# Patient Record
Sex: FEMALE | Race: BLACK OR AFRICAN AMERICAN | Employment: OTHER | URBAN - METROPOLITAN AREA
[De-identification: names, ages, dates, MRNs, and addresses within clinical notes are randomized per-mention and may not be internally consistent; named-entity substitution may affect disease eponyms.]

---

## 2018-08-13 ENCOUNTER — OFFICE VISIT (OUTPATIENT)
Dept: INTERNAL MEDICINE CLINIC | Age: 68
End: 2018-08-13

## 2018-08-13 VITALS
TEMPERATURE: 98.5 F | RESPIRATION RATE: 16 BRPM | WEIGHT: 191.6 LBS | OXYGEN SATURATION: 99 % | BODY MASS INDEX: 28.29 KG/M2 | SYSTOLIC BLOOD PRESSURE: 139 MMHG | DIASTOLIC BLOOD PRESSURE: 90 MMHG | HEART RATE: 80 BPM

## 2018-08-13 DIAGNOSIS — I48.19 PERSISTENT ATRIAL FIBRILLATION (HCC): Primary | ICD-10-CM

## 2018-08-13 DIAGNOSIS — Z13.220 SCREENING FOR HYPERLIPIDEMIA: ICD-10-CM

## 2018-08-13 DIAGNOSIS — Z23 NEED FOR PROPHYLACTIC VACCINATION AGAINST DIPHTHERIA-TETANUS-PERTUSSIS (DTP): ICD-10-CM

## 2018-08-13 DIAGNOSIS — Z12.11 SCREENING FOR COLON CANCER: ICD-10-CM

## 2018-08-13 DIAGNOSIS — E87.6 HYPOKALEMIA: ICD-10-CM

## 2018-08-13 DIAGNOSIS — Z23 NEED FOR PROPHYLACTIC VACCINATION AND INOCULATION AGAINST VARICELLA: ICD-10-CM

## 2018-08-13 DIAGNOSIS — E04.1 THYROID NODULE: ICD-10-CM

## 2018-08-13 DIAGNOSIS — Z12.31 ENCOUNTER FOR SCREENING MAMMOGRAM FOR BREAST CANCER: ICD-10-CM

## 2018-08-13 DIAGNOSIS — Z78.0 POST-MENOPAUSAL: ICD-10-CM

## 2018-08-13 DIAGNOSIS — Z13.1 ENCOUNTER FOR SCREENING FOR DIABETES MELLITUS: ICD-10-CM

## 2018-08-13 DIAGNOSIS — I10 ESSENTIAL HYPERTENSION: ICD-10-CM

## 2018-08-13 PROBLEM — Z79.01 CHRONIC ANTICOAGULATION: Status: ACTIVE | Noted: 2017-10-13

## 2018-08-13 PROBLEM — Z79.69 ON ANTINEOPLASTIC CHEMOTHERAPY: Status: ACTIVE | Noted: 2018-06-07

## 2018-08-13 PROBLEM — C54.1 ENDOMETRIAL CANCER (HCC): Status: ACTIVE | Noted: 2018-04-03

## 2018-08-13 PROBLEM — Z79.899 ON ANTINEOPLASTIC CHEMOTHERAPY: Status: ACTIVE | Noted: 2018-06-07

## 2018-08-13 PROBLEM — G62.0 CHEMOTHERAPY-INDUCED NEUROPATHY (HCC): Status: ACTIVE | Noted: 2018-06-19

## 2018-08-13 PROBLEM — R74.8 ALKALINE PHOSPHATASE ELEVATION: Status: ACTIVE | Noted: 2018-03-08

## 2018-08-13 PROBLEM — D64.9 ANEMIA: Status: ACTIVE | Noted: 2017-07-20

## 2018-08-13 PROBLEM — R55 SYNCOPE: Status: ACTIVE | Noted: 2018-07-17

## 2018-08-13 PROBLEM — K56.600 PARTIAL SMALL BOWEL OBSTRUCTION (HCC): Status: ACTIVE | Noted: 2018-06-08

## 2018-08-13 PROBLEM — T45.1X5A CHEMOTHERAPY-INDUCED NEUROPATHY (HCC): Status: ACTIVE | Noted: 2018-06-19

## 2018-08-13 PROBLEM — R74.8 ABNORMAL LIVER ENZYMES: Status: ACTIVE | Noted: 2017-07-20

## 2018-08-13 PROBLEM — R73.03 PREDIABETES: Status: ACTIVE | Noted: 2018-03-08

## 2018-08-13 PROBLEM — R10.84 ABDOMINAL PAIN, GENERALIZED: Status: ACTIVE | Noted: 2018-06-07

## 2018-08-13 PROCEDURE — 99203 OFFICE O/P NEW LOW 30 MIN: CPT | Performed by: INTERNAL MEDICINE

## 2018-08-13 RX ORDER — HYDROCHLOROTHIAZIDE 25 MG/1
25 TABLET ORAL
COMMUNITY
Start: 2018-02-05 | End: 2018-08-13 | Stop reason: SDUPTHER

## 2018-08-13 RX ORDER — ONDANSETRON HYDROCHLORIDE 8 MG/1
TABLET, FILM COATED ORAL
COMMUNITY
Start: 2018-05-17 | End: 2018-11-13

## 2018-08-13 RX ORDER — METOPROLOL SUCCINATE 25 MG/1
25 TABLET, EXTENDED RELEASE ORAL
COMMUNITY
Start: 2018-07-26 | End: 2018-08-13 | Stop reason: SDUPTHER

## 2018-08-13 RX ORDER — PROCHLORPERAZINE MALEATE 10 MG
10 TABLET ORAL
COMMUNITY
Start: 2018-05-17 | End: 2018-11-13

## 2018-08-13 RX ORDER — METOPROLOL SUCCINATE 50 MG/1
50 TABLET, EXTENDED RELEASE ORAL DAILY
Qty: 30 TABLET | Refills: 5 | Status: SHIPPED | OUTPATIENT
Start: 2018-08-13 | End: 2018-09-24 | Stop reason: SDUPTHER

## 2018-08-13 RX ORDER — DOCUSATE SODIUM 100 MG/1
100 CAPSULE, LIQUID FILLED ORAL
COMMUNITY
Start: 2018-04-23 | End: 2018-11-13

## 2018-08-13 RX ORDER — PLANT STANOL ESTER 450 MG
550 TABLET ORAL DAILY
COMMUNITY
End: 2018-08-13 | Stop reason: ALTCHOICE

## 2018-08-13 RX ORDER — POLYETHYLENE GLYCOL 3350 17 G/17G
17 POWDER, FOR SOLUTION ORAL
COMMUNITY
End: 2018-11-13

## 2018-08-13 RX ORDER — PLANT STANOL ESTER 450 MG
550 TABLET ORAL DAILY
Qty: 90 TABLET | Refills: 0 | Status: SHIPPED | OUTPATIENT
Start: 2018-08-13 | End: 2018-11-13

## 2018-08-13 ASSESSMENT — PATIENT HEALTH QUESTIONNAIRE - PHQ9
SUM OF ALL RESPONSES TO PHQ QUESTIONS 1-9: 0
SUM OF ALL RESPONSES TO PHQ9 QUESTIONS 1 & 2: 0
1. LITTLE INTEREST OR PLEASURE IN DOING THINGS: 0
SUM OF ALL RESPONSES TO PHQ QUESTIONS 1-9: 0
2. FEELING DOWN, DEPRESSED OR HOPELESS: 0

## 2018-08-13 ASSESSMENT — ENCOUNTER SYMPTOMS
EYES NEGATIVE: 1
GASTROINTESTINAL NEGATIVE: 1
RESPIRATORY NEGATIVE: 1
ALLERGIC/IMMUNOLOGIC NEGATIVE: 1

## 2018-08-13 NOTE — PROGRESS NOTES
MAGNESIA) 400 MG/5ML suspension Take by mouth daily as needed for Constipation      amLODIPine-benazepril (LOTREL) 10-20 MG per capsule Take 1 capsule by mouth daily      hydrochlorothiazide (HYDRODIURIL) 25 MG tablet Take 25 mg by mouth daily      flecainide (TAMBOCOR) 100 MG tablet Take 100 mg by mouth 2 times daily      dicyclomine (BENTYL) 10 MG capsule Take 1 capsule by mouth every 6 hours as needed (cramps) 20 capsule 0     No current facility-administered medications for this visit. Vitals:    08/13/18 0927   BP: (!) 146/84   Pulse: 80   Resp: 16   Temp: 98.5 °F (36.9 °C)   TempSrc: Oral   SpO2: 99%   Weight: 191 lb 9.6 oz (86.9 kg)     Body mass index is 28.29 kg/m². Wt Readings from Last 3 Encounters:   08/13/18 191 lb 9.6 oz (86.9 kg)   09/10/17 178 lb (80.7 kg)     BP Readings from Last 3 Encounters:   08/13/18 (!) 146/84   09/10/17 116/68       Objective:   Physical Exam   Constitutional: She is oriented to person, place, and time. She appears well-developed and well-nourished. HENT:   Head: Normocephalic and atraumatic. Eyes: Pupils are equal, round, and reactive to light. Conjunctivae and EOM are normal.   Neck: Normal range of motion. Neck supple. Cardiovascular: Normal rate, regular rhythm, normal heart sounds and intact distal pulses. Pulmonary/Chest: Effort normal and breath sounds normal. No respiratory distress. Musculoskeletal: Normal range of motion. She exhibits no edema. Neurological: She is alert and oriented to person, place, and time. Skin: Skin is warm. Psychiatric: She has a normal mood and affect. Her behavior is normal. Judgment and thought content normal.   Nursing note and vitals reviewed. REBECCA SCR MIKEL DIG CAD TOMO12/15/2017  [facility]   Result Impression       No mammographic evidence of malignancy.     ASSESSMENT:  BI-RADS CATEGORY: 1 - Negative    RECOMMENDATION:  Annual screening mammogram.   Result Narrative   HISTORY:   Screening Mammogram with varicella  -     zoster recombinant adjuvanted vaccine (SHINGRIX) 50 MCG SUSR injection; 50 MCG IM then repeat 2-6 months. Essential hypertension  -     Tiffany Khan MD  -     metoprolol succinate (TOPROL XL) 50 MG extended release tablet; Take 1 tablet by mouth daily    Thyroid nodule  -     US Head Neck Soft Tissue Thyroid; Future    Hypokalemia  -     potassium gluconate 550 (90 K) MG TABS tablet; Take 1 tablet by mouth daily    Return in about 6 weeks (around 9/24/2018) for Hypertension. Return in about 6 weeks (around 9/24/2018) for Hypertension.       Liam Reyna MD

## 2018-08-13 NOTE — PATIENT INSTRUCTIONS
Please call to schedule ultrasound, schedule with Dr. Hailey Erickson (heart doctor), Check blood pressure at home.

## 2018-08-21 ENCOUNTER — HOSPITAL ENCOUNTER (OUTPATIENT)
Dept: ULTRASOUND IMAGING | Age: 68
Discharge: HOME | End: 2018-08-21
Attending: INTERNAL MEDICINE | Admitting: INTERNAL MEDICINE

## 2018-08-21 DIAGNOSIS — E04.1 NONTOXIC SINGLE THYROID NODULE: ICD-10-CM

## 2018-08-21 DIAGNOSIS — E04.1 THYROID NODULE: ICD-10-CM

## 2018-08-21 NOTE — TELEPHONE ENCOUNTER
[facility]    Informed pharmacist    Pharmacist voiced understanding

## 2018-09-04 ASSESSMENT — ENCOUNTER SYMPTOMS
ORTHOPNEA: 0
SHORTNESS OF BREATH: 0
BLURRED VISION: 0

## 2018-09-26 ENCOUNTER — OFFICE VISIT (OUTPATIENT)
Dept: INTERNAL MEDICINE CLINIC | Age: 68
End: 2018-09-26
Payer: MEDICARE

## 2018-09-26 VITALS
HEART RATE: 54 BPM | SYSTOLIC BLOOD PRESSURE: 134 MMHG | RESPIRATION RATE: 16 BRPM | HEIGHT: 67 IN | WEIGHT: 196 LBS | TEMPERATURE: 98.3 F | BODY MASS INDEX: 30.76 KG/M2 | DIASTOLIC BLOOD PRESSURE: 78 MMHG | OXYGEN SATURATION: 99 %

## 2018-09-26 DIAGNOSIS — I10 ESSENTIAL HYPERTENSION: Primary | ICD-10-CM

## 2018-09-26 DIAGNOSIS — Z12.31 ENCOUNTER FOR SCREENING MAMMOGRAM FOR BREAST CANCER: ICD-10-CM

## 2018-09-26 DIAGNOSIS — Z23 NEEDS FLU SHOT: ICD-10-CM

## 2018-09-26 DIAGNOSIS — R73.03 PREDIABETES: ICD-10-CM

## 2018-09-26 DIAGNOSIS — I48.19 PERSISTENT ATRIAL FIBRILLATION (HCC): ICD-10-CM

## 2018-09-26 DIAGNOSIS — E04.1 THYROID NODULE: ICD-10-CM

## 2018-09-26 DIAGNOSIS — Z78.0 POST-MENOPAUSAL: ICD-10-CM

## 2018-09-26 DIAGNOSIS — Z12.11 SCREENING FOR COLON CANCER: ICD-10-CM

## 2018-09-26 LAB — HBA1C MFR BLD: 5.6 %

## 2018-09-26 PROCEDURE — G8427 DOCREV CUR MEDS BY ELIG CLIN: HCPCS | Performed by: INTERNAL MEDICINE

## 2018-09-26 PROCEDURE — G8400 PT W/DXA NO RESULTS DOC: HCPCS | Performed by: INTERNAL MEDICINE

## 2018-09-26 PROCEDURE — 99203 OFFICE O/P NEW LOW 30 MIN: CPT | Performed by: INTERNAL MEDICINE

## 2018-09-26 PROCEDURE — 83036 HEMOGLOBIN GLYCOSYLATED A1C: CPT | Performed by: INTERNAL MEDICINE

## 2018-09-26 PROCEDURE — G8417 CALC BMI ABV UP PARAM F/U: HCPCS | Performed by: INTERNAL MEDICINE

## 2018-09-26 PROCEDURE — 1090F PRES/ABSN URINE INCON ASSESS: CPT | Performed by: INTERNAL MEDICINE

## 2018-09-26 PROCEDURE — 4040F PNEUMOC VAC/ADMIN/RCVD: CPT | Performed by: INTERNAL MEDICINE

## 2018-09-26 PROCEDURE — 1036F TOBACCO NON-USER: CPT | Performed by: INTERNAL MEDICINE

## 2018-09-26 PROCEDURE — 1123F ACP DISCUSS/DSCN MKR DOCD: CPT | Performed by: INTERNAL MEDICINE

## 2018-09-26 PROCEDURE — 1101F PT FALLS ASSESS-DOCD LE1/YR: CPT | Performed by: INTERNAL MEDICINE

## 2018-09-26 PROCEDURE — 3017F COLORECTAL CA SCREEN DOC REV: CPT | Performed by: INTERNAL MEDICINE

## 2018-09-26 RX ORDER — METOPROLOL SUCCINATE 50 MG/1
50 TABLET, EXTENDED RELEASE ORAL DAILY
Qty: 30 TABLET | Refills: 5 | Status: ON HOLD | OUTPATIENT
Start: 2018-09-26 | End: 2019-03-14 | Stop reason: HOSPADM

## 2018-09-26 ASSESSMENT — ENCOUNTER SYMPTOMS
EYES NEGATIVE: 1
SHORTNESS OF BREATH: 0
ALLERGIC/IMMUNOLOGIC NEGATIVE: 1
BLURRED VISION: 0
ORTHOPNEA: 0

## 2018-09-26 NOTE — PROGRESS NOTES
Subjective:      Patient ID: Jessica Mckinney is a 79 y.o. female. Chief Complaint   Patient presents with    Hypertension     follow up  HTN     Hypertension   This is a chronic problem. The problem is controlled. Associated symptoms include headaches (Earlier this week, lingered a couple of days but went away. ). Pertinent negatives include no anxiety, blurred vision, chest pain, malaise/fatigue, neck pain, orthopnea, palpitations, peripheral edema, PND, shortness of breath or sweats. Past treatments include diuretics, beta blockers and calcium channel blockers. The current treatment provides moderate improvement. There are no compliance problems. Exercise: \"walking now and then\". Diet: healthy, eating fruits and vegetables. Drinking plenty of water. She is currently undergoing radiation therapy for endometrial cancer. No longer on chemotherapy due to side effects/wasn't working for her. Last radiation treatment was yesterday (9/25/2018). Review of Systems   Constitutional: Negative for activity change, appetite change, fatigue and malaise/fatigue. HENT: Negative. Eyes: Negative. Negative for blurred vision and visual disturbance. Respiratory: Negative for shortness of breath. Cardiovascular: Negative for chest pain, palpitations, orthopnea and PND. Endocrine: Negative. Genitourinary: Negative. Musculoskeletal: Negative for neck pain. Skin: Negative. Allergic/Immunologic: Negative. Neurological: Positive for headaches (Earlier this week, lingered a couple of days but went away. ). Psychiatric/Behavioral: Negative for agitation. The patient is not nervous/anxious.       Past Medical History:   Diagnosis Date    Anemia     Atrial fibrillation (HCC)     Endometrial cancer (HCC)     Hypertension      Allergies   Allergen Reactions    Naproxen Swelling    Lisinopril Rash     Current Outpatient Prescriptions   Medication Sig Dispense Refill    metoprolol succinate (TOPROL XL) (FIT); Future    Needs flu shot  -     Influenza, High Dose, 65 yrs  and older, IM, PF, Prefill Syr, 0.5mL (FLUZONE HD)    Persistent atrial fibrillation (HCC)  -     metoprolol succinate (TOPROL XL) 50 MG extended release tablet; Take 1 tablet by mouth daily    Thyroid nodule  -     Wilhelminia Bloch, MD (Endocrinology Referral)    Follow up in 3 months for Hypertension.      ROBYN Adame-Student

## 2018-09-27 PROCEDURE — G0008 ADMIN INFLUENZA VIRUS VAC: HCPCS | Performed by: INTERNAL MEDICINE

## 2018-09-27 PROCEDURE — 90662 IIV NO PRSV INCREASED AG IM: CPT | Performed by: INTERNAL MEDICINE

## 2018-09-28 RX ORDER — HYDROCHLOROTHIAZIDE 25 MG/1
25 TABLET ORAL DAILY
Qty: 30 TABLET | Refills: 3 | Status: SHIPPED | OUTPATIENT
Start: 2018-09-28 | End: 2019-02-17 | Stop reason: SDUPTHER

## 2018-10-03 NOTE — PROGRESS NOTES
[facility]   Electrophysiology Consultation   Date: 10/9/2018  Reason for Consultation: Atrial Fibrillation  Consult Requesting Physician: Jennifer Crabtree MD     HPI: Quang Toribio is a 79 y.o. with PMH of HTN is being referred by PCP, Dr. Ha Avalos, for evaluation of Afib. She was originally diagnosed with Afib in 7/2017, followed by cardiologist at [address] [place] [address]. She underwent DCCVs on 8/2017 and 9/2017. All ECGs since that time showed AF, rate controlled. No TSH found. On metoprolol 50 mg daily and Xarelto 20 mg daily for stroke prevention. She was a patient of Dr. Jovana Bryant. In the past, she has not been interested in amiodarone therapy or ablation. Diagnosed with FIGO Stage IA (pT1a(2), N0, M0) serous carcinoma of the endometrium, status post TLH/BSO and five of a planned six cycles of adjuvant chemotherapy completed 08/28/2018. Her final treatment for radiation is today and completed with chemotherapy. She had episode of syncope while receiving chemo in 7/2018. Interval History:  Here today for second opinion regarding AF. She has been asymptomatic for the past 6 months. BP is elevated today. She does not check her BP at home. She reports feeling fatigued during the day. Past Medical History:   Diagnosis Date    Anemia     Atrial fibrillation (HCC)     [address] [facility] ([facility] [address] [address]     Hypertension       Past Surgical History:   Procedure Laterality Date    CARDIOVERSION      KEKE AND BSO      TUBAL LIGATION       Allergies   Allergen Reactions    Naproxen Swelling    Lisinopril Rash       Social History:   reports that she has never smoked. She has never used smokeless tobacco. She reports that she does not drink alcohol or use drugs. Family History:  family history includes Colon Cancer in her maternal grandmother; Hypertension in her mother. Review of System:  All other systems reviewed except for that noted above.  Pertinent negatives and positives

## 2018-10-09 ENCOUNTER — OFFICE VISIT (OUTPATIENT)
Dept: CARDIOLOGY CLINIC | Age: 68
End: 2018-10-09
Payer: MEDICARE

## 2018-10-09 VITALS
WEIGHT: 195 LBS | HEART RATE: 65 BPM | HEIGHT: 67 IN | SYSTOLIC BLOOD PRESSURE: 154 MMHG | BODY MASS INDEX: 30.61 KG/M2 | DIASTOLIC BLOOD PRESSURE: 82 MMHG

## 2018-10-09 DIAGNOSIS — R53.83 FATIGUE, UNSPECIFIED TYPE: ICD-10-CM

## 2018-10-09 DIAGNOSIS — I10 ESSENTIAL HYPERTENSION: ICD-10-CM

## 2018-10-09 DIAGNOSIS — I48.19 PERSISTENT ATRIAL FIBRILLATION (HCC): Primary | ICD-10-CM

## 2018-10-09 PROCEDURE — 3017F COLORECTAL CA SCREEN DOC REV: CPT | Performed by: INTERNAL MEDICINE

## 2018-10-09 PROCEDURE — G8482 FLU IMMUNIZE ORDER/ADMIN: HCPCS | Performed by: INTERNAL MEDICINE

## 2018-10-09 PROCEDURE — 1036F TOBACCO NON-USER: CPT | Performed by: INTERNAL MEDICINE

## 2018-10-09 PROCEDURE — G8417 CALC BMI ABV UP PARAM F/U: HCPCS | Performed by: INTERNAL MEDICINE

## 2018-10-09 PROCEDURE — G8427 DOCREV CUR MEDS BY ELIG CLIN: HCPCS | Performed by: INTERNAL MEDICINE

## 2018-10-09 PROCEDURE — G8400 PT W/DXA NO RESULTS DOC: HCPCS | Performed by: INTERNAL MEDICINE

## 2018-10-09 PROCEDURE — 1101F PT FALLS ASSESS-DOCD LE1/YR: CPT | Performed by: INTERNAL MEDICINE

## 2018-10-09 PROCEDURE — 99204 OFFICE O/P NEW MOD 45 MIN: CPT | Performed by: INTERNAL MEDICINE

## 2018-10-09 PROCEDURE — 4040F PNEUMOC VAC/ADMIN/RCVD: CPT | Performed by: INTERNAL MEDICINE

## 2018-10-09 PROCEDURE — 1123F ACP DISCUSS/DSCN MKR DOCD: CPT | Performed by: INTERNAL MEDICINE

## 2018-10-09 PROCEDURE — 93000 ELECTROCARDIOGRAM COMPLETE: CPT | Performed by: INTERNAL MEDICINE

## 2018-10-09 PROCEDURE — 1090F PRES/ABSN URINE INCON ASSESS: CPT | Performed by: INTERNAL MEDICINE

## 2018-10-09 RX ORDER — M-VIT,TX,IRON,MINS/CALC/FOLIC 27MG-0.4MG
1 TABLET ORAL DAILY
COMMUNITY
End: 2022-03-29

## 2018-11-13 ENCOUNTER — OFFICE VISIT (OUTPATIENT)
Dept: ENDOCRINOLOGY | Age: 68
End: 2018-11-13
Payer: MEDICARE

## 2018-11-13 VITALS
SYSTOLIC BLOOD PRESSURE: 136 MMHG | HEART RATE: 80 BPM | OXYGEN SATURATION: 99 % | HEIGHT: 69 IN | WEIGHT: 196 LBS | BODY MASS INDEX: 29.03 KG/M2 | DIASTOLIC BLOOD PRESSURE: 82 MMHG

## 2018-11-13 DIAGNOSIS — I10 ESSENTIAL HYPERTENSION: ICD-10-CM

## 2018-11-13 DIAGNOSIS — Z79.899 ON ANTINEOPLASTIC CHEMOTHERAPY: ICD-10-CM

## 2018-11-13 DIAGNOSIS — E04.1 THYROID NODULE: Primary | ICD-10-CM

## 2018-11-13 DIAGNOSIS — K56.600 PARTIAL SMALL BOWEL OBSTRUCTION (HCC): ICD-10-CM

## 2018-11-13 DIAGNOSIS — I48.19 PERSISTENT ATRIAL FIBRILLATION (HCC): ICD-10-CM

## 2018-11-13 DIAGNOSIS — R55 VASOVAGAL SYNCOPE: ICD-10-CM

## 2018-11-13 PROCEDURE — G8482 FLU IMMUNIZE ORDER/ADMIN: HCPCS | Performed by: INTERNAL MEDICINE

## 2018-11-13 PROCEDURE — 3017F COLORECTAL CA SCREEN DOC REV: CPT | Performed by: INTERNAL MEDICINE

## 2018-11-13 PROCEDURE — G8417 CALC BMI ABV UP PARAM F/U: HCPCS | Performed by: INTERNAL MEDICINE

## 2018-11-13 PROCEDURE — 1101F PT FALLS ASSESS-DOCD LE1/YR: CPT | Performed by: INTERNAL MEDICINE

## 2018-11-13 PROCEDURE — 1090F PRES/ABSN URINE INCON ASSESS: CPT | Performed by: INTERNAL MEDICINE

## 2018-11-13 PROCEDURE — 99203 OFFICE O/P NEW LOW 30 MIN: CPT | Performed by: INTERNAL MEDICINE

## 2018-11-13 PROCEDURE — G8427 DOCREV CUR MEDS BY ELIG CLIN: HCPCS | Performed by: INTERNAL MEDICINE

## 2018-11-13 NOTE — PROGRESS NOTES
blockers      3. Partial small bowel obstruction (HCC)  Stable    4. Vasovagal syncope  Stable    5. Essential hypertension  Well controlled on current therapy    6. On antineoplastic chemotherapy  For her ovarian cancer      Reviewed and/or ordered clinical lab results Yes  Reviewed and/or ordered radiology tests Yes   Reviewed and/or ordered other diagnostic tests Yes  Made a decision to obtain old records Yes  Reviewed and summarized old records Yes      Stef Barnes was counseled regarding symptoms of current diagnosis, course and complications of disease if inadequately treated, side effects of medications, diagnosis, treatment options, and prognosis, risks, benefits, complications, and alternatives of treatment, labs, imaging and other studies and treatment targets and goals. She understands instructions and counseling. Total time spent 40 minutes , more than 50 % if this time was spent on face to face counseling. Return in about 3 months (around 2/13/2019). Please note that some or all of this report was generated using voice recognition software. Please notify me in case of any questions about the content of this document, as some errors in transcription may have occurred .

## 2019-02-18 RX ORDER — HYDROCHLOROTHIAZIDE 25 MG/1
TABLET ORAL
Qty: 30 TABLET | Refills: 2 | Status: SHIPPED | OUTPATIENT
Start: 2019-02-18 | End: 2019-05-14 | Stop reason: SDUPTHER

## 2019-02-26 ENCOUNTER — HOSPITAL ENCOUNTER (OUTPATIENT)
Dept: ULTRASOUND IMAGING | Age: 69
Discharge: HOME | End: 2019-02-26
Payer: MEDICARE

## 2019-02-26 DIAGNOSIS — E04.1 THYROID NODULE: ICD-10-CM

## 2019-02-26 PROCEDURE — 76536 US EXAM OF HEAD AND NECK: CPT

## 2019-03-01 ENCOUNTER — TELEPHONE (OUTPATIENT)
Dept: INTERNAL MEDICINE CLINIC | Age: 69
End: 2019-03-01

## 2019-03-05 ENCOUNTER — OFFICE VISIT (OUTPATIENT)
Dept: ENDOCRINOLOGY | Age: 69
End: 2019-03-05
Payer: MEDICARE

## 2019-03-05 VITALS
HEIGHT: 69 IN | WEIGHT: 186.2 LBS | SYSTOLIC BLOOD PRESSURE: 132 MMHG | DIASTOLIC BLOOD PRESSURE: 86 MMHG | HEART RATE: 71 BPM | BODY MASS INDEX: 27.58 KG/M2 | OXYGEN SATURATION: 99 %

## 2019-03-05 DIAGNOSIS — C56.9 MALIGNANT NEOPLASM OF OVARY, UNSPECIFIED LATERALITY (HCC): ICD-10-CM

## 2019-03-05 DIAGNOSIS — I48.19 PERSISTENT ATRIAL FIBRILLATION (HCC): ICD-10-CM

## 2019-03-05 DIAGNOSIS — E04.1 THYROID NODULE: ICD-10-CM

## 2019-03-05 DIAGNOSIS — E04.1 THYROID NODULE: Primary | ICD-10-CM

## 2019-03-05 DIAGNOSIS — I10 ESSENTIAL HYPERTENSION: ICD-10-CM

## 2019-03-05 LAB
A/G RATIO: 1.4 (ref 1.1–2.2)
ALBUMIN SERPL-MCNC: 4.1 G/DL (ref 3.4–5)
ALP BLD-CCNC: 196 U/L (ref 40–129)
ALT SERPL-CCNC: 21 U/L (ref 10–40)
ANION GAP SERPL CALCULATED.3IONS-SCNC: 15 MMOL/L (ref 3–16)
ANTI-THYROGLOB ABS: 374 IU/ML
AST SERPL-CCNC: 29 U/L (ref 15–37)
BILIRUB SERPL-MCNC: 0.6 MG/DL (ref 0–1)
BUN BLDV-MCNC: 12 MG/DL (ref 7–20)
CALCIUM SERPL-MCNC: 9.6 MG/DL (ref 8.3–10.6)
CHLORIDE BLD-SCNC: 98 MMOL/L (ref 99–110)
CO2: 28 MMOL/L (ref 21–32)
CREAT SERPL-MCNC: <0.5 MG/DL (ref 0.6–1.2)
GFR AFRICAN AMERICAN: >60
GFR NON-AFRICAN AMERICAN: >60
GLOBULIN: 3 G/DL
GLUCOSE BLD-MCNC: 109 MG/DL (ref 70–99)
PARATHYROID HORMONE INTACT: 52.7 PG/ML (ref 14–72)
POTASSIUM SERPL-SCNC: 3.9 MMOL/L (ref 3.5–5.1)
SODIUM BLD-SCNC: 141 MMOL/L (ref 136–145)
T3 TOTAL: 3.42 NG/ML (ref 0.8–2)
T4 FREE: 4.4 NG/DL (ref 0.9–1.8)
THYROID PEROXIDASE (TPO) ABS: 297 IU/ML
TOTAL PROTEIN: 7.1 G/DL (ref 6.4–8.2)
TSH SERPL DL<=0.05 MIU/L-ACNC: <0.01 UIU/ML (ref 0.27–4.2)

## 2019-03-05 PROCEDURE — 3017F COLORECTAL CA SCREEN DOC REV: CPT | Performed by: INTERNAL MEDICINE

## 2019-03-05 PROCEDURE — 4040F PNEUMOC VAC/ADMIN/RCVD: CPT | Performed by: INTERNAL MEDICINE

## 2019-03-05 PROCEDURE — G8427 DOCREV CUR MEDS BY ELIG CLIN: HCPCS | Performed by: INTERNAL MEDICINE

## 2019-03-05 PROCEDURE — G8400 PT W/DXA NO RESULTS DOC: HCPCS | Performed by: INTERNAL MEDICINE

## 2019-03-05 PROCEDURE — 1036F TOBACCO NON-USER: CPT | Performed by: INTERNAL MEDICINE

## 2019-03-05 PROCEDURE — 1123F ACP DISCUSS/DSCN MKR DOCD: CPT | Performed by: INTERNAL MEDICINE

## 2019-03-05 PROCEDURE — G8417 CALC BMI ABV UP PARAM F/U: HCPCS | Performed by: INTERNAL MEDICINE

## 2019-03-05 PROCEDURE — 1090F PRES/ABSN URINE INCON ASSESS: CPT | Performed by: INTERNAL MEDICINE

## 2019-03-05 PROCEDURE — G8482 FLU IMMUNIZE ORDER/ADMIN: HCPCS | Performed by: INTERNAL MEDICINE

## 2019-03-05 PROCEDURE — 99213 OFFICE O/P EST LOW 20 MIN: CPT | Performed by: INTERNAL MEDICINE

## 2019-03-05 PROCEDURE — 1101F PT FALLS ASSESS-DOCD LE1/YR: CPT | Performed by: INTERNAL MEDICINE

## 2019-03-06 ENCOUNTER — NURSE TRIAGE (OUTPATIENT)
Dept: OTHER | Facility: CLINIC | Age: 69
End: 2019-03-06

## 2019-03-07 ENCOUNTER — OFFICE VISIT (OUTPATIENT)
Dept: INTERNAL MEDICINE CLINIC | Age: 69
End: 2019-03-07
Payer: MEDICARE

## 2019-03-07 VITALS
DIASTOLIC BLOOD PRESSURE: 80 MMHG | SYSTOLIC BLOOD PRESSURE: 128 MMHG | HEART RATE: 75 BPM | BODY MASS INDEX: 27.32 KG/M2 | OXYGEN SATURATION: 97 % | WEIGHT: 185 LBS

## 2019-03-07 DIAGNOSIS — B07.9 VIRAL WARTS, UNSPECIFIED TYPE: ICD-10-CM

## 2019-03-07 DIAGNOSIS — K62.5 BRIGHT RED RECTAL BLEEDING: ICD-10-CM

## 2019-03-07 DIAGNOSIS — K62.5 BRIGHT RED RECTAL BLEEDING: Primary | ICD-10-CM

## 2019-03-07 DIAGNOSIS — E04.1 THYROID NODULE: ICD-10-CM

## 2019-03-07 LAB
BASOPHILS ABSOLUTE: 0 K/UL (ref 0–0.2)
BASOPHILS RELATIVE PERCENT: 0.3 %
EOSINOPHILS ABSOLUTE: 0 K/UL (ref 0–0.6)
EOSINOPHILS RELATIVE PERCENT: 1.1 %
HCT VFR BLD CALC: 32.3 % (ref 36–48)
HEMOGLOBIN: 10.6 G/DL (ref 12–16)
LYMPHOCYTES ABSOLUTE: 1.2 K/UL (ref 1–5.1)
LYMPHOCYTES RELATIVE PERCENT: 39.8 %
MCH RBC QN AUTO: 29.4 PG (ref 26–34)
MCHC RBC AUTO-ENTMCNC: 32.7 G/DL (ref 31–36)
MCV RBC AUTO: 89.9 FL (ref 80–100)
MONOCYTES ABSOLUTE: 0.5 K/UL (ref 0–1.3)
MONOCYTES RELATIVE PERCENT: 14.7 %
NEUTROPHILS ABSOLUTE: 1.4 K/UL (ref 1.7–7.7)
NEUTROPHILS RELATIVE PERCENT: 44.1 %
PDW BLD-RTO: 15.6 % (ref 12.4–15.4)
PLATELET # BLD: 144 K/UL (ref 135–450)
PMV BLD AUTO: 10.9 FL (ref 5–10.5)
RBC # BLD: 3.59 M/UL (ref 4–5.2)
WBC # BLD: 3.1 K/UL (ref 4–11)

## 2019-03-07 PROCEDURE — 3017F COLORECTAL CA SCREEN DOC REV: CPT | Performed by: NURSE PRACTITIONER

## 2019-03-07 PROCEDURE — 1090F PRES/ABSN URINE INCON ASSESS: CPT | Performed by: NURSE PRACTITIONER

## 2019-03-07 PROCEDURE — 99214 OFFICE O/P EST MOD 30 MIN: CPT | Performed by: NURSE PRACTITIONER

## 2019-03-07 PROCEDURE — G8482 FLU IMMUNIZE ORDER/ADMIN: HCPCS | Performed by: NURSE PRACTITIONER

## 2019-03-07 PROCEDURE — 1101F PT FALLS ASSESS-DOCD LE1/YR: CPT | Performed by: NURSE PRACTITIONER

## 2019-03-07 PROCEDURE — 1036F TOBACCO NON-USER: CPT | Performed by: NURSE PRACTITIONER

## 2019-03-07 PROCEDURE — 4040F PNEUMOC VAC/ADMIN/RCVD: CPT | Performed by: NURSE PRACTITIONER

## 2019-03-07 PROCEDURE — G8427 DOCREV CUR MEDS BY ELIG CLIN: HCPCS | Performed by: NURSE PRACTITIONER

## 2019-03-07 PROCEDURE — G8417 CALC BMI ABV UP PARAM F/U: HCPCS | Performed by: NURSE PRACTITIONER

## 2019-03-07 PROCEDURE — 1123F ACP DISCUSS/DSCN MKR DOCD: CPT | Performed by: NURSE PRACTITIONER

## 2019-03-07 PROCEDURE — G8510 SCR DEP NEG, NO PLAN REQD: HCPCS | Performed by: NURSE PRACTITIONER

## 2019-03-07 PROCEDURE — G8400 PT W/DXA NO RESULTS DOC: HCPCS | Performed by: NURSE PRACTITIONER

## 2019-03-07 ASSESSMENT — PATIENT HEALTH QUESTIONNAIRE - PHQ9
SUM OF ALL RESPONSES TO PHQ QUESTIONS 1-9: 0
SUM OF ALL RESPONSES TO PHQ QUESTIONS 1-9: 0
2. FEELING DOWN, DEPRESSED OR HOPELESS: 0
1. LITTLE INTEREST OR PLEASURE IN DOING THINGS: 0
SUM OF ALL RESPONSES TO PHQ9 QUESTIONS 1 & 2: 0

## 2019-03-07 ASSESSMENT — ENCOUNTER SYMPTOMS
BLOOD IN STOOL: 1
RESPIRATORY NEGATIVE: 1
HEMATOCHEZIA: 1
EYES NEGATIVE: 1
ABDOMINAL PAIN: 1

## 2019-03-08 ENCOUNTER — TELEPHONE (OUTPATIENT)
Dept: ENDOCRINOLOGY | Age: 69
End: 2019-03-08

## 2019-03-09 LAB — TSH RECEPTOR AB: 10.32 IU/L

## 2019-03-10 RX ORDER — METHIMAZOLE 10 MG/1
10 TABLET ORAL DAILY
Qty: 30 TABLET | Refills: 5 | Status: SHIPPED | OUTPATIENT
Start: 2019-03-10 | End: 2019-05-29 | Stop reason: SDUPTHER

## 2019-03-11 ENCOUNTER — TELEPHONE (OUTPATIENT)
Dept: CARDIOLOGY CLINIC | Age: 69
End: 2019-03-11

## 2019-03-11 LAB — THYROID STIMULATING IMMUNOGLOBULIN: 410 %

## 2019-03-12 ENCOUNTER — ANESTHESIA EVENT (OUTPATIENT)
Dept: ENDOSCOPY | Age: 69
End: 2019-03-12
Payer: MEDICARE

## 2019-03-14 ENCOUNTER — HOSPITAL ENCOUNTER (EMERGENCY)
Age: 69
Discharge: HOME | End: 2019-03-14
Attending: EMERGENCY MEDICINE
Payer: MEDICARE

## 2019-03-14 ENCOUNTER — ANESTHESIA (OUTPATIENT)
Dept: ENDOSCOPY | Age: 69
End: 2019-03-14
Payer: MEDICARE

## 2019-03-14 ENCOUNTER — APPOINTMENT (OUTPATIENT)
Dept: CT IMAGING | Age: 69
End: 2019-03-14
Payer: MEDICARE

## 2019-03-14 ENCOUNTER — HOSPITAL ENCOUNTER (OUTPATIENT)
Age: 69
Setting detail: OUTPATIENT SURGERY
Discharge: HOME | End: 2019-03-14
Attending: INTERNAL MEDICINE | Admitting: INTERNAL MEDICINE
Payer: MEDICARE

## 2019-03-14 ENCOUNTER — TELEPHONE (OUTPATIENT)
Dept: CARDIOLOGY CLINIC | Age: 69
End: 2019-03-14

## 2019-03-14 VITALS
RESPIRATION RATE: 16 BRPM | WEIGHT: 186 LBS | HEIGHT: 68 IN | TEMPERATURE: 98 F | DIASTOLIC BLOOD PRESSURE: 92 MMHG | SYSTOLIC BLOOD PRESSURE: 147 MMHG | BODY MASS INDEX: 28.19 KG/M2 | OXYGEN SATURATION: 98 % | HEART RATE: 82 BPM

## 2019-03-14 VITALS
DIASTOLIC BLOOD PRESSURE: 72 MMHG | HEIGHT: 69 IN | WEIGHT: 186 LBS | BODY MASS INDEX: 27.55 KG/M2 | HEART RATE: 82 BPM | TEMPERATURE: 97.1 F | OXYGEN SATURATION: 100 % | SYSTOLIC BLOOD PRESSURE: 123 MMHG | RESPIRATION RATE: 16 BRPM

## 2019-03-14 VITALS
OXYGEN SATURATION: 100 % | SYSTOLIC BLOOD PRESSURE: 84 MMHG | DIASTOLIC BLOOD PRESSURE: 57 MMHG | RESPIRATION RATE: 29 BRPM

## 2019-03-14 DIAGNOSIS — N30.00 ACUTE CYSTITIS WITHOUT HEMATURIA: Primary | ICD-10-CM

## 2019-03-14 DIAGNOSIS — R10.30 LOWER ABDOMINAL PAIN: ICD-10-CM

## 2019-03-14 LAB
A/G RATIO: 1.1 (ref 1.1–2.2)
ALBUMIN SERPL-MCNC: 3.6 G/DL (ref 3.4–5)
ALP BLD-CCNC: 185 U/L (ref 40–129)
ALT SERPL-CCNC: 19 U/L (ref 10–40)
ANION GAP SERPL CALCULATED.3IONS-SCNC: 11 MMOL/L (ref 3–16)
AST SERPL-CCNC: 31 U/L (ref 15–37)
BASOPHILS ABSOLUTE: 0 K/UL (ref 0–0.2)
BASOPHILS RELATIVE PERCENT: 1.4 %
BILIRUB SERPL-MCNC: 0.6 MG/DL (ref 0–1)
BILIRUBIN URINE: NEGATIVE
BLOOD, URINE: ABNORMAL
BUN BLDV-MCNC: 10 MG/DL (ref 7–20)
CALCIUM SERPL-MCNC: 9 MG/DL (ref 8.3–10.6)
CHLORIDE BLD-SCNC: 102 MMOL/L (ref 99–110)
CLARITY: CLEAR
CO2: 26 MMOL/L (ref 21–32)
COLOR: YELLOW
CREAT SERPL-MCNC: <0.5 MG/DL (ref 0.6–1.2)
EOSINOPHILS ABSOLUTE: 0 K/UL (ref 0–0.6)
EOSINOPHILS RELATIVE PERCENT: 0 %
EPITHELIAL CELLS, UA: 1 /HPF (ref 0–5)
GFR AFRICAN AMERICAN: >60
GFR NON-AFRICAN AMERICAN: >60
GLOBULIN: 3.3 G/DL
GLUCOSE BLD-MCNC: 160 MG/DL (ref 70–99)
GLUCOSE URINE: NEGATIVE MG/DL
HCT VFR BLD CALC: 31.9 % (ref 36–48)
HEMOGLOBIN: 10.3 G/DL (ref 12–16)
HYALINE CASTS: 2 /LPF (ref 0–8)
KETONES, URINE: NEGATIVE MG/DL
LEUKOCYTE ESTERASE, URINE: ABNORMAL
LIPASE: 32 U/L (ref 13–60)
LYMPHOCYTES ABSOLUTE: 0.7 K/UL (ref 1–5.1)
LYMPHOCYTES RELATIVE PERCENT: 21.1 %
MAGNESIUM: 1.7 MG/DL (ref 1.8–2.4)
MCH RBC QN AUTO: 29.1 PG (ref 26–34)
MCHC RBC AUTO-ENTMCNC: 32.2 G/DL (ref 31–36)
MCV RBC AUTO: 90.5 FL (ref 80–100)
MICROSCOPIC EXAMINATION: YES
MONOCYTES ABSOLUTE: 0.4 K/UL (ref 0–1.3)
MONOCYTES RELATIVE PERCENT: 11.5 %
NEUTROPHILS ABSOLUTE: 2.2 K/UL (ref 1.7–7.7)
NEUTROPHILS RELATIVE PERCENT: 66 %
NITRITE, URINE: NEGATIVE
PDW BLD-RTO: 15.8 % (ref 12.4–15.4)
PH UA: 5.5 (ref 5–8)
PLATELET # BLD: 122 K/UL (ref 135–450)
PMV BLD AUTO: 10.7 FL (ref 5–10.5)
POTASSIUM REFLEX MAGNESIUM: 3.5 MMOL/L (ref 3.5–5.1)
PROTEIN UA: NEGATIVE MG/DL
RBC # BLD: 3.53 M/UL (ref 4–5.2)
RBC UA: 6 /HPF (ref 0–4)
REASON FOR REJECTION: NORMAL
REJECTED TEST: NORMAL
SODIUM BLD-SCNC: 139 MMOL/L (ref 136–145)
SPECIFIC GRAVITY UA: >1.03 (ref 1–1.03)
T3 FREE: 23 PG/ML (ref 2.3–4.2)
TOTAL PROTEIN: 6.9 G/DL (ref 6.4–8.2)
URINE REFLEX TO CULTURE: YES
URINE TYPE: ABNORMAL
UROBILINOGEN, URINE: 0.2 E.U./DL
WBC # BLD: 3.3 K/UL (ref 4–11)
WBC UA: 4 /HPF (ref 0–5)

## 2019-03-14 PROCEDURE — 2709999900 HC NON-CHARGEABLE SUPPLY: Performed by: INTERNAL MEDICINE

## 2019-03-14 PROCEDURE — 3700000001 HC ADD 15 MINUTES (ANESTHESIA): Performed by: INTERNAL MEDICINE

## 2019-03-14 PROCEDURE — 96375 TX/PRO/DX INJ NEW DRUG ADDON: CPT

## 2019-03-14 PROCEDURE — 7100000011 HC PHASE II RECOVERY - ADDTL 15 MIN: Performed by: INTERNAL MEDICINE

## 2019-03-14 PROCEDURE — 85025 COMPLETE CBC W/AUTO DIFF WBC: CPT

## 2019-03-14 PROCEDURE — 3609010600 HC COLONOSCOPY POLYPECTOMY SNARE/COLD BIOPSY: Performed by: INTERNAL MEDICINE

## 2019-03-14 PROCEDURE — 3700000000 HC ANESTHESIA ATTENDED CARE: Performed by: INTERNAL MEDICINE

## 2019-03-14 PROCEDURE — 87086 URINE CULTURE/COLONY COUNT: CPT

## 2019-03-14 PROCEDURE — 99283 EMERGENCY DEPT VISIT LOW MDM: CPT

## 2019-03-14 PROCEDURE — 83735 ASSAY OF MAGNESIUM: CPT

## 2019-03-14 PROCEDURE — 6370000000 HC RX 637 (ALT 250 FOR IP): Performed by: EMERGENCY MEDICINE

## 2019-03-14 PROCEDURE — 2580000003 HC RX 258: Performed by: NURSE ANESTHETIST, CERTIFIED REGISTERED

## 2019-03-14 PROCEDURE — 6360000002 HC RX W HCPCS: Performed by: NURSE ANESTHETIST, CERTIFIED REGISTERED

## 2019-03-14 PROCEDURE — 6360000002 HC RX W HCPCS: Performed by: EMERGENCY MEDICINE

## 2019-03-14 PROCEDURE — 81003 URINALYSIS AUTO W/O SCOPE: CPT

## 2019-03-14 PROCEDURE — 80053 COMPREHEN METABOLIC PANEL: CPT

## 2019-03-14 PROCEDURE — 2580000003 HC RX 258: Performed by: ANESTHESIOLOGY

## 2019-03-14 PROCEDURE — 96361 HYDRATE IV INFUSION ADD-ON: CPT

## 2019-03-14 PROCEDURE — 6360000004 HC RX CONTRAST MEDICATION: Performed by: EMERGENCY MEDICINE

## 2019-03-14 PROCEDURE — 84481 FREE ASSAY (FT-3): CPT

## 2019-03-14 PROCEDURE — 83690 ASSAY OF LIPASE: CPT

## 2019-03-14 PROCEDURE — 7100000010 HC PHASE II RECOVERY - FIRST 15 MIN: Performed by: INTERNAL MEDICINE

## 2019-03-14 PROCEDURE — 3609009900 HC COLONOSCOPY W/CONTROL BLEEDING ANY METHOD: Performed by: INTERNAL MEDICINE

## 2019-03-14 PROCEDURE — 96374 THER/PROPH/DIAG INJ IV PUSH: CPT

## 2019-03-14 PROCEDURE — 6370000000 HC RX 637 (ALT 250 FOR IP): Performed by: INTERNAL MEDICINE

## 2019-03-14 PROCEDURE — 2580000003 HC RX 258: Performed by: EMERGENCY MEDICINE

## 2019-03-14 PROCEDURE — 36415 COLL VENOUS BLD VENIPUNCTURE: CPT

## 2019-03-14 PROCEDURE — 74177 CT ABD & PELVIS W/CONTRAST: CPT

## 2019-03-14 PROCEDURE — 2720000010 HC SURG SUPPLY STERILE: Performed by: INTERNAL MEDICINE

## 2019-03-14 PROCEDURE — 88305 TISSUE EXAM BY PATHOLOGIST: CPT

## 2019-03-14 PROCEDURE — 2500000003 HC RX 250 WO HCPCS: Performed by: NURSE ANESTHETIST, CERTIFIED REGISTERED

## 2019-03-14 RX ORDER — LIDOCAINE HYDROCHLORIDE 20 MG/ML
INJECTION, SOLUTION EPIDURAL; INFILTRATION; INTRACAUDAL; PERINEURAL PRN
Status: DISCONTINUED | OUTPATIENT
Start: 2019-03-14 | End: 2019-03-14 | Stop reason: SDUPTHER

## 2019-03-14 RX ORDER — METOPROLOL TARTRATE 5 MG/5ML
INJECTION INTRAVENOUS PRN
Status: DISCONTINUED | OUTPATIENT
Start: 2019-03-14 | End: 2019-03-14 | Stop reason: SDUPTHER

## 2019-03-14 RX ORDER — SODIUM CHLORIDE 0.9 % (FLUSH) 0.9 %
10 SYRINGE (ML) INJECTION EVERY 12 HOURS SCHEDULED
Status: DISCONTINUED | OUTPATIENT
Start: 2019-03-14 | End: 2019-03-14 | Stop reason: HOSPADM

## 2019-03-14 RX ORDER — SODIUM CHLORIDE 9 MG/ML
INJECTION, SOLUTION INTRAVENOUS CONTINUOUS PRN
Status: DISCONTINUED | OUTPATIENT
Start: 2019-03-14 | End: 2019-03-14 | Stop reason: SDUPTHER

## 2019-03-14 RX ORDER — SODIUM CHLORIDE 9 MG/ML
INJECTION, SOLUTION INTRAVENOUS CONTINUOUS
Status: DISCONTINUED | OUTPATIENT
Start: 2019-03-14 | End: 2019-03-14 | Stop reason: HOSPADM

## 2019-03-14 RX ORDER — LIDOCAINE HYDROCHLORIDE 10 MG/ML
1 INJECTION, SOLUTION EPIDURAL; INFILTRATION; INTRACAUDAL; PERINEURAL
Status: DISCONTINUED | OUTPATIENT
Start: 2019-03-14 | End: 2019-03-14 | Stop reason: HOSPADM

## 2019-03-14 RX ORDER — SODIUM CHLORIDE 0.9 % (FLUSH) 0.9 %
10 SYRINGE (ML) INJECTION PRN
Status: DISCONTINUED | OUTPATIENT
Start: 2019-03-14 | End: 2019-03-14 | Stop reason: HOSPADM

## 2019-03-14 RX ORDER — CEFUROXIME AXETIL 250 MG/1
500 TABLET ORAL ONCE
Status: COMPLETED | OUTPATIENT
Start: 2019-03-14 | End: 2019-03-14

## 2019-03-14 RX ORDER — PROPOFOL 10 MG/ML
INJECTION, EMULSION INTRAVENOUS PRN
Status: DISCONTINUED | OUTPATIENT
Start: 2019-03-14 | End: 2019-03-14 | Stop reason: SDUPTHER

## 2019-03-14 RX ORDER — ONDANSETRON 2 MG/ML
4 INJECTION INTRAMUSCULAR; INTRAVENOUS ONCE
Status: COMPLETED | OUTPATIENT
Start: 2019-03-14 | End: 2019-03-14

## 2019-03-14 RX ORDER — CEFUROXIME AXETIL 500 MG/1
500 TABLET ORAL 2 TIMES DAILY
Qty: 14 TABLET | Refills: 0 | Status: SHIPPED | OUTPATIENT
Start: 2019-03-14 | End: 2019-03-21

## 2019-03-14 RX ORDER — PHENAZOPYRIDINE HYDROCHLORIDE 200 MG/1
200 TABLET, FILM COATED ORAL 3 TIMES DAILY PRN
Qty: 6 TABLET | Refills: 0 | Status: SHIPPED | OUTPATIENT
Start: 2019-03-14 | End: 2019-03-17

## 2019-03-14 RX ORDER — HYDROCODONE BITARTRATE AND ACETAMINOPHEN 5; 325 MG/1; MG/1
1 TABLET ORAL EVERY 6 HOURS PRN
Qty: 8 TABLET | Refills: 0 | Status: SHIPPED | OUTPATIENT
Start: 2019-03-14 | End: 2019-03-17

## 2019-03-14 RX ORDER — ONDANSETRON 2 MG/ML
4 INJECTION INTRAMUSCULAR; INTRAVENOUS PRN
Status: DISCONTINUED | OUTPATIENT
Start: 2019-03-14 | End: 2019-03-14 | Stop reason: HOSPADM

## 2019-03-14 RX ORDER — 0.9 % SODIUM CHLORIDE 0.9 %
1000 INTRAVENOUS SOLUTION INTRAVENOUS ONCE
Status: COMPLETED | OUTPATIENT
Start: 2019-03-14 | End: 2019-03-14

## 2019-03-14 RX ORDER — HYDROMORPHONE HYDROCHLORIDE 1 MG/ML
1 INJECTION, SOLUTION INTRAMUSCULAR; INTRAVENOUS; SUBCUTANEOUS ONCE
Status: COMPLETED | OUTPATIENT
Start: 2019-03-14 | End: 2019-03-14

## 2019-03-14 RX ADMIN — SODIUM CHLORIDE: 9 INJECTION, SOLUTION INTRAVENOUS at 06:25

## 2019-03-14 RX ADMIN — METOPROLOL TARTRATE 2 MG: 1 INJECTION, SOLUTION INTRAVENOUS at 07:56

## 2019-03-14 RX ADMIN — PROPOFOL 30 MG: 10 INJECTION, EMULSION INTRAVENOUS at 08:15

## 2019-03-14 RX ADMIN — PROPOFOL 30 MG: 10 INJECTION, EMULSION INTRAVENOUS at 08:21

## 2019-03-14 RX ADMIN — Medication 400 MG: at 21:42

## 2019-03-14 RX ADMIN — PROPOFOL 30 MG: 10 INJECTION, EMULSION INTRAVENOUS at 08:10

## 2019-03-14 RX ADMIN — PROPOFOL 30 MG: 10 INJECTION, EMULSION INTRAVENOUS at 08:08

## 2019-03-14 RX ADMIN — PROPOFOL 30 MG: 10 INJECTION, EMULSION INTRAVENOUS at 08:12

## 2019-03-14 RX ADMIN — PROPOFOL 10 MG: 10 INJECTION, EMULSION INTRAVENOUS at 08:22

## 2019-03-14 RX ADMIN — PROPOFOL 20 MG: 10 INJECTION, EMULSION INTRAVENOUS at 08:24

## 2019-03-14 RX ADMIN — ONDANSETRON 4 MG: 2 INJECTION INTRAMUSCULAR; INTRAVENOUS at 19:22

## 2019-03-14 RX ADMIN — PROPOFOL 100 MG: 10 INJECTION, EMULSION INTRAVENOUS at 08:00

## 2019-03-14 RX ADMIN — CEFUROXIME AXETIL 500 MG: 250 TABLET ORAL at 21:54

## 2019-03-14 RX ADMIN — PROPOFOL 30 MG: 10 INJECTION, EMULSION INTRAVENOUS at 08:17

## 2019-03-14 RX ADMIN — PROPOFOL 20 MG: 10 INJECTION, EMULSION INTRAVENOUS at 08:01

## 2019-03-14 RX ADMIN — SODIUM CHLORIDE: 9 INJECTION, SOLUTION INTRAVENOUS at 07:56

## 2019-03-14 RX ADMIN — PROPOFOL 20 MG: 10 INJECTION, EMULSION INTRAVENOUS at 08:26

## 2019-03-14 RX ADMIN — LIDOCAINE HYDROCHLORIDE 100 MG: 20 INJECTION, SOLUTION EPIDURAL; INFILTRATION; INTRACAUDAL; PERINEURAL at 08:00

## 2019-03-14 RX ADMIN — IOPAMIDOL 75 ML: 755 INJECTION, SOLUTION INTRAVENOUS at 20:24

## 2019-03-14 RX ADMIN — PROPOFOL 30 MG: 10 INJECTION, EMULSION INTRAVENOUS at 08:06

## 2019-03-14 RX ADMIN — PROPOFOL 30 MG: 10 INJECTION, EMULSION INTRAVENOUS at 08:19

## 2019-03-14 RX ADMIN — PROPOFOL 20 MG: 10 INJECTION, EMULSION INTRAVENOUS at 08:04

## 2019-03-14 RX ADMIN — PROPOFOL 20 MG: 10 INJECTION, EMULSION INTRAVENOUS at 08:13

## 2019-03-14 RX ADMIN — HYDROMORPHONE HYDROCHLORIDE 1 MG: 1 INJECTION, SOLUTION INTRAMUSCULAR; INTRAVENOUS; SUBCUTANEOUS at 19:22

## 2019-03-14 RX ADMIN — SODIUM CHLORIDE 1000 ML: 9 INJECTION, SOLUTION INTRAVENOUS at 19:22

## 2019-03-14 ASSESSMENT — PAIN SCALES - GENERAL
PAINLEVEL_OUTOF10: 9
PAINLEVEL_OUTOF10: 1
PAINLEVEL_OUTOF10: 0

## 2019-03-14 ASSESSMENT — PAIN - FUNCTIONAL ASSESSMENT: PAIN_FUNCTIONAL_ASSESSMENT: 0-10

## 2019-03-16 LAB — URINE CULTURE, ROUTINE: NORMAL

## 2019-03-21 ENCOUNTER — OFFICE VISIT (OUTPATIENT)
Dept: INTERNAL MEDICINE CLINIC | Age: 69
End: 2019-03-21
Payer: MEDICARE

## 2019-03-21 VITALS
BODY MASS INDEX: 27.67 KG/M2 | WEIGHT: 182 LBS | DIASTOLIC BLOOD PRESSURE: 68 MMHG | OXYGEN SATURATION: 98 % | SYSTOLIC BLOOD PRESSURE: 122 MMHG | HEART RATE: 92 BPM

## 2019-03-21 DIAGNOSIS — R10.30 LOWER ABDOMINAL PAIN: ICD-10-CM

## 2019-03-21 DIAGNOSIS — D64.9 ANEMIA, UNSPECIFIED TYPE: Primary | ICD-10-CM

## 2019-03-21 LAB
BASOPHILS ABSOLUTE: 0 K/UL (ref 0–0.2)
BASOPHILS RELATIVE PERCENT: 0.4 %
EOSINOPHILS ABSOLUTE: 0.1 K/UL (ref 0–0.6)
EOSINOPHILS RELATIVE PERCENT: 1.6 %
HCT VFR BLD CALC: 33.3 % (ref 36–48)
HEMOGLOBIN: 10.7 G/DL (ref 12–16)
LYMPHOCYTES ABSOLUTE: 0.9 K/UL (ref 1–5.1)
LYMPHOCYTES RELATIVE PERCENT: 22.3 %
MCH RBC QN AUTO: 29.2 PG (ref 26–34)
MCHC RBC AUTO-ENTMCNC: 32.1 G/DL (ref 31–36)
MCV RBC AUTO: 91.1 FL (ref 80–100)
MONOCYTES ABSOLUTE: 0.6 K/UL (ref 0–1.3)
MONOCYTES RELATIVE PERCENT: 14.2 %
NEUTROPHILS ABSOLUTE: 2.5 K/UL (ref 1.7–7.7)
NEUTROPHILS RELATIVE PERCENT: 61.5 %
PDW BLD-RTO: 15.9 % (ref 12.4–15.4)
PLATELET # BLD: 134 K/UL (ref 135–450)
PMV BLD AUTO: 10.4 FL (ref 5–10.5)
RBC # BLD: 3.66 M/UL (ref 4–5.2)
WBC # BLD: 4.1 K/UL (ref 4–11)

## 2019-03-21 PROCEDURE — 3017F COLORECTAL CA SCREEN DOC REV: CPT | Performed by: NURSE PRACTITIONER

## 2019-03-21 PROCEDURE — 99214 OFFICE O/P EST MOD 30 MIN: CPT | Performed by: NURSE PRACTITIONER

## 2019-03-21 PROCEDURE — 1101F PT FALLS ASSESS-DOCD LE1/YR: CPT | Performed by: NURSE PRACTITIONER

## 2019-03-21 PROCEDURE — 4040F PNEUMOC VAC/ADMIN/RCVD: CPT | Performed by: NURSE PRACTITIONER

## 2019-03-21 PROCEDURE — 1036F TOBACCO NON-USER: CPT | Performed by: NURSE PRACTITIONER

## 2019-03-21 PROCEDURE — G8427 DOCREV CUR MEDS BY ELIG CLIN: HCPCS | Performed by: NURSE PRACTITIONER

## 2019-03-21 PROCEDURE — G8482 FLU IMMUNIZE ORDER/ADMIN: HCPCS | Performed by: NURSE PRACTITIONER

## 2019-03-21 PROCEDURE — 1090F PRES/ABSN URINE INCON ASSESS: CPT | Performed by: NURSE PRACTITIONER

## 2019-03-21 PROCEDURE — G8400 PT W/DXA NO RESULTS DOC: HCPCS | Performed by: NURSE PRACTITIONER

## 2019-03-21 PROCEDURE — 1123F ACP DISCUSS/DSCN MKR DOCD: CPT | Performed by: NURSE PRACTITIONER

## 2019-03-21 PROCEDURE — G8417 CALC BMI ABV UP PARAM F/U: HCPCS | Performed by: NURSE PRACTITIONER

## 2019-03-21 RX ORDER — HYDROCHLOROTHIAZIDE 25 MG/1
TABLET ORAL
Qty: 30 TABLET | Refills: 2 | Status: CANCELLED | OUTPATIENT
Start: 2019-03-21

## 2019-03-21 ASSESSMENT — ENCOUNTER SYMPTOMS
EYES NEGATIVE: 1
RESPIRATORY NEGATIVE: 1
ABDOMINAL PAIN: 1

## 2019-03-26 ENCOUNTER — APPOINTMENT (OUTPATIENT)
Dept: CT IMAGING | Age: 69
DRG: 394 | End: 2019-03-26
Payer: MEDICARE

## 2019-03-26 ENCOUNTER — ANESTHESIA (OUTPATIENT)
Dept: ENDOSCOPY | Age: 69
DRG: 394 | End: 2019-03-26
Payer: MEDICARE

## 2019-03-26 ENCOUNTER — HOSPITAL ENCOUNTER (INPATIENT)
Age: 69
LOS: 3 days | Discharge: HOME HEALTH | DRG: 394 | End: 2019-03-29
Attending: EMERGENCY MEDICINE | Admitting: INTERNAL MEDICINE
Payer: MEDICARE

## 2019-03-26 ENCOUNTER — APPOINTMENT (OUTPATIENT)
Dept: GENERAL RADIOLOGY | Age: 69
DRG: 394 | End: 2019-03-26
Payer: MEDICARE

## 2019-03-26 ENCOUNTER — ANESTHESIA EVENT (OUTPATIENT)
Dept: ENDOSCOPY | Age: 69
DRG: 394 | End: 2019-03-26
Payer: MEDICARE

## 2019-03-26 VITALS — DIASTOLIC BLOOD PRESSURE: 44 MMHG | SYSTOLIC BLOOD PRESSURE: 108 MMHG | OXYGEN SATURATION: 100 %

## 2019-03-26 DIAGNOSIS — Z85.42 HISTORY OF ENDOMETRIAL CANCER: ICD-10-CM

## 2019-03-26 DIAGNOSIS — Z79.01 ADEQUATE ANTICOAGULATION ON ANTICOAGULANT THERAPY: ICD-10-CM

## 2019-03-26 DIAGNOSIS — I48.20 CHRONIC ATRIAL FIBRILLATION WITH RAPID VENTRICULAR RESPONSE (HCC): ICD-10-CM

## 2019-03-26 DIAGNOSIS — I95.9 HYPOTENSION, UNSPECIFIED HYPOTENSION TYPE: ICD-10-CM

## 2019-03-26 DIAGNOSIS — K52.9 PROCTOCOLITIS WITH RECTAL BLEEDING: Primary | ICD-10-CM

## 2019-03-26 DIAGNOSIS — D62 ACUTE POST-HEMORRHAGIC ANEMIA: ICD-10-CM

## 2019-03-26 DIAGNOSIS — K62.5 PROCTOCOLITIS WITH RECTAL BLEEDING: Primary | ICD-10-CM

## 2019-03-26 PROBLEM — K92.2 GIB (GASTROINTESTINAL BLEEDING): Status: ACTIVE | Noted: 2019-03-26

## 2019-03-26 LAB
A/G RATIO: 1 (ref 1.1–2.2)
ABO/RH: NORMAL
ALBUMIN SERPL-MCNC: 3.4 G/DL (ref 3.4–5)
ALP BLD-CCNC: 164 U/L (ref 40–129)
ALT SERPL-CCNC: 18 U/L (ref 10–40)
ANION GAP SERPL CALCULATED.3IONS-SCNC: 9 MMOL/L (ref 3–16)
ANTIBODY SCREEN: NORMAL
APTT: 77.5 SEC (ref 26–36)
AST SERPL-CCNC: 28 U/L (ref 15–37)
BASOPHILS ABSOLUTE: 0 K/UL (ref 0–0.2)
BASOPHILS RELATIVE PERCENT: 0.3 %
BILIRUB SERPL-MCNC: 0.8 MG/DL (ref 0–1)
BILIRUBIN URINE: NEGATIVE
BLOOD BANK DISPENSE STATUS: NORMAL
BLOOD BANK PRODUCT CODE: NORMAL
BLOOD, URINE: NEGATIVE
BPU ID: NORMAL
BUN BLDV-MCNC: 9 MG/DL (ref 7–20)
CALCIUM SERPL-MCNC: 9.6 MG/DL (ref 8.3–10.6)
CHLORIDE BLD-SCNC: 107 MMOL/L (ref 99–110)
CLARITY: CLEAR
CO2: 24 MMOL/L (ref 21–32)
COLOR: YELLOW
CREAT SERPL-MCNC: 0.5 MG/DL (ref 0.6–1.2)
DESCRIPTION BLOOD BANK: NORMAL
EKG ATRIAL RATE: 78 BPM
EKG DIAGNOSIS: NORMAL
EKG Q-T INTERVAL: 388 MS
EKG QRS DURATION: 94 MS
EKG QTC CALCULATION (BAZETT): 512 MS
EKG R AXIS: -23 DEGREES
EKG T AXIS: 11 DEGREES
EKG VENTRICULAR RATE: 105 BPM
EOSINOPHILS ABSOLUTE: 0.1 K/UL (ref 0–0.6)
EOSINOPHILS RELATIVE PERCENT: 1.7 %
GFR AFRICAN AMERICAN: >60
GFR NON-AFRICAN AMERICAN: >60
GLOBULIN: 3.4 G/DL
GLUCOSE BLD-MCNC: 99 MG/DL (ref 70–99)
GLUCOSE URINE: NEGATIVE MG/DL
HCT VFR BLD CALC: 21.7 % (ref 36–48)
HCT VFR BLD CALC: 23.7 % (ref 36–48)
HCT VFR BLD CALC: 26.5 % (ref 36–48)
HCT VFR BLD CALC: 30.9 % (ref 36–48)
HEMOGLOBIN: 10 G/DL (ref 12–16)
HEMOGLOBIN: 7.1 G/DL (ref 12–16)
HEMOGLOBIN: 7.7 G/DL (ref 12–16)
HEMOGLOBIN: 8.6 G/DL (ref 12–16)
INR BLD: 1.85 (ref 0.86–1.14)
KETONES, URINE: NEGATIVE MG/DL
LACTIC ACID: 1.4 MMOL/L (ref 0.4–2)
LEUKOCYTE ESTERASE, URINE: NEGATIVE
LIPASE: 33 U/L (ref 13–60)
LYMPHOCYTES ABSOLUTE: 1.7 K/UL (ref 1–5.1)
LYMPHOCYTES RELATIVE PERCENT: 35.8 %
MAGNESIUM: 1.7 MG/DL (ref 1.8–2.4)
MCH RBC QN AUTO: 28.7 PG (ref 26–34)
MCHC RBC AUTO-ENTMCNC: 32.3 G/DL (ref 31–36)
MCV RBC AUTO: 88.9 FL (ref 80–100)
MICROSCOPIC EXAMINATION: NORMAL
MONOCYTES ABSOLUTE: 0.7 K/UL (ref 0–1.3)
MONOCYTES RELATIVE PERCENT: 15.1 %
NEUTROPHILS ABSOLUTE: 2.3 K/UL (ref 1.7–7.7)
NEUTROPHILS RELATIVE PERCENT: 47.1 %
NITRITE, URINE: NEGATIVE
OCCULT BLOOD DIAGNOSTIC: ABNORMAL
PDW BLD-RTO: 15.6 % (ref 12.4–15.4)
PH UA: 6 (ref 5–8)
PLATELET # BLD: 194 K/UL (ref 135–450)
PMV BLD AUTO: 9.6 FL (ref 5–10.5)
POTASSIUM REFLEX MAGNESIUM: 3.5 MMOL/L (ref 3.5–5.1)
PROTEIN UA: NEGATIVE MG/DL
PROTHROMBIN TIME: 21.1 SEC (ref 9.8–13)
RBC # BLD: 3.47 M/UL (ref 4–5.2)
SODIUM BLD-SCNC: 140 MMOL/L (ref 136–145)
SPECIFIC GRAVITY UA: 1.02 (ref 1–1.03)
TOTAL PROTEIN: 6.8 G/DL (ref 6.4–8.2)
URINE REFLEX TO CULTURE: NORMAL
URINE TYPE: NORMAL
UROBILINOGEN, URINE: 0.2 E.U./DL
WBC # BLD: 4.8 K/UL (ref 4–11)

## 2019-03-26 PROCEDURE — 2000000000 HC ICU R&B

## 2019-03-26 PROCEDURE — 86850 RBC ANTIBODY SCREEN: CPT

## 2019-03-26 PROCEDURE — 6360000002 HC RX W HCPCS: Performed by: INTERNAL MEDICINE

## 2019-03-26 PROCEDURE — 80053 COMPREHEN METABOLIC PANEL: CPT

## 2019-03-26 PROCEDURE — 6360000004 HC RX CONTRAST MEDICATION: Performed by: PHYSICIAN ASSISTANT

## 2019-03-26 PROCEDURE — 86901 BLOOD TYPING SEROLOGIC RH(D): CPT

## 2019-03-26 PROCEDURE — 3609009900 HC COLONOSCOPY W/CONTROL BLEEDING ANY METHOD: Performed by: INTERNAL MEDICINE

## 2019-03-26 PROCEDURE — 2500000003 HC RX 250 WO HCPCS: Performed by: NURSE ANESTHETIST, CERTIFIED REGISTERED

## 2019-03-26 PROCEDURE — 2500000003 HC RX 250 WO HCPCS: Performed by: INTERNAL MEDICINE

## 2019-03-26 PROCEDURE — P9016 RBC LEUKOCYTES REDUCED: HCPCS

## 2019-03-26 PROCEDURE — 6360000002 HC RX W HCPCS: Performed by: NURSE ANESTHETIST, CERTIFIED REGISTERED

## 2019-03-26 PROCEDURE — 2580000003 HC RX 258: Performed by: NURSE ANESTHETIST, CERTIFIED REGISTERED

## 2019-03-26 PROCEDURE — 2709999900 HC NON-CHARGEABLE SUPPLY: Performed by: INTERNAL MEDICINE

## 2019-03-26 PROCEDURE — C1773 RET DEV, INSERTABLE: HCPCS | Performed by: INTERNAL MEDICINE

## 2019-03-26 PROCEDURE — 83735 ASSAY OF MAGNESIUM: CPT

## 2019-03-26 PROCEDURE — 36415 COLL VENOUS BLD VENIPUNCTURE: CPT

## 2019-03-26 PROCEDURE — 99291 CRITICAL CARE FIRST HOUR: CPT

## 2019-03-26 PROCEDURE — 2720000010 HC SURG SUPPLY STERILE: Performed by: INTERNAL MEDICINE

## 2019-03-26 PROCEDURE — 93005 ELECTROCARDIOGRAM TRACING: CPT | Performed by: PHYSICIAN ASSISTANT

## 2019-03-26 PROCEDURE — 93010 ELECTROCARDIOGRAM REPORT: CPT | Performed by: INTERNAL MEDICINE

## 2019-03-26 PROCEDURE — 87040 BLOOD CULTURE FOR BACTERIA: CPT

## 2019-03-26 PROCEDURE — P9017 PLASMA 1 DONOR FRZ W/IN 8 HR: HCPCS

## 2019-03-26 PROCEDURE — 2580000003 HC RX 258: Performed by: EMERGENCY MEDICINE

## 2019-03-26 PROCEDURE — 6360000002 HC RX W HCPCS: Performed by: EMERGENCY MEDICINE

## 2019-03-26 PROCEDURE — 51701 INSERT BLADDER CATHETER: CPT

## 2019-03-26 PROCEDURE — 86900 BLOOD TYPING SEROLOGIC ABO: CPT

## 2019-03-26 PROCEDURE — 6360000002 HC RX W HCPCS: Performed by: PHYSICIAN ASSISTANT

## 2019-03-26 PROCEDURE — 85025 COMPLETE CBC W/AUTO DIFF WBC: CPT

## 2019-03-26 PROCEDURE — 74019 RADEX ABDOMEN 2 VIEWS: CPT

## 2019-03-26 PROCEDURE — 2580000003 HC RX 258: Performed by: PHYSICIAN ASSISTANT

## 2019-03-26 PROCEDURE — 0W3P8ZZ CONTROL BLEEDING IN GASTROINTESTINAL TRACT, VIA NATURAL OR ARTIFICIAL OPENING ENDOSCOPIC: ICD-10-PCS | Performed by: INTERNAL MEDICINE

## 2019-03-26 PROCEDURE — 83690 ASSAY OF LIPASE: CPT

## 2019-03-26 PROCEDURE — 85730 THROMBOPLASTIN TIME PARTIAL: CPT

## 2019-03-26 PROCEDURE — 86923 COMPATIBILITY TEST ELECTRIC: CPT

## 2019-03-26 PROCEDURE — 83605 ASSAY OF LACTIC ACID: CPT

## 2019-03-26 PROCEDURE — 3700000001 HC ADD 15 MINUTES (ANESTHESIA): Performed by: INTERNAL MEDICINE

## 2019-03-26 PROCEDURE — 36430 TRANSFUSION BLD/BLD COMPNT: CPT

## 2019-03-26 PROCEDURE — C9113 INJ PANTOPRAZOLE SODIUM, VIA: HCPCS | Performed by: PHYSICIAN ASSISTANT

## 2019-03-26 PROCEDURE — G0328 FECAL BLOOD SCRN IMMUNOASSAY: HCPCS

## 2019-03-26 PROCEDURE — 85014 HEMATOCRIT: CPT

## 2019-03-26 PROCEDURE — 81003 URINALYSIS AUTO W/O SCOPE: CPT

## 2019-03-26 PROCEDURE — 2500000003 HC RX 250 WO HCPCS: Performed by: EMERGENCY MEDICINE

## 2019-03-26 PROCEDURE — 85610 PROTHROMBIN TIME: CPT

## 2019-03-26 PROCEDURE — 3700000000 HC ANESTHESIA ATTENDED CARE: Performed by: INTERNAL MEDICINE

## 2019-03-26 PROCEDURE — 2580000003 HC RX 258

## 2019-03-26 PROCEDURE — 74174 CTA ABD&PLVS W/CONTRAST: CPT

## 2019-03-26 PROCEDURE — 2580000003 HC RX 258: Performed by: INTERNAL MEDICINE

## 2019-03-26 PROCEDURE — 2700000000 HC OXYGEN THERAPY PER DAY

## 2019-03-26 PROCEDURE — 99291 CRITICAL CARE FIRST HOUR: CPT | Performed by: INTERNAL MEDICINE

## 2019-03-26 PROCEDURE — 85018 HEMOGLOBIN: CPT

## 2019-03-26 RX ORDER — CIPROFLOXACIN 2 MG/ML
400 INJECTION, SOLUTION INTRAVENOUS EVERY 12 HOURS
Status: DISCONTINUED | OUTPATIENT
Start: 2019-03-26 | End: 2019-03-29

## 2019-03-26 RX ORDER — SODIUM CHLORIDE 9 MG/ML
1000 INJECTION, SOLUTION INTRAVENOUS ONCE
Status: COMPLETED | OUTPATIENT
Start: 2019-03-26 | End: 2019-03-26

## 2019-03-26 RX ORDER — 0.9 % SODIUM CHLORIDE 0.9 %
1000 INTRAVENOUS SOLUTION INTRAVENOUS ONCE
Status: COMPLETED | OUTPATIENT
Start: 2019-03-26 | End: 2019-03-26

## 2019-03-26 RX ORDER — SODIUM CHLORIDE 0.9 % (FLUSH) 0.9 %
10 SYRINGE (ML) INJECTION PRN
Status: DISCONTINUED | OUTPATIENT
Start: 2019-03-26 | End: 2019-03-29 | Stop reason: HOSPADM

## 2019-03-26 RX ORDER — CIPROFLOXACIN 2 MG/ML
400 INJECTION, SOLUTION INTRAVENOUS CONTINUOUS
Status: DISCONTINUED | OUTPATIENT
Start: 2019-03-26 | End: 2019-03-26

## 2019-03-26 RX ORDER — SODIUM CHLORIDE 9 MG/ML
INJECTION, SOLUTION INTRAVENOUS CONTINUOUS PRN
Status: DISCONTINUED | OUTPATIENT
Start: 2019-03-26 | End: 2019-03-26 | Stop reason: SDUPTHER

## 2019-03-26 RX ORDER — HYDROCHLOROTHIAZIDE 25 MG/1
25 TABLET ORAL DAILY
Status: DISCONTINUED | OUTPATIENT
Start: 2019-03-26 | End: 2019-03-26

## 2019-03-26 RX ORDER — LIDOCAINE HYDROCHLORIDE 20 MG/ML
INJECTION, SOLUTION EPIDURAL; INFILTRATION; INTRACAUDAL; PERINEURAL PRN
Status: DISCONTINUED | OUTPATIENT
Start: 2019-03-26 | End: 2019-03-26 | Stop reason: SDUPTHER

## 2019-03-26 RX ORDER — POLYETHYLENE GLYCOL-3350 AND ELECTROLYTES WITH FLAVOR PACK 240; 5.84; 2.98; 6.72; 22.72 G/278.26G; G/278.26G; G/278.26G; G/278.26G; G/278.26G
POWDER, FOR SOLUTION ORAL
Status: ON HOLD | COMMUNITY
Start: 2019-03-13 | End: 2019-03-26

## 2019-03-26 RX ORDER — ONDANSETRON 2 MG/ML
4 INJECTION INTRAMUSCULAR; INTRAVENOUS EVERY 6 HOURS PRN
Status: DISCONTINUED | OUTPATIENT
Start: 2019-03-26 | End: 2019-03-29 | Stop reason: HOSPADM

## 2019-03-26 RX ORDER — SODIUM CHLORIDE 9 MG/ML
INJECTION, SOLUTION INTRAVENOUS
Status: COMPLETED
Start: 2019-03-26 | End: 2019-03-26

## 2019-03-26 RX ORDER — METHIMAZOLE 5 MG/1
10 TABLET ORAL DAILY
Status: DISCONTINUED | OUTPATIENT
Start: 2019-03-26 | End: 2019-03-29 | Stop reason: HOSPADM

## 2019-03-26 RX ORDER — SODIUM CHLORIDE 9 MG/ML
INJECTION, SOLUTION INTRAVENOUS CONTINUOUS
Status: DISCONTINUED | OUTPATIENT
Start: 2019-03-26 | End: 2019-03-29 | Stop reason: HOSPADM

## 2019-03-26 RX ORDER — 0.9 % SODIUM CHLORIDE 0.9 %
30 INTRAVENOUS SOLUTION INTRAVENOUS ONCE
Status: COMPLETED | OUTPATIENT
Start: 2019-03-26 | End: 2019-03-26

## 2019-03-26 RX ORDER — 0.9 % SODIUM CHLORIDE 0.9 %
250 INTRAVENOUS SOLUTION INTRAVENOUS ONCE
Status: COMPLETED | OUTPATIENT
Start: 2019-03-26 | End: 2019-03-27

## 2019-03-26 RX ORDER — METOPROLOL SUCCINATE 50 MG/1
50 TABLET, EXTENDED RELEASE ORAL DAILY
COMMUNITY
End: 2019-06-25 | Stop reason: SDUPTHER

## 2019-03-26 RX ORDER — PHYTONADIONE 10 MG/ML
10 INJECTION, EMULSION INTRAMUSCULAR; INTRAVENOUS; SUBCUTANEOUS ONCE
Status: COMPLETED | OUTPATIENT
Start: 2019-03-26 | End: 2019-03-26

## 2019-03-26 RX ORDER — PEG-3350, SODIUM SULFATE, SODIUM CHLORIDE, POTASSIUM CHLORIDE, SODIUM ASCORBATE AND ASCORBIC ACID 7.5-2.691G
100 KIT ORAL ONCE
Status: DISCONTINUED | OUTPATIENT
Start: 2019-03-26 | End: 2019-03-29

## 2019-03-26 RX ORDER — MORPHINE SULFATE 2 MG/ML
2 INJECTION, SOLUTION INTRAMUSCULAR; INTRAVENOUS
Status: DISCONTINUED | OUTPATIENT
Start: 2019-03-26 | End: 2019-03-29 | Stop reason: HOSPADM

## 2019-03-26 RX ORDER — 0.9 % SODIUM CHLORIDE 0.9 %
500 INTRAVENOUS SOLUTION INTRAVENOUS ONCE
Status: COMPLETED | OUTPATIENT
Start: 2019-03-26 | End: 2019-03-26

## 2019-03-26 RX ORDER — POLYETHYLENE GLYCOL 3350, SODIUM SULFATE ANHYDROUS, SODIUM BICARBONATE, SODIUM CHLORIDE, POTASSIUM CHLORIDE 236; 22.74; 6.74; 5.86; 2.97 G/4L; G/4L; G/4L; G/4L; G/4L
POWDER, FOR SOLUTION ORAL
Status: ON HOLD | COMMUNITY
Start: 2019-03-07 | End: 2019-03-26

## 2019-03-26 RX ORDER — AMLODIPINE BESYLATE AND BENAZEPRIL HYDROCHLORIDE 10; 20 MG/1; MG/1
1 CAPSULE ORAL DAILY
Status: DISCONTINUED | OUTPATIENT
Start: 2019-03-26 | End: 2019-03-26

## 2019-03-26 RX ORDER — AMLODIPINE BESYLATE AND BENAZEPRIL HYDROCHLORIDE 10; 20 MG/1; MG/1
1 CAPSULE ORAL DAILY
Status: DISCONTINUED | OUTPATIENT
Start: 2019-03-26 | End: 2019-03-26 | Stop reason: SDUPTHER

## 2019-03-26 RX ORDER — SODIUM CHLORIDE 0.9 % (FLUSH) 0.9 %
10 SYRINGE (ML) INJECTION EVERY 12 HOURS SCHEDULED
Status: DISCONTINUED | OUTPATIENT
Start: 2019-03-26 | End: 2019-03-29 | Stop reason: HOSPADM

## 2019-03-26 RX ORDER — AMLODIPINE BESYLATE AND BENAZEPRIL HYDROCHLORIDE 10; 20 MG/1; MG/1
1 CAPSULE ORAL DAILY
Status: DISCONTINUED | OUTPATIENT
Start: 2019-03-26 | End: 2019-03-26 | Stop reason: CLARIF

## 2019-03-26 RX ORDER — PROPOFOL 10 MG/ML
INJECTION, EMULSION INTRAVENOUS PRN
Status: DISCONTINUED | OUTPATIENT
Start: 2019-03-26 | End: 2019-03-26 | Stop reason: SDUPTHER

## 2019-03-26 RX ORDER — MORPHINE SULFATE 4 MG/ML
4 INJECTION, SOLUTION INTRAMUSCULAR; INTRAVENOUS
Status: DISCONTINUED | OUTPATIENT
Start: 2019-03-26 | End: 2019-03-29 | Stop reason: HOSPADM

## 2019-03-26 RX ORDER — 0.9 % SODIUM CHLORIDE 0.9 %
250 INTRAVENOUS SOLUTION INTRAVENOUS ONCE
Status: COMPLETED | OUTPATIENT
Start: 2019-03-26 | End: 2019-03-26

## 2019-03-26 RX ADMIN — MORPHINE SULFATE 4 MG: 4 INJECTION INTRAVENOUS at 15:56

## 2019-03-26 RX ADMIN — PHENYLEPHRINE HYDROCHLORIDE 100 MCG: 10 INJECTION INTRAVENOUS at 15:17

## 2019-03-26 RX ADMIN — TRANEXAMIC ACID 1000 MG: 100 INJECTION, SOLUTION INTRAVENOUS at 10:42

## 2019-03-26 RX ADMIN — Medication 10 ML: at 13:40

## 2019-03-26 RX ADMIN — PROPOFOL 10 MG: 10 INJECTION, EMULSION INTRAVENOUS at 15:23

## 2019-03-26 RX ADMIN — PHENYLEPHRINE HYDROCHLORIDE 100 MCG: 10 INJECTION INTRAVENOUS at 15:12

## 2019-03-26 RX ADMIN — SODIUM CHLORIDE 2478 ML: 9 INJECTION, SOLUTION INTRAVENOUS at 12:25

## 2019-03-26 RX ADMIN — SODIUM CHLORIDE 1000 ML: 9 INJECTION, SOLUTION INTRAVENOUS at 11:12

## 2019-03-26 RX ADMIN — IOPAMIDOL 75 ML: 755 INJECTION, SOLUTION INTRAVENOUS at 09:02

## 2019-03-26 RX ADMIN — PHENYLEPHRINE HYDROCHLORIDE 100 MCG: 10 INJECTION INTRAVENOUS at 15:13

## 2019-03-26 RX ADMIN — METRONIDAZOLE 500 MG: 500 INJECTION, SOLUTION INTRAVENOUS at 20:40

## 2019-03-26 RX ADMIN — SODIUM CHLORIDE 1000 ML: 9 INJECTION, SOLUTION INTRAVENOUS at 08:49

## 2019-03-26 RX ADMIN — PHENYLEPHRINE HYDROCHLORIDE 100 MCG: 10 INJECTION INTRAVENOUS at 15:24

## 2019-03-26 RX ADMIN — CIPROFLOXACIN 400 MG: 2 INJECTION, SOLUTION INTRAVENOUS at 22:34

## 2019-03-26 RX ADMIN — LIDOCAINE HYDROCHLORIDE 100 MG: 20 INJECTION, SOLUTION EPIDURAL; INFILTRATION; INTRACAUDAL; PERINEURAL at 15:09

## 2019-03-26 RX ADMIN — SODIUM CHLORIDE 500 ML: 9 INJECTION, SOLUTION INTRAVENOUS at 11:11

## 2019-03-26 RX ADMIN — CIPROFLOXACIN 400 MG: 2 INJECTION, SOLUTION INTRAVENOUS at 10:26

## 2019-03-26 RX ADMIN — PHENYLEPHRINE HYDROCHLORIDE 100 MCG: 10 INJECTION INTRAVENOUS at 15:10

## 2019-03-26 RX ADMIN — SODIUM CHLORIDE 250 ML: 9 INJECTION, SOLUTION INTRAVENOUS at 11:12

## 2019-03-26 RX ADMIN — SODIUM CHLORIDE: 9 INJECTION, SOLUTION INTRAVENOUS at 22:36

## 2019-03-26 RX ADMIN — PANTOPRAZOLE SODIUM 80 MG: 40 INJECTION, POWDER, FOR SOLUTION INTRAVENOUS at 09:28

## 2019-03-26 RX ADMIN — SODIUM CHLORIDE 250 ML: 9 INJECTION, SOLUTION INTRAVENOUS at 11:38

## 2019-03-26 RX ADMIN — PROPOFOL 10 MG: 10 INJECTION, EMULSION INTRAVENOUS at 15:25

## 2019-03-26 RX ADMIN — PROPOFOL 20 MG: 10 INJECTION, EMULSION INTRAVENOUS at 15:16

## 2019-03-26 RX ADMIN — SODIUM CHLORIDE: 9 INJECTION, SOLUTION INTRAVENOUS at 14:14

## 2019-03-26 RX ADMIN — PHYTONADIONE 10 MG: 10 INJECTION, EMULSION INTRAMUSCULAR; INTRAVENOUS; SUBCUTANEOUS at 12:36

## 2019-03-26 RX ADMIN — METRONIDAZOLE 500 MG: 500 INJECTION, SOLUTION INTRAVENOUS at 12:32

## 2019-03-26 RX ADMIN — PROPOFOL 30 MG: 10 INJECTION, EMULSION INTRAVENOUS at 15:10

## 2019-03-26 RX ADMIN — SODIUM CHLORIDE: 9 INJECTION, SOLUTION INTRAVENOUS at 14:59

## 2019-03-26 RX ADMIN — PROPOFOL 10 MG: 10 INJECTION, EMULSION INTRAVENOUS at 15:20

## 2019-03-26 RX ADMIN — SODIUM CHLORIDE 1000 ML: 9 INJECTION, SOLUTION INTRAVENOUS at 10:21

## 2019-03-26 ASSESSMENT — PAIN DESCRIPTION - DESCRIPTORS: DESCRIPTORS: DISCOMFORT

## 2019-03-26 ASSESSMENT — ENCOUNTER SYMPTOMS
RECTAL PAIN: 0
ABDOMINAL PAIN: 1
VOMITING: 0
CONSTIPATION: 0
ANAL BLEEDING: 1
CHEST TIGHTNESS: 0
SORE THROAT: 0
DIARRHEA: 0
SHORTNESS OF BREATH: 0
BACK PAIN: 0
NAUSEA: 0
BLOOD IN STOOL: 1

## 2019-03-26 ASSESSMENT — PAIN DESCRIPTION - LOCATION
LOCATION: OTHER (COMMENT)
LOCATION: RECTUM
LOCATION: RECTUM

## 2019-03-26 ASSESSMENT — PAIN DESCRIPTION - PAIN TYPE
TYPE: ACUTE PAIN
TYPE: ACUTE PAIN

## 2019-03-26 ASSESSMENT — PAIN SCALES - GENERAL
PAINLEVEL_OUTOF10: 2
PAINLEVEL_OUTOF10: 3
PAINLEVEL_OUTOF10: 10
PAINLEVEL_OUTOF10: 3
PAINLEVEL_OUTOF10: 6
PAINLEVEL_OUTOF10: 10

## 2019-03-26 NOTE — ED NOTES
Patient voided per bedpan, no urine specimen collected to due blood/blood clots in bedpan with urine. Patient continuing to pass red blood/dark red blood clots, appearing less frequently than earlier.       Kaleigh Tran RN  03/26/19 [id number]

## 2019-03-26 NOTE — H&P
Hospital Medicine History & Physical      PCP: Rafael Orourke MD    Date of Admission: 3/26/2019    Date of Service: Pt seen/examined on 3/26/2019    Chief Complaint:      Chief Complaint   Patient presents with    Rectal Bleeding     pt in by shan barragan from home- with c/o rectal bleeding started this am- with significant amount of bleeding- history of colon cancer and rectal bleeds- states had colonscopy done several weeks ago        History Of Present Illness: The patient is a 79 y.o. female who presents to [facility] with rectal bleed. Pt with h/o uterine ca s/p chemo and radiation. 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 She later had lower abdominal and rectal pain and is on abx for the same. Pt started passing BRBPR yesterday. Pt has several BMs and passing Blood clots since then. Currently pt denied any cp/ sob/ dizziness/ blurry vision. No n/ v/ hematochezia. Asso abd cramps with bleeding.         Past Medical History:        Diagnosis Date    Anal bleeding 2019    CT scan clear     Anemia     Atrial fibrillation ([address]     cleared by cardiologist 2018, was related to cancer     Colon polyps     Diabetes mellitus ([address]     pre diabetic diet controlled    Endometrial cancer ([address]     Hypertension     Small bowel obstruction ([address]     resolved without surgery    Thyroid nodule        Past Surgical History:        Procedure Laterality Date    CARDIOVERSION      COLONOSCOPY N/A 3/14/2019    COLONOSCOPY POLYPECTOMY SNARE/COLD BIOPSY performed by Rosana Stahl MD at [address] [facility]  3/14/2019    COLONOSCOPY CONTROL HEMORRHAGE performed by Rosana Stahl MD at [facility]      TUNNELED CENTRAL VENOUS CATHETER W/ SUBCUTANEOUS PORT Right        Medications Prior to Admission:    Prior to Admission medications    Medication Sig Start Date End Date Taking? Authorizing Provider   methimazole (TAPAZOLE) 10 MG tablet Take 1 tablet by mouth daily 3/10/19 3/9/20 Yes Felicitas Ritchie MD   hydrochlorothiazide (HYDRODIURIL) 25 MG tablet TAKE ONE TABLET BY MOUTH DAILY 2/18/19  Yes Luis Angel Vega MD   Multiple Vitamins-Minerals (THERAPEUTIC MULTIVITAMIN-MINERALS) tablet Take 1 tablet by mouth daily   Yes Historical Provider, MD   zoster recombinant adjuvanted vaccine (SHINGRIX) 50 MCG SUSR injection 50 MCG IM then repeat 2-6 months. 8/13/18 8/13/19 Yes Luis Angel Vega MD   amLODIPine-benazepril (LOTREL) 10-20 MG per capsule Take 1 capsule by mouth daily   Yes Historical Provider, MD   rivaroxaban (XARELTO) 20 MG TABS tablet Take 20 mg by mouth   Yes Historical Provider, MD   GAVILYTE-C 240 g solution  3/13/19   Historical Provider, MD   PEG 3350-KCl-NaBcb-NaCl-NaSulf (PEG-3350/ELECTROLYTES) 236 g SOLR  3/7/19   Historical Provider, MD       Allergies:  Naproxen and Lisinopril    Social History:       reports that she has never smoked. She has never used smokeless tobacco. She reports that she drinks alcohol. She reports that she does not use drugs. Family History:  Reviewed in detail and negative for DM, Early CAD, Cancer, CVA. Positive as follows:        Problem Relation Age of Onset    Hypertension Mother     Colon Cancer Maternal Grandmother        REVIEW OF SYSTEMS:   Positive review  noted in the HPI. All other systems reviewed and negative. PHYSICAL EXAM;  Vitals       Vitals:    03/26/19 1026   BP: (!) 85/45   Pulse: 106   Resp: 30   Temp:    SpO2: 100%         24HR INTAKE/OUTPUT:      Intake/Output Summary (Last 24 hours) at 3/26/2019 1035  Last data filed at 3/26/2019 1009  Gross per 24 hour   Intake 1000 ml   Output --   Net 1000 ml       Exam:    Gen: No distress. Eyes: PERRL. No sclera icterus. No conjunctival injection. ENT: No discharge. Pharynx clear.  External appearance of ears and nose normal.  Neck: supple Resp: Clear to auscultation. No crackles / Rhonchi. No wheezes. CV: Regular rate. Regular rhythm. No murmur or rub  GI: soft, Non-tender. Non-distended. No hernia. BS +  Skin: Warm, dry, normal texture. No rashes   Lymph: No cervical LAD. No supraclavicular LAD. M/S: No joint deformity. Neuro: Moves all four extremities. CN 2-12 tested, no defect noted. Psych: Oriented x 3. No anxiety. Awake. Alert. Intact judgement and insight. LABS:      CBC:  Recent Labs     03/26/19  0836   WBC 4.8   HGB 10.0*   HCT 30.9*         RENAL  Recent Labs     03/26/19  0836      K 3.5      CO2 24   BUN 9   CREATININE 0.5*     LFT'S  Recent Labs     03/26/19  0836   AST 28   ALT 18   BILITOT 0.8   ALKPHOS 164*     COAG  Recent Labs     03/26/19  0835   INR 1.85*     CARDIAC ENZYMES  No results for input(s): CKTOTAL, CKMB, CKMBINDEX, TROPONINI in the last 72 hours. ABG  No results found for: CII4MPD, BEART, W1IETCYW, PHART, THGBART, RUY1KFM, PO2ART, ZBY8WNI  POC GLUCOSE:  No results for input(s): POCGLU in the last 72 hours. Microbiology:  Nasal Culture: No results for input(s): ORG, MRSAPCR in the last 72 hours. Blood Culture: No results for input(s): BC, BLOODCULT2 in the last 72 hours. UA:No results for input(s): NITRITE, COLORU, PHUR, LABCAST, WBCUA, RBCUA, MUCUS, TRICHOMONAS, YEAST, BACTERIA, CLARITYU, SPECGRAV, LEUKOCYTESUR, UROBILINOGEN, BILIRUBINUR, BLOODU, GLUCOSEU, KETUA, AMORPHOUS in the last 72 hours. Urine Culture:No results for input(s): [place] in the last 72 hours. RADIOLOGY:    CTA ABDOMEN PELVIS W WO CONTRAST   Final Result   1. Severe colitis involving the descending colon, sigmoid and especially   rectum. 2. Area of contrast blush in the rectal wall in arterial phase, extending   into the venous phase, but no definitive communication with the lumen of the   rectum. This may be related to severe inflammation of the wall of the rectum.    3. Small bowel appears unremarkable. No evidence of dilation or thickening. CONSULTS:    IP CONSULT TO GI  IP CONSULT TO HOSPITALIST    ASSESSMENT AND PLAN:      Active Problems:    GIB (gastrointestinal bleeding)  Resolved Problems:    * No resolved hospital problems. *    PHYSICIAN CERTIFICATION    I certify that Maryann Squires is expected to be hospitalized for >2 midnights based on the following assessment and plan:    ABLA   -sec to rectal bleed  -Hx of radiation proctitis on recent colonoscopy 3/14/19  -monitor H&H q6h  -Transfused in ER, Transfuse prn if Hb <7   -IVF   -Gi for colonoscopy this afternoon   -AC on hold   Resume and complete abx course recently prescribed Cipro and flagly   Today day 5/7     Hx of uterine cancer s/p chemo and radiation     Chronic Afib   -Hold AC   - Hold BB in view of hypotension   -Received FFP in ER for elevated INR     Diet: No diet orders on file    DVT Prophylaxis: scds   Code Status:full   PT/OT Eval Status: Maria Isabel Carrasco MD  The note was completed using EMR. Every effort was made to ensure accuracy; however, inadvertent computerized transcription errors may be present.

## 2019-03-26 NOTE — ED NOTES
Dr. Angela North in [address] [facility] to see patient. Pink top blood tube collected and sent to blood bank. Called blood bank and to have them run stat H&H on patient. Patient continues to fill faint/dizzy. Patient alert and responsive. O2 2 liter nasal canula placed on patient.      Tomas Meng RN  03/26/19 [id number]

## 2019-03-26 NOTE — PROGRESS NOTES
Admission assessment completed, alert and oriented x 4, on Room air. C/o pain in bottom of stomach. Having bloody stools with large clots. Denies other complaints. Patient aware of plans for colonoscopy today at [facility] and agreeable.

## 2019-03-26 NOTE — PROGRESS NOTES
Scope verified using 2 person system. Family in waiting room. Reviewed pt problem list, history, H&P and assessment preoperatively.

## 2019-03-26 NOTE — CONSULTS
[facility]  ICU Note    Subjective:   CC / HPI: GI bleed, anemia  Patient is 78 yo female who presented with rectal bleed to ED  She was hypotensive and required IVF for BP support. H/H was low and started on blood transfusion. She was admitted to ICU service  She is on chronic Xarelto for A.fib  She did receive radiation for uterus ca few months ago  2 weeks ago opt colonoscopy showed rectum proctitis     PMH: Reviewed  Past Medical History:   Diagnosis Date    Anal bleeding 2019    CT scan clear     Anemia     Atrial fibrillation ([address]     cleared by cardiologist 2018, was related to cancer     Colon polyps     Diabetes mellitus ([address]     pre diabetic diet controlled    Endometrial cancer ([address]     Hypertension     Small bowel obstruction ([address]     resolved without surgery    Thyroid nodule          PSH: Reviewed  Past Surgical History:   Procedure Laterality Date    CARDIOVERSION      COLONOSCOPY N/A 3/14/2019    COLONOSCOPY POLYPECTOMY SNARE/COLD BIOPSY performed by Rachael Washington MD at [address] [facility]  3/14/2019    COLONOSCOPY CONTROL HEMORRHAGE performed by Rachael Washington MD at [facility]      TUNNELED CENTRAL VENOUS CATHETER W/ SUBCUTANEOUS PORT Right          Review of Systems  Constitutional: Negative for fever, chills, diaphoresis. Respiratory: See HPI  Cardiovascular: Negative for chest pain, palpitations and leg swelling. Gastrointestinal: Negative for abdominal pain, blood in stool, abdominal distention and anal bleeding. Genitourinary: Negative for dysuria, urgency, frequency, hematuria, decreased urine volume, and enuresis. Skin: Negative. Negative for color change and pallor.    Neurological: No dizziness, lightheaded, loss of consciousness, HA or neck stiffness     Objective:   Data Review:  Vital Signs: BP (!) 105/53   Pulse 90   Temp 98.7 °F (37.1 °C) (Temporal)   Resp (!) 34   Ht 5' palpation. Musculoskeletal: No edema and no tenderness. Neurological: Non focal.    Assessment:   1. Rectal bleed  2. Blood loss anemia  3. Hypotension 2/2 above  4. A.fib with chronic anticoagulation   5. Radiation induced colitis   Plan:     VS reviewed as above. Stress ulcer, DVT, and line protocols are being followed if not contraindicated.     BP support, use Pio if needed  Blood transfusion  For repeat colonoscopy per GI  Hold Xarelto  Discussed with ICU RN      Total critical care time caring for this patient with life threatening illness, including direct patient contact, management of life support systems, review of data including imaging and labs, discussions with other team members and physicians is at least 32 minutes so far today, excluding procedures.

## 2019-03-26 NOTE — ED PROVIDER NOTES
[address] [facility]  eMERGENCY dEPARTMENT eNCOUnter        Pt Name: Maryann Squires  MRN: [id number]  [place] 1950  Date of evaluation: 3/26/2019  Provider: Jordan Canada PA-C  PCP: Soham Reynoso MD    This patient was seen and evaluated by the attending physician Dr. Maru Torres. CHIEF COMPLAINT       Chief Complaint   Patient presents with    Rectal Bleeding     pt in by [employer] from home- with c/o rectal bleeding started this am- with significant amount of bleeding- history of colon cancer and rectal bleeds- states had colonscopy done several weeks ago        HISTORY OF PRESENT ILLNESS   (Location/Symptom, Timing/Onset, Context/Setting, Quality, Duration, Modifying Factors, Severity)  Note limiting factors. Maryann Squires is a 79 y.o. female who presents the emergency department with reports of active rectal bleeding. Patient has a history of endometrial cancer and is followed for this at [facility]. .  She is under active care of Dr. Gutierrez Waddell and Dr. Mirella Moore.  she has had difficulties as a pertains to rectal bleeding this been ongoing for more than a month. She states that she was last seen and evaluated for abdominal pain following a colonoscopy that occurred at the beginning of the month the cousin uncontrolled bleeding. She states that he has had follow-up with gastroenterology is continuing to her course of treatment. This morning at approximate 7:30 the patient states she got up to the restroom. She states she thought she needed to have a bowel movement and instead she had copious amounts of bright red blood per rectum without stool on multiple occasions (quantity 3). Patient states that at this point she has active mild amounts of bright red blood continuing per rectum. She is anticoagulated because of a history of atrial fibrillation with Xarelto. She does have some left-sided abdominal pain. She has mild nausea but no vomiting or diarrhea. She denies that she's having chest pain or palpitations. She is equivocal for orthostasis. She goes on to report she has not had fevers and or chills. Pain and discomfort is dull ache in the left hand side of the abdomen. She's found nothing to make the symptoms better and are worse and with this she presents for evaluation and treatment. Nursing Notes were all reviewed and agreed with or any disagreements were addressed  in the HPI. REVIEW OF SYSTEMS    (2-9 systems for level 4, 10 or more for level 5)     Review of Systems   Constitutional: Negative for activity change, chills and fever. HENT: Negative for ear pain and sore throat. Respiratory: Negative for chest tightness and shortness of breath. Cardiovascular: Negative for chest pain. Gastrointestinal: Positive for abdominal pain, anal bleeding and blood in stool. Negative for constipation, diarrhea, nausea, rectal pain and vomiting. Genitourinary: Negative for dysuria and flank pain. Musculoskeletal: Positive for myalgias. Negative for back pain. Neurological: Positive for light-headedness. Negative for dizziness, seizures, syncope and headaches. Positives and Pertinent negatives as per HPI. Except as noted abovein the ROS, all other systems were reviewed and negative.        PAST MEDICAL HISTORY     Past Medical History:   Diagnosis Date    Anal bleeding 2019    CT scan clear     Anemia     Atrial fibrillation ([address]     cleared by cardiologist 2018, was related to cancer     Colon polyps     Diabetes mellitus ([address]     pre diabetic diet controlled    Endometrial cancer ([address]     Hypertension     Small bowel obstruction ([address]     resolved without surgery    Thyroid nodule          SURGICAL HISTORY     Past Surgical History:   Procedure Laterality Date    CARDIOVERSION      COLONOSCOPY N/A 3/14/2019    COLONOSCOPY POLYPECTOMY SNARE/COLD BIOPSY performed by Nguyễn Bridges MD at [address] [facility] 3/14/2019    COLONOSCOPY CONTROL HEMORRHAGE performed by Marvin Ferro MD at [address] [place] [address]      TUNNELED CENTRAL VENOUS CATHETER W/ SUBCUTANEOUS PORT Right          CURRENTMEDICATIONS       Previous Medications    AMLODIPINE-BENAZEPRIL (LOTREL) 10-20 MG PER CAPSULE    Take 1 capsule by mouth daily    GAVILYTE-C 240 G SOLUTION        HYDROCHLOROTHIAZIDE (HYDRODIURIL) 25 MG TABLET    TAKE ONE TABLET BY MOUTH DAILY    METHIMAZOLE (TAPAZOLE) 10 MG TABLET    Take 1 tablet by mouth daily    MULTIPLE VITAMINS-MINERALS (THERAPEUTIC MULTIVITAMIN-MINERALS) TABLET    Take 1 tablet by mouth daily    PEG 3350-KCL-NABCB-NACL-NASULF (PEG-3350/ELECTROLYTES) 236 G SOLR        RIVAROXABAN (XARELTO) 20 MG TABS TABLET    Take 20 mg by mouth    ZOSTER RECOMBINANT ADJUVANTED VACCINE (SHINGRIX) 50 MCG SUSR INJECTION    50 MCG IM then repeat 2-6 months.          ALLERGIES     Naproxen and Lisinopril    FAMILYHISTORY       Family History   Problem Relation Age of Onset    Hypertension Mother     Colon Cancer Maternal Grandmother           SOCIAL HISTORY       Social History     Socioeconomic History    Marital status:      Spouse name: None    Number of children: None    Years of education: None    Highest education level: None   Occupational History    Occupation: cash checking   Social Needs    Financial resource strain: None    Food insecurity:     Worry: None     Inability: None    Transportation needs:     Medical: None     Non-medical: None   Tobacco Use    Smoking status: Never Smoker    Smokeless tobacco: Never Used   Substance and Sexual Activity    Alcohol use: Yes     Comment: occ wine    Drug use: No    Sexual activity: Never   Lifestyle    Physical activity:     Days per week: None     Minutes per session: None    Stress: None   Relationships    Social connections:     Talks on phone: None     Gets together: None     Attends Mosque service: None Active member of club or organization: None     Attends meetings of clubs or organizations: None     Relationship status: None    Intimate partner violence:     Fear of current or ex partner: None     Emotionally abused: None     Physically abused: None     Forced sexual activity: None   Other Topics Concern    None   Social History Narrative    Lives home with her 3 children. Doing well 8 grandchildren. SCREENINGS    [place] Coma Scale  Eye Opening: Spontaneous  Best Verbal Response: Oriented  Best Motor Response: Obeys commands  [place] Coma Scale Score: 15        PHYSICAL EXAM    (up to 7 for level 4, 8 or more for level 5)     ED Triage Vitals [03/26/19 0823]   BP Temp Temp Source Pulse Resp SpO2 Height Weight   (!) 147/81 97.1 °F (36.2 °C) Oral 106 24 98 % 5' 8\" (1.727 m) 182 lb (82.6 kg)       Physical Exam   Constitutional: She is oriented to person, place, and time. She appears well-developed and well-nourished. She is active and cooperative. No distress. HENT:   Head: Normocephalic and atraumatic. Right Ear: Hearing and external ear normal.   Left Ear: Hearing and external ear normal.   Eyes: Conjunctivae are normal. Right eye exhibits no discharge. Left eye exhibits no discharge. No scleral icterus. Neck: Normal range of motion. No JVD present. Cardiovascular: An irregularly irregular rhythm present. Tachycardia present. Exam reveals no gallop and no friction rub. No murmur heard. Right sided port in place. Pulmonary/Chest: Effort normal and breath sounds normal. No accessory muscle usage. No respiratory distress. She has no wheezes. She has no rhonchi. She has no rales. Abdominal: Soft. Normal appearance and bowel sounds are normal. She exhibits no distension and no mass. There is tenderness in the left upper quadrant and left lower quadrant. There is no rigidity, no rebound, no guarding and no CVA tenderness. Genitourinary: Rectal exam shows guaiac positive stool. Genitourinary Comments: There is mild trickling of bright red blood intermittently from the anus. External anus is otherwise normal in appearance. Neurological: She is alert and oriented to person, place, and time. She has normal strength. No cranial nerve deficit or sensory deficit. Coordination normal. GCS eye subscore is 4. GCS verbal subscore is 5. GCS motor subscore is 6. Skin: Skin is warm and dry. She is not diaphoretic. Psychiatric: She has a normal mood and affect. Her behavior is normal.   Nursing note and vitals reviewed.       DIAGNOSTIC RESULTS   LABS:    Labs Reviewed   COMPREHENSIVE METABOLIC PANEL W/ REFLEX TO MG FOR LOW K - Abnormal; Notable for the following components:       Result Value    CREATININE 0.5 (*)     Albumin/Globulin Ratio 1.0 (*)     Alkaline Phosphatase 164 (*)     All other components within normal limits    Narrative:     Performed at:  [facility] [address], [place], [address]   Phone [phone]   CBC WITH AUTO DIFFERENTIAL - Abnormal; Notable for the following components:    RBC 3.47 (*)     Hemoglobin 10.0 (*)     Hematocrit 30.9 (*)     RDW 15.6 (*)     All other components within normal limits    Narrative:     Performed at:  [facility] [address], [place], [address]   Phone [phone]   PROTIME-INR - Abnormal; Notable for the following components:    Protime 21.1 (*)     INR 1.85 (*)     All other components within normal limits    Narrative:     Performed at:  [facility] [address], [place], [address]   Phone [phone]   BLOOD OCCULT STOOL DIAGNOSTIC - Abnormal; Notable for the following components:    Occult Blood Diagnostic   (*)     Value: Result: POSITIVE  Normal range: Negative      All other components within normal limits    Narrative:     ORDER#: [id number]                          ORDERED BY: Marjorie Carrero  SOURCE: Stool in the absence of a cardiologist.  Please see their note for interpretation of EKG. RADIOLOGY:   Non-plain film images such as CT, Ultrasound and MRI are read by the radiologist. Plain radiographic images are visualized andpreliminarily interpreted by the  ED Provider with the below findings:      Interpretation [facility] Radiologist below, if available at the time of this note:     [facility]   Final Result   1. Severe colitis involving the descending colon, sigmoid and especially   rectum. 2. Area of contrast blush in the rectal wall in arterial phase, extending   into the venous phase, but no definitive communication with the lumen of the   rectum. This may be related to severe inflammation of the wall of the rectum. 3. Small bowel appears unremarkable. No evidence of dilation or thickening. PROCEDURES   Unless otherwise noted below, none     Procedures    CRITICAL CARE TIME   Because of the high probability of sudden clinical deterioration of the patients condition and to prevent further deterioration, my critical care time involved my full attention to the patients condition, and included chart data review, documentation, medication ordering, viewing the patients old records, reevaluation of the patient's cardiac, pulmonary, and neurological status. Reassessing vital signs. Consutlations with off service physician. Ordering, interpreting reviewing diagnostic testing. Therefore my critical care time was 42 minutes of direct attention to the patients condition and did not include time spent on procedures.       CONSULTS:  IP CONSULT TO GI  IP CONSULT TO HOSPITALIST  IP CONSULT TO HOSPITALIST      EMERGENCY DEPARTMENT COURSE and DIFFERENTIALDIAGNOSIS/MDM:   Vitals:    Vitals:    03/26/19 1114 03/26/19 1115 03/26/19 1120 03/26/19 1129   BP: (!) 90/42 (!) 79/45 (!) 97/45 (!) 104/46   Pulse:  94 92 98   Resp:  24 24 28   Temp: 96.8 °F (36 °C)   97 °F (36.1 °C)   TempSrc: obtained. Laboratory testing a workup had been initiated. Protonix in the form of bolus as well as infusion was initiated. He was administered a liter of IV fluid. CBC demonstrates no evidence of leukocytosis. She has a hemoglobin of 10.0 hematocrit of 30.9 with a small interval decrease. She has no evidence of thrombocytopenia and/or thrombocytosis. BUN is 9 creatinine is 0.5 left lites and LFTs are unremarkable with the exception of mild elevation of alkaline phosphatase at 164. Patient tighten screenings are positive. Lactic acid is 1.4. Lipase 33. Magnesium mildly low at 1.7. INR at 1.85 and a patient with known Xarelto anticoagulation. EKG does demonstrate evidence of atrial fibrillation with a rate of 105. No evidence of acute ST elevation. Comparison EKG from July 18, 2007 has been reviewed. At that time she was in a sinus rhythm. Patient does have a history of atrial fibrillation in the ranges relatively well controlled without requiring any intervention at present. CTA of the abdomen and pelvis demonstrates severe colitis involving the descending colon, sigmoid colon and especially into the rectum. Small bowel is unremarkable. There is no evidence of dilation or thickening. A contrast blush in the rectal wall in the arterial phase extending in the venous phase demonstrates no definitive communication with the lumen of the rectum. This is thought to be related to severe inflammation along the rectum. 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  Because there is a remote chance that this could still be infectious the patient was treated with antibiotics. Plan will be for scheduled colonoscopy this afternoon as documented. Patient will remain nothing by mouth in the interim.   Case was then discussed directly with the hospitalist.  As the page had been placed we did receive an update from the nurse that the patient was feeling more lightheaded in the head and had 2 additional bouts of passage of bright red blood per rectum. It was also noted that she was mildly hypotensive at this point. Second liter of IV fluids were initiated. Dr. Angela North did order a single unit of blood. He also contacted the pharmacist.  Allison Fairy is not currently indicated in this patient as her last dose seen is outside a 24 hour window and it would not be efficacious. TXA was administered in attempts to help control symptoms related to the patient's rectal bleeding. I did Place a repeat telephone call to gastroenterology to inform them of her mild hypotension as well as the interval decrease in her hematocrit now down to 23.7. It appears that the hospitalist has placed orders on the chart for the patient's admission. In light of this I did place a telephone call back to the hospitalist to inform them of the above-mentioned as well as the update from Dr. Javier Summers that the patient would need to be admitted to the intensive care unit and he will do the colonoscopy without prep performed in the intensive care unit. She is aware that the patient does have peripheral access as well as a port. On multiple rechecks the patient's blood pressure is trending up nicely with the IV fluids as well as blood which has not yet been initiated. FINAL IMPRESSION      1. Proctocolitis with rectal bleeding    2. History of endometrial cancer    3. Chronic atrial fibrillation with rapid ventricular response (HCC)    4. Adequate anticoagulation on anticoagulant therapy    5. Acute post-hemorrhagic anemia    6.  Hypotension, unspecified hypotension type          DISPOSITION/PLAN   DISPOSITION:  Admit medical surgical services       (Please note that portions ofthis note were completed with a voice recognition program.  Efforts were made to edit the dictations but occasionally words are mis-transcribed.)    Jose Neville PA-C (electronically signed)            Valeriano Mcneil PA-C  03/26/19 1131

## 2019-03-26 NOTE — ED NOTES
Bed: 12  Expected date:   Expected time:   Means of arrival:   Comments:  Michelle Phillip RN  03/26/19 0030

## 2019-03-26 NOTE — ED NOTES
Patient returned to room from cat scan by bed. Ivan Fontana with GI in room to see patient. Patient is alert and oriented, her respirations are easy and unlabored. IV fluid infusing as ordered. Family members x 3 at the bedside.      Mary Gutierrez RN  03/26/19 0900

## 2019-03-26 NOTE — ED NOTES
Patient bleeding red blood rectally, marilyn pad, 2 chux, and sheet saturated from the waist level to just above the knees. Large blood clots also noted. Dr. Reena Danielle in to see patient, discussed bowel prep ordered and colonoscopy later today.        Wenceslao Aldana RN  03/26/19 7040

## 2019-03-26 NOTE — ED PROVIDER NOTES
I independently performed a history and physical on Becual. All diagnostic, treatment, and disposition decisions were made by myself in conjunction with the advanced practice provider. Briefly, this is a 79 y.o. female here for lower abdominal pain, with a history of colon cancer, proctitis. She also reports being on his Xarelto and is having rectal bleeding which started rather recently, hours ago. She has intermittent tachycardia relates intermittent dizziness but no syncope, chest pain or pressure. Symptoms are moderate in concerning. See SAMEER note      On exam:  Constitutional:  Well developed, well nourished, non-toxic appearance   Eyes:  PERRL, conjunctiva normal   HENT:  Atraumatic, external ears normal, nosenormal, oropharynx moist, no pharyngeal exudates. Neck- normal range of motion, supple   Respiratory:  No respiratory distress, normal breath sounds, no rales, no wheezing   Cardiovascular:  Normal rate, irreg rhythm, no murmurs,   GI:  Soft, nondistended, nontender, no obvious organomegaly, no mass, no rebound, no guarding   Rectal exam: Bright red blood per rectum with some clots present. Musculoskeletal:  No tenderness, no deformities. Integument:  no rashes on exposed surfaces  Neurologic:  Alert & oriented x 3, no focal deficits noted   Psychiatric:  Speech and behavior appropriate. No agitation. EKG  Atrial fibrillation, which is known and seen on prior, had a slightly rapid rate 104. No obvious acute ischemia  Screenings   [place] Coma Scale  Eye Opening: Spontaneous  Best Verbal Response: Oriented  Best Motor Response: Obeys commands  [place] Coma Scale Score: 15        MDM    We did consult early with a GI, Dr. Diaz Pena. He apparently did a colonoscopy and this patient weeks ago. He can see the patient in consult. No further orders at this time    Patient's hemoglobin is stable and she was initially presenting as normotensive. GI team will follow with patient and see. Patient took his Xarelto, last dose approximately 25-26 hours ago. I did discuss the case with Xuan An , of pharmacy, and there is no effective reversal agent at this time. Patient eventually did become hypotensive and required blood transfusion. Also, she is not on Coumadin but her INR is slightly elevated. FFP was given for this in addition to some vitamin K. After this intervention along with TXA, bleedin seemed to slow and BP increased. She had central access (port)    SEE SAMEER NOTE          Critical Care  There was a high probability of life-threatening clinical deterioration in the patient's condition requiring my urgent intervention. Total critical care time with the patient was 35 minutes excluding separately reportable procedures. Critical care required due to patients GIB, Anemia, Hypotension        Patient Referrals:  [address] [phone]            Discharge Medications:  Current Discharge Medication List          FINAL IMPRESSION  1. Proctocolitis with rectal bleeding    2. History of endometrial cancer    3. Chronic atrial fibrillation with rapid ventricular response (HCC)    4. Adequate anticoagulation on anticoagulant therapy    5. Acute post-hemorrhagic anemia    6. Hypotension, unspecified hypotension type        Blood pressure (!) 95/43, pulse 94, temperature 97.9 °F (36.6 °C), temperature source Temporal, resp. rate 17, height 5' 9\" (1.753 m), weight 179 lb 0.2 oz (81.2 kg), SpO2 96 %, not currently breastfeeding. For further details of [facility] emergency department encounter, please see documentation by advanced practice provider.         Darylene Simmering III, DO  03/27/19 4017

## 2019-03-26 NOTE — ED NOTES
Pt. Presents to ED via [facility] with c/o rectal bleeding onset this morning. Pt. With hx of colon cancer with previous colonoscopy within last month. Pt. Reports heavy bleeding this morning of bright red blood. Pt. With has saturated pad and pants upon initial assessment. Pt. With copious amounts of bright red blood noted. Pt. Denies CP or SOB at this time. Pt. With irregular HR ranging from 103-152 on monitor. Pt. Reports being on eliquis for Afib. Dr. Geovanny Archuleta and ROBYN Marrero at bedside. Pt. placed in gown and on telemetry monitor per protocol. Updated vs obtained. Bed locked with side rails up x2. Call light within reach. No other needs verbalized at this time. Will continue to monitor.         Hari Patel RN  03/26/19 4704

## 2019-03-26 NOTE — ED NOTES
Patient is alert and oriented, her respirations are easy and unlabored, patient reports the continued feeling of faintness. IV fluid, Iv cipro infusing in IV in right hand, PRBC infusing at 125 in port. Patient denies any blood loss rectally since last pad/linen change.      Jake Wright RN  03/26/19 4696

## 2019-03-26 NOTE — PROGRESS NOTES
Spoke with Hieu at [facility] on [address] concerning home medication list. Patient states she only gets her medications from this [facility]. Home medications updated. Patient states she is confused about what she is taking and will have family bring in her medication bottles tomorrow for review.

## 2019-03-26 NOTE — PROGRESS NOTES
Assessment completed and unchanged from initial assessment. Patient c/o abdominal pain/cramping, rolling back and forth in bed. Received morphine 4mg at 1556, see eMAR. X-ray her to take abdominal x-ray.

## 2019-03-26 NOTE — ED NOTES
Angelita Ramirez called back for 2 nd page at [phone] about pt.  BP dropping     Yokasta Vinson  03/26/19 5303

## 2019-03-26 NOTE — OP NOTE
abnormalities. The scope was straightened, the colon was decompressed and the scope was withdrawn from the patient. The patient tolerated the procedure well. Impression:   1) Large distal rectal ulcer with non bleeding visible vessel - treated with epi injection and hemoclips x 2 as above. Recommendations:   1) Clear liquid diet. 2) Abdomen xray. 3) Morphine prn discomfort. 4) Serial Hb/Hct. 5) Hold Xarelto.     Jessica Zaidi, [address] [facility]  3/26/2019

## 2019-03-26 NOTE — PROGRESS NOTES
Patient returned to room from [facility] , [address] [facility] aware.

## 2019-03-26 NOTE — ED NOTES
Patient reports feeling faint, bp checked 82/46. BP discussed with Dr. Blayne Shankar and ROBYN Fitzgerald. Verbal order received for 2nd liter normal saline. Patient reports continued feeling of blood pouring out of her. Large blood clot noted in brief.        Sherita Salazar RN  03/26/19 3061

## 2019-03-26 NOTE — CONSULTS
Gastroenterology Consult Note      Patient: Irvin Pereyra  : 1950  Acct#:      Date:  3/26/2019    Subjective:       History of Present Illness  Patient is a 79 y.o.   female admitted with No admission diagnoses are documented for this encounter. who is seen in consult for hematochezia. She has h/o uterine ca s/p chemo and radiation. She had hematochezia and underwent colonoscopy on 3/14/19 for this. 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 She later had lower abdominal and rectal pain and was treated with antibiotics. She is still on antibiotics. The pain resolved 2 days ago. This morning she passed large volume of BRBPR at 7 am. She had 3 episodes of BRBPR while the squad was there. Passed BRBPR on the way to the ED. Has had red blood leaking per rectum since. Reports lower abdominal pressure. No melena. No N/V. Past Medical History:   Diagnosis Date    Anal bleeding     CT scan clear     Anemia     Atrial fibrillation ([address]     cleared by cardiologist , was related to cancer     Colon polyps     Diabetes mellitus ([address]     pre diabetic diet controlled    Endometrial cancer ([address]     Hypertension     Small bowel obstruction ([address]     resolved without surgery    Thyroid nodule       Past Surgical History:   Procedure Laterality Date    CARDIOVERSION      COLONOSCOPY N/A 3/14/2019    COLONOSCOPY POLYPECTOMY SNARE/COLD BIOPSY performed by Humphrey Do MD at [address] [facility]  3/14/2019    COLONOSCOPY CONTROL HEMORRHAGE performed by Humphrey Do MD at [address] [facility]      TUNNELED CENTRAL VENOUS CATHETER W/ SUBCUTANEOUS PORT Right       Past Endoscopic History: colonoscopy 3/14/19 with Dr Mack Prader for hematochezia with 3 diminutive ascending colon polyps removed with cold snare, diverticulosis, and rectum proctitis treated with APC.      Admission Meds  No current facility-administered medications on file prior to encounter. Current Outpatient Medications on File Prior to Encounter   Medication Sig Dispense Refill    methimazole (TAPAZOLE) 10 MG tablet Take 1 tablet by mouth daily 30 tablet 5    hydrochlorothiazide (HYDRODIURIL) 25 MG tablet TAKE ONE TABLET BY MOUTH DAILY 30 tablet 2    Multiple Vitamins-Minerals (THERAPEUTIC MULTIVITAMIN-MINERALS) tablet Take 1 tablet by mouth daily      zoster recombinant adjuvanted vaccine (SHINGRIX) 50 MCG SUSR injection 50 MCG IM then repeat 2-6 months. 0.5 mL 1    amLODIPine-benazepril (LOTREL) 10-20 MG per capsule Take 1 capsule by mouth daily      rivaroxaban (XARELTO) 20 MG TABS tablet Take 20 mg by mouth      GAVILYTE-C 240 g solution       PEG 3350-KCl-NaBcb-NaCl-NaSulf (PEG-3350/ELECTROLYTES) 236 g SOLR          Allergies  Allergies   Allergen Reactions    Naproxen Swelling    Lisinopril Rash      Social   Social History     Tobacco Use    Smoking status: Never Smoker    Smokeless tobacco: Never Used   Substance Use Topics    Alcohol use: Yes     Comment: occ wine        Family History   Problem Relation Age of Onset    Hypertension Mother     Colon Cancer Maternal Grandmother         Review of Systems  Constitutional: negative for fevers, chills, sweats    Ears, nose, mouth, throat, and face: negative for nasal congestion and sore throat   Respiratory: negative for cough and shortness of breath   Cardiovascular: negative for chest pain and dyspnea   Gastrointestinal: see hpi   Genitourinary:negative for dysuria and frequency   Integument/breast: negative for pruritus and rash   Hematologic/lymphatic: negative for lymphadenopathy and easy bruising   Musculoskeletal:negative for arthralgias and myalgias   Neurological: negative for dizziness and weakness       Physical Exam  Blood pressure (!) 155/69, pulse 110, temperature 97.1 °F (36.2 °C), temperature source Oral, resp.  rate 27, height 5' 8\" (1.727 m), weight 182 lb (82.6 kg), this patient. I have interviewed and examined the patient and I agree with the findings and recommended plan of care. In summary, my findings and plan are the following: As above, 80 yo woman known to me with radiation proctitis causing rectal bleeding that was treated during colonoscopy 3/14 with APC. She had rectal pain thereafter with CT showing rectal wall thickening and some mural air likely post op changes, but she was treated with 2 week course of antibiotics. She presents with 1 day of significant rectal bleeding that continues in house with Hb and BP drop requiring PRBC's, Cyklokapron  and FFP to correct Xarelto anticoagulation. On exam abd is soft and mildly tender in LLQ. CTA shows wall-thickening of left colon and rectum concerning for colitis along with contrast blush in rectum perhaps c/w active bleeding. Agree with ICU admission, resuscitation in anticipation of urgent proctosigmoidoscopy today.       Era Whiting MD  [address] [place] [address]  3/26/2019

## 2019-03-27 LAB
ANION GAP SERPL CALCULATED.3IONS-SCNC: 6 MMOL/L (ref 3–16)
BASOPHILS ABSOLUTE: 0 K/UL (ref 0–0.2)
BASOPHILS RELATIVE PERCENT: 0.3 %
BLOOD BANK DISPENSE STATUS: NORMAL
BLOOD BANK PRODUCT CODE: NORMAL
BPU ID: NORMAL
BUN BLDV-MCNC: 6 MG/DL (ref 7–20)
CALCIUM SERPL-MCNC: 8 MG/DL (ref 8.3–10.6)
CHLORIDE BLD-SCNC: 112 MMOL/L (ref 99–110)
CO2: 25 MMOL/L (ref 21–32)
CREAT SERPL-MCNC: <0.5 MG/DL (ref 0.6–1.2)
DESCRIPTION BLOOD BANK: NORMAL
EOSINOPHILS ABSOLUTE: 0 K/UL (ref 0–0.6)
EOSINOPHILS RELATIVE PERCENT: 0.5 %
GFR AFRICAN AMERICAN: >60
GFR NON-AFRICAN AMERICAN: >60
GLUCOSE BLD-MCNC: 114 MG/DL (ref 70–99)
HCT VFR BLD CALC: 22.5 % (ref 36–48)
HCT VFR BLD CALC: 23.6 % (ref 36–48)
HCT VFR BLD CALC: 25.7 % (ref 36–48)
HEMOGLOBIN: 7.5 G/DL (ref 12–16)
HEMOGLOBIN: 7.8 G/DL (ref 12–16)
HEMOGLOBIN: 8.5 G/DL (ref 12–16)
LYMPHOCYTES ABSOLUTE: 1.5 K/UL (ref 1–5.1)
LYMPHOCYTES RELATIVE PERCENT: 16.8 %
MCH RBC QN AUTO: 29.8 PG (ref 26–34)
MCHC RBC AUTO-ENTMCNC: 33.4 G/DL (ref 31–36)
MCV RBC AUTO: 89 FL (ref 80–100)
MONOCYTES ABSOLUTE: 0.9 K/UL (ref 0–1.3)
MONOCYTES RELATIVE PERCENT: 9.9 %
NEUTROPHILS ABSOLUTE: 6.3 K/UL (ref 1.7–7.7)
NEUTROPHILS RELATIVE PERCENT: 72.5 %
PDW BLD-RTO: 14.5 % (ref 12.4–15.4)
PLATELET # BLD: 141 K/UL (ref 135–450)
PMV BLD AUTO: 9.2 FL (ref 5–10.5)
POTASSIUM SERPL-SCNC: 3.4 MMOL/L (ref 3.5–5.1)
RBC # BLD: 2.52 M/UL (ref 4–5.2)
SODIUM BLD-SCNC: 143 MMOL/L (ref 136–145)
WBC # BLD: 8.7 K/UL (ref 4–11)

## 2019-03-27 PROCEDURE — 99233 SBSQ HOSP IP/OBS HIGH 50: CPT | Performed by: INTERNAL MEDICINE

## 2019-03-27 PROCEDURE — 86923 COMPATIBILITY TEST ELECTRIC: CPT

## 2019-03-27 PROCEDURE — 85014 HEMATOCRIT: CPT

## 2019-03-27 PROCEDURE — 36430 TRANSFUSION BLD/BLD COMPNT: CPT

## 2019-03-27 PROCEDURE — 2580000003 HC RX 258: Performed by: PHYSICIAN ASSISTANT

## 2019-03-27 PROCEDURE — 85018 HEMOGLOBIN: CPT

## 2019-03-27 PROCEDURE — 6370000000 HC RX 637 (ALT 250 FOR IP): Performed by: INTERNAL MEDICINE

## 2019-03-27 PROCEDURE — P9016 RBC LEUKOCYTES REDUCED: HCPCS

## 2019-03-27 PROCEDURE — 2580000003 HC RX 258: Performed by: INTERNAL MEDICINE

## 2019-03-27 PROCEDURE — 2000000000 HC ICU R&B

## 2019-03-27 PROCEDURE — 2500000003 HC RX 250 WO HCPCS: Performed by: INTERNAL MEDICINE

## 2019-03-27 PROCEDURE — 85025 COMPLETE CBC W/AUTO DIFF WBC: CPT

## 2019-03-27 PROCEDURE — 80048 BASIC METABOLIC PNL TOTAL CA: CPT

## 2019-03-27 PROCEDURE — 6360000002 HC RX W HCPCS: Performed by: INTERNAL MEDICINE

## 2019-03-27 RX ORDER — POTASSIUM CHLORIDE 20 MEQ/1
40 TABLET, EXTENDED RELEASE ORAL ONCE
Status: COMPLETED | OUTPATIENT
Start: 2019-03-27 | End: 2019-03-27

## 2019-03-27 RX ORDER — 0.9 % SODIUM CHLORIDE 0.9 %
250 INTRAVENOUS SOLUTION INTRAVENOUS ONCE
Status: COMPLETED | OUTPATIENT
Start: 2019-03-27 | End: 2019-03-27

## 2019-03-27 RX ORDER — POTASSIUM CHLORIDE 20 MEQ/1
40 TABLET, EXTENDED RELEASE ORAL ONCE
Status: DISCONTINUED | OUTPATIENT
Start: 2019-03-27 | End: 2019-03-29 | Stop reason: HOSPADM

## 2019-03-27 RX ADMIN — METRONIDAZOLE 500 MG: 500 INJECTION, SOLUTION INTRAVENOUS at 04:05

## 2019-03-27 RX ADMIN — CIPROFLOXACIN 400 MG: 2 INJECTION, SOLUTION INTRAVENOUS at 10:28

## 2019-03-27 RX ADMIN — SODIUM CHLORIDE: 9 INJECTION, SOLUTION INTRAVENOUS at 17:20

## 2019-03-27 RX ADMIN — SODIUM CHLORIDE 250 ML: 9 INJECTION, SOLUTION INTRAVENOUS at 08:44

## 2019-03-27 RX ADMIN — CIPROFLOXACIN 400 MG: 2 INJECTION, SOLUTION INTRAVENOUS at 22:05

## 2019-03-27 RX ADMIN — POTASSIUM CHLORIDE 40 MEQ: 1500 TABLET, EXTENDED RELEASE ORAL at 08:44

## 2019-03-27 RX ADMIN — Medication 10 ML: at 22:05

## 2019-03-27 RX ADMIN — Medication 10 ML: at 08:45

## 2019-03-27 RX ADMIN — METRONIDAZOLE 500 MG: 500 INJECTION, SOLUTION INTRAVENOUS at 12:45

## 2019-03-27 RX ADMIN — METHIMAZOLE 10 MG: 5 TABLET ORAL at 10:27

## 2019-03-27 RX ADMIN — METRONIDAZOLE 500 MG: 500 INJECTION, SOLUTION INTRAVENOUS at 19:53

## 2019-03-27 ASSESSMENT — PAIN SCALES - GENERAL
PAINLEVEL_OUTOF10: 0

## 2019-03-27 NOTE — DISCHARGE INSTR - COC
Continuity of Care Form         Patient Name: Irvin Pereyra     :  1950                       MRN:  [id number]         Admit date:  3/26/2019                      Discharge date:  19         Code Status Order: No Order     Advance Directives:                  [facility] Documentation                    Date/Time       Healthcare Directive       Type of Healthcare Directive       Copy in [facility] Agent's Name       Healthcare Agent's Phone Number               19 1324      No, patient does not have an advance directive for healthcare treatment     --     --     --     --     --                             Admitting Physician:  Graham Hart MD    PCP: Manny Guerra MD         Discharging Nurse: [facility] Unit/Room#: [id number]    Discharging Unit Phone Number: ***         Emergency Contact:     Extended Emergency Contact Information    Primary Emergency Contact: Kei Ayala    Address:                     [address] [facility] Phone: [phone]    Relation: Child    Secondary Emergency Contact: charla ko    Home Phone: [phone]    Mobile Phone: [phone]    Relation: Child         Past Surgical History:            Past Surgical History:       Procedure     Laterality     Date            CARDIOVERSION                        COLONOSCOPY     N/A     3/14/2019             COLONOSCOPY POLYPECTOMY SNARE/COLD BIOPSY performed by Stanton Walsh MD at [facility]           3/14/2019             COLONOSCOPY CONTROL HEMORRHAGE performed by Stanton Walsh MD at [place] [address]     3/26/2019             COLONOSCOPY CONTROL HEMORRHAGE performed by Stanton Walsh MD at [address] [place] [address] Admin       {[facility] DME APPD:388166805}    Med Delivery   { GIN MED Delivery:681891430}         Wound Care Documentation and Therapy:             Elimination:    Continence:     Bowel: {YES / DB:75779}      Bladder: {YES / UX:52415}      Urinary Catheter: {Urinary Catheter:515203631}     Colostomy/Ileostomy/Ileal Conduit: {YES / BD:86772}             Date of Last BM: ***         Intake/Output Summary (Last 24 hours) at 3/27/2019 0849    Last data filed at 3/27/2019 0544          Gross per 24 hour       Intake     7240 ml       Output     800 ml       Net     6440 ml            I/O last 3 completed shifts: In: 7240 [P.O.:720;  I.V.:4645; Blood:825; IV Piggyback:1050]    Out: 800 [Urine:800]         Safety Concerns:       [address] [employer] Safety Concerns:479375220}         Impairments/Disabilities:        [address] [employer] Impairments/Disabilities:819925915}         Nutrition Therapy:    Current Nutrition Therapy:     [address] [employer] Diet List:585215585}         Routes of Feeding: {[facility] DME Other Feedings:796992828}    Liquids: {Slp liquid thickness:73478}    Daily Fluid Restriction: {[facility] DME Yes amt example:055555712}    Last Modified Barium Swallow with Video (Video Swallowing Test): {Done Not Done LJZL:757057940}         Treatments at the Time of Hospital Discharge:     Respiratory Treatments: ***    Oxygen Therapy:  {Therapy; copd oxygen:93511}    Ventilator:      { CC Vent UXSN:974522481}         Rehab Therapies: Nursing,PT,OT    Weight Bearing Status/Restrictions: [employer] Weight Bearin}    Other Medical Equipment (for information only, NOT a DME order):  {EQUIPMENT:681871345}    Other Treatments: ***         Patient's personal belongings (please select all that are sent with patient):    {[facility] DME Belongings:177835443}         RN SIGNATURE:  {Esignature:921793420}         CASE MANAGEMENT/SOCIAL WORK SECTION         Inpatient Status Date: 3/26/19         Readmission Risk Assessment Score:    Readmission Risk Risk of Unplanned Readmission:          16                    Discharging to Facility/ Agency     Name: [address] [place] [address],[address]: [phone]      Fax: [phone]                     / signature: Electronically signed by SABINO Doshi on 3/27/19 at 8:49 AM         PHYSICIAN SECTION         Prognosis: {Prognosis:7564990229}         Condition at Discharge: [address] Patient Condition:637176192}         Rehab Potential (if transferring to Rehab): {Prognosis:9478606930}         Recommended Labs or Other Treatments After Discharge: home care         Physician Certification: I certify the above information and transfer of Kendall Tom  is necessary for the continuing treatment of the diagnosis listed and that she requires home care for 30 days.           Update Admission H&P: no changes in H&P         PHYSICIAN SIGNATURE:  Catrachita Andrade MD on 19                Elanaantonio JORDAN Barrera  22 4838             Continuity of Care Form         Patient Name: Kendall Tom     :  1950                       MRN:  [id number]         Admit date:  3/26/2019                      Discharge date:  ***         Code Status Order: No Order     Advance Directives:                  [address] [place] [address] Documentation                    Date/Time       Healthcare Directive       Type of Healthcare Directive       Copy in [address] [facility] [address] Agent's Name       Healthcare Agent's Phone Number               19 1329      No, patient does not have an advance directive for healthcare treatment     --     --     --     --     --                             Admitting Physician:  Marcial Andersen MD    PCP: Nataliya Edwards MD         Discharging Nurse: [facility] Unit/Room#: [id number]    Discharging Unit Phone Number: ***         Emergency Contact:     Extended Emergency Contact Information    Primary Emergency Contact: Kei Ayala    Address:                     [facility] Phone: [phone]    Relation: Child    Secondary Emergency Contact: charla ko    Home Phone: [phone]    Mobile Phone: [phone]    Relation: Child         Past Surgical History:            Past Surgical History:       Procedure     Laterality     Date            CARDIOVERSION                        COLONOSCOPY     N/A     3/14/2019             COLONOSCOPY POLYPECTOMY SNARE/COLD BIOPSY performed by Radha Jaime MD at [facility]           3/14/2019             COLONOSCOPY CONTROL HEMORRHAGE performed by Radha Jaime MD at [address] [place] [address]     COLONOSCOPY     N/A     3/26/2019             COLONOSCOPY CONTROL HEMORRHAGE performed by Radha Jaime MD at [facility]                        TUNNELED CENTRAL VENOUS CATHETER W/ SUBCUTANEOUS PORT     Right                       Immunization History:            Immunization History       Administered     Date(s) Administered            Influenza, High Dose (Fluzone 65 yrs and older)     09/27/2018                 Active Problems:           Patient Active Problem List       Diagnosis     Code            Persistent atrial fibrillation (HCC)     I48.1            Partial small bowel obstruction (HCC)     K56.600            Syncope     R55            Chronic anticoagulation     Z79.01            Endometrial cancer ([facility] [address]     C54.1            Alkaline phosphatase elevation     R74.8            Anemia     D64.9            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[facility] [address]     G62.0, T45.1X5A            Essential hypertension     I10            On antineoplastic chemotherapy     Z79.899            Prediabetes     R73.03            Abdominal pain, generalized     R10.84            Abnormal liver enzymes     R74.8            GIB (gastrointestinal bleeding)     K92.2 Feedings:732711407}    Liquids: {Slp liquid thickness:74369}    Daily Fluid Restriction: {CHP DME Yes amt example:067152701}    Last Modified Barium Swallow with Video (Video Swallowing Test): {Done Not Done QSZS:861682627}         Treatments at the Time of Hospital Discharge:     Respiratory Treatments: ***    Oxygen Therapy:  {Therapy; copd oxygen:32433}    Ventilator:      { CC Vent BYVM:868367540}         Rehab Therapies: Nursing    Weight Bearing Status/Restrictions: [employer] Weight Bearin}    Other Medical Equipment (for information only, NOT a DME order):  {EQUIPMENT:010095675}    Other Treatments: ***         Patient's personal belongings (please select all that are sent with patient):    {CHP DME Belongings:525191657}         RN SIGNATURE:  {Esignature:981335987}         CASE MANAGEMENT/SOCIAL WORK SECTION         Inpatient Status Date: 3/26/19         Readmission Risk Assessment Score:    Readmission Risk                  Risk of Unplanned Readmission:          16                    Discharging to Facility/ Agency     Name: [address] [place] [address],[facility] [address]: [phone]      Fax: [phone]                     / signature: Electronically signed by SABINO Ascencio on 3/27/19 at 8:49 AM         PHYSICIAN SECTION         Prognosis: {Prognosis:9431522566}         Condition at Discharge: Marleni Gunderson Patient Condition:329368473}         Rehab Potential (if transferring to Rehab): {Prognosis:0826012770}         Recommended Labs or Other Treatments After Discharge: home care         Physician Certification: I certify the above information and transfer of Dilcia Beverly  is necessary for the continuing treatment of the diagnosis listed and that she requires home care for 30 days.           Update Admission H&P: no changes in H&P         PHYSICIAN SIGNATURE:  Mamadou York MD on 19                Richy Donis RN  19 5345             Continuity of Care Form Immunization History:            Immunization History       Administered     Date(s) Administered            Influenza, High Dose (Fluzone 65 yrs and older)     2018                 Active Problems:           Patient Active Problem List       Diagnosis     Code            Persistent atrial fibrillation (HCC)     I48.1            Partial small bowel obstruction (HCC)     K56.600            Syncope     R55            Chronic anticoagulation     Z79.01            Endometrial cancer ([facility] [address]     C54.1            Alkaline phosphatase elevation     R74.8            Anemia     D64.9            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HCC)     G62.0, T45.1X5A            Essential hypertension     I10            On antineoplastic chemotherapy     Z79.899            Prediabetes     R73.03            Abdominal pain, generalized     R10.84            Abnormal liver enzymes     R74.8            GIB (gastrointestinal bleeding)     K92.2                 Isolation/Infection:           Isolation                                    No Isolation                                 Nurse Assessment:    Last Vital Signs: BP (!) 95/43   Pulse 94   Temp 97.9 °F (36.6 °C) (Temporal)   Resp 17   Ht 5' 9\" (1.753 m)   Wt 179 lb 0.2 oz (81.2 kg)   LMP  (Exact Date)   SpO2 96%   BMI 26.44 kg/m²       Last documented pain score (0-10 scale): Pain Level: 2    Last Weight:           Wt Readings from Last 1 Encounters:       19     179 lb 0.2 oz (81.2 kg)            Mental Status:  {IP PT MENTAL STATUS:12782}         IV Access:    { GIN IV ACCESS:073548395}         Nursing Mobility/ADLs:    Walking            {CHP DME BYQS:062803629}    Transfer            {CHP DME EEKP:605868079}    Bathing             {CHP DME KBX}    Dressing           {CHP DME IZFN:045749871}    Toileting           {CHP DME NBYR:422783285}    Feeding            {CHP DME BZKE:524824917}    Med Admin       {CHP DME TOXU:686534404}    Med Delivery   [employer] MED Delivery:395759106}         Wound Care Documentation and Therapy:             Elimination:    Continence:     Bowel: {YES / CW:56073}      Bladder: {YES / IN:11026}      Urinary Catheter: {Urinary Catheter:241824957}     Colostomy/Ileostomy/Ileal Conduit: {YES / BK:65558}             Date of Last BM: ***         Intake/Output Summary (Last 24 hours) at 3/27/2019 0849    Last data filed at 3/27/2019 0544          Gross per 24 hour       Intake     7240 ml       Output     800 ml       Net     6440 ml            I/O last 3 completed shifts: In: 7240 [P.O.:720;  I.V.:4645; Blood:825; IV Piggyback:1050]    Out: 800 [Urine:800]         Safety Concerns:       [employer] Safety Concerns:233045858}         Impairments/Disabilities:        [employer] Impairments/Disabilities:624502986}         Nutrition Therapy:    Current Nutrition Therapy:     [employer] Diet List:314364543}         Routes of Feeding: {CHP DME Other Feedings:976979991}    Liquids: {Slp liquid thickness:22552}    Daily Fluid Restriction: {CHP DME Yes amt example:314173256}    Last Modified Barium Swallow with Video (Video Swallowing Test): {Done Not Done DMTV:865466492}         Treatments at the Time of Hospital Discharge:     Respiratory Treatments: ***    Oxygen Therapy:  {Therapy; copd oxygen:12088}    Ventilator:      {MH CC Vent UJP}         Rehab Therapies: Nursing    Weight Bearing Status/Restrictions: [address] [employer]  Weight Bearin}    Other Medical Equipment (for information only, NOT a DME order):  {EQUIPMENT:331328557}    Other Treatments: ***         Patient's personal belongings (please select all that are sent with patient):    {P DME Belongings:185005758}         RN SIGNATURE:  {Esignature:721338138}         CASE MANAGEMENT/SOCIAL WORK SECTION         Inpatient Status Date: 3/26/19         Readmission Risk Assessment Score:    Readmission Risk                  Risk of Unplanned Readmission:          16 Discharging to Facility/ Agency     Name: [address] [place] [address],[address]: [phone]      Fax: [phone]                     / signature: Electronically signed by SABINO Rayo on 3/27/19 at 8:49 AM         PHYSICIAN SECTION         Prognosis: {Prognosis:2275558651}         Condition at Discharge: [address] Patient Condition:986088587}         Rehab Potential (if transferring to Rehab): {Prognosis:4252495319}         Recommended Labs or Other Treatments After Discharge: home care         Physician Certification: I certify the above information and transfer of Jeremías Tellez  is necessary for the continuing treatment of the diagnosis listed and that she requires home care for  30 days.           Update Admission H&P: no change in h&p         PHYSICIAN SIGNATURE:  Sterling Mclaughlin MD ON 18                [facility]  19 0849             Continuity of Care Form         Patient Name: Jeremías Tellez     :  1950                       MRN:  [id number]         Admit date:  3/26/2019                      Discharge date:  ***         Code Status Order: No Order     Advance Directives:                  [address] [place] [address] Documentation                    Date/Time       Healthcare Directive       Type of Healthcare Directive       Copy in [address] [facility] [address] Agent's Name       Healthcare Agent's Phone Number               19 1324      No, patient does not have an advance directive for healthcare treatment     --     --     --     --     --                             Admitting Physician:  Melo Tran MD    PCP: Marita Smith MD         Discharging Nurse: [facility] Unit/Room#: [id number]    Discharging Unit Phone Number: ***         Emergency Contact:     Extended Emergency Contact Information    Primary Emergency Contact: Kei Ayala    Address: [facility] Phone: [phone]    Relation: Child    Secondary Emergency Contact: charla ko    Home Phone: [phone]    Mobile Phone: [phone]    Relation: Child         Past Surgical History:            Past Surgical History:       Procedure     Laterality     Date            CARDIOVERSION                        COLONOSCOPY     N/A     3/14/2019             COLONOSCOPY POLYPECTOMY SNARE/COLD BIOPSY performed by Gray Nash MD at [facility]           3/14/2019             COLONOSCOPY CONTROL HEMORRHAGE performed by Gray Nash MD at [address] [place] [address]     COLONOSCOPY     N/A     3/26/2019             COLONOSCOPY CONTROL HEMORRHAGE performed by Gray Nash MD at [facility]                        TUNNELED CENTRAL VENOUS CATHETER W/ SUBCUTANEOUS PORT     Right                       Immunization History:            Immunization History       Administered     Date(s) Administered            Influenza, High Dose (Fluzone 65 yrs and older)     09/27/2018                 Active Problems:           Patient Active Problem List       Diagnosis     Code            Persistent atrial fibrillation (HCC)     I48.1            Partial small bowel obstruction (HCC)     K56.600            Syncope     R55            Chronic anticoagulation     Z79.01            Endometrial cancer ([facility] [address]     C54.1            Alkaline phosphatase elevation     R74.8            Anemia     D64.9            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[facility] [address]     G62.0, T45.1X5A            Essential hypertension     I10            On antineoplastic chemotherapy     Z79.899            Prediabetes     R73.03            Abdominal pain, generalized     R10.84            Abnormal liver enzymes     R74.8            GIB (gastrointestinal bleeding)     K92.2     Isolation/Infection:   Isolation    No Isolation     Nurse Assessment:    Last Vital Signs: BP (!) 95/43   Pulse 94   Temp 97.9 °F (36.6 °C) (Temporal)   Resp 17   Ht 5' 9\" (1.753 m)   Wt 179 lb 0.2 oz (81.2 kg)   LMP  (Exact Date)   SpO2 96%   BMI 26.44 kg/m²       Last documented pain score (0-10 scale): Pain Level: 2    Last Weight:           Wt Readings from Last 1 Encounters:       03/27/19     179 lb 0.2 oz (81.2 kg)       Mental Status:  {IP PT MENTAL STATUS:20030}     IV Access:    { GIN IV ACCESS:232445482}     Nursing Mobility/ADLs:  Walking            {P DME GADP:660574436}    Transfer            {P DME DEQU:336944225}    Bathing             {P DME WRBI:794666472}    Dressing           {P DME SKHM:507653459}    Toileting           {[facility] DME DKFT:512325656}    Feeding            {[facility] DME SXUA:069062244}    Med Admin       {[facility] DME MJYH:578580480}    Med Delivery   { GIN MED Delivery:404033382}    Wound Care Documentation and Therapy:  Elimination:  Continence:   Bowel: {YES / IN:55756}  Bladder: {YES / TF:64132}  Urinary Catheter: {Urinary Catheter:137180783}   Colostomy/Ileostomy/Ileal Conduit: {YES / OA:55754}  Date of Last BM: ***  Intake/Output Summary (Last 24 hours) at 3/27/2019 0849    Last data filed at 3/27/2019 0544       Gross per 24 hour   Intake 7240 ml   Output 800 ml   Net 6440 ml   I/O last 3 completed shifts: In: 7240 [P.O.:720;  I.V.:4645; Blood:825; IV Piggyback:1050]  Out: 800 [Urine:800]       Safety Concerns:       [employer] Safety Concerns:077394671}       Impairments/Disabilities:        [employer] Impairments/Disabilities:210402125}    Nutrition Therapy:    Current Nutrition Therapy:     [employer] Diet List:536641844}     Routes of Feeding: {[facility] DME Other Feedings:932563946}    Liquids: {Slp liquid thickness:32401}    Daily Fluid Restriction: {CHP DME Yes amt example:737107698}    Last Modified Barium Swallow with Video (Video Swallowing Test): {Done Not Done XNIA:553815639}     reatments at the Time of

## 2019-03-27 NOTE — PROGRESS NOTES
Information:  · Nutrition-Focused Physical Findings: No edema noted.  +6.4 liters  · Wound Type: None  · Current Nutrition Therapies:  · Oral Diet Orders: Full Liquid   · Oral Diet intake: %  · Oral Nutrition Supplement (ONS) Orders: None  · Anthropometric Measures:  · Ht: 5' 9\" (175.3 cm)   · Current Body Wt: 179 lb (81.2 kg)  · Admission Body Wt: 175 lb (79.4 kg)  · Ideal Body Wt: 145 lb (65.8 kg)   · BMI Classification: BMI 25.0 - 29.9 Overweight    Nutrition Interventions:   Continue current diet  Continued Inpatient Monitoring, Education Not Indicated    Nutrition Evaluation:   · Evaluation: Goals set   · Goals: Pt will tolerate diet advancement and consume at least 50% of meals     · Monitoring: Nutrition Progression, Meal Intake, Diet Tolerance, Weight, I&O, Pertinent Labs      Electronically signed by Verónica Holbrook RD, LD on 3/27/19 at 9:09 AM    Contact Number: [phone]

## 2019-03-27 NOTE — CARE COORDINATION
Discharge Planning Assessment     met with patient to discuss reason for admission, current living situation, and potential needs at the time of discharge    Demographics/Insurance verified : Yes    Current type of dwellin [address]    Living arrangements: Patient reports that she lives at home alone. Level of function/Support: Patient reports that she was independent in her ADLs prior to admission. Patient reports that she has supportive family. PCP: Dr. Faiza Johnston    Last Visit to PCP: 3/21/19    DME: None identified. Patient denies DME needs at this time. Active with any community resources/agencies/skilled home care: None prior to admission. Discussed possible home care needs- patient reports that she may feel better at discharge with having a visiting nurse arranged. To check vitals/ manage medicines/ post op care, as the patient lives at home alone. Patient has no preference in provider, and is agreeable to using [facility] at discharge. Medication compliance issues: None identified. Financial issues that could impact healthcare: None identified. Transportation at the time of discharge: Patient reports that her family will transport. Tentative discharge plan: Home with [facility]. Discussed with the patient at the bedside, who identifies no other needs at this time.     Called [place]/ [facility] with the home care referral.    Will continue to follow for support and discharge planning.    -Bubba Buchanan, MSW, LSW

## 2019-03-27 NOTE — PROGRESS NOTES
[address] home care referral. Spoke with pt and friend re: home care plan of care/services. Agreeable. Will follow for home care.

## 2019-03-27 NOTE — FLOWSHEET NOTE
03/27/19 1653   Encounter Summary   Services provided to: Patient   Referral/Consult From: Nurse   Support System Unknown   Continue Visiting   (visit, prayer, AD discussion 3/27 CL)   Complexity of Encounter Moderate   Length of Encounter 15 minutes   Spiritual/Religion   Type Spiritual support   Assessment Calm; Approachable   Intervention Active listening;Discussed illness/injury and it's impact   Outcome Engaged in conversation   Advance Directives (For Healthcare)   Healthcare Directive No, patient does not have an advance directive for healthcare treatment   Advance Directives Documents given  (left forms w/guide per pt request)   Electronically signed by Diana Jackson on 3/27/2019 at 4:55 PM

## 2019-03-27 NOTE — PROGRESS NOTES
[facility] GI  Gastroenterology Progress Note  Dilcia Beverly is a 79 y.o. female patient. 1. Proctocolitis with rectal bleeding    2. History of endometrial cancer    3. Chronic atrial fibrillation with rapid ventricular response (HCC)    4. Adequate anticoagulation on anticoagulant therapy    5. Acute post-hemorrhagic anemia    6. Hypotension, unspecified hypotension type        SUBJECTIVE:  Feels much better. No bleeding overnight. Physical    VITALS:  BP (!) 95/43   Pulse 94   Temp 97.9 °F (36.6 °C) (Temporal)   Resp 17   Ht 5' 9\" (1.753 m)   Wt 179 lb 0.2 oz (81.2 kg)   LMP  (Exact Date)   SpO2 96%   BMI 26.44 kg/m²   TEMPERATURE:  Current - Temp: 97.9 °F (36.6 °C); Max - Temp  Av.1 °F (36.7 °C)  Min: 96.8 °F (36 °C)  Max: 99.5 °F (37.5 °C)    Abdomen soft, ND, NT, no HSM, Bowel sounds normal     Data      Recent Labs     19  0836  19  1820 19  0027 19  0545   WBC 4.8  --   --   --  8.7   HGB 10.0*   < > 8.6* 7.8* 7.5*   HCT 30.9*   < > 26.5* 23.6* 22.5*   MCV 88.9  --   --   --  89.0     --   --   --  141    < > = values in this interval not displayed. Recent Labs     19  0836 19  0545    143   K 3.5 3.4*    112*   CO2 24 25   BUN 9 6*   CREATININE 0.5* <0.5*     Recent Labs     19  0836   AST 28   ALT 18   BILITOT 0.8   ALKPHOS 164*     Recent Labs     19  0836   LIPASE 33.0             ASSESSMENT : LGI hemorrhage from large rectal ulcer - s/p epi and hemoclip per Flex Sig 3/26. Required total of 4 units PRBC's. Hb trending down despite this. Given magnitude of rapid hemorrhage, will give another 1 unit PRBC's and observe in ICU today. Can transfer out in AM.      PLAN   :  1) Transfuse 1 unit PRBC's.  2) Full liquid diet. 3) ICU today and transfer out in AM if no bleeding. 4) Serial Hb/Hct q 12 hrs.     Umesh Collazo MD  [address] [place] [address] [facility]  3/27/2019

## 2019-03-27 NOTE — PROGRESS NOTES
Reassessment complete. No acute changes. Patient voided via bedpan. Voided such a large amount it overflowed the pan and spilled in the bed. Linens and gown changed. Only a few smears of old blood noted with marilyn care. Denies any other needs at this time. Will monitor.

## 2019-03-27 NOTE — PROGRESS NOTES
Assess complete. See flow sheet. A/O x 4. Follows commands. Remains in A-fib. Assisted patient with marilyn care. Small amount of dark blood noted from vagina and rectum. Marilyn pad placed to improve monitoring. Discussed POC for this shift. Turned & repositioned for comfort/skin care. Denies any other needs at this time.  Will monitor

## 2019-03-27 NOTE — PROGRESS NOTES
P Pulmonary and Critical Care  ICU F/U Note    Subjective:   CC / HPI: GI bleed, anemia  Patient is on RA, S/P colonoscopy. No recurrent bleeding reported    PMH: Reviewed, no changes    PSH: Reviewed, no changes    Review of Systems  Constitutional: Negative for fever, chills, diaphoresis. Respiratory: See HPI  Cardiovascular: Negative for chest pain, palpitations and leg swelling. Gastrointestinal: Negative for abdominal pain, blood in stool, abdominal distention and anal bleeding. Genitourinary: Negative for dysuria, urgency, frequency, hematuria, decreased urine volume, and enuresis. Skin: Negative. Negative for color change and pallor.    Neurological: No dizziness, lightheaded, loss of consciousness, HA or neck stiffness     Objective:   Data Review:  Vital Signs: BP (!) 95/43   Pulse 94   Temp 97.9 °F (36.6 °C) (Temporal)   Resp 17   Ht 5' 9\" (1.753 m)   Wt 179 lb 0.2 oz (81.2 kg)   LMP  (Exact Date)   SpO2 96%   BMI 26.44 kg/m²     24 Hour I&O Review:     Intake/Output Summary (Last 24 hours) at 3/27/2019 7758  Last data filed at 3/27/2019 0544  Gross per 24 hour   Intake 7240 ml   Output 800 ml   Net 6440 ml     Continuous Infusions:   sodium chloride 100 mL/hr at 03/26/19 1836     Current Medications: Current Facility-Administered Medications: 0.9 % sodium chloride bolus, 250 mL, Intravenous, Once  PEG-KCl-NaCl-NaSulf-Na Asc-C (MOVIPREP) solution 100 g, 100 g, Oral, Once  methimazole (TAPAZOLE) tablet 10 mg, 10 mg, Oral, Daily  sodium chloride flush 0.9 % injection 10 mL, 10 mL, Intravenous, 2 times per day  sodium chloride flush 0.9 % injection 10 mL, 10 mL, Intravenous, PRN  0.9 % sodium chloride infusion, , Intravenous, Continuous  metronidazole (FLAGYL) 500 mg in NaCl 100 mL IVPB premix, 500 mg, Intravenous, Q8H  ciprofloxacin (CIPRO) IVPB 400 mg, 400 mg, Intravenous, Q12H  morphine (PF) injection 2 mg, 2 mg, Intravenous, Q2H PRN  morphine injection 4 mg, 4 mg, Intravenous, Q2H PRN  ondansetron [facility] injection 4 mg, 4 mg, Intravenous, Q6H PRN    CBC:  Recent Labs     03/26/19  0836  03/26/19  1820 03/27/19  0027 03/27/19  0545   WBC 4.8  --   --   --  8.7   RBC 3.47*  --   --   --  2.52*   HGB 10.0*   < > 8.6* 7.8* 7.5*   HCT 30.9*   < > 26.5* 23.6* 22.5*     --   --   --  141   MCV 88.9  --   --   --  89.0   MCH 28.7  --   --   --  29.8   MCHC 32.3  --   --   --  33.4   RDW 15.6*  --   --   --  14.5    < > = values in this interval not displayed. BMP:  Recent Labs     03/26/19  0836 03/27/19  0545    143   K 3.5 3.4*    112*   CO2 24 25   BUN 9 6*   CREATININE 0.5* <0.5*   CALCIUM 9.6 8.0*   GLUCOSE 99 114*        Radiology Review:  Pertinent images / reports were reviewed as a part of this visit. Physical Exam   Constitutional: Alert and oriented. HENT: No oropharyngeal exudate. Eyes: Pupils are equal, round, and reactive to light. Neck: No JVD present. No tracheal deviation present. Cardiovascular: tachycardic regular rhythm, S1&S2 and intact distal pulses. Pulmonary/Chest: bilateral breath sounds. No wheezes, rhonchi, rales or tenderness. Abdominal: Soft. Bowel sounds are normal, no distension and no tenderness to palpation. Musculoskeletal: No edema and no tenderness. Neurological: Non focal.    Assessment:   1. Rectal bleed  2. Blood loss anemia  3. Hypotension 2/2 above  4. A.fib with chronic anticoagulation   5. Radiation induced colitis   Plan:     VS reviewed as above. Stress ulcer, DVT, and line protocols are being followed if not contraindicated. She is getting another unite of blood today  No recurrent bleeding reported  Restart Xarelto once cleared by GI service  Discussed with ICU RN, OK to transfer patient to step down unite.  Will sign off

## 2019-03-27 NOTE — PLAN OF CARE
Problem: Falls - Risk of:  Goal: Will remain free from falls  Description  Will remain free from falls  Note:   Patient Remains free of falls or injury this shift. Fall risk assessment conducted QS and PRN. Fall prevention initiated. Visual rounding will be completed to verify pt is safe and free of falls. Bed will be in lowest position and patient and/or family will be educated on using call light to prevent falls. RN will utilize necessary equipment when moving a patient to prevent falls. Bed in lowest position & wheels locked. Bed alarm activated. Bedside table, call light and phone within reach. Problem: Risk for Impaired Skin Integrity  Goal: Tissue integrity - skin and mucous membranes  Description  Structural intactness and normal physiological function of skin and  mucous membranes. Note:   Skin assessed QS. Encouraged patient to turn and reposition Q2H, assisting if needed to maintain skin integrity. Problem: Pain:  Goal: Pain level will decrease  Description  Pain level will decrease  Note:   Patient will maintain pain at a tolerable level. Pain assessed QS & PRN. Patient knowledgeable of pain scale, medication available and need to communicate with staff. Patient will be knowledgeable of side effects of pain medications.

## 2019-03-27 NOTE — PROGRESS NOTES
Reassessment complete. No acute changes. H/H drawn and sent to lab per order. Denies any other needs at this time. Will monitor.

## 2019-03-27 NOTE — PROGRESS NOTES
Progress Note  Admit Date: 3/26/2019      PCP: Corbin Moseley MD     CC: F/U for rectal bleed     SUBJECTIVE / Interval History:     Pt feeling better today, only small amt blood per rectum X 2 today, no n/ v/ hematemesis        Allergies  Naproxen and Lisinopril    Medications    Scheduled Meds:   sodium chloride  250 mL Intravenous Once    PEG-KCl-NaCl-NaSulf-Na Asc-C  100 g Oral Once    methimazole  10 mg Oral Daily    sodium chloride flush  10 mL Intravenous 2 times per day    metroNIDAZOLE  500 mg Intravenous Q8H    ciprofloxacin  400 mg Intravenous Q12H       Continuous Infusions:   sodium chloride 100 mL/hr at 03/26/19 2236       PRN Meds:  sodium chloride flush, morphine, morphine, ondansetron    Vitals       Vitals:    03/27/19 1147   BP: 110/63   Pulse:    Resp:    Temp:    SpO2:          24HR INTAKE/OUTPUT:      Intake/Output Summary (Last 24 hours) at 3/27/2019 1158  Last data filed at 3/27/2019 0544  Gross per 24 hour   Intake 6190 ml   Output 800 ml   Net 5390 ml       Exam:    Gen: No distress. Eyes: PERRL. No sclera icterus. No conjunctival injection. ENT: No discharge. Pharynx clear. External appearance of ears and nose normal.  Neck: supple   Resp: Clear to auscultation. No crackles / Rhonchi. No wheezes. CV: Regular rate. Regular rhythm. No murmur or rub  GI: soft, Non-tender. Non-distended. No hernia. BS +  Skin: Warm, dry, normal texture. No rashes   Lymph: No cervical LAD. No supraclavicular LAD. M/S: No joint deformity. Neuro: Moves all four extremities. CN 2-12 tested, no defect noted. Psych: Oriented x 3. No anxiety. Awake. Alert. Intact judgement and insight. Data    LABS  CBC:   Recent Labs     03/26/19  0836  03/26/19  1820 03/27/19  0027 03/27/19  0545   WBC 4.8  --   --   --  8.7   HGB 10.0*   < > 8.6* 7.8* 7.5*   HCT 30.9*   < > 26.5* 23.6* 22.5*   MCV 88.9  --   --   --  89.0     --   --   --  141    < > = values in this interval not displayed. BMP:   Recent Labs     03/26/19  0836 03/27/19  0545    143   K 3.5 3.4*    112*   CO2 24 25   BUN 9 6*   CREATININE 0.5* <0.5*       POC GLUCOSE:  No results for input(s): POCGLU in the last 72 hours. LIVER PROFILE:   Recent Labs     03/26/19  0836   AST 28   ALT 18   LIPASE 33.0   LABALBU 3.4   BILITOT 0.8   ALKPHOS 164*       PT/INR: @LABRCNT(protime:3,in      Microbiology:  Nasal Culture: )No results for input(s): ORG, MRSAPCR in the last 72 hours. Blood Culture:   Recent Labs     03/26/19  0835   BC No Growth to date. Any change in status will be called. BLOODCULT2 No Growth to date. Any change in status will be called. UA:  Recent Labs     03/26/19  1655   COLORU YELLOW   PHUR 6.0   CLARITYU Clear   SPECGRAV 1.021   LEUKOCYTESUR Negative   UROBILINOGEN 0.2   BILIRUBINUR Negative   BLOODU Negative   GLUCOSEU Negative   KETUA Negative       Urine Culture:No results for input(s): Veverly [place] in the last 72 hours. RADIOLOGY:    XR ABDOMEN (2 VIEWS)   Final Result   No free air or acute process. The bladder is distended, possibly contributing to the patient's abdominal   pain. CTA ABDOMEN PELVIS W WO CONTRAST   Final Result   1. Severe colitis involving the descending colon, sigmoid and especially   rectum. 2. Area of contrast blush in the rectal wall in arterial phase, extending   into the venous phase, but no definitive communication with the lumen of the   rectum. This may be related to severe inflammation of the wall of the rectum. 3. Small bowel appears unremarkable. No evidence of dilation or thickening. CONSULTS:    IP CONSULT TO GI  IP CONSULT TO HOSPITALIST  IP CONSULT TO HOSPITALIST  IP CONSULT TO SPIRITUAL SERVICES    ASSESSMENT AND PLAN:      Active Problems:    GIB (gastrointestinal bleeding)  Resolved Problems:    * No resolved hospital problems.  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 Discussed with consulting physicians, nursing and social work     The note was completed using EMR. Every effort was made to ensure accuracy; however, inadvertent computerized transcription errors may be present.        Korina Rodriguez MD

## 2019-03-27 NOTE — PLAN OF CARE
Nutrition Problem: Unintended weight loss  Intervention: Food and/or Nutrient Delivery: Continue current diet  Nutritional Goals: Pt will tolerate diet advancement and consume at least 50% of meals

## 2019-03-28 LAB
A/G RATIO: 1.1 (ref 1.1–2.2)
ALBUMIN SERPL-MCNC: 2.8 G/DL (ref 3.4–5)
ALP BLD-CCNC: 99 U/L (ref 40–129)
ALT SERPL-CCNC: 14 U/L (ref 10–40)
ANION GAP SERPL CALCULATED.3IONS-SCNC: 9 MMOL/L (ref 3–16)
ANION GAP SERPL CALCULATED.3IONS-SCNC: 9 MMOL/L (ref 3–16)
AST SERPL-CCNC: 23 U/L (ref 15–37)
BASOPHILS ABSOLUTE: 0 K/UL (ref 0–0.2)
BASOPHILS ABSOLUTE: 0 K/UL (ref 0–0.2)
BASOPHILS RELATIVE PERCENT: 0.3 %
BASOPHILS RELATIVE PERCENT: 0.3 %
BILIRUB SERPL-MCNC: 0.9 MG/DL (ref 0–1)
BUN BLDV-MCNC: 3 MG/DL (ref 7–20)
BUN BLDV-MCNC: 4 MG/DL (ref 7–20)
CALCIUM SERPL-MCNC: 5.4 MG/DL (ref 8.3–10.6)
CALCIUM SERPL-MCNC: 7.7 MG/DL (ref 8.3–10.6)
CHLORIDE BLD-SCNC: 109 MMOL/L (ref 99–110)
CHLORIDE BLD-SCNC: 117 MMOL/L (ref 99–110)
CO2: 20 MMOL/L (ref 21–32)
CO2: 24 MMOL/L (ref 21–32)
CREAT SERPL-MCNC: <0.5 MG/DL (ref 0.6–1.2)
CREAT SERPL-MCNC: <0.5 MG/DL (ref 0.6–1.2)
EOSINOPHILS ABSOLUTE: 0.1 K/UL (ref 0–0.6)
EOSINOPHILS ABSOLUTE: 0.1 K/UL (ref 0–0.6)
EOSINOPHILS RELATIVE PERCENT: 2.1 %
EOSINOPHILS RELATIVE PERCENT: 2.2 %
GFR AFRICAN AMERICAN: >60
GFR AFRICAN AMERICAN: >60
GFR NON-AFRICAN AMERICAN: >60
GFR NON-AFRICAN AMERICAN: >60
GLOBULIN: 2.6 G/DL
GLUCOSE BLD-MCNC: 144 MG/DL (ref 70–99)
GLUCOSE BLD-MCNC: 86 MG/DL (ref 70–99)
HCT VFR BLD CALC: 18.8 % (ref 36–48)
HCT VFR BLD CALC: 24.1 % (ref 36–48)
HCT VFR BLD CALC: 24.3 % (ref 36–48)
HCT VFR BLD CALC: 24.4 % (ref 36–48)
HEMOGLOBIN: 6.2 G/DL (ref 12–16)
HEMOGLOBIN: 7.9 G/DL (ref 12–16)
HEMOGLOBIN: 8 G/DL (ref 12–16)
HEMOGLOBIN: 8.1 G/DL (ref 12–16)
LYMPHOCYTES ABSOLUTE: 1.7 K/UL (ref 1–5.1)
LYMPHOCYTES ABSOLUTE: 1.8 K/UL (ref 1–5.1)
LYMPHOCYTES RELATIVE PERCENT: 27.1 %
LYMPHOCYTES RELATIVE PERCENT: 34.3 %
MCH RBC QN AUTO: 29.6 PG (ref 26–34)
MCH RBC QN AUTO: 30.2 PG (ref 26–34)
MCHC RBC AUTO-ENTMCNC: 32.9 G/DL (ref 31–36)
MCHC RBC AUTO-ENTMCNC: 33.4 G/DL (ref 31–36)
MCV RBC AUTO: 90 FL (ref 80–100)
MCV RBC AUTO: 90.6 FL (ref 80–100)
MONOCYTES ABSOLUTE: 0.5 K/UL (ref 0–1.3)
MONOCYTES ABSOLUTE: 0.8 K/UL (ref 0–1.3)
MONOCYTES RELATIVE PERCENT: 10.6 %
MONOCYTES RELATIVE PERCENT: 12 %
NEUTROPHILS ABSOLUTE: 2.7 K/UL (ref 1.7–7.7)
NEUTROPHILS ABSOLUTE: 3.7 K/UL (ref 1.7–7.7)
NEUTROPHILS RELATIVE PERCENT: 52.7 %
NEUTROPHILS RELATIVE PERCENT: 58.4 %
PDW BLD-RTO: 15.3 % (ref 12.4–15.4)
PDW BLD-RTO: 15.5 % (ref 12.4–15.4)
PLATELET # BLD: 109 K/UL (ref 135–450)
PLATELET # BLD: 140 K/UL (ref 135–450)
PMV BLD AUTO: 9 FL (ref 5–10.5)
PMV BLD AUTO: 9.5 FL (ref 5–10.5)
POTASSIUM SERPL-SCNC: 4.1 MMOL/L (ref 3.5–5.1)
POTASSIUM SERPL-SCNC: 4.2 MMOL/L (ref 3.5–5.1)
RBC # BLD: 2.09 M/UL (ref 4–5.2)
RBC # BLD: 2.66 M/UL (ref 4–5.2)
SODIUM BLD-SCNC: 142 MMOL/L (ref 136–145)
SODIUM BLD-SCNC: 146 MMOL/L (ref 136–145)
TOTAL PROTEIN: 5.4 G/DL (ref 6.4–8.2)
WBC # BLD: 5.1 K/UL (ref 4–11)
WBC # BLD: 6.4 K/UL (ref 4–11)

## 2019-03-28 PROCEDURE — 6360000002 HC RX W HCPCS: Performed by: INTERNAL MEDICINE

## 2019-03-28 PROCEDURE — 6370000000 HC RX 637 (ALT 250 FOR IP): Performed by: INTERNAL MEDICINE

## 2019-03-28 PROCEDURE — 80053 COMPREHEN METABOLIC PANEL: CPT

## 2019-03-28 PROCEDURE — 85014 HEMATOCRIT: CPT

## 2019-03-28 PROCEDURE — 2500000003 HC RX 250 WO HCPCS: Performed by: INTERNAL MEDICINE

## 2019-03-28 PROCEDURE — 2580000003 HC RX 258: Performed by: INTERNAL MEDICINE

## 2019-03-28 PROCEDURE — 85018 HEMOGLOBIN: CPT

## 2019-03-28 PROCEDURE — 2580000003 HC RX 258: Performed by: PHYSICIAN ASSISTANT

## 2019-03-28 PROCEDURE — 85025 COMPLETE CBC W/AUTO DIFF WBC: CPT

## 2019-03-28 PROCEDURE — 1200000000 HC SEMI PRIVATE

## 2019-03-28 RX ADMIN — SODIUM CHLORIDE: 9 INJECTION, SOLUTION INTRAVENOUS at 20:04

## 2019-03-28 RX ADMIN — METHIMAZOLE 10 MG: 5 TABLET ORAL at 10:29

## 2019-03-28 RX ADMIN — CIPROFLOXACIN 400 MG: 2 INJECTION, SOLUTION INTRAVENOUS at 10:28

## 2019-03-28 RX ADMIN — METRONIDAZOLE 500 MG: 500 INJECTION, SOLUTION INTRAVENOUS at 05:26

## 2019-03-28 RX ADMIN — Medication 10 ML: at 10:30

## 2019-03-28 RX ADMIN — Medication 10 ML: at 20:05

## 2019-03-28 RX ADMIN — METRONIDAZOLE 500 MG: 500 INJECTION, SOLUTION INTRAVENOUS at 20:04

## 2019-03-28 RX ADMIN — METRONIDAZOLE 500 MG: 500 INJECTION, SOLUTION INTRAVENOUS at 12:52

## 2019-03-28 RX ADMIN — CIPROFLOXACIN 400 MG: 2 INJECTION, SOLUTION INTRAVENOUS at 21:44

## 2019-03-28 ASSESSMENT — PAIN SCALES - GENERAL
PAINLEVEL_OUTOF10: 0

## 2019-03-28 NOTE — PROGRESS NOTES
Critical Hgb called of 6.2. Last Hbg was 8.1. Redrawing Hgb now to verify results in the event the waste blood was sent. New blood specimen sent to reverify lab results on Ca+ result of 5.4 and Hgb result of 6.2.

## 2019-03-28 NOTE — PROGRESS NOTES
Nursing assessment completed. Vital Signs 98.4 temp, Atrial Fib 102, 20 RR, 137/73 (88) BP, 97% RA. Patient denies pain. Assisted up to bathroom. Steady gait. Was able to void clear yellow and loose brown stool (no blood) in commode. Neuro WNL A&Ox4 speech clear and appropriate, LS CTA diminished in bases, 97% on RA and RR 18-20. Skin warm natural and dry. Skin turgor WNL. Cap refill < 2 sec. Palpable Pulses radials 1+/1+ and Dorsalis Pedis 1+/1+. 2 peripheral left wrist and right hand IV sites WNL patent, flushed and sterile caps. Normal Saline 0.9% infusing at 50 cc/hr through right chest portacath site patent. No acute distress at this time. Taking fluids fair and on clears at this time. Instructed on use of call light. Covered with 3 warm blankets and closed her blinds per her request.  SR up x3. Call light in reach. Lights dimmed.

## 2019-03-28 NOTE — PLAN OF CARE
Problem: Pain:  Goal: Pain level will decrease  Description  Pain level will decrease  Outcome: Met This Shift  Goal: Control of acute pain  Description  Control of acute pain  Outcome: Met This Shift  Goal: Control of chronic pain  Description  Control of chronic pain  Outcome: Met This Shift

## 2019-03-28 NOTE — PROGRESS NOTES
Nursing reassessment completed. VSS. Afebrile. Chronic Afib controlled rate. Assistance provided when up to bathroom. Passing loose stool small amount (yellow-brown-green). Voiding with stooling. Taking PO fair. Denies pain. SR up x3. Call light in reach. Assessment unchanged. No acute distress.

## 2019-03-28 NOTE — PROGRESS NOTES
Department of Internal Medicine  General Internal Medicine   Progress Note  CC: rectal bleeding     SUBJECTIVE:    Feels good today. Old clots of blood in stool. No n/ v/ hematemesis. OBJECTIVE      Medications    Current Facility-Administered Medications: potassium chloride (KLOR-CON M) extended release tablet 40 mEq, 40 mEq, Oral, Once  PEG-KCl-NaCl-NaSulf-Na Asc-C (MOVIPREP) solution 100 g, 100 g, Oral, Once  methimazole (TAPAZOLE) tablet 10 mg, 10 mg, Oral, Daily  sodium chloride flush 0.9 % injection 10 mL, 10 mL, Intravenous, 2 times per day  sodium chloride flush 0.9 % injection 10 mL, 10 mL, Intravenous, PRN  0.9 % sodium chloride infusion, , Intravenous, Continuous  metronidazole (FLAGYL) 500 mg in NaCl 100 mL IVPB premix, 500 mg, Intravenous, Q8H  ciprofloxacin (CIPRO) IVPB 400 mg, 400 mg, Intravenous, Q12H  morphine (PF) injection 2 mg, 2 mg, Intravenous, Q2H PRN  morphine injection 4 mg, 4 mg, Intravenous, Q2H PRN  ondansetron (ZOFRAN) injection 4 mg, 4 mg, Intravenous, Q6H PRN  Physical    VITALS:  BP (!) 121/42   Pulse 92   Temp 99.2 °F (37.3 °C) (Temporal)   Resp 20   Ht 5' 9\" (1.753 m)   Wt 181 lb 3.5 oz (82.2 kg)   LMP  (Exact Date)   SpO2 99%   BMI 26.76 kg/m²     Gen: No distress. Eyes: PERRL. No sclera icterus. No conjunctival injection. ENT: No discharge. Pharynx clear. External appearance of ears and nose normal.  Neck: supple   Resp: Clear to auscultation. No crackles / Rhonchi. No wheezes. CV: Regular rate. Regular rhythm. No murmur or rub  GI: soft, Non-tender. Non-distended. No hernia. BS +  Skin: Warm, dry, normal texture. No rashes   Lymph: No cervical LAD. No supraclavicular LAD. M/S: No joint deformity. Neuro: Moves all four extremities. CN 2-12 tested, no defect noted. Psych: Oriented x 3. No anxiety. Awake. Alert.  Intact judgement and insight.       RADIOLOGY:     XR ABDOMEN (2 VIEWS)   Final Result   No free air or acute process.       The bladder is distended, possibly contributing to the patient's abdominal   pain.           CTA ABDOMEN PELVIS W WO CONTRAST   Final Result   1. Severe colitis involving the descending colon, sigmoid and especially   rectum. 2. Area of contrast blush in the rectal wall in arterial phase, extending   into the venous phase, but no definitive communication with the lumen of the   rectum. This may be related to severe inflammation of the wall of the rectum. 3. Small bowel appears unremarkable. No evidence of dilation or thickening.             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

## 2019-03-28 NOTE — PROGRESS NOTES
Nursing reassessment completed. Patient resting quietly. VSS. Afebrile. Chronic Afib controlled rate. No c/o pain. Respirations easy/regular. Will continue to monitor. SR up x3. Call light in reach.

## 2019-03-29 VITALS
HEART RATE: 90 BPM | SYSTOLIC BLOOD PRESSURE: 147 MMHG | DIASTOLIC BLOOD PRESSURE: 76 MMHG | BODY MASS INDEX: 27.43 KG/M2 | TEMPERATURE: 98.2 F | RESPIRATION RATE: 20 BRPM | WEIGHT: 185.19 LBS | HEIGHT: 69 IN | OXYGEN SATURATION: 100 %

## 2019-03-29 LAB
ANION GAP SERPL CALCULATED.3IONS-SCNC: 8 MMOL/L (ref 3–16)
BASOPHILS ABSOLUTE: 0 K/UL (ref 0–0.2)
BASOPHILS RELATIVE PERCENT: 0.3 %
BUN BLDV-MCNC: 5 MG/DL (ref 7–20)
CALCIUM SERPL-MCNC: 8.3 MG/DL (ref 8.3–10.6)
CHLORIDE BLD-SCNC: 110 MMOL/L (ref 99–110)
CO2: 24 MMOL/L (ref 21–32)
CREAT SERPL-MCNC: <0.5 MG/DL (ref 0.6–1.2)
EOSINOPHILS ABSOLUTE: 0.1 K/UL (ref 0–0.6)
EOSINOPHILS RELATIVE PERCENT: 1.8 %
GFR AFRICAN AMERICAN: >60
GFR NON-AFRICAN AMERICAN: >60
GLUCOSE BLD-MCNC: 135 MG/DL (ref 70–99)
HCT VFR BLD CALC: 24 % (ref 36–48)
HEMOGLOBIN: 7.9 G/DL (ref 12–16)
LYMPHOCYTES ABSOLUTE: 1.3 K/UL (ref 1–5.1)
LYMPHOCYTES RELATIVE PERCENT: 21.6 %
MCH RBC QN AUTO: 29.6 PG (ref 26–34)
MCHC RBC AUTO-ENTMCNC: 32.7 G/DL (ref 31–36)
MCV RBC AUTO: 90.5 FL (ref 80–100)
MONOCYTES ABSOLUTE: 0.5 K/UL (ref 0–1.3)
MONOCYTES RELATIVE PERCENT: 7.8 %
NEUTROPHILS ABSOLUTE: 4.1 K/UL (ref 1.7–7.7)
NEUTROPHILS RELATIVE PERCENT: 68.5 %
PDW BLD-RTO: 15.7 % (ref 12.4–15.4)
PLATELET # BLD: 140 K/UL (ref 135–450)
PMV BLD AUTO: 8.9 FL (ref 5–10.5)
POTASSIUM SERPL-SCNC: 3.7 MMOL/L (ref 3.5–5.1)
RBC # BLD: 2.65 M/UL (ref 4–5.2)
SODIUM BLD-SCNC: 142 MMOL/L (ref 136–145)
WBC # BLD: 6 K/UL (ref 4–11)

## 2019-03-29 PROCEDURE — 2500000003 HC RX 250 WO HCPCS: Performed by: INTERNAL MEDICINE

## 2019-03-29 PROCEDURE — 85025 COMPLETE CBC W/AUTO DIFF WBC: CPT

## 2019-03-29 PROCEDURE — 6370000000 HC RX 637 (ALT 250 FOR IP): Performed by: INTERNAL MEDICINE

## 2019-03-29 PROCEDURE — 80048 BASIC METABOLIC PNL TOTAL CA: CPT

## 2019-03-29 PROCEDURE — 2580000003 HC RX 258: Performed by: PHYSICIAN ASSISTANT

## 2019-03-29 RX ORDER — AMLODIPINE BESYLATE 5 MG/1
5 TABLET ORAL DAILY
Qty: 90 TABLET | Refills: 1 | Status: SHIPPED | OUTPATIENT
Start: 2019-03-29 | End: 2019-05-14 | Stop reason: SDUPTHER

## 2019-03-29 RX ADMIN — METRONIDAZOLE 500 MG: 500 INJECTION, SOLUTION INTRAVENOUS at 03:40

## 2019-03-29 RX ADMIN — METHIMAZOLE 10 MG: 5 TABLET ORAL at 10:41

## 2019-03-29 RX ADMIN — Medication 10 ML: at 10:41

## 2019-03-29 ASSESSMENT — PAIN SCALES - GENERAL: PAINLEVEL_OUTOF10: 0

## 2019-03-29 NOTE — PLAN OF CARE
Problem: Falls - Risk of:  Goal: Will remain free from falls  Description  Will remain free from falls  Outcome: Ongoing  Goal: Absence of physical injury  Description  Absence of physical injury  Outcome: Ongoing     Problem: Risk for Impaired Skin Integrity  Goal: Tissue integrity - skin and mucous membranes  Description  Structural intactness and normal physiological function of skin and  mucous membranes. Outcome: Ongoing     Problem: Pain:  Description  Pain management should include both nonpharmacologic and pharmacologic interventions. Goal: Pain level will decrease  Description  Pain level will decrease  Outcome: Ongoing  Goal: Control of acute pain  Description  Control of acute pain  Outcome: Ongoing  Goal: Control of chronic pain  Description  Control of chronic pain  Outcome: Ongoing     Problem: Discharge Planning:  Goal: Discharged to appropriate level of care  Description  Discharged to appropriate level of care  Outcome: Ongoing     Problem: Nutrition  Goal: Optimal nutrition therapy  Outcome: Ongoing     Problem:  Bowel Function - Altered:  Goal: Bowel elimination is within specified parameters  Description  Bowel elimination is within specified parameters  Outcome: Ongoing     Problem: Fluid Volume - Imbalance:  Goal: Will show no signs and symptoms of excessive bleeding  Description  Will show no signs and symptoms of excessive bleeding  Outcome: Ongoing  Goal: Absence of imbalanced fluid volume signs and symptoms  Description  Absence of imbalanced fluid volume signs and symptoms  Outcome: Ongoing     Problem: Nausea/Vomiting:  Goal: Absence of nausea/vomiting  Description  Absence of nausea/vomiting  Outcome: Ongoing  Goal: Able to drink  Description  Able to drink  Outcome: Ongoing  Goal: Able to eat  Description  Able to eat  Outcome: Ongoing  Goal: Ability to achieve adequate nutritional intake will improve  Description  Ability to achieve adequate nutritional intake will improve  Outcome: Ongoing

## 2019-03-29 NOTE — PROGRESS NOTES
[facility] GI  Gastroenterology Progress Note    Sasha Qiu is a 79 y.o. female patient. 1. Proctocolitis with rectal bleeding    2. History of endometrial cancer    3. Chronic atrial fibrillation with rapid ventricular response (HCC)    4. Adequate anticoagulation on anticoagulant therapy    5. Acute post-hemorrhagic anemia    6. Hypotension, unspecified hypotension type        SUBJECTIVE:  Feels fine. Tolerating diet. No bleeding. Physical    VITALS:  /69   Pulse 92   Temp 98.4 °F (36.9 °C) (Temporal)   Resp 16   Ht 5' 9\" (1.753 m)   Wt 185 lb 3 oz (84 kg)   LMP  (Exact Date)   SpO2 100%   BMI 27.35 kg/m²   TEMPERATURE:  Current - Temp: 98.4 °F (36.9 °C); Max - Temp  Av.3 °F (36.8 °C)  Min: 97.2 °F (36.2 °C)  Max: 99.2 °F (37.3 °C)    Abdomen soft, ND, NT, no HSM, Bowel sounds normal     Data      Recent Labs     19  0525 19  0620   WBC 5.1  --  6.4 6.0   HGB 6.2* 7.9* 8.0* 7.9*   HCT 18.8* 24.4* 24.1* 24.0*   MCV 90.0  --  90.6 90.5   *  --  140 140     Recent Labs     19  0420 19  0525 19  0620   * 142 142   K 4.1 4.2 3.7   * 109 110   CO2 20* 24 24   BUN 3* 4* 5*   CREATININE <0.5* <0.5* <0.5*     Recent Labs     19  0836 19  0525   AST 28 23   ALT 18 14   BILITOT 0.8 0.9   ALKPHOS 164* 99     Recent Labs     19  0836   LIPASE 33.0         ASSESSMENT :              LGI hemorrhage from large rectal ulcer - s/p epi and hemoclip per Flex Sig 3/26.  Required total of 4 units PRBC's.  Hb stable at 7.9.  Feeling better and no bleeding. Tolerating diet. She had no colitis on Flex sig beside the ulcer. As such can d/c antibiotics.     PLAN   :  1) Regular low residue diet. 2) D/C antibiotics. 3) CBC q 1 - 2 weeks as outpatient. 4) OK to discharge from GI standpoint. 5) Hold Xarelto another 3 days -> OK to resume 2019.  6) F/u with me in 2 - 3 mos.   Call sooner if bleeding recurs.  Lexine Sic Ronald Morrissey MD  [address] [facility] [place]  3/29/2019

## 2019-03-29 NOTE — PROGRESS NOTES
Report received from night shift RN. Patient is awake in bed. A&O x4. Shift assessment completed and charted, see flow sheet. Patient denies pain. Patient has no further request at this time. Questions answered. Call light within reach. Bed in low position with wheels locked. Encouraged to call for nurse as needed throughout the shift. Will continue to monitor. Plan of care discussed.

## 2019-03-29 NOTE — CARE COORDINATION
Discharge planning-    Per Hospitalist, patient may discharge today. Mayela/ [facility] notified. Plan- Home with [facility].     Will continue to follow for support and discharge planning.    -Sheryl Libman, MSW, LSW

## 2019-03-29 NOTE — PLAN OF CARE
Problem: Falls - Risk of:  Goal: Will remain free from falls  Description  Will remain free from falls  Outcome: Completed  Goal: Absence of physical injury  Description  Absence of physical injury  Outcome: Completed     Problem: Risk for Impaired Skin Integrity  Goal: Tissue integrity - skin and mucous membranes  Description  Structural intactness and normal physiological function of skin and  mucous membranes. Outcome: Completed     Problem: Pain:  Goal: Pain level will decrease  Description  Pain level will decrease  Outcome: Completed  Goal: Control of acute pain  Description  Control of acute pain  Outcome: Completed  Goal: Control of chronic pain  Description  Control of chronic pain  Outcome: Completed     Problem: Discharge Planning:  Goal: Discharged to appropriate level of care  Description  Discharged to appropriate level of care  Outcome: Completed     Problem: Nutrition  Goal: Optimal nutrition therapy  Outcome: Completed     Problem:  Bowel Function - Altered:  Goal: Bowel elimination is within specified parameters  Description  Bowel elimination is within specified parameters  Outcome: Completed     Problem: Fluid Volume - Imbalance:  Goal: Will show no signs and symptoms of excessive bleeding  Description  Will show no signs and symptoms of excessive bleeding  Outcome: Completed  Goal: Absence of imbalanced fluid volume signs and symptoms  Description  Absence of imbalanced fluid volume signs and symptoms  Outcome: Completed     Problem: Nausea/Vomiting:  Goal: Absence of nausea/vomiting  Description  Absence of nausea/vomiting  Outcome: Completed  Goal: Able to drink  Description  Able to drink  Outcome: Completed  Goal: Able to eat  Description  Able to eat  Outcome: Completed  Goal: Ability to achieve adequate nutritional intake will improve  Description  Ability to achieve adequate nutritional intake will improve  Outcome: Completed

## 2019-03-29 NOTE — DISCHARGE SUMMARY
Physician Discharge Summary     Patient ID:  Annelise Day  [id number]  25 y.o.  1950    Admit date: 3/26/2019    Discharge date and time: No discharge date for patient encounter. Admitting Physician: Justin Jerez MD     Discharge Physician: Jay Giraldo MD    Admission Diagnoses: GIB (gastrointestinal bleeding) [K92.2]  GIB (gastrointestinal bleeding) [K92.2]  GIB (gastrointestinal bleeding) [K92.2]  GIB (gastrointestinal bleeding) [K92.2]    Discharge Diagnoses: LGI hemorrhage from large rectal ulcer     Admission Condition: fair    Discharged Condition: good    Indication for Admission: GIB    Hospital Course: The patient is a 79 y.o. female who presented with rectal bleed. Pt with h/o uterine ca s/p chemo and radiation. 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 GI consulted. Found to have LGI hemorrhage from large rectal ulcer - s/p epi and hemoclip per Flex Sig 3/26.  Required total of 4 units PRBC's. Hb stable. No actibe bleeding. Tolerated diet. She had no colitis on Flex sig beside the ulcer. Pt will need CBC q 1 - 2 weeks as outpatient. OK to discharge from GI standpoint. Hold Xarelto another 3 days -> OK to resume 4/1/2019. F/u with GI in 2 - 3 mos.      Consults: GI    Significant Diagnostic Studies: CTA abdomen    Treatments: Flex sig     Discharge Exam:  BP (!) 147/76   Pulse 90   Temp 98.2 °F (36.8 °C) (Temporal)   Resp 20   Ht 5' 9\" (1.753 m)   Wt 185 lb 3 oz (84 kg)   LMP  (Exact Date)   SpO2 100%   BMI 27.35 kg/m²     General Appearance:    Alert, cooperative, no distress, appears stated age   Head:    Normocephalic, without obvious abnormality, atraumatic   Eyes:    PERRL, conjunctiva/corneas clear, EOM's intact, fundi     benign, both eyes   Ears:    Normal TM's and external ear canals, both ears   Nose:   Nares normal, septum midline, mucosa normal, no drainage    or sinus tenderness   Throat: Lips, mucosa, and tongue normal; teeth and gums normal   Neck:   Supple, symmetrical, trachea midline, no adenopathy;     thyroid:  no enlargement/tenderness/nodules; no carotid    bruit or JVD   Back:     Symmetric, no curvature, ROM normal, no CVA tenderness   Lungs:     Clear to auscultation bilaterally, respirations unlabored   Chest Wall:    No tenderness or deformity    Heart:    Regular rate and rhythm, S1 and S2 normal, no murmur, rub   or gallop   Breast Exam:    No tenderness, masses, or nipple abnormality   Abdomen:     Soft, non-tender, bowel sounds active all four quadrants,     no masses, no organomegaly   Genitalia:    Normal female without lesion, discharge or tenderness   Rectal:    Normal tone ;guaiac negative stool   Extremities:   Extremities normal, atraumatic, no cyanosis or edema   Pulses:   2+ and symmetric all extremities   Skin:   Skin color, texture, turgor normal, no rashes or lesions   Lymph nodes:   Cervical, supraclavicular, and axillary nodes normal   Neurologic:   CNII-XII intact, normal strength, sensation and reflexes     throughout       Disposition: home    In process/preliminary results:  Outstanding Order Results     Date and Time Order Name Status Description    3/26/2019 0836 PREPARE RBC (CROSSMATCH), 1 Units Preliminary     3/26/2019 0836 PREPARE RBC (CROSSMATCH), 2 Units Preliminary     3/26/2019 0836 PREPARE FRESH FROZEN PLASMA, 1 Units Preliminary     3/26/2019 0836 PREPARE RBC (CROSSMATCH), 1 Units Preliminary     3/26/2019 0835 Culture blood #2 Preliminary     3/26/2019 0835 Culture blood #1 Preliminary           Patient Instructions:   Current Discharge Medication List      CONTINUE these medications which have NOT CHANGED    Details   metoprolol succinate (TOPROL XL) 50 MG extended release tablet Take 50 mg by mouth daily      methimazole (TAPAZOLE) 10 MG tablet Take 1 tablet by mouth daily  Qty: 30 tablet, Refills: 5      hydrochlorothiazide (HYDRODIURIL) 25 MG tablet TAKE ONE TABLET BY MOUTH DAILY  Qty: 30 tablet, Refills: 2      Multiple Vitamins-Minerals (THERAPEUTIC MULTIVITAMIN-MINERALS) tablet Take 1 tablet by mouth daily      zoster recombinant adjuvanted vaccine (SHINGRIX) 50 MCG SUSR injection 50 MCG IM then repeat 2-6 months. Qty: 0.5 mL, Refills: 1    Associated Diagnoses: Need for prophylactic vaccination and inoculation against varicella      rivaroxaban (XARELTO) 20 MG TABS tablet Take 20 mg by mouth         STOP taking these medications       GAVILYTE-C 240 g solution Comments:   Reason for Stopping:         PEG 3350-KCl-NaBcb-NaCl-NaSulf (PEG-3350/ELECTROLYTES) 236 g SOLR Comments:   Reason for Stopping:         amLODIPine-benazepril (LOTREL) 10-20 MG per capsule Comments:   Reason for Stopping:             Activity: activity as tolerated  Diet: regular diet  Wound Care: none needed    Follow-up with pcp, GI in 2 weeks.     Signed:  Bo Guevara MD  Time spent: 36 mins   3/29/2019  12:09 PM

## 2019-03-31 LAB
BLOOD CULTURE, ROUTINE: NORMAL
CULTURE, BLOOD 2: NORMAL

## 2019-04-01 ENCOUNTER — TELEPHONE (OUTPATIENT)
Dept: INTERNAL MEDICINE CLINIC | Age: 69
End: 2019-04-01

## 2019-04-02 ENCOUNTER — OFFICE VISIT (OUTPATIENT)
Dept: INTERNAL MEDICINE CLINIC | Age: 69
End: 2019-04-02
Payer: MEDICARE

## 2019-04-02 ENCOUNTER — TELEPHONE (OUTPATIENT)
Dept: INTERNAL MEDICINE CLINIC | Age: 69
End: 2019-04-02

## 2019-04-02 VITALS — SYSTOLIC BLOOD PRESSURE: 136 MMHG | DIASTOLIC BLOOD PRESSURE: 70 MMHG | BODY MASS INDEX: 27.47 KG/M2 | WEIGHT: 186 LBS

## 2019-04-02 DIAGNOSIS — K62.5 RECTAL BLEED: Primary | ICD-10-CM

## 2019-04-02 DIAGNOSIS — Z09 HOSPITAL DISCHARGE FOLLOW-UP: ICD-10-CM

## 2019-04-02 PROCEDURE — 4040F PNEUMOC VAC/ADMIN/RCVD: CPT | Performed by: NURSE PRACTITIONER

## 2019-04-02 PROCEDURE — G8427 DOCREV CUR MEDS BY ELIG CLIN: HCPCS | Performed by: NURSE PRACTITIONER

## 2019-04-02 PROCEDURE — 3017F COLORECTAL CA SCREEN DOC REV: CPT | Performed by: NURSE PRACTITIONER

## 2019-04-02 PROCEDURE — G8417 CALC BMI ABV UP PARAM F/U: HCPCS | Performed by: NURSE PRACTITIONER

## 2019-04-02 PROCEDURE — 1111F DSCHRG MED/CURRENT MED MERGE: CPT | Performed by: NURSE PRACTITIONER

## 2019-04-02 PROCEDURE — 1090F PRES/ABSN URINE INCON ASSESS: CPT | Performed by: NURSE PRACTITIONER

## 2019-04-02 PROCEDURE — G8400 PT W/DXA NO RESULTS DOC: HCPCS | Performed by: NURSE PRACTITIONER

## 2019-04-02 PROCEDURE — 99495 TRANSJ CARE MGMT MOD F2F 14D: CPT | Performed by: NURSE PRACTITIONER

## 2019-04-02 PROCEDURE — 1036F TOBACCO NON-USER: CPT | Performed by: NURSE PRACTITIONER

## 2019-04-02 PROCEDURE — 1123F ACP DISCUSS/DSCN MKR DOCD: CPT | Performed by: NURSE PRACTITIONER

## 2019-04-02 NOTE — TELEPHONE ENCOUNTER
[facility] called informing physician patient is doing good on her own and does not need PT services anymore

## 2019-04-02 NOTE — LETTER
[facility]  [address] [address]  Phone: [phone]  Fax: [phone]    MARLENE Rodríguez CNP        April 2, 2019     Patient: Maisha Conde   YOB: 1950   Date of Visit: 4/2/2019       To Whom It May Concern: It is my medical opinion that Maisha Conde will be able to return to work April 9, 2019 without restrictions. Please excuse her absence because of illness from March 27- April 8..    If you have any questions or concerns, please don't hesitate to call.     Sincerely,        MARLENE Rodríguez CNP

## 2019-04-02 NOTE — PROGRESS NOTES
Post-Discharge Transitional Care Management Services or Hospital Follow Up      Priyanka Wheeler   YOB: 1950    Date of Office Visit:  4/2/2019  Date of Hospital Admission: 3/26/19  Date of Hospital Discharge: 3/29/19  Risk of hospital readmission (high >=14%.  Medium >=10%) :Readmission Risk Score: 13      Care management risk score Rising risk (score 2-5) and Complex Care (Scores >=6): 1     Non face to face  following discharge, date last encounter closed (first attempt may have been earlier): *No documented post hospital discharge outreach found in the last 14 days    Call initiated 2 business days of discharge: *No response recorded in the last 14 days    Patient Active Problem List   Diagnosis    Persistent atrial fibrillation (HCC)    Partial small bowel obstruction (HCC)    Syncope    Chronic anticoagulation    Endometrial cancer (HCC)    Alkaline phosphatase elevation    Anemia    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HCC)    Essential hypertension    On antineoplastic chemotherapy    Prediabetes    Abdominal pain, generalized    Abnormal liver enzymes    GIB (gastrointestinal bleeding)       Allergies   Allergen Reactions    Naproxen Swelling    Lisinopril Rash       Medications listed as ordered at the time of discharge from [facility] Medication Instructions [place]UC[place]:    Printed on:04/02/19 1026   Medication Information                      amLODIPine (NORVASC) 5 MG tablet  Take 1 tablet by mouth daily             hydrochlorothiazide (HYDRODIURIL) 25 MG tablet  TAKE ONE TABLET BY MOUTH DAILY             methimazole (TAPAZOLE) 10 MG tablet  Take 1 tablet by mouth daily             metoprolol succinate (TOPROL XL) 50 MG extended release tablet  Take 50 mg by mouth daily             Multiple Vitamins-Minerals (THERAPEUTIC MULTIVITAMIN-MINERALS) tablet  Take 1 tablet by mouth daily             rivaroxaban (XARELTO) 20 MG TABS tablet  Take 20 mg by mouth or erythema  Neck: neck supple and non tender without mass, no thyromegaly or thyroid nodules, no cervical lymphadenopathy   Pulmonary/Chest: clear to auscultation bilaterally- no wheezes, rales or rhonchi, normal air movement, no respiratory distress  Cardiovascular: normal rate, no gallops, intact distal pulses, no carotid bruits and irregularly irregular rhythm noted  Abdomen: soft, non-tender, non-distended, normal bowel sounds, no masses or organomegaly  Musculoskeletal: normal range of motion, no joint swelling, deformity or tenderness  Neurologic: gait and coordination normal and speech normal    Assessment/Plan  Hospital follow up GI bleed  Stop Xarelto  Take ASA daily with food  Continue with regular activity  There are no diagnoses linked to this encounter. Notified cardiologist of decision to withhold Xarelto  Medical Decision Making: moderate complexity    [id number] Score for Atrial Fibrillation Stroke Risk   Risk   Factors  Component Value   C CHF No 0   H HTN Yes 1   A2 Age >= 76 No,  (70 y.o.) 0   D DM No 0   S2 Prior Stroke/TIA No 0   V Vascular Disease No 0   A Age 74-69 Yes,  (70 y.o.) 1   Sc Sex female 1    [id number]  Score  3   Score last updated 1/8/72 02:84 AM    Click here for a link to the UpToDate guideline \"Atrial Fibrillation: Anticoagulation therapy to prevent embolization    Disclaimer: Risk Score calculation is dependent on accuracy of patient problem list and past encounter diagnosis.

## 2019-04-02 NOTE — PROGRESS NOTES
Post-Discharge Transitional Care Management Services or Hospital Follow Up      Rob Cordova   YOB: 1950    Date of Office Visit:  4/2/2019  Date of Hospital Admission: 3/26/19  Date of Hospital Discharge: 3/29/19  Risk of hospital readmission (high >=14%.  Medium >=10%) :Readmission Risk Score: 13      Care management risk score Rising risk (score 2-5) and Complex Care (Scores >=6): 1     Non face to face  following discharge, date last encounter closed (first attempt may have been earlier): *No documented post hospital discharge outreach found in the last 14 days    Call initiated 2 business days of discharge: *No response recorded in the last 14 days    Patient Active Problem List   Diagnosis    Persistent atrial fibrillation (HCC)    Partial small bowel obstruction (HCC)    Syncope    Chronic anticoagulation    Endometrial cancer (HCC)    Alkaline phosphatase elevation    Anemia    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HCC)    Essential hypertension    On antineoplastic chemotherapy    Prediabetes    Abdominal pain, generalized    Abnormal liver enzymes    GIB (gastrointestinal bleeding)       Allergies   Allergen Reactions    Naproxen Swelling    Lisinopril Rash       Medications listed as ordered at the time of discharge from hospital   [facility] Medication Instructions [place]:    Printed on:04/02/19 8714   Medication Information                      amLODIPine (NORVASC) 5 MG tablet  Take 1 tablet by mouth daily             hydrochlorothiazide (HYDRODIURIL) 25 MG tablet  TAKE ONE TABLET BY MOUTH DAILY             methimazole (TAPAZOLE) 10 MG tablet  Take 1 tablet by mouth daily             metoprolol succinate (TOPROL XL) 50 MG extended release tablet  Take 50 mg by mouth daily             Multiple Vitamins-Minerals (THERAPEUTIC MULTIVITAMIN-MINERALS) tablet  Take 1 tablet by mouth daily             zoster recombinant adjuvanted vaccine (SHINGRIX) 50 MCG SUSR injection  50 MCG IM then repeat 2-6 months. Medications marked \"taking\" at this time  Outpatient Medications Marked as Taking for the 4/2/19 encounter (Office Visit) with MARLENE Menchaca CNP   Medication Sig Dispense Refill    amLODIPine (NORVASC) 5 MG tablet Take 1 tablet by mouth daily 90 tablet 1    metoprolol succinate (TOPROL XL) 50 MG extended release tablet Take 50 mg by mouth daily      methimazole (TAPAZOLE) 10 MG tablet Take 1 tablet by mouth daily 30 tablet 5    hydrochlorothiazide (HYDRODIURIL) 25 MG tablet TAKE ONE TABLET BY MOUTH DAILY 30 tablet 2    Multiple Vitamins-Minerals (THERAPEUTIC MULTIVITAMIN-MINERALS) tablet Take 1 tablet by mouth daily      zoster recombinant adjuvanted vaccine (SHINGRIX) 50 MCG SUSR injection 50 MCG IM then repeat 2-6 months. 0.5 mL 1        Medications patient taking as of now reconciled against medications ordered at time of hospital discharge: {YES / HL:91113}    Chief Complaint   Patient presents with    Follow-Up from [address] [place] [address]     F/U-From hospital       History of Present illness - Follow up of Hospital diagnosis(es): ***    Inpatient course: Discharge summary reviewed- see chart. Interval history/Current status: ***    {OPTIONAL WHEN USING 71092 - ROS (THIS WILL AUTO-DELETE IF NOT USED) :[id number]::\"A comprehensive review of systems was negative except for what was noted in the HPI. \"}    Vitals:    04/02/19 1012   BP: 136/70   Weight: 186 lb (84.4 kg)     Body mass index is 27.47 kg/m². Wt Readings from Last 3 Encounters:   04/02/19 186 lb (84.4 kg)   03/29/19 185 lb 3 oz (84 kg)   03/21/19 182 lb (82.6 kg)     BP Readings from Last 3 Encounters:   04/02/19 136/70   03/29/19 (!) 147/76   03/26/19 (!) 108/44        Physical Exam:  {GENERAL PHYSICAL NMKO:55071}    Assessment/Plan:  1. Hospital discharge follow-up  ***  - OK DISCHARGE MEDS RECONCILED W/ CURRENT OUTPATIENT MED LIST    2.  Rectal bleed  ***        [facility] Making: [facility]}

## 2019-04-02 NOTE — LETTER
Dr. Paul Mitchell    I saw Ms. Filemon Lara today following her GI bleed, concerns were: 2nd in last month,  Potential to have another. Although this was due to proctitis, still high risk for bleed Had her hold Xarelto and she is now on aspirin.  Please provide further guidance

## 2019-04-09 ENCOUNTER — OFFICE VISIT (OUTPATIENT)
Dept: CARDIOLOGY CLINIC | Age: 69
End: 2019-04-09
Payer: MEDICARE

## 2019-04-09 VITALS
WEIGHT: 182 LBS | DIASTOLIC BLOOD PRESSURE: 83 MMHG | RESPIRATION RATE: 14 BRPM | SYSTOLIC BLOOD PRESSURE: 137 MMHG | BODY MASS INDEX: 26.96 KG/M2 | HEIGHT: 69 IN | HEART RATE: 85 BPM

## 2019-04-09 DIAGNOSIS — C54.1 ENDOMETRIAL CANCER (HCC): ICD-10-CM

## 2019-04-09 DIAGNOSIS — I10 ESSENTIAL HYPERTENSION: ICD-10-CM

## 2019-04-09 DIAGNOSIS — I48.19 PERSISTENT ATRIAL FIBRILLATION (HCC): Primary | ICD-10-CM

## 2019-04-09 PROCEDURE — G8427 DOCREV CUR MEDS BY ELIG CLIN: HCPCS | Performed by: INTERNAL MEDICINE

## 2019-04-09 PROCEDURE — 4040F PNEUMOC VAC/ADMIN/RCVD: CPT | Performed by: INTERNAL MEDICINE

## 2019-04-09 PROCEDURE — 1111F DSCHRG MED/CURRENT MED MERGE: CPT | Performed by: INTERNAL MEDICINE

## 2019-04-09 PROCEDURE — 1036F TOBACCO NON-USER: CPT | Performed by: INTERNAL MEDICINE

## 2019-04-09 PROCEDURE — 3017F COLORECTAL CA SCREEN DOC REV: CPT | Performed by: INTERNAL MEDICINE

## 2019-04-09 PROCEDURE — 1090F PRES/ABSN URINE INCON ASSESS: CPT | Performed by: INTERNAL MEDICINE

## 2019-04-09 PROCEDURE — 93000 ELECTROCARDIOGRAM COMPLETE: CPT | Performed by: INTERNAL MEDICINE

## 2019-04-09 PROCEDURE — 1123F ACP DISCUSS/DSCN MKR DOCD: CPT | Performed by: INTERNAL MEDICINE

## 2019-04-09 PROCEDURE — G8417 CALC BMI ABV UP PARAM F/U: HCPCS | Performed by: INTERNAL MEDICINE

## 2019-04-09 PROCEDURE — G8400 PT W/DXA NO RESULTS DOC: HCPCS | Performed by: INTERNAL MEDICINE

## 2019-04-09 PROCEDURE — 99214 OFFICE O/P EST MOD 30 MIN: CPT | Performed by: INTERNAL MEDICINE

## 2019-04-09 NOTE — PROGRESS NOTES
[address]   Electrophysiology Consultation   Date: 4/9/2019   CC:Atrial fibrillation  HPI: Maryann Squires is a 79 y.o. with PMH of HTN is being referred by PCP, Dr. Santiago Sutton, for evaluation of Afib. She was originally diagnosed with Afib in 7/2017, followed by cardiologist at [facility]. She underwent DCCVs on 8/2017 and 9/2017. All ECGs since that time showed AF, rate controlled. No TSH found. On metoprolol 50 mg daily and Xarelto 20 mg daily for stroke prevention. She was a patient of Dr. Maryjane Vasquez. In the past, she has not been interested in amiodarone therapy or ablation. Diagnosed with FIGO Stage IA (pT1a(2), N0, M0) serous carcinoma of the endometrium, status post TLH/BSO and five of a planned six cycles of adjuvant chemotherapy completed 08/28/2018. Her final treatment for radiation is today and completed with chemotherapy. She had episode of syncope while receiving chemo in 7/2018. She was admitted for rectal bleeding due to radiation proctitis and a large rectal ulcer noted. She received 4 units RBCs and discharged home without anticoagulation. Xarelto was stopped. Interval History:    Her endometrial cancer has been in remission for 6 months. She does notice that her heart is back in afib. She also has IVY even with a couple flights of stairs. She felt out of rhythm since before she was admitted to the hospital for GI hemorrhage. She will ask her GI physician if she can restart her blood thinner.     Past Medical History:   Diagnosis Date    Anal bleeding 2019    CT scan clear     Anemia     Atrial fibrillation ([address]     cleared by cardiologist 2018, was related to cancer     Colon polyps     Diabetes mellitus ([address]     pre diabetic diet controlled    Endometrial cancer ([address]     Hypertension     Small bowel obstruction ([address]     resolved without surgery    Thyroid nodule       Past Surgical History:   Procedure Laterality Date    CARDIOVERSION      COLONOSCOPY mL 1     No current facility-administered medications for this visit. Assessment and plan:     Paroxysmal atrial fibrillation   ECG today shows atrial fibrillation   Last office visit she was in sinus rhythm. Off Xarelto d/t recent GI hemorrhage, radiation proctitis. She will see Dr. Antonio Oneal a few months to see if she can resume her anticoagulation. Discussed watchman and ablation all questions answered   Will do DCCV 3 weeks after she has resumed Xarelto    Afib diagnosed in 7/2017. She was later diagnosed with uterine cancer and underwent surgery, chemotherapy and is finishing her last radiation therapy session. 2 DCCVs, last 9/2017 with recurrence. On metoprolol 50 mg daily for HR control      HTN     Vitals:   04/09/19 1501  BP: 137/83  Pulse: 85  Resp: 14   Controlled. Home BP monitoring encourage with a BP goal <130/80   On Lotrel and HCTZ     - Uterine cancer: In remission for the past 6 months   S/p chemo and radiation therapy. GI bleeding, radiation proctitis: Follow up with GI.     F/u 4-6 months    Thank you for allowing me to participate in the care of [facility]. Further evaluation will be based upon the patient's clinical course and testing results. All questions and concerns were addressed to the patient/family. Alternatives to my treatment were discussed. I have discussed the above stated plan and the patient verbalized understanding and agreed with the plan. NOTE: This report was transcribed using voice recognition software. Every effort was made to ensure accuracy, however, inadvertent computerized transcription errors may be present. Dilan Crowell MD, MPH  [facility]   Office: [phone]     Scribe attestation:  This note was scribed in the presence of Dilan Crowell MD by Ascencion Betts RN    Physician Attestation: I, Dr. Dilan Crowell, confirm that the scribe's documentation has been prepared under my direction and personally reviewed by me in its entirety. I also confirm that the note above accurately reflects all work, treatment, procedures, and medical decision making performed by me.

## 2019-05-07 ENCOUNTER — OFFICE VISIT (OUTPATIENT)
Dept: ENDOCRINOLOGY | Age: 69
End: 2019-05-07
Payer: MEDICARE

## 2019-05-07 VITALS
DIASTOLIC BLOOD PRESSURE: 70 MMHG | HEART RATE: 62 BPM | OXYGEN SATURATION: 99 % | HEIGHT: 69 IN | BODY MASS INDEX: 27.34 KG/M2 | WEIGHT: 184.6 LBS | SYSTOLIC BLOOD PRESSURE: 132 MMHG

## 2019-05-07 DIAGNOSIS — C54.1 ENDOMETRIAL CANCER (HCC): ICD-10-CM

## 2019-05-07 DIAGNOSIS — E04.2 MULTINODULAR GOITER: ICD-10-CM

## 2019-05-07 DIAGNOSIS — K56.600 PARTIAL SMALL BOWEL OBSTRUCTION (HCC): ICD-10-CM

## 2019-05-07 DIAGNOSIS — E05.00 GRAVES DISEASE: ICD-10-CM

## 2019-05-07 DIAGNOSIS — I48.19 PERSISTENT ATRIAL FIBRILLATION (HCC): ICD-10-CM

## 2019-05-07 DIAGNOSIS — E05.00 GRAVES DISEASE: Primary | ICD-10-CM

## 2019-05-07 LAB
T3 TOTAL: 2.07 NG/ML (ref 0.8–2)
T4 FREE: 2.2 NG/DL (ref 0.9–1.8)
TSH SERPL DL<=0.05 MIU/L-ACNC: <0.01 UIU/ML (ref 0.27–4.2)

## 2019-05-07 PROCEDURE — 1123F ACP DISCUSS/DSCN MKR DOCD: CPT | Performed by: INTERNAL MEDICINE

## 2019-05-07 PROCEDURE — 3017F COLORECTAL CA SCREEN DOC REV: CPT | Performed by: INTERNAL MEDICINE

## 2019-05-07 PROCEDURE — G8417 CALC BMI ABV UP PARAM F/U: HCPCS | Performed by: INTERNAL MEDICINE

## 2019-05-07 PROCEDURE — 1036F TOBACCO NON-USER: CPT | Performed by: INTERNAL MEDICINE

## 2019-05-07 PROCEDURE — 99214 OFFICE O/P EST MOD 30 MIN: CPT | Performed by: INTERNAL MEDICINE

## 2019-05-07 PROCEDURE — 1090F PRES/ABSN URINE INCON ASSESS: CPT | Performed by: INTERNAL MEDICINE

## 2019-05-07 PROCEDURE — 4040F PNEUMOC VAC/ADMIN/RCVD: CPT | Performed by: INTERNAL MEDICINE

## 2019-05-07 PROCEDURE — G8400 PT W/DXA NO RESULTS DOC: HCPCS | Performed by: INTERNAL MEDICINE

## 2019-05-07 PROCEDURE — G8427 DOCREV CUR MEDS BY ELIG CLIN: HCPCS | Performed by: INTERNAL MEDICINE

## 2019-05-07 NOTE — PROGRESS NOTES
generalized 06/07/2018    Endometrial cancer ([facility] [address] 04/03/2018    Alkaline phosphatase elevation 03/08/2018    Prediabetes 03/08/2018    Chronic anticoagulation 10/13/2017    Anemia 07/20/2017    Abnormal liver enzymes 07/20/2017    Essential hypertension 07/06/2016     Past Surgical History:   Procedure Laterality Date    CARDIOVERSION      COLONOSCOPY N/A 3/14/2019    COLONOSCOPY POLYPECTOMY SNARE/COLD BIOPSY performed by Karine Saucedo MD at [address] [facility] COLONOSCOPY  3/14/2019    COLONOSCOPY CONTROL HEMORRHAGE performed by Karine Saucedo MD at [facility] [facility] COLONOSCOPY N/A 3/26/2019    COLONOSCOPY CONTROL HEMORRHAGE performed by Karine Saucedo MD at [address]      TUBAL LIGATION      TUNNELED CENTRAL VENOUS CATHETER W/ SUBCUTANEOUS PORT Right      Family History   Problem Relation Age of Onset    Hypertension Mother     Colon Cancer Maternal Grandmother      Social History     Socioeconomic History    Marital status:      Spouse name: None    Number of children: None    Years of education: None    Highest education level: None   Occupational History    Occupation: cash checking   Social Needs    Financial resource strain: None    Food insecurity:     Worry: None     Inability: None    Transportation needs:     Medical: None     Non-medical: None   Tobacco Use    Smoking status: Never Smoker    Smokeless tobacco: Never Used   Substance and Sexual Activity    Alcohol use: Yes     Comment: occ wine    Drug use: No    Sexual activity: Never   Lifestyle    Physical activity:     Days per week: None     Minutes per session: None    Stress: None   Relationships    Social connections:     Talks on phone: None     Gets together: None     Attends Latter day service: None     Active member of club or organization: None     Attends meetings of clubs or organizations: None     Relationship status: None    Intimate partner violence: Fear of current or ex partner: None     Emotionally abused: None     Physically abused: None     Forced sexual activity: None   Other Topics Concern    None   Social History Narrative    Lives home with her 3 children. Doing well 8 grandchildren. Current Outpatient Medications   Medication Sig Dispense Refill    amLODIPine (NORVASC) 5 MG tablet Take 1 tablet by mouth daily 90 tablet 1    metoprolol succinate (TOPROL XL) 50 MG extended release tablet Take 50 mg by mouth daily      methimazole (TAPAZOLE) 10 MG tablet Take 1 tablet by mouth daily 30 tablet 5    hydrochlorothiazide (HYDRODIURIL) 25 MG tablet TAKE ONE TABLET BY MOUTH DAILY 30 tablet 2    Multiple Vitamins-Minerals (THERAPEUTIC MULTIVITAMIN-MINERALS) tablet Take 1 tablet by mouth daily      zoster recombinant adjuvanted vaccine (SHINGRIX) 50 MCG SUSR injection 50 MCG IM then repeat 2-6 months. 0.5 mL 1     No current facility-administered medications for this visit.       Allergies   Allergen Reactions    Naproxen Swelling    Lisinopril Rash         Review of Systems:  Constitutional: no fatigue, no fever, no recent weight gain, no recent weight loss, no changes in appetite  Eyes: no eye pain, no change in vision, no eye redness, no eye irritation, no double vision  Ears, nose, throat: no nasal congestion, no sore throat, no earache, no decrease in hearing, no hoarseness, no dry mouth, no sinus problems, no difficulty swallowing, no neck lumps, no dental problems, no mouth sores, no ringing in ears  Pulmonary: no shortness of breath, no wheezing, no dyspnea on exertion, no cough  Cardiovascular: no chest pain, no lower extremity edema, no orthopnea, no intermittent leg claudication, no palpitations  Gastrointestinal: no abdominal pain, no nausea, no vomiting, no diarrhea, no constipation, no heartburn, no bloating  Genitourinary: no dysuria, no urinary incontinence, no urinary hesitancy, no change in urinary frequency, no feelings of urinary urgency, no nocturia  Musculoskeletal: no joint swelling, no joint stiffness, no joint pain, no muscle cramps, no muscle pain, no bone pain, no fractures  Integument/Breast: no hair loss, but no skin rashes, no skin lesions, no itching, no dry skin, no breast pain, no breast mass, no skin hives, no skin discoloration, no nipple discharge  Neurological: no numbness, no tingling, no weakness, no confusion, no headaches, no dizziness, no fainting, no tremors, no decrease in memory, no balance problems  Psychiatric: no anxiety, no depression, no insomnia, no change in personality, no emotional problems, no stress  Hematologic/Lymphatic: no tendency for easy bleeding, no swollen lymph nodes, no tendency for easy bruising  Immunology: no seasonal allergies, no frequent infections, no frequent illnesses  Endocrine: no temperature intolerance, no hot flashes, no hand tremor    OBJECTIVE:   /70   Pulse 62   Ht 5' 9\" (1.753 m)   Wt 184 lb 9.6 oz (83.7 kg)   LMP  (Exact Date)   SpO2 99%   BMI 27.26 kg/m²   Wt Readings from Last 3 Encounters:   05/07/19 184 lb 9.6 oz (83.7 kg)   04/09/19 182 lb (82.6 kg)   04/02/19 186 lb (84.4 kg)       Physical Exam:  Constitutional: no acute distress, well appearing, well nourished  Psychiatric: oriented to person, place and time, judgement, insight and normal, recent and remote memory and intact and mood, affect are normal  Skin: skin and subcutaneous tissue is normal without mass, normal turgor  Head and Face: examination of head and face revealed no abnormalities  Eyes: no lid or conjunctival swelling, no erythema or discharge, pupils are normal, equal, round, and reactive to light  Ears/Nose: external inspection of ears and nose revealed no abnormalities, hearing is grossly normal  Oropharynx/Mouth/Face: lips, tongue and gums are normal with no lesions, the voice quality was normal  Neck: neck is supple and symmetric, with midline trachea and no masses, thyroid is enlarged  Lymphatics: normal cervical lymph nodes, normal supraclavicular nodes  Pulmonary: no increased work of breathing or signs of respiratory distress, lungs are clear to auscultation  Cardiovascular: normal heart rate and rhythm, normal S1 and S2, no murmurs and pedal pulses and 2+ bilaterally, No edema  Abdomen: abdomen is soft, non-tender with no masses  Musculoskeletal: normal gait and station, exam of the digits and nails are normal  Neurological: normal coordination, normal general cortical function    Lab Review:  Lab Results   Component Value Date    TSH <0.01 03/05/2019     Lab Results   Component Value Date    T4FREE 4.4 03/05/2019        ASSESSMENT/PLAN:  1.Graves disease trab and TSI positive   She has been taking tapazole 10 mg daily since march 2019   Dx with Graves on bloodwork and she was not symptomatic but feels that palpitations got better   Repeat labs today   She was advised to get monthly blood work done and was given a standing order for blood work she was advised to call back for results  We discussed treatment options for hyperthyroidism in detail including MARTIN ablation vs thyroidectomy vs anti thyroid medication. At this time Kun Ruiz  is inclined towards anti thyroid medication i.e tapazole ,  she  was told that Methimazole can cause allergic reaction including skin rash, arthralgias and hepatic damage. Kun Ruiz  was told about the rare side effect of agranulocytosis and warning symptoms of sore throat, mouth sores, fever and chills to to let us know in case if any of these symptoms develop The patient verbalized understanding and agreed to start taking the medicine     Thyroid nodule  Repeat thyroid uls done in 2/2019 shows stable size of the thyroid nodule.   She was advised to get her thyroid function test done and repeat thyroid ultrasound in 6 months  Patient has 2 nodules in the right lobe which meet the criteria for fine-needle aspiration biopsy which was offered to the patient. Patient was advised that the right lobe thyroid nodule which measures 1.9 cm should be biopsied, patient does not want to get a fine-needle biopsy done at this time    --Patient had a recent hospitalization in March of 2019 for rectal bleeding and was anemic she still feels dizzy she was advised to contact her treating physician for the symptoms    . Persistent atrial fibrillation ([address]  Patient is on anticoagulation and beta blockers      . Partial small bowel obstruction (HCC)  Stable    . Vasovagal syncope  Stable    . Essential hypertension  Well controlled on current therapy    6. On antineoplastic chemotherapy  For her ovarian cancer      Reviewed and/or ordered clinical lab results Yes  Reviewed and/or ordered radiology tests Yes   Reviewed and/or ordered other diagnostic tests Yes  Made a decision to obtain old records Yes  Reviewed and summarized old records Yes      Rox Rod was counseled regarding symptoms of current diagnosis, course and complications of disease if inadequately treated, side effects of medications, diagnosis, treatment options, and prognosis, risks, benefits, complications, and alternatives of treatment, labs, imaging and other studies and treatment targets and goals. She understands instructions and counseling. Return in about 3 months (around 8/7/2019). Please note that some or all of this report was generated using voice recognition software. Please notify me in case of any questions about the content of this document, as some errors in transcription may have occurred .

## 2019-05-14 ENCOUNTER — OFFICE VISIT (OUTPATIENT)
Dept: INTERNAL MEDICINE CLINIC | Age: 69
End: 2019-05-14
Payer: MEDICARE

## 2019-05-14 VITALS
SYSTOLIC BLOOD PRESSURE: 124 MMHG | BODY MASS INDEX: 26.9 KG/M2 | RESPIRATION RATE: 16 BRPM | TEMPERATURE: 98.2 F | WEIGHT: 181.6 LBS | OXYGEN SATURATION: 98 % | DIASTOLIC BLOOD PRESSURE: 62 MMHG | HEART RATE: 68 BPM | HEIGHT: 69 IN

## 2019-05-14 DIAGNOSIS — G62.0 CHEMOTHERAPY-INDUCED NEUROPATHY (HCC): ICD-10-CM

## 2019-05-14 DIAGNOSIS — D64.9 ANEMIA, UNSPECIFIED TYPE: ICD-10-CM

## 2019-05-14 DIAGNOSIS — I10 ESSENTIAL HYPERTENSION: ICD-10-CM

## 2019-05-14 DIAGNOSIS — R42 VERTIGO: Primary | ICD-10-CM

## 2019-05-14 DIAGNOSIS — Z23 NEED FOR PNEUMOCOCCAL VACCINATION: ICD-10-CM

## 2019-05-14 DIAGNOSIS — T45.1X5A CHEMOTHERAPY-INDUCED NEUROPATHY (HCC): ICD-10-CM

## 2019-05-14 LAB
A/G RATIO: 1.1 (ref 1.1–2.2)
ALBUMIN SERPL-MCNC: 4.2 G/DL (ref 3.4–5)
ALP BLD-CCNC: 222 U/L (ref 40–129)
ALT SERPL-CCNC: 20 U/L (ref 10–40)
ANION GAP SERPL CALCULATED.3IONS-SCNC: 13 MMOL/L (ref 3–16)
AST SERPL-CCNC: 25 U/L (ref 15–37)
BASOPHILS ABSOLUTE: 0 K/UL (ref 0–0.2)
BASOPHILS RELATIVE PERCENT: 0.6 %
BILIRUB SERPL-MCNC: 0.4 MG/DL (ref 0–1)
BUN BLDV-MCNC: 10 MG/DL (ref 7–20)
CALCIUM SERPL-MCNC: 9.9 MG/DL (ref 8.3–10.6)
CHLORIDE BLD-SCNC: 99 MMOL/L (ref 99–110)
CO2: 28 MMOL/L (ref 21–32)
CREAT SERPL-MCNC: 0.7 MG/DL (ref 0.6–1.2)
EOSINOPHILS ABSOLUTE: 0.1 K/UL (ref 0–0.6)
EOSINOPHILS RELATIVE PERCENT: 2.9 %
GFR AFRICAN AMERICAN: >60
GFR NON-AFRICAN AMERICAN: >60
GLOBULIN: 3.9 G/DL
GLUCOSE BLD-MCNC: 98 MG/DL (ref 70–99)
HCT VFR BLD CALC: 36.6 % (ref 36–48)
HEMOGLOBIN: 11.6 G/DL (ref 12–16)
LYMPHOCYTES ABSOLUTE: 1.9 K/UL (ref 1–5.1)
LYMPHOCYTES RELATIVE PERCENT: 39.3 %
MCH RBC QN AUTO: 27.2 PG (ref 26–34)
MCHC RBC AUTO-ENTMCNC: 31.7 G/DL (ref 31–36)
MCV RBC AUTO: 85.7 FL (ref 80–100)
MONOCYTES ABSOLUTE: 0.4 K/UL (ref 0–1.3)
MONOCYTES RELATIVE PERCENT: 9.1 %
NEUTROPHILS ABSOLUTE: 2.3 K/UL (ref 1.7–7.7)
NEUTROPHILS RELATIVE PERCENT: 48.1 %
PDW BLD-RTO: 17.2 % (ref 12.4–15.4)
PLATELET # BLD: 176 K/UL (ref 135–450)
PMV BLD AUTO: 10.7 FL (ref 5–10.5)
POTASSIUM SERPL-SCNC: 4.1 MMOL/L (ref 3.5–5.1)
RBC # BLD: 4.27 M/UL (ref 4–5.2)
SODIUM BLD-SCNC: 140 MMOL/L (ref 136–145)
TOTAL PROTEIN: 8.1 G/DL (ref 6.4–8.2)
WBC # BLD: 4.8 K/UL (ref 4–11)

## 2019-05-14 PROCEDURE — 90732 PPSV23 VACC 2 YRS+ SUBQ/IM: CPT | Performed by: INTERNAL MEDICINE

## 2019-05-14 PROCEDURE — G8427 DOCREV CUR MEDS BY ELIG CLIN: HCPCS | Performed by: INTERNAL MEDICINE

## 2019-05-14 PROCEDURE — 99214 OFFICE O/P EST MOD 30 MIN: CPT | Performed by: INTERNAL MEDICINE

## 2019-05-14 PROCEDURE — 1123F ACP DISCUSS/DSCN MKR DOCD: CPT | Performed by: INTERNAL MEDICINE

## 2019-05-14 PROCEDURE — G8417 CALC BMI ABV UP PARAM F/U: HCPCS | Performed by: INTERNAL MEDICINE

## 2019-05-14 PROCEDURE — 1090F PRES/ABSN URINE INCON ASSESS: CPT | Performed by: INTERNAL MEDICINE

## 2019-05-14 PROCEDURE — G0009 ADMIN PNEUMOCOCCAL VACCINE: HCPCS | Performed by: INTERNAL MEDICINE

## 2019-05-14 PROCEDURE — 4040F PNEUMOC VAC/ADMIN/RCVD: CPT | Performed by: INTERNAL MEDICINE

## 2019-05-14 PROCEDURE — 3017F COLORECTAL CA SCREEN DOC REV: CPT | Performed by: INTERNAL MEDICINE

## 2019-05-14 PROCEDURE — 1036F TOBACCO NON-USER: CPT | Performed by: INTERNAL MEDICINE

## 2019-05-14 PROCEDURE — G8400 PT W/DXA NO RESULTS DOC: HCPCS | Performed by: INTERNAL MEDICINE

## 2019-05-14 RX ORDER — HYDROCHLOROTHIAZIDE 25 MG/1
TABLET ORAL
Qty: 30 TABLET | Refills: 2 | Status: SHIPPED | OUTPATIENT
Start: 2019-05-14 | End: 2019-06-25 | Stop reason: SDUPTHER

## 2019-05-14 RX ORDER — AMLODIPINE BESYLATE 5 MG/1
5 TABLET ORAL DAILY
Qty: 90 TABLET | Refills: 1 | Status: SHIPPED | OUTPATIENT
Start: 2019-05-14 | End: 2019-06-25 | Stop reason: SDUPTHER

## 2019-05-14 RX ORDER — ASPIRIN 81 MG/1
81 TABLET ORAL DAILY
Status: ON HOLD | COMMUNITY
End: 2019-09-19

## 2019-05-14 NOTE — PROGRESS NOTES
Subjective:      Patient ID: Diana Costa is a 79 y.o. female. Chief Complaint   Patient presents with    GI Bleeding     GI bleed 3/2019    no SX now     seen by Nurse Uriel Nix   post dsicharge       Hypertension   This is a chronic problem. The current episode started more than 1 year ago. The problem is unchanged. The problem is controlled. Pertinent negatives include no anxiety, blurred vision, chest pain, headaches, malaise/fatigue, neck pain, orthopnea, palpitations, peripheral edema, PND, shortness of breath or sweats. Agents associated with hypertension include thyroid hormones. Risk factors for coronary artery disease include sedentary lifestyle, stress and post-menopausal state. Past treatments include diuretics and beta blockers. The current treatment provides significant improvement. There are no compliance problems. Hypertensive end-organ damage includes PVD. There is no history of angina, kidney disease, CAD/MI, CVA, heart failure or retinopathy. Sensation of dizziness or light headedness ~ 2-4 times day for a few weeks. She closes her eyes and readjust. She has had no falls. Diana Costa is a 79 y.o. female who is here for evaluation of dizziness. The dizziness has been present for 4 weeks. She describes the symptoms as near syncope. Symptoms are exacerbated by nothing in particular. She also complains of none. She denies change in vision, headaches or nausea. No syncopal episodes. . She has been treated with nothing. /62   Pulse 68   Temp 98.2 °F (36.8 °C) (Oral)   Resp 16   Ht 5' 9\" (1.753 m)   Wt 181 lb 9.6 oz (82.4 kg)   LMP  (Exact Date)   SpO2 98%   BMI 26.82 kg/m²    {Review of Systems   Constitutional: Negative for fatigue, fever and malaise/fatigue. HENT: Negative. Eyes: Negative for blurred vision. Respiratory: Negative for shortness of breath. Cardiovascular: Negative for chest pain, palpitations, orthopnea and PND.    Musculoskeletal: Negative for neck pain. Neurological: Positive for dizziness and numbness (bilateral feet, associates with chemotherapy). Negative for tremors, seizures, syncope, facial asymmetry, speech difficulty, light-headedness and headaches. Hematological: Negative. Psychiatric/Behavioral: Negative. Allergies   Allergen Reactions    Naproxen Swelling    Lisinopril Rash       Current Outpatient Medications   Medication Sig Dispense Refill    aspirin 81 MG EC tablet Take 81 mg by mouth daily      amLODIPine (NORVASC) 5 MG tablet Take 1 tablet by mouth daily 90 tablet 1    metoprolol succinate (TOPROL XL) 50 MG extended release tablet Take 50 mg by mouth daily      methimazole (TAPAZOLE) 10 MG tablet Take 1 tablet by mouth daily 30 tablet 5    hydrochlorothiazide (HYDRODIURIL) 25 MG tablet TAKE ONE TABLET BY MOUTH DAILY 30 tablet 2    Multiple Vitamins-Minerals (THERAPEUTIC MULTIVITAMIN-MINERALS) tablet Take 1 tablet by mouth daily      zoster recombinant adjuvanted vaccine (SHINGRIX) 50 MCG SUSR injection 50 MCG IM then repeat 2-6 months. 0.5 mL 1     No current facility-administered medications for this visit. Vitals:    05/14/19 1152   BP: 124/62   Pulse: 68   Resp: 16   Temp: 98.2 °F (36.8 °C)   TempSrc: Oral   SpO2: 98%   Weight: 181 lb 9.6 oz (82.4 kg)   Height: 5' 9\" (1.753 m)     Body mass index is 26.82 kg/m². Wt Readings from Last 3 Encounters:   05/14/19 181 lb 9.6 oz (82.4 kg)   05/07/19 184 lb 9.6 oz (83.7 kg)   04/09/19 182 lb (82.6 kg)     BP Readings from Last 3 Encounters:   05/14/19 124/62   05/07/19 132/70   04/09/19 137/83       Objective:   Physical Exam   Constitutional: She is oriented to person, place, and time. Vital signs are normal. She appears well-developed and well-nourished. No distress. Appears thin   HENT:   Head: Normocephalic and atraumatic.    Right Ear: External ear normal.   Left Ear: External ear normal.   Nose: Nose normal.   Mouth/Throat: Oropharynx is clear and moist.   Eyes: Pupils are equal, round, and reactive to light. Conjunctivae and EOM are normal.   Neck: Normal range of motion. Neck supple. No thyromegaly present. Cardiovascular: Normal rate, regular rhythm, normal heart sounds and intact distal pulses. Pulmonary/Chest: Effort normal and breath sounds normal.   Abdominal: Soft. Bowel sounds are normal. She exhibits no distension. Musculoskeletal: Normal range of motion. Right hip: Normal. She exhibits normal range of motion and normal strength. Left hip: Normal. She exhibits normal range of motion and normal strength. Cervical back: She exhibits deformity. Lumbar back: She exhibits no bony tenderness and no swelling. Neurological: She is alert and oriented to person, place, and time. No cranial nerve deficit. Coordination normal.   Skin: Skin is warm. Psychiatric: She has a normal mood and affect. Her behavior is normal. Judgment and thought content normal.   Nursing note and vitals reviewed. Assessment/Plan:  Benson Penn was seen today for gi bleeding. Diagnoses and all orders for this visit:    Vertigo  - educational hand out given  - video shown, discussed activities    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HCC)  -     Comprehensive Metabolic Panel    Anemia, unspecified type  -     CBC WITH AUTO DIFFERENTIAL    Essential hypertension  -     hydrochlorothiazide (HYDRODIURIL) 25 MG tablet; TAKE ONE TABLET BY MOUTH DAILY  -     amLODIPine (NORVASC) 5 MG tablet; Take 1 tablet by mouth daily    Need for pneumococcal vaccination  -     PNEUMOVAX 23 subcutaneous/IM (Pneumococcal polysaccharide vaccine 23-valent >= 3yo)      Return in about 6 weeks (around 6/25/2019) for vertigo/HTN.         Geneva Hinojosa MD

## 2019-05-14 NOTE — PATIENT INSTRUCTIONS
Patient Education        Vertigo: Exercises  Your Care Instructions  Here are some examples of typical rehabilitation exercises for your condition. Start each exercise slowly. Ease off the exercise if you start to have pain. Your doctor or physical therapist will tell you when you can start these exercises and which ones will work best for you. How to do the exercises  Exercise 1    1. Stand with a chair in front of you and a wall behind you. If you begin to fall, you may use them for support. 2. Stand with your feet together and your arms at your sides. 3. Move your head up and down 10 times. Exercise 2    1. Move your head side to side 10 times. Exercise 3    1. Move your head diagonally up and down 10 times. Exercise 4    1. Move your head diagonally up and down 10 times on the other side. Follow-up care is a key part of your treatment and safety. Be sure to make and go to all appointments, and call your doctor if you are having problems. It's also a good idea to know your test results and keep a list of the medicines you take. Where can you learn more? Go to https://OneStopWebpeFerevo.Triptrotting. org and sign in to your [employer] account. Enter F349 in the Monitor box to learn more about \"Vertigo: Exercises. \"     If you do not have an account, please click on the \"Sign Up Now\" link. Current as of: October 21, 2018  Content Version: 12.0  © 8659-4906 Healthwise, Incorporated. Care instructions adapted under license by [facility] anfix [facility] ([facility]). If you have questions about a medical condition or this instruction, always ask your healthcare professional. [place] [address] any warranty or liability for your use of this information. Patient Education         Vertigo: Head Movements That Help (01:53)  Your health professional recommends that you watch this short online health video. Learn simple head movements to help with vertigo and balance problems.      How to watch treatment and safety. Be sure to make and go to all appointments, and call your doctor if you are having problems. It's also a good idea to know your test results and keep a list of the medicines you take. Where can you learn more? Go to https://chpeelsa.[employer]. org and sign in to your [employer] account. Enter J483 in the Fatigue Science box to learn more about \"Epley Maneuver at Home for Vertigo: Exercises. \"     If you do not have an account, please click on the \"Sign Up Now\" link. Current as of: Ly 3, 2018  Content Version: 12.0  © 2040-9831 Healthwise, [employer] Care instructions adapted under license by [facility] ([facility]). If you have questions about a medical condition or this instruction, always ask your healthcare professional. [address] any warranty or liability for your use of this information. Patient Education        Vertigo: Care Instructions  Your Care Instructions    Vertigo is the feeling that you or your surroundings are moving when there is no actual movement. It is often described as a feeling of spinning, whirling, falling, or tilting. Vertigo may make you vomit or feel nauseated. You may have trouble standing or walking and may lose your balance. Vertigo is often related to an inner ear problem, but it can have other more serious causes. If vertigo continues, you may need more tests to find its cause. Follow-up care is a key part of your treatment and safety. Be sure to make and go to all appointments, and call your doctor if you are having problems. It's also a good idea to know your test results and keep a list of the medicines you take. How can you care for yourself at home? · Do not lie flat on your back. Prop yourself up slightly. This may reduce the spinning feeling. Keep your eyes open. · Move slowly so that you do not fall. · If your doctor recommends medicine, take it exactly as directed.   · Do not drive while you are having vertigo. Certain exercises, called Melvin-Daroff exercises, can help decrease vertigo. To do Melvin-Daroff exercises:  · Sit on the edge of a bed or sofa and quickly lie down on the side that causes the worst vertigo. Lie on your side with your ear down. · Stay in this position for at least 30 seconds or until the vertigo goes away. · Sit up. If this causes vertigo, wait for it to stop. · Repeat the procedure on the other side. · Repeat this 10 times. Do these exercises 2 times a day until the vertigo is gone. When should you call for help? Call 911 anytime you think you may need emergency care. For example, call if:    · You passed out (lost consciousness).     · You have symptoms of a stroke. These may include:  ? Sudden numbness, tingling, weakness, or loss of movement in your face, arm, or leg, especially on only one side of your body. ? Sudden vision changes. ? Sudden trouble speaking. ? Sudden confusion or trouble understanding simple statements. ? Sudden problems with walking or balance. ? A sudden, severe headache that is different from past headaches.    Call your doctor now or seek immediate medical care if:    · Vertigo occurs with a fever, a headache, or ringing in your ears.     · You have new or increased nausea and vomiting.    Watch closely for changes in your health, and be sure to contact your doctor if:    · Vertigo gets worse or happens more often.     · Vertigo has not gotten better after 2 weeks. Where can you learn more? Go to https://Giraffe Friendteto.Last.[email or web address] org and sign in to your [employer] account. Enter E108 in the [facility] box to learn more about \"Vertigo: Care Instructions. \"     If you do not have an account, please click on the \"Sign Up Now\" link. Current as of: October 21, 2018  Content Version: 12.0  © 7996-1363 Healthwise, Incorporated. Care instructions adapted under license by [address]  If you have questions about a medical condition or this instruction, always ask your healthcare professional. [place] [address] any warranty or liability for your use of this information.

## 2019-05-29 RX ORDER — METHIMAZOLE 10 MG/1
20 TABLET ORAL DAILY
Qty: 60 TABLET | Refills: 5 | Status: SHIPPED | OUTPATIENT
Start: 2019-05-29 | End: 2019-09-24

## 2019-05-29 NOTE — TELEPHONE ENCOUNTER
PT states her methimazole med should be for 2 per day, pharmacy has 1 per day, she would like a new script sent to [facility] for 2 per day and a phn call to let her know it has been sent.

## 2019-05-30 ASSESSMENT — ENCOUNTER SYMPTOMS
ORTHOPNEA: 0
SHORTNESS OF BREATH: 0
BLURRED VISION: 0

## 2019-06-25 ENCOUNTER — OFFICE VISIT (OUTPATIENT)
Dept: INTERNAL MEDICINE CLINIC | Age: 69
End: 2019-06-25
Payer: MEDICARE

## 2019-06-25 VITALS
OXYGEN SATURATION: 98 % | TEMPERATURE: 98.3 F | DIASTOLIC BLOOD PRESSURE: 78 MMHG | WEIGHT: 185.6 LBS | BODY MASS INDEX: 27.41 KG/M2 | HEART RATE: 60 BPM | SYSTOLIC BLOOD PRESSURE: 132 MMHG

## 2019-06-25 DIAGNOSIS — S46.001A INJURY OF RIGHT ROTATOR CUFF, INITIAL ENCOUNTER: Primary | ICD-10-CM

## 2019-06-25 DIAGNOSIS — E05.00 GRAVES DISEASE: ICD-10-CM

## 2019-06-25 DIAGNOSIS — I10 ESSENTIAL HYPERTENSION: ICD-10-CM

## 2019-06-25 LAB
T3 TOTAL: 1.54 NG/ML (ref 0.8–2)
T4 FREE: 1.2 NG/DL (ref 0.9–1.8)
TSH SERPL DL<=0.05 MIU/L-ACNC: <0.01 UIU/ML (ref 0.27–4.2)

## 2019-06-25 PROCEDURE — 99214 OFFICE O/P EST MOD 30 MIN: CPT | Performed by: INTERNAL MEDICINE

## 2019-06-25 PROCEDURE — G8427 DOCREV CUR MEDS BY ELIG CLIN: HCPCS | Performed by: INTERNAL MEDICINE

## 2019-06-25 PROCEDURE — 3017F COLORECTAL CA SCREEN DOC REV: CPT | Performed by: INTERNAL MEDICINE

## 2019-06-25 PROCEDURE — 1036F TOBACCO NON-USER: CPT | Performed by: INTERNAL MEDICINE

## 2019-06-25 PROCEDURE — G8417 CALC BMI ABV UP PARAM F/U: HCPCS | Performed by: INTERNAL MEDICINE

## 2019-06-25 PROCEDURE — 4040F PNEUMOC VAC/ADMIN/RCVD: CPT | Performed by: INTERNAL MEDICINE

## 2019-06-25 PROCEDURE — G8400 PT W/DXA NO RESULTS DOC: HCPCS | Performed by: INTERNAL MEDICINE

## 2019-06-25 PROCEDURE — 1123F ACP DISCUSS/DSCN MKR DOCD: CPT | Performed by: INTERNAL MEDICINE

## 2019-06-25 PROCEDURE — 1090F PRES/ABSN URINE INCON ASSESS: CPT | Performed by: INTERNAL MEDICINE

## 2019-06-25 RX ORDER — METOPROLOL SUCCINATE 50 MG/1
50 TABLET, EXTENDED RELEASE ORAL DAILY
Qty: 90 TABLET | Refills: 1 | Status: SHIPPED | OUTPATIENT
Start: 2019-06-25 | End: 2019-12-10

## 2019-06-25 RX ORDER — AMLODIPINE BESYLATE 5 MG/1
5 TABLET ORAL DAILY
Qty: 90 TABLET | Refills: 1 | Status: SHIPPED | OUTPATIENT
Start: 2019-06-25 | End: 2020-03-03 | Stop reason: SDUPTHER

## 2019-06-25 RX ORDER — HYDROCHLOROTHIAZIDE 25 MG/1
TABLET ORAL
Qty: 90 TABLET | Refills: 2 | Status: SHIPPED | OUTPATIENT
Start: 2019-06-25 | End: 2020-07-07 | Stop reason: SDUPTHER

## 2019-06-25 ASSESSMENT — ENCOUNTER SYMPTOMS
SWOLLEN GLANDS: 0
CHANGE IN BOWEL HABIT: 0
ABDOMINAL PAIN: 0
VISUAL CHANGE: 0
VOMITING: 0

## 2019-06-25 NOTE — PATIENT INSTRUCTIONS
arm muscles. Rather, use your legs and your hips to create movement that makes your arm swing freely. 2. Use the movement from your hips and legs to guide the slightly swinging arm back and forth like a pendulum (or elephant trunk). Then guide it in circles that start small (about the size of a dinner plate). Make the circles a bit larger each day, as your pain allows. 3. Do this exercise for 5 minutes, 5 to 7 times each day. 4. As you have less pain, try bending over a little farther to do this exercise. This will increase the amount of movement at your shoulder. Posterior stretching exercise    1. Hold the elbow of your injured arm with your other hand. 2. Use your hand to pull your injured arm gently up and across your body. You will feel a gentle stretch across the back of your injured shoulder. 3. Hold for at least 15 to 30 seconds. Then slowly lower your arm. 4. Repeat 2 to 4 times. Up-the-back stretch    1. Put your hand in your back pocket. Let it rest there to stretch your shoulder. 2. With your other hand, hold your injured arm (palm outward) behind your back by the wrist. Pull your arm up gently to stretch your shoulder. 3. Next, put a towel over your other shoulder. Put the hand of your injured arm behind your back. Now hold the back end of the towel. With the other hand, hold the front end of the towel in front of your body. Pull gently on the front end of the towel. This will bring your hand farther up your back to stretch your shoulder. Overhead stretch    1. Standing about an arm's length away, grasp onto a solid surface. You could use a countertop, a doorknob, or the back of a sturdy chair. 2. With your knees slightly bent, bend forward with your arms straight. Lower your upper body, and let your shoulders stretch. 3. As your shoulders are able to stretch farther, you may need to take a step or two backward. 4. Hold for at least 15 to 30 seconds. Then stand up and relax.  If you had stepped back during your stretch, step forward so you can keep your hands on the solid surface. 5. Repeat 2 to 4 times. Shoulder flexion (lying down)    1. Lie on your back, holding a wand with both hands. Your palms should face down as you hold the wand. 2. Keeping your elbows straight, slowly raise your arms over your head. Raise them until you feel a stretch in your shoulders, upper back, and chest.  3. Hold for 15 to 30 seconds. 4. Repeat 2 to 4 times. Shoulder rotation (lying down)    1. Lie on your back. Hold a wand with both hands with your elbows bent and palms up. 2. Keep your elbows close to your body, and move the wand across your body toward the sore arm. 3. Hold for 8 to 12 seconds. 4. Repeat 2 to 4 times. Wall climbing (to the side)    1. Stand with your side to a wall so that your fingers can just touch it at an angle about 30 degrees toward the front of your body. 2. Walk the fingers of your injured arm up the wall as high as pain permits. Try not to shrug your shoulder up toward your ear as you move your arm up. 3. Hold that position for a count of at least 15 to 20.  4. Walk your fingers back down to the starting position. 5. Repeat at least 2 to 4 times. Try to reach higher each time. Wall climbing (to the front)    1. Face a wall, and stand so your fingers can just touch it. 2. Keeping your shoulder down, walk the fingers of your injured arm up the wall as high as pain permits. (Don't shrug your shoulder up toward your ear.)  3. Hold your arm in that position for at least 15 to 30 seconds. 4. Slowly walk your fingers back down to where you started. 5. Repeat at least 2 to 4 times. Try to reach higher each time. Shoulder blade squeeze    1. Stand with your arms at your sides, and squeeze your shoulder blades together. Do not raise your shoulders up as you squeeze. 2. Hold 6 seconds. 3. Repeat 8 to 12 times. Scapular exercise: Arm reach    1.  Lie flat on your back. This exercise is a very slight motion that starts with your arms raised (elbows straight, arms straight). 2. From this position, reach higher toward the mikel or ceiling. Keep your elbows straight. All motion should be from your shoulder blade only. 3. Relax your arms back to where you started. 4. Repeat 8 to 12 times. Arm raise to the side    1. Slowly raise your injured arm to the side, with your thumb facing up. Raise your arm 60 degrees at the most (shoulder level is 90 degrees). 2. Hold the position for 3 to 5 seconds. Then lower your arm back to your side. If you need to, bring your \"good\" arm across your body and place it under the elbow as you lower your injured arm. Use your good arm to keep your injured arm from dropping down too fast.  3. Repeat 8 to 12 times. 4. When you first start out, don't hold any extra weight in your hand. As you get stronger, you may use a 1-pound to 2-pound dumbbell or a small can of food. Shoulder flexor and extensor exercise    1. Push forward (flex): Stand facing a wall or doorjamb, about 6 inches or less back. Hold your injured arm against your body. Make a closed fist with your thumb on top. Then gently push your hand forward into the wall with about 25% to 50% of your strength. Don't let your body move backward as you push. Hold for about 6 seconds. Relax for a few seconds. Repeat 8 to 12 times. 2. Push backward (extend): Stand with your back flat against a wall. Your upper arm should be against the wall, with your elbow bent 90 degrees (your hand straight ahead). Push your elbow gently back against the wall with about 25% to 50% of your strength. Don't let your body move forward as you push. Hold for about 6 seconds. Relax for a few seconds. Repeat 8 to 12 times. Scapular exercise: Wall push-ups    1. Stand facing a wall, about 12 inches to 18 inches away. 2. Place your hands on the wall at shoulder height.   3. Slowly bend your elbows and bring your relaxed and your elbow bent 90 degrees. Your upper arm should rest comfortably against your side. Squeeze a rolled towel between your elbow and your body for comfort. This will help keep your arm at your side. 3. Hold one end of the elastic band with the hand of the painful arm. 4. Start with your forearm across your belly. Slowly rotate the forearm out away from your body. Keep your elbow and upper arm tucked against the towel roll or the side of your body until you begin to feel tightness in your shoulder. Slowly move your arm back to where you started. 5. Repeat 8 to 12 times. Follow-up care is a key part of your treatment and safety. Be sure to make and go to all appointments, and call your doctor if you are having problems. It's also a good idea to know your test results and keep a list of the medicines you take. Where can you learn more? Go to https://Eureka.Dishable. org and sign in to your ONEPLE account. Enter [place] in the Clctin box to learn more about \"Rotator Cuff: Exercises. \"     If you do not have an account, please click on the \"Sign Up Now\" link. Current as of: September 20, 2018  Content Version: 12.0  © 7633-3940 Healthwise, Incorporated. Care instructions adapted under license by [facility] ([facility]). If you have questions about a medical condition or this instruction, always ask your healthcare professional. [address] any warranty or liability for your use of this information. Patient Education        Rotator Cuff Rehabilitation  What is rotator cuff rehabilitation? Rotator cuff rehabilitation is a series of exercises you do after your surgery. It helps you get back your shoulder's range of motion and strength. You will work with your doctor and physical therapist to plan this exercise program. To get the best results, you need to do the exercises correctly and as often as your doctor tells you.   Before you start any exercises, your feet shoulder-width apart and your affected arm at your side. · Slowly raise your injured arm to the side, with your thumb facing up. Raise your arm 60 degrees at the most (shoulder level is 90 degrees). · After holding the position for 3 to 5 seconds, lower your arm back to your side. If you need to, bring your \"good\" arm across your body and place it under the elbow as you lower your injured arm. Use your good arm to keep your injured arm from dropping down too fast during the downward motion. · Repeat 8 to 12 times. · When you first start out, don't hold any additional weight in your hand. As your strength improves, you may use a 1- to 2-pound dumbbell or a small can of food. Shoulder flexor  · Stand facing a wall. Your body should be about 6 inches away from the wall. · Keep your affected arm and elbow to your side, and bend your elbow so that your arm is pointing toward the wall. · Make a closed fist with your thumb on top. · Push your hand into the wall and hold it for 6 seconds. Push with 25% to 50% of the force you have. Shoulder extension  · Stand with your back flat against a wall. · Keep your affected arm and elbow at your side, and bend your elbow so that your upper arm is against the wall and your lower arm is pointing straight ahead. Make a closed fist with your thumb on top. · Push your elbow gently back against the wall, holding for 6 seconds. Push with 25% to 50% of the force you have. Where can you learn more? Go to https://Loaded PocketsoniaDrillster.Atlas Apps. org and sign in to your MakeMeReach account. Enter L016 in the Workhint box to learn more about \"Rotator Cuff Rehabilitation. \"     If you do not have an account, please click on the \"Sign Up Now\" link. Current as of: September 20, 2018  Content Version: 12.0  © 3348-8780 Healthwise, Incorporated. Care instructions adapted under license by [facility] ([facility]).  If you have questions about a medical condition or this instruction, always ask your healthcare professional. [place] [address] any warranty or liability for your use of this information.

## 2019-06-25 NOTE — PROGRESS NOTES
COLONOSCOPY  3/14/2019    COLONOSCOPY CONTROL HEMORRHAGE performed by Gildardo Leslie MD at [address] COLONOSCOPY N/A 3/26/2019    COLONOSCOPY CONTROL HEMORRHAGE performed by Gildardo Leslie MD at [address]      TUBAL LIGATION      TUNNELED CENTRAL VENOUS CATHETER W/ SUBCUTANEOUS PORT Right      Family History   Problem Relation Age of Onset    Hypertension Mother     Colon Cancer Maternal Grandmother      Social History     Socioeconomic History    Marital status:      Spouse name: Not on file    Number of children: Not on file    Years of education: Not on file    Highest education level: Not on file   Occupational History    Occupation: cash checking   Social Needs    Financial resource strain: Not on file    Food insecurity:     Worry: Not on file     Inability: Not on file    Transportation needs:     Medical: Not on file     Non-medical: Not on file   Tobacco Use    Smoking status: Never Smoker    Smokeless tobacco: Never Used   Substance and Sexual Activity    Alcohol use: Yes     Comment: occ wine    Drug use: No    Sexual activity: Never   Lifestyle    Physical activity:     Days per week: Not on file     Minutes per session: Not on file    Stress: Not on file   Relationships    Social connections:     Talks on phone: Not on file     Gets together: Not on file     Attends Jehovah's witness service: Not on file     Active member of club or organization: Not on file     Attends meetings of clubs or organizations: Not on file     Relationship status: Not on file    Intimate partner violence:     Fear of current or ex partner: Not on file     Emotionally abused: Not on file     Physically abused: Not on file     Forced sexual activity: Not on file   Other Topics Concern    Not on file   Social History Narrative    Lives home with her 3 children. Doing well 8 grandchildren. Review of Systems   Constitutional: Negative for fever.    HENT:

## 2019-07-09 ENCOUNTER — HOSPITAL ENCOUNTER (OUTPATIENT)
Dept: PHYSICAL THERAPY | Age: 69
Setting detail: THERAPIES SERIES
Discharge: HOME | End: 2019-07-09
Payer: MEDICARE

## 2019-07-09 PROCEDURE — 97110 THERAPEUTIC EXERCISES: CPT | Performed by: PHYSICAL THERAPIST

## 2019-07-09 PROCEDURE — 97161 PT EVAL LOW COMPLEX 20 MIN: CPT | Performed by: PHYSICAL THERAPIST

## 2019-07-09 NOTE — FLOWSHEET NOTE
27   Total Treatment Minutes: 53       [x] EVAL - LOW (96753)   [] EVAL - MOD (61563)  [] EVAL - HIGH (18189)  [] RE-EVAL (01903)  [x] TQ(55991) x 2    [] Ionto  [] NMR (23241) x      [] Vaso  [] Manual (16404) x      [] Ultrasound:  [] TA x       [] Mech Traction (61945)  [] Home Management Training x    [] ES (un) (09582):   [] Aquatic    [] ES(attended) (47961)  [] Other:                     GOALS:  Patient stated goal: pain relief     Therapist goals for Patient:   Short Term Goals: To be achieved in: 2 weeks  1. Independent in HEP and progression per patient tolerance, in order to prevent re-injury. 2. Patient will have a decrease in pain to facilitate improvement in movement, function, and ADLs as indicated by Functional Deficits.     Long Term Goals: To be achieved in: 8 weeks  1. Disability index score of 10% or less for the UEFI or Quick DASH to assist with reaching prior level of function. 2. Patient will demonstrate increased AROM to to at least 150 degrees of shoulder flexion and abduction to allow for proper joint functioning as indicated by patients Functional Deficits. 3. Patient will demonstrate an increase in Strength to 5/5 to allow for proper functional mobility as indicated by patients Functional Deficits. 4. Patient will return to functional activities including driving without increased symptoms or restriction. 5. Patient will be able to perform all household chores / tasks, including overhead reaching, without increased symptoms or restriction. Progression Towards Functional goals:  [] Patient is progressing as expected towards functional goals listed. [] Progression is slowed due to complexities listed. [] Progression has been slowed due to co-morbidities.   [x] Plan just implemented, too soon to assess goals progression  [] Other:     Persisting Functional Limitations/Impairments:  []Sitting []Standing   []Transfers

## 2019-07-09 NOTE — PLAN OF CARE
[place], [facility], [address]  Phone: [phone]   Fax: [phone]                                                     Physical Therapy Certification    Dear Referring Practitioner: Dr. Gissell Mills,    We had the pleasure of evaluating the following patient for physical therapy services at [address] [place] [address]. A summary of our findings can be found in the initial assessment below. This includes our plan of care. If you have any questions or concerns regarding these findings, please do not hesitate to contact me at the office phone number checked above. Thank you for the referral.       Physician Signature:_______________________________Date:__________________  By signing above (or electronic signature), therapists plan is approved by physician      Patient: Lillian Ha   : 1950   MRN: [id number]  Referring Physician: Referring Practitioner: Dr. Gissell Mills      Evaluation Date: 2019      Medical Diagnosis Information:  Diagnosis: S46.001 (ICD-10-CM) - Injury of right rotator cuff   Treatment Diagnosis: R shoulder atrophy; R shoulder pain                                         Insurance information: PT Insurance Information: [facility] Medicare Complete - visits per medical necessity; no deductible, OOP not met, $35 copay, no auth    Precautions/ Contra-indications: n/a  Latex Allergy:  [x]NO      []YES  Preferred Language for Healthcare:   [x]English       []other:    SUBJECTIVE: Patient stated complaint: R shoulder pain since 2018 - was cutting hard cardboard and after finishing, felt pain in her shoulder / arm. Thought it would go away, but it's getting worse. Pt went to PCP for her shoulder and PCP recommended PT. Relevant Medical History:  Uterine cancer - in remission  A-fib  Vertigo - no specific trigger, improves with closing eyes.      Functional Outcome:     Pain Scale: avg = 4/10; up to R PSIS   Elbow Flexion  NT NT WNL WNL   Elbow Extension  NT NT WNL WNL       Strength (0-5) Left Right    Shoulder Flex 4+ NT   Shoulder Abd 3+ NT   Shoulder ER 4 3+   Shoulder IR 4+ 4-   Biceps 3+ 4-   Triceps 3+ 3+   Lats NT NT   Middle Trap NT NT   Rhomboids NT NT   Lower Trap  NT NT            Orthopaedic Special Tests Positive  Negative  NT Comments - limited by AROM limitations    Shoulder        Neer    x    Jaime-Joya   x    Empty Can   x    Drop Arm x      [place]'s   x    Speeds    x    Sulcus   x     Apprehension (Anterior / Posterior)   x    Load and Shift   x    CBA x   Tested in supine             Joint mobility:    [x]Normal    []Hypo   []Hyper                                        [x] Patient history, allergies, meds reviewed. Medical chart reviewed. See intake form. Review Of Systems (ROS):  [x]Performed Review of systems (Integumentary, CardioPulmonary, Neurological) by intake and observation. Intake form has been scanned into medical record. Patient has been instructed to contact their primary care physician regarding ROS issues if not already being addressed at this time.       Co-morbidities/Complexities (which will affect course of rehabilitation):   []None           Arthritic conditions   []Rheumatoid arthritis (M05.9)  []Osteoarthritis (M19.91)   Cardiovascular conditions   []Hypertension (I10)  []Hyperlipidemia (E78.5)  []Angina pectoris (I20)  []Atherosclerosis (I70)   Musculoskeletal conditions   []Disc pathology   []Congenital spine pathologies   []Prior surgical intervention  []Osteoporosis (M81.8)  []Osteopenia (M85.8)   Endocrine conditions   []Hypothyroid (E03.9)  []Hyperthyroid Gastrointestinal conditions   []Constipation (Z33.81)   Metabolic conditions   []Morbid obesity (E66.01)  []Diabetes type 1(E10.65) or 2 (E11.65)   []Neuropathy (G60.9)     Pulmonary conditions   []Asthma (J45)  []Coughing   []COPD (J44.9)   Psychological Disorders  []Anxiety (F41.9)  []Depression assessment instrument and/or measurable assessment of functional outcome. [x] EVAL (LOW) 68245 (typically 15 minutes face-to-face)  [] EVAL (MOD) 85704 (typically 30 minutes face-to-face)  [] EVAL (HIGH) 13425 (typically 45 minutes face-to-face)  [] RE-EVAL     PLAN:  Frequency/Duration:  1 days per week for 8 Weeks:  INTERVENTIONS:  [x] Therapeutic exercise including: strength training, ROM, for Upper extremity and core   [x]  NMR activation and proprioception for UE, scap and Core   [x] Manual therapy as indicated for shoulder, scapula and spine to include: Dry Needling/IASTM, STM, PROM, Gr I-IV mobilizations, manipulation. [x] Modalities as needed that may include: thermal agents, E-stim, Biofeedback, US, iontophoresis as indicated  [x] Patient education on joint protection, postural re-education, activity modification, progression of HEP. HEP instruction: Written HEP instructions provided and reviewed (see scanned image in ). GOALS:  Patient stated goal: pain relief    Therapist goals for Patient:   Short Term Goals: To be achieved in: 2 weeks  1. Independent in HEP and progression per patient tolerance, in order to prevent re-injury. 2. Patient will have a decrease in pain to facilitate improvement in movement, function, and ADLs as indicated by Functional Deficits. Long Term Goals: To be achieved in: 8 weeks  1. Disability index score of 10% or less for the UEFI or Quick DASH to assist with reaching prior level of function. 2. Patient will demonstrate increased AROM to to at least 150 degrees of shoulder flexion and abduction to allow for proper joint functioning as indicated by patients Functional Deficits. 3. Patient will demonstrate an increase in Strength to 5/5 to allow for proper functional mobility as indicated by patients Functional Deficits. 4. Patient will return to functional activities including driving without increased symptoms or restriction.    5. Patient will be able to perform all household chores / tasks, including overhead reaching, without increased symptoms or restriction.        Electronically signed by:  Silke Gardiner PT

## 2019-07-16 ENCOUNTER — HOSPITAL ENCOUNTER (OUTPATIENT)
Dept: PHYSICAL THERAPY | Age: 69
Setting detail: THERAPIES SERIES
Discharge: HOME | End: 2019-07-16
Payer: MEDICARE

## 2019-07-16 PROCEDURE — 97110 THERAPEUTIC EXERCISES: CPT | Performed by: PHYSICAL THERAPIST

## 2019-07-23 ENCOUNTER — HOSPITAL ENCOUNTER (OUTPATIENT)
Dept: ULTRASOUND IMAGING | Age: 69
Discharge: HOME | End: 2019-07-23
Payer: MEDICARE

## 2019-07-23 ENCOUNTER — HOSPITAL ENCOUNTER (OUTPATIENT)
Dept: WOMENS IMAGING | Age: 69
Discharge: HOME | End: 2019-07-23
Payer: MEDICARE

## 2019-07-23 ENCOUNTER — APPOINTMENT (OUTPATIENT)
Dept: PHYSICAL THERAPY | Age: 69
End: 2019-07-23
Payer: MEDICARE

## 2019-07-23 DIAGNOSIS — E04.1 THYROID NODULE: ICD-10-CM

## 2019-07-23 DIAGNOSIS — Z12.31 ENCOUNTER FOR SCREENING MAMMOGRAM FOR BREAST CANCER: ICD-10-CM

## 2019-07-23 PROCEDURE — 77067 SCR MAMMO BI INCL CAD: CPT

## 2019-07-23 PROCEDURE — 76536 US EXAM OF HEAD AND NECK: CPT

## 2019-07-30 ENCOUNTER — APPOINTMENT (OUTPATIENT)
Dept: PHYSICAL THERAPY | Age: 69
End: 2019-07-30
Payer: MEDICARE

## 2019-08-12 NOTE — PROGRESS NOTES
[facility]   Electrophysiology Consultation   Date: 8/13/2019   CC:Atrial fibrillation  HPI: Yaya Longo is a 79 y.o. with PMH of HTN is being referred by PCP, Dr. Mitchel Parker, for evaluation of Afib. She was originally diagnosed with Afib in 7/2017, followed by [facility]ologist at [facility]. She underwent DCCVs on 8/2017 and 9/2017. Diagnosed with FIGO Stage IA (pT1a(2), N0, M0) serous carcinoma of the endometrium, status post TLH/BSO and five of a planned six cycles of adjuvant chemotherapy completed 08/28/2018. Her final treatment for radiation is today and completed with chemotherapy. She had episode of syncope while receiving chemo in 7/2018. 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 She received 4 units RBCs and discharged home without anticoagulation. Xarelto was stopped. Interval History:  She did not follow up with Dr. Tal Todd to approve anticoagulation. She will call for appointment. Denies having any bleeding. Her cancer has been treated.       Past Medical History:   Diagnosis Date    Anal bleeding 2019    CT scan clear     Anemia     Atrial fibrillation ([address]     cleared by cardiologist 2018, was related to cancer     Colon polyps     Diabetes mellitus ([address]     pre diabetic diet controlled    Endometrial cancer ([address]     Hypertension     Small bowel obstruction ([address]     resolved without surgery    Thyroid nodule       Past Surgical History:   Procedure Laterality Date    CARDIOVERSION      COLONOSCOPY N/A 3/14/2019    COLONOSCOPY POLYPECTOMY SNARE/COLD BIOPSY performed by Melecio Busch MD at [place] [address]  3/14/2019    COLONOSCOPY CONTROL HEMORRHAGE performed by Melecio Busch MD at [facility] N/A 3/26/2019    COLONOSCOPY CONTROL HEMORRHAGE performed by Melecio Busch MD at [address] [place] [address] CENTRAL VENOUS CATHETER W/ SUBCUTANEOUS PORT Right      Allergies   Allergen Reactions    Naproxen Swelling    Lisinopril Rash       Social History:   reports that she has never smoked. She has never used smokeless tobacco. She reports that she drinks alcohol. She reports that she does not use drugs. Family History:  family history includes Colon Cancer in her maternal grandmother; Hypertension in her mother. Review of System:  All other systems reviewed except for that noted above. Pertinent negatives and positives are:      General: negative for fever, chills   Ophthalmic ROS: negative for - eye pain or loss of vision  ENT ROS: negative for - headaches, sore throat   Respiratory: negative for - cough, sputum  Cardiovascular: Reviewed in HPI  Gastrointestinal: negative for - abdominal pain, diarrhea, N/V  Hematology: negative for - bleeding, blood clots, bruising or jaundice  Genito-Urinary:  negative for - Dysuria or incontinence  Musculoskeletal: negative for - Joint swelling, muscle pain  Neurological: negative for - confusion, dizziness, headaches   Psychiatric: No anxiety, no depression. Dermatological: negative for - rash    Physical Examination:  Vitals:    08/13/19 1434   BP: 124/82   Pulse: 60      Wt Readings from Last 3 Encounters:   08/13/19 194 lb 12.8 oz (88.4 kg)   06/25/19 185 lb 9.6 oz (84.2 kg)   05/14/19 181 lb 9.6 oz (82.4 kg)     · Constitutional: Oriented. No distress. · Head: Normocephalic and atraumatic. · Mouth/Throat: Oropharynx is clear and moist.   · Eyes: Conjunctivae normal. EOM are normal.   · Neck: Neck supple. No JVD present. · Cardiovascular: Normal rate, irregular rhythm, S1&S2. · Pulmonary/Chest: Bilateral respiratory sounds. No rhonchi. · Abdominal: Soft. No tenderness. · Musculoskeletal: No tenderness. No edema    · Lymphadenopathy: Has no cervical adenopathy. · Neurological: Alert and oriented.  Follows command, No Gross deficit   · Skin: Skin is recurrence. She has remained in persistent atrial for ablation since last office visit. Off Xarelto d/t recent GI hemorrhage, radiation proctitis. He was supposed to see Dr. Ophelia York to see when she can resume her anticoagulation. She needs to be on anticoagulation for treatment of atrial fibrillation, ablation of atrial fibrillation, and potential watchman procedure if she cannot tolerate long-term anticoagulation therapy. She will see Dr. Ophelia York a few months to see if she can resume her anticoagulation. If she can start anticoagulation, plan for cardioversion after 3 weeks. Also discussed ablation of atrial fibrillation with her. Risk-benefit alternatives discussed. Educational material was provided. She prefers to proceed with cardioversion first and if her atrial fibrillation recurs, she will consider ablation. On metoprolol 50 mg daily for HR control      HTN:  Controlled. Continue current medications. On Lotrel and HCTZ     - Uterine cancer: In remission for the past 6 months   S/p chemo and radiation therapy. GI bleeding, radiation proctitis: Follow up with GI. Plan   Follow up with Dr. Melecio Wheat after starting anticoagulation   Consider ablation  Follow up in 6 months      Thank you for allowing me to participate in the care of [facility]. Further evaluation will be based upon the patient's clinical course and testing results. All questions and concerns were addressed to the patient/family. Alternatives to my treatment were discussed. I have discussed the above stated plan and the patient verbalized understanding and agreed with the plan. NOTE: This report was transcribed using voice recognition software. Every effort was made to ensure accuracy, however, inadvertent computerized transcription errors may be present. Maru Hurt MD, MPH  [facility]   Office: [phone]     Scribe attestation:  This note was scribed in the

## 2019-08-13 ENCOUNTER — OFFICE VISIT (OUTPATIENT)
Dept: CARDIOLOGY CLINIC | Age: 69
End: 2019-08-13
Payer: MEDICARE

## 2019-08-13 VITALS
BODY MASS INDEX: 28.85 KG/M2 | HEIGHT: 69 IN | WEIGHT: 194.8 LBS | SYSTOLIC BLOOD PRESSURE: 124 MMHG | HEART RATE: 60 BPM | DIASTOLIC BLOOD PRESSURE: 82 MMHG

## 2019-08-13 DIAGNOSIS — R55 VASOVAGAL SYNCOPE: ICD-10-CM

## 2019-08-13 DIAGNOSIS — I48.19 PERSISTENT ATRIAL FIBRILLATION (HCC): Primary | ICD-10-CM

## 2019-08-13 PROCEDURE — 1036F TOBACCO NON-USER: CPT | Performed by: INTERNAL MEDICINE

## 2019-08-13 PROCEDURE — 1090F PRES/ABSN URINE INCON ASSESS: CPT | Performed by: INTERNAL MEDICINE

## 2019-08-13 PROCEDURE — 99214 OFFICE O/P EST MOD 30 MIN: CPT | Performed by: INTERNAL MEDICINE

## 2019-08-13 PROCEDURE — 4040F PNEUMOC VAC/ADMIN/RCVD: CPT | Performed by: INTERNAL MEDICINE

## 2019-08-13 PROCEDURE — 1123F ACP DISCUSS/DSCN MKR DOCD: CPT | Performed by: INTERNAL MEDICINE

## 2019-08-13 PROCEDURE — G8400 PT W/DXA NO RESULTS DOC: HCPCS | Performed by: INTERNAL MEDICINE

## 2019-08-13 PROCEDURE — 93000 ELECTROCARDIOGRAM COMPLETE: CPT | Performed by: INTERNAL MEDICINE

## 2019-08-13 PROCEDURE — 3017F COLORECTAL CA SCREEN DOC REV: CPT | Performed by: INTERNAL MEDICINE

## 2019-08-13 PROCEDURE — G8427 DOCREV CUR MEDS BY ELIG CLIN: HCPCS | Performed by: INTERNAL MEDICINE

## 2019-08-13 PROCEDURE — G8417 CALC BMI ABV UP PARAM F/U: HCPCS | Performed by: INTERNAL MEDICINE

## 2019-08-14 ENCOUNTER — TELEPHONE (OUTPATIENT)
Dept: CARDIOLOGY CLINIC | Age: 69
End: 2019-08-14

## 2019-08-19 ENCOUNTER — ANESTHESIA EVENT (OUTPATIENT)
Dept: ENDOSCOPY | Age: 69
End: 2019-08-19
Payer: MEDICARE

## 2019-08-20 ENCOUNTER — CARE COORDINATION (OUTPATIENT)
Dept: CARE COORDINATION | Age: 69
End: 2019-08-20

## 2019-08-30 DIAGNOSIS — E05.00 GRAVES DISEASE: ICD-10-CM

## 2019-08-30 LAB
T3 TOTAL: 0.8 NG/ML (ref 0.8–2)
T4 FREE: 0.5 NG/DL (ref 0.9–1.8)
TSH SERPL DL<=0.05 MIU/L-ACNC: 0.56 UIU/ML (ref 0.27–4.2)

## 2019-09-10 ENCOUNTER — ANESTHESIA (OUTPATIENT)
Dept: ENDOSCOPY | Age: 69
End: 2019-09-10
Payer: MEDICARE

## 2019-09-10 ENCOUNTER — HOSPITAL ENCOUNTER (OUTPATIENT)
Age: 69
Setting detail: OUTPATIENT SURGERY
Discharge: HOME | End: 2019-09-10
Attending: INTERNAL MEDICINE | Admitting: INTERNAL MEDICINE
Payer: MEDICARE

## 2019-09-10 VITALS — SYSTOLIC BLOOD PRESSURE: 120 MMHG | OXYGEN SATURATION: 96 % | DIASTOLIC BLOOD PRESSURE: 78 MMHG

## 2019-09-10 VITALS
RESPIRATION RATE: 15 BRPM | HEIGHT: 69 IN | OXYGEN SATURATION: 100 % | HEART RATE: 48 BPM | DIASTOLIC BLOOD PRESSURE: 72 MMHG | SYSTOLIC BLOOD PRESSURE: 149 MMHG | BODY MASS INDEX: 28.89 KG/M2 | WEIGHT: 195.06 LBS | TEMPERATURE: 97.8 F

## 2019-09-10 LAB
CALCIUM IONIZED: 1.22 MMOL/L (ref 1.12–1.32)
PERFORMED ON: ABNORMAL
POC POTASSIUM: 3.3 MMOL/L (ref 3.5–5.1)
POC SAMPLE TYPE: ABNORMAL
POC SODIUM: 146 MMOL/L (ref 136–145)

## 2019-09-10 PROCEDURE — 2709999900 HC NON-CHARGEABLE SUPPLY: Performed by: INTERNAL MEDICINE

## 2019-09-10 PROCEDURE — 3700000000 HC ANESTHESIA ATTENDED CARE: Performed by: INTERNAL MEDICINE

## 2019-09-10 PROCEDURE — 7100000001 HC PACU RECOVERY - ADDTL 15 MIN: Performed by: INTERNAL MEDICINE

## 2019-09-10 PROCEDURE — 82330 ASSAY OF CALCIUM: CPT

## 2019-09-10 PROCEDURE — 84132 ASSAY OF SERUM POTASSIUM: CPT

## 2019-09-10 PROCEDURE — 6360000002 HC RX W HCPCS: Performed by: NURSE ANESTHETIST, CERTIFIED REGISTERED

## 2019-09-10 PROCEDURE — 7100000000 HC PACU RECOVERY - FIRST 15 MIN: Performed by: INTERNAL MEDICINE

## 2019-09-10 PROCEDURE — 7100000010 HC PHASE II RECOVERY - FIRST 15 MIN: Performed by: INTERNAL MEDICINE

## 2019-09-10 PROCEDURE — 3700000001 HC ADD 15 MINUTES (ANESTHESIA): Performed by: INTERNAL MEDICINE

## 2019-09-10 PROCEDURE — 2500000003 HC RX 250 WO HCPCS: Performed by: ANESTHESIOLOGY

## 2019-09-10 PROCEDURE — 84295 ASSAY OF SERUM SODIUM: CPT

## 2019-09-10 PROCEDURE — 2580000003 HC RX 258: Performed by: ANESTHESIOLOGY

## 2019-09-10 PROCEDURE — 3609008400 HC SIGMOIDOSCOPY DIAGNOSTIC: Performed by: INTERNAL MEDICINE

## 2019-09-10 PROCEDURE — 2580000003 HC RX 258: Performed by: NURSE ANESTHETIST, CERTIFIED REGISTERED

## 2019-09-10 PROCEDURE — 2500000003 HC RX 250 WO HCPCS: Performed by: NURSE ANESTHETIST, CERTIFIED REGISTERED

## 2019-09-10 RX ORDER — LIDOCAINE HYDROCHLORIDE 20 MG/ML
INJECTION, SOLUTION EPIDURAL; INFILTRATION; INTRACAUDAL; PERINEURAL PRN
Status: DISCONTINUED | OUTPATIENT
Start: 2019-09-10 | End: 2019-09-10 | Stop reason: SDUPTHER

## 2019-09-10 RX ORDER — SODIUM CHLORIDE 9 MG/ML
INJECTION, SOLUTION INTRAVENOUS CONTINUOUS
Status: DISCONTINUED | OUTPATIENT
Start: 2019-09-10 | End: 2019-09-10 | Stop reason: HOSPADM

## 2019-09-10 RX ORDER — GLYCOPYRROLATE 0.2 MG/ML
0.2 INJECTION INTRAMUSCULAR; INTRAVENOUS ONCE
Status: COMPLETED | OUTPATIENT
Start: 2019-09-10 | End: 2019-09-10

## 2019-09-10 RX ORDER — PROPOFOL 10 MG/ML
INJECTION, EMULSION INTRAVENOUS PRN
Status: DISCONTINUED | OUTPATIENT
Start: 2019-09-10 | End: 2019-09-10 | Stop reason: SDUPTHER

## 2019-09-10 RX ORDER — SODIUM CHLORIDE 9 MG/ML
INJECTION, SOLUTION INTRAVENOUS CONTINUOUS PRN
Status: DISCONTINUED | OUTPATIENT
Start: 2019-09-10 | End: 2019-09-10 | Stop reason: SDUPTHER

## 2019-09-10 RX ORDER — SODIUM CHLORIDE 0.9 % (FLUSH) 0.9 %
10 SYRINGE (ML) INJECTION PRN
Status: DISCONTINUED | OUTPATIENT
Start: 2019-09-10 | End: 2019-09-10 | Stop reason: HOSPADM

## 2019-09-10 RX ORDER — LIDOCAINE HYDROCHLORIDE 10 MG/ML
1 INJECTION, SOLUTION EPIDURAL; INFILTRATION; INTRACAUDAL; PERINEURAL
Status: DISCONTINUED | OUTPATIENT
Start: 2019-09-10 | End: 2019-09-10 | Stop reason: HOSPADM

## 2019-09-10 RX ORDER — SODIUM CHLORIDE 0.9 % (FLUSH) 0.9 %
10 SYRINGE (ML) INJECTION EVERY 12 HOURS SCHEDULED
Status: DISCONTINUED | OUTPATIENT
Start: 2019-09-10 | End: 2019-09-10 | Stop reason: HOSPADM

## 2019-09-10 RX ADMIN — SODIUM CHLORIDE: 9 INJECTION, SOLUTION INTRAVENOUS at 08:51

## 2019-09-10 RX ADMIN — SODIUM CHLORIDE: 9 INJECTION, SOLUTION INTRAVENOUS at 07:48

## 2019-09-10 RX ADMIN — LIDOCAINE HYDROCHLORIDE 25 MG: 20 INJECTION, SOLUTION EPIDURAL; INFILTRATION; INTRACAUDAL; PERINEURAL at 08:56

## 2019-09-10 RX ADMIN — PROPOFOL 150 MG: 10 INJECTION, EMULSION INTRAVENOUS at 08:56

## 2019-09-10 RX ADMIN — GLYCOPYRROLATE 0.2 MG: 0.2 INJECTION, SOLUTION INTRAMUSCULAR; INTRAVENOUS at 09:41

## 2019-09-10 ASSESSMENT — PAIN DESCRIPTION - DESCRIPTORS: DESCRIPTORS: BURNING

## 2019-09-10 ASSESSMENT — PAIN - FUNCTIONAL ASSESSMENT
PAIN_FUNCTIONAL_ASSESSMENT: ACTIVITIES ARE NOT PREVENTED
PAIN_FUNCTIONAL_ASSESSMENT: 0-10

## 2019-09-10 NOTE — ANESTHESIA POSTPROCEDURE EVALUATION
Department of Anesthesiology  Postprocedure Note    Patient: Johnathan Trevino  MRN: [id number]  YOB: 1950  Date of evaluation: 9/10/2019  Time:  1:24 PM     Procedure Summary     Date:  09/10/19 Room / Location:  [facility] ENDO 04 / [facility] ENDOSCOPY    Anesthesia Start:  6137 Anesthesia Stop:  0908    Procedure:  SIGMOIDOSCOPY DIAGNOSTIC FLEXIBLE (N/A ) Diagnosis:  (RECTAL ULCER K62.6)    Surgeon:  Reggie Winchester MD Responsible Provider:  Abner Tobias MD    Anesthesia Type:  TIVA ASA Status:  3          Anesthesia Type: TIVA    Yelitza Phase I: Yelitza Score: 9    Yelitza Phase II: Yelitza Score: 10    Last vitals: Reviewed and per EMR flowsheets.        Anesthesia Post Evaluation    Patient location during evaluation: PACU  Complications: no  Cardiovascular status: hemodynamically stable  Respiratory status: acceptable

## 2019-09-10 NOTE — H&P
[address] [place] [address]    Pre-operative History and Physical    Patient: Glenn Lubin  : 1950  CSN:     History Obtained From:  patient, electronic medical record    HISTORY OF PRESENT ILLNESS:    The patient is a 81 y.o. female with significant past medical history of A.fib, HTN, DM and recent LGI hemorrhage from large stercoral rectal ulcer who presents to check healing of ulcer. Past Medical History:        Diagnosis Date    Anal bleeding     CT scan clear     Anemia     Atrial fibrillation ([address]     cleared by cardiologist , was related to cancer     Colon polyps     Diabetes mellitus ([address]     pre diabetic diet controlled    Endometrial cancer ([address]     Hypertension     Small bowel obstruction ([address]     resolved without surgery    Thyroid nodule      Past Surgical History:        Procedure Laterality Date    CARDIOVERSION      COLONOSCOPY N/A 3/14/2019    COLONOSCOPY POLYPECTOMY SNARE/COLD BIOPSY performed by Gildardo Leslie MD at [address] [facility]  3/14/2019    COLONOSCOPY CONTROL HEMORRHAGE performed by Gildardo Leslie MD at [address] [facility] N/A 3/26/2019    COLONOSCOPY CONTROL HEMORRHAGE performed by Gildardo Leslie MD at [address] [facility]      TUNNELED CENTRAL VENOUS CATHETER W/ SUBCUTANEOUS PORT Right      Medications Prior to Admission:   No current facility-administered medications on file prior to encounter.       Current Outpatient Medications on File Prior to Encounter   Medication Sig Dispense Refill    rivaroxaban (XARELTO) 10 MG TABS tablet Take 10 mg by mouth      amLODIPine (NORVASC) 5 MG tablet Take 1 tablet by mouth daily 90 tablet 1    metoprolol succinate (TOPROL XL) 50 MG extended release tablet Take 1 tablet by mouth daily 90 tablet 1    hydrochlorothiazide (HYDRODIURIL) 25 MG tablet TAKE ONE TABLET BY MOUTH DAILY 90 tablet 2    methimazole (TAPAZOLE) 10 MG tablet Take 2 tablets by mouth daily 60 tablet 5    Multiple Vitamins-Minerals (THERAPEUTIC MULTIVITAMIN-MINERALS) tablet Take 1 tablet by mouth daily      aspirin 81 MG EC tablet Take 81 mg by mouth daily          Allergies:  Naproxen and Lisinopril    History of allergic reaction to anesthesia:  No    Social History:   TOBACCO:   reports that she has never smoked. She has never used smokeless tobacco.  ETOH:   reports that she drinks alcohol. DRUGS:   reports that she does not use drugs. Family History:       Problem Relation Age of Onset    Hypertension Mother     Colon Cancer Maternal Grandmother        PHYSICAL EXAM:      Ht 5' 9\" (1.753 m)   Wt 195 lb 1 oz (88.5 kg)   LMP  (Exact Date)   BMI 28.81 kg/m²  I        Heart:  Normal apical impulse, regular rate and rhythm, normal S1 and S2, no S3 or S4, and no murmur noted    Lungs:  No increased work of breathing, good air exchange, clear to auscultation bilaterally, no crackles or wheezing    Abdomen:  Normal bowel sounds, soft, non-distended, non-tender, no masses palpated, no hepatosplenomegally      ASA Grade:  ASA 3 - Patient with moderate systemic disease with functional limitations    Mallampati Class:  Class I: Soft palate, uvula, fauces, pillars visible  __________  Class II: Soft palate, uvula, fauces visible  ____X_____   Class III: Soft palate, base of uvula visible  __________  Class IV: Hard palate only visible   __________        ASSESSMENT AND PLAN:    1. Patient is a 81 y.o. female here for flexible sigmoidoscopy with anesthesia  2. Procedure options, risks and benefits reviewed with patient. Patient expresses understanding.      Reggie Winchester MD  [address]  9/10/2019

## 2019-09-10 NOTE — PROGRESS NOTES
Pt's bp checked sitting up on side of bed. 149/72. No orthostatic hypotension. Dr Brittany montana'ed for discharge. Moved to phase 2.

## 2019-09-13 ENCOUNTER — TELEPHONE (OUTPATIENT)
Dept: ENDOCRINOLOGY | Age: 69
End: 2019-09-13

## 2019-09-16 ENCOUNTER — TELEPHONE (OUTPATIENT)
Dept: CARDIOLOGY CLINIC | Age: 69
End: 2019-09-16

## 2019-09-19 ENCOUNTER — HOSPITAL ENCOUNTER (OUTPATIENT)
Dept: CARDIAC CATH/INVASIVE PROCEDURES | Age: 69
Discharge: HOME | End: 2019-09-19
Attending: INTERNAL MEDICINE | Admitting: INTERNAL MEDICINE
Payer: MEDICARE

## 2019-09-19 ENCOUNTER — TELEPHONE (OUTPATIENT)
Dept: CARDIOLOGY CLINIC | Age: 69
End: 2019-09-19

## 2019-09-19 VITALS
HEART RATE: 48 BPM | DIASTOLIC BLOOD PRESSURE: 77 MMHG | TEMPERATURE: 97.6 F | RESPIRATION RATE: 16 BRPM | SYSTOLIC BLOOD PRESSURE: 134 MMHG

## 2019-09-19 LAB
GFR AFRICAN AMERICAN: >60
GFR NON-AFRICAN AMERICAN: 55
GLUCOSE BLD-MCNC: 93 MG/DL (ref 70–99)
PERFORMED ON: ABNORMAL
POC BUN: 15 MG/DL (ref 7–18)
POC CREATININE: 1 MG/DL (ref 0.6–1.2)
POC HEMATOCRIT: 37 % (ref 36–48)
POC POTASSIUM: 3.2 MMOL/L (ref 3.5–5.1)
POC SAMPLE TYPE: ABNORMAL
POC SODIUM: 142 MMOL/L (ref 136–145)

## 2019-09-19 PROCEDURE — 84295 ASSAY OF SERUM SODIUM: CPT

## 2019-09-19 PROCEDURE — 82947 ASSAY GLUCOSE BLOOD QUANT: CPT

## 2019-09-19 PROCEDURE — 84132 ASSAY OF SERUM POTASSIUM: CPT

## 2019-09-19 PROCEDURE — 93005 ELECTROCARDIOGRAM TRACING: CPT | Performed by: INTERNAL MEDICINE

## 2019-09-19 PROCEDURE — 2500000003 HC RX 250 WO HCPCS

## 2019-09-19 PROCEDURE — 85014 HEMATOCRIT: CPT

## 2019-09-19 PROCEDURE — 84520 ASSAY OF UREA NITROGEN: CPT

## 2019-09-19 PROCEDURE — 92960 CARDIOVERSION ELECTRIC EXT: CPT

## 2019-09-19 PROCEDURE — 92960 CARDIOVERSION ELECTRIC EXT: CPT | Performed by: INTERNAL MEDICINE

## 2019-09-19 PROCEDURE — 82565 ASSAY OF CREATININE: CPT

## 2019-09-19 PROCEDURE — 7100000010 HC PHASE II RECOVERY - FIRST 15 MIN

## 2019-09-20 ENCOUNTER — TELEPHONE (OUTPATIENT)
Dept: INTERNAL MEDICINE CLINIC | Age: 69
End: 2019-09-20

## 2019-09-20 LAB
EKG ATRIAL RATE: 47 BPM
EKG ATRIAL RATE: 79 BPM
EKG DIAGNOSIS: NORMAL
EKG DIAGNOSIS: NORMAL
EKG P AXIS: 62 DEGREES
EKG P-R INTERVAL: 226 MS
EKG Q-T INTERVAL: 514 MS
EKG Q-T INTERVAL: 536 MS
EKG QRS DURATION: 110 MS
EKG QRS DURATION: 110 MS
EKG QTC CALCULATION (BAZETT): 434 MS
EKG QTC CALCULATION (BAZETT): 474 MS
EKG R AXIS: -27 DEGREES
EKG R AXIS: -28 DEGREES
EKG T AXIS: -12 DEGREES
EKG T AXIS: 18 DEGREES
EKG VENTRICULAR RATE: 43 BPM
EKG VENTRICULAR RATE: 47 BPM

## 2019-09-20 PROCEDURE — 93010 ELECTROCARDIOGRAM REPORT: CPT | Performed by: INTERNAL MEDICINE

## 2019-09-20 RX ORDER — POTASSIUM CHLORIDE 20 MEQ/1
20 TABLET, EXTENDED RELEASE ORAL DAILY
Qty: 14 TABLET | Refills: 0 | Status: SHIPPED | OUTPATIENT
Start: 2019-09-20 | End: 2019-12-10

## 2019-09-23 DIAGNOSIS — I10 ESSENTIAL HYPERTENSION: ICD-10-CM

## 2019-09-23 DIAGNOSIS — I48.19 PERSISTENT ATRIAL FIBRILLATION (HCC): ICD-10-CM

## 2019-09-24 ENCOUNTER — OFFICE VISIT (OUTPATIENT)
Dept: ENDOCRINOLOGY | Age: 69
End: 2019-09-24
Payer: MEDICARE

## 2019-09-24 DIAGNOSIS — I48.19 PERSISTENT ATRIAL FIBRILLATION (HCC): ICD-10-CM

## 2019-09-24 DIAGNOSIS — I10 ESSENTIAL HYPERTENSION: ICD-10-CM

## 2019-09-24 DIAGNOSIS — C54.1 ENDOMETRIAL CANCER (HCC): ICD-10-CM

## 2019-09-24 DIAGNOSIS — E05.00 GRAVES DISEASE: Primary | ICD-10-CM

## 2019-09-24 DIAGNOSIS — G62.0 CHEMOTHERAPY-INDUCED NEUROPATHY (HCC): ICD-10-CM

## 2019-09-24 DIAGNOSIS — T45.1X5A CHEMOTHERAPY-INDUCED NEUROPATHY (HCC): ICD-10-CM

## 2019-09-24 PROBLEM — K52.0 RADIATION COLITIS: Status: ACTIVE | Noted: 2019-04-30

## 2019-09-24 PROCEDURE — 3017F COLORECTAL CA SCREEN DOC REV: CPT | Performed by: INTERNAL MEDICINE

## 2019-09-24 PROCEDURE — G8427 DOCREV CUR MEDS BY ELIG CLIN: HCPCS | Performed by: INTERNAL MEDICINE

## 2019-09-24 PROCEDURE — 99214 OFFICE O/P EST MOD 30 MIN: CPT | Performed by: INTERNAL MEDICINE

## 2019-09-24 PROCEDURE — 1036F TOBACCO NON-USER: CPT | Performed by: INTERNAL MEDICINE

## 2019-09-24 PROCEDURE — 1090F PRES/ABSN URINE INCON ASSESS: CPT | Performed by: INTERNAL MEDICINE

## 2019-09-24 PROCEDURE — G8400 PT W/DXA NO RESULTS DOC: HCPCS | Performed by: INTERNAL MEDICINE

## 2019-09-24 PROCEDURE — 4040F PNEUMOC VAC/ADMIN/RCVD: CPT | Performed by: INTERNAL MEDICINE

## 2019-09-24 PROCEDURE — G8417 CALC BMI ABV UP PARAM F/U: HCPCS | Performed by: INTERNAL MEDICINE

## 2019-09-24 PROCEDURE — 1123F ACP DISCUSS/DSCN MKR DOCD: CPT | Performed by: INTERNAL MEDICINE

## 2019-09-24 RX ORDER — METHIMAZOLE 5 MG/1
5 TABLET ORAL DAILY
Qty: 30 TABLET | Refills: 4 | Status: SHIPPED | OUTPATIENT
Start: 2019-09-24 | End: 2020-01-08 | Stop reason: SDUPTHER

## 2019-09-24 RX ORDER — METOPROLOL SUCCINATE 50 MG/1
TABLET, EXTENDED RELEASE ORAL
Qty: 90 TABLET | Refills: 4 | Status: SHIPPED | OUTPATIENT
Start: 2019-09-24 | End: 2019-12-10 | Stop reason: SDUPTHER

## 2019-09-24 NOTE — PROGRESS NOTES
SUBJECTIVE:  Angelina Rodriguez is a 81 y.o. female  seen in my clinic for Thyroid nodule  which was identified on a CT scan done in April 2018 as a workup for her ovarian cancer  . Patient Current complaints: denies fatigue, weight changes, heat/cold intolerance, bowel/skin changes or CVS symptoms  History of obstructive symptoms: difficulty swallowing No, changes in voice/hoarseness No.    History of radiation to patient's neck: NO  Recent iodine exposure: No  Family history includes no thyroid abnormalities.   Family history of thyroid cancer: No    Ovarian cancer is treated with surgery , chemo and RT ---she was diagnosed in jan 2018   Patient also has hypertension and is on Toprol and hydrochlorothiazide along with Lotrel  Patient also has atrial fibrillation and is on anticoagulation  Patient was noted to have significant hypothyroidism on her blood work in March of 2019 patient did not significantly complained of any symptoms at that time I did start her on Tapazole 10 milligrams daily she did not repeat her thyroid function test after starting Tapazole she is now advised to get them done now and she is given the standing order to get them done monthly    Past Medical History:   Diagnosis Date    Anal bleeding 2019    CT scan clear     Anemia     Atrial fibrillation ([facility])     cleared by cardiologist 2018, was related to cancer     Colon polyps     Diabetes mellitus ([facility])     pre diabetic diet controlled    Endometrial cancer ([facility])     Hypertension     Small bowel obstruction ([facility])     resolved without surgery    Thyroid nodule      Patient Active Problem List    Diagnosis Date Noted    Graves disease 05/07/2019    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    GIB (gastrointestinal bleeding) 03/26/2019    Persistent atrial fibrillation ([place]) 07/26/2018    Syncope 07/17/2018    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    Partial small bowel obstruction ([facility]) 06/08/2018    On antineoplastic chemotherapy ordered clinical lab results Yes  Reviewed and/or ordered radiology tests Yes   Reviewed and/or ordered other diagnostic tests Yes  Made a decision to obtain old records Yes  Reviewed and summarized old records Yes      Jess Orozco was counseled regarding symptoms of current diagnosis, course and complications of disease if inadequately treated, side effects of medications, diagnosis, treatment options, and prognosis, risks, benefits, complications, and alternatives of treatment, labs, imaging and other studies and treatment targets and goals. She understands instructions and counseling. I spent  25 + minutes with patient and more than 50 % of this time was spent discussing and providing counseling and coordinating care of patient's multiple health issues    Return in about 3 months (around 12/24/2019). Please note that some or all of this report was generated using voice recognition software. Please notify me in case of any questions about the content of this document, as some errors in transcription may have occurred .

## 2019-09-26 VITALS
OXYGEN SATURATION: 99 % | WEIGHT: 203 LBS | HEART RATE: 54 BPM | BODY MASS INDEX: 29.98 KG/M2 | DIASTOLIC BLOOD PRESSURE: 78 MMHG | SYSTOLIC BLOOD PRESSURE: 142 MMHG

## 2019-10-14 ENCOUNTER — CARE COORDINATION (OUTPATIENT)
Dept: CARE COORDINATION | Age: 69
End: 2019-10-14

## 2019-10-28 ENCOUNTER — CARE COORDINATION (OUTPATIENT)
Dept: CARE COORDINATION | Age: 69
End: 2019-10-28

## 2019-10-29 DIAGNOSIS — E05.00 GRAVES DISEASE: ICD-10-CM

## 2019-10-29 LAB
T3 TOTAL: 1.02 NG/ML (ref 0.8–2)
T4 FREE: 0.7 NG/DL (ref 0.9–1.8)
TSH SERPL DL<=0.05 MIU/L-ACNC: 4.12 UIU/ML (ref 0.27–4.2)

## 2019-11-19 ENCOUNTER — NURSE ONLY (OUTPATIENT)
Dept: INTERNAL MEDICINE CLINIC | Age: 69
End: 2019-11-19
Payer: MEDICARE

## 2019-11-19 DIAGNOSIS — Z23 NEED FOR INFLUENZA VACCINATION: Primary | ICD-10-CM

## 2019-11-19 PROCEDURE — G0008 ADMIN INFLUENZA VIRUS VAC: HCPCS | Performed by: INTERNAL MEDICINE

## 2019-11-19 PROCEDURE — 90653 IIV ADJUVANT VACCINE IM: CPT | Performed by: INTERNAL MEDICINE

## 2019-12-10 ENCOUNTER — OFFICE VISIT (OUTPATIENT)
Dept: CARDIOLOGY CLINIC | Age: 69
End: 2019-12-10
Payer: MEDICARE

## 2019-12-10 VITALS
HEIGHT: 69 IN | WEIGHT: 206 LBS | DIASTOLIC BLOOD PRESSURE: 76 MMHG | HEART RATE: 64 BPM | SYSTOLIC BLOOD PRESSURE: 140 MMHG | BODY MASS INDEX: 30.51 KG/M2

## 2019-12-10 DIAGNOSIS — I48.19 PERSISTENT ATRIAL FIBRILLATION (HCC): Primary | ICD-10-CM

## 2019-12-10 DIAGNOSIS — E05.00 GRAVES DISEASE: ICD-10-CM

## 2019-12-10 DIAGNOSIS — I10 ESSENTIAL HYPERTENSION: ICD-10-CM

## 2019-12-10 LAB
T3 TOTAL: 1.32 NG/ML (ref 0.8–2)
T4 FREE: 1.5 NG/DL (ref 0.9–1.8)
TSH SERPL DL<=0.05 MIU/L-ACNC: 0.02 UIU/ML (ref 0.27–4.2)

## 2019-12-10 PROCEDURE — 1090F PRES/ABSN URINE INCON ASSESS: CPT | Performed by: INTERNAL MEDICINE

## 2019-12-10 PROCEDURE — 93000 ELECTROCARDIOGRAM COMPLETE: CPT | Performed by: INTERNAL MEDICINE

## 2019-12-10 PROCEDURE — G8400 PT W/DXA NO RESULTS DOC: HCPCS | Performed by: INTERNAL MEDICINE

## 2019-12-10 PROCEDURE — 3017F COLORECTAL CA SCREEN DOC REV: CPT | Performed by: INTERNAL MEDICINE

## 2019-12-10 PROCEDURE — 4040F PNEUMOC VAC/ADMIN/RCVD: CPT | Performed by: INTERNAL MEDICINE

## 2019-12-10 PROCEDURE — 1036F TOBACCO NON-USER: CPT | Performed by: INTERNAL MEDICINE

## 2019-12-10 PROCEDURE — 99214 OFFICE O/P EST MOD 30 MIN: CPT | Performed by: INTERNAL MEDICINE

## 2019-12-10 PROCEDURE — G8427 DOCREV CUR MEDS BY ELIG CLIN: HCPCS | Performed by: INTERNAL MEDICINE

## 2019-12-10 PROCEDURE — 1123F ACP DISCUSS/DSCN MKR DOCD: CPT | Performed by: INTERNAL MEDICINE

## 2019-12-10 PROCEDURE — G8482 FLU IMMUNIZE ORDER/ADMIN: HCPCS | Performed by: INTERNAL MEDICINE

## 2019-12-10 PROCEDURE — G8417 CALC BMI ABV UP PARAM F/U: HCPCS | Performed by: INTERNAL MEDICINE

## 2020-01-07 ENCOUNTER — TELEPHONE (OUTPATIENT)
Dept: ENDOCRINOLOGY | Age: 70
End: 2020-01-07

## 2020-01-07 ENCOUNTER — OFFICE VISIT (OUTPATIENT)
Dept: INTERNAL MEDICINE CLINIC | Age: 70
End: 2020-01-07
Payer: MEDICARE

## 2020-01-07 VITALS
RESPIRATION RATE: 98 BRPM | BODY MASS INDEX: 30.96 KG/M2 | SYSTOLIC BLOOD PRESSURE: 148 MMHG | HEART RATE: 86 BPM | DIASTOLIC BLOOD PRESSURE: 84 MMHG | HEIGHT: 69 IN | WEIGHT: 209 LBS

## 2020-01-07 DIAGNOSIS — E05.00 GRAVES DISEASE: ICD-10-CM

## 2020-01-07 LAB
T3 TOTAL: 1.12 NG/ML (ref 0.8–2)
T4 FREE: 1.2 NG/DL (ref 0.9–1.8)
TSH SERPL DL<=0.05 MIU/L-ACNC: 0.11 UIU/ML (ref 0.27–4.2)

## 2020-01-07 PROCEDURE — G8482 FLU IMMUNIZE ORDER/ADMIN: HCPCS | Performed by: INTERNAL MEDICINE

## 2020-01-07 PROCEDURE — 3017F COLORECTAL CA SCREEN DOC REV: CPT | Performed by: INTERNAL MEDICINE

## 2020-01-07 PROCEDURE — G0438 PPPS, INITIAL VISIT: HCPCS | Performed by: INTERNAL MEDICINE

## 2020-01-07 PROCEDURE — 1123F ACP DISCUSS/DSCN MKR DOCD: CPT | Performed by: INTERNAL MEDICINE

## 2020-01-07 PROCEDURE — 4040F PNEUMOC VAC/ADMIN/RCVD: CPT | Performed by: INTERNAL MEDICINE

## 2020-01-07 SDOH — SOCIAL STABILITY: SOCIAL NETWORK: HOW OFTEN DO YOU ATTEND CHURCH OR RELIGIOUS SERVICES?: NEVER

## 2020-01-07 SDOH — SOCIAL STABILITY: SOCIAL INSECURITY: WITHIN THE LAST YEAR, HAVE YOU BEEN AFRAID OF YOUR PARTNER OR EX-PARTNER?: NO

## 2020-01-07 SDOH — HEALTH STABILITY: PHYSICAL HEALTH: ON AVERAGE, HOW MANY MINUTES DO YOU ENGAGE IN EXERCISE AT THIS LEVEL?: 30 MIN

## 2020-01-07 SDOH — SOCIAL STABILITY: SOCIAL NETWORK
IN A TYPICAL WEEK, HOW MANY TIMES DO YOU TALK ON THE PHONE WITH FAMILY, FRIENDS, OR NEIGHBORS?: MORE THAN THREE TIMES A WEEK

## 2020-01-07 SDOH — SOCIAL STABILITY: SOCIAL INSECURITY: WITHIN THE LAST YEAR, HAVE YOU BEEN HUMILIATED OR EMOTIONALLY ABUSED IN OTHER WAYS BY YOUR PARTNER OR EX-PARTNER?: NO

## 2020-01-07 SDOH — HEALTH STABILITY: PHYSICAL HEALTH: ON AVERAGE, HOW MANY DAYS PER WEEK DO YOU ENGAGE IN MODERATE TO STRENUOUS EXERCISE (LIKE A BRISK WALK)?: 4 DAYS

## 2020-01-07 SDOH — ECONOMIC STABILITY: TRANSPORTATION INSECURITY
IN THE PAST 12 MONTHS, HAS LACK OF TRANSPORTATION KEPT YOU FROM MEETINGS, WORK, OR FROM GETTING THINGS NEEDED FOR DAILY LIVING?: NO

## 2020-01-07 SDOH — SOCIAL STABILITY: SOCIAL NETWORK: HOW OFTEN DO YOU GET TOGETHER WITH FRIENDS OR RELATIVES?: MORE THAN THREE TIMES A WEEK

## 2020-01-07 SDOH — HEALTH STABILITY: MENTAL HEALTH
STRESS IS WHEN SOMEONE FEELS TENSE, NERVOUS, ANXIOUS, OR CAN'T SLEEP AT NIGHT BECAUSE THEIR MIND IS TROUBLED. HOW STRESSED ARE YOU?: ONLY A LITTLE

## 2020-01-07 SDOH — ECONOMIC STABILITY: FOOD INSECURITY: WITHIN THE PAST 12 MONTHS, YOU WORRIED THAT YOUR FOOD WOULD RUN OUT BEFORE YOU GOT MONEY TO BUY MORE.: NEVER TRUE

## 2020-01-07 SDOH — SOCIAL STABILITY: SOCIAL NETWORK
DO YOU BELONG TO ANY CLUBS OR ORGANIZATIONS SUCH AS CHURCH GROUPS UNIONS, FRATERNAL OR ATHLETIC GROUPS, OR SCHOOL GROUPS?: NO

## 2020-01-07 SDOH — SOCIAL STABILITY: SOCIAL INSECURITY
WITHIN THE LAST YEAR, HAVE TO BEEN RAPED OR FORCED TO HAVE ANY KIND OF SEXUAL ACTIVITY BY YOUR PARTNER OR EX-PARTNER?: NO

## 2020-01-07 SDOH — ECONOMIC STABILITY: TRANSPORTATION INSECURITY
IN THE PAST 12 MONTHS, HAS THE LACK OF TRANSPORTATION KEPT YOU FROM MEDICAL APPOINTMENTS OR FROM GETTING MEDICATIONS?: NO

## 2020-01-07 SDOH — SOCIAL STABILITY: SOCIAL NETWORK: ARE YOU MARRIED, WIDOWED, DIVORCED, SEPARATED, NEVER MARRIED, OR LIVING WITH A PARTNER?: NEVER MARRIED

## 2020-01-07 SDOH — SOCIAL STABILITY: SOCIAL INSECURITY
WITHIN THE LAST YEAR, HAVE YOU BEEN KICKED, HIT, SLAPPED, OR OTHERWISE PHYSICALLY HURT BY YOUR PARTNER OR EX-PARTNER?: NO

## 2020-01-07 SDOH — ECONOMIC STABILITY: FOOD INSECURITY: WITHIN THE PAST 12 MONTHS, THE FOOD YOU BOUGHT JUST DIDN'T LAST AND YOU DIDN'T HAVE MONEY TO GET MORE.: NEVER TRUE

## 2020-01-07 SDOH — ECONOMIC STABILITY: INCOME INSECURITY: HOW HARD IS IT FOR YOU TO PAY FOR THE VERY BASICS LIKE FOOD, HOUSING, MEDICAL CARE, AND HEATING?: NOT VERY HARD

## 2020-01-07 SDOH — SOCIAL STABILITY: SOCIAL NETWORK: HOW OFTEN DO YOU ATTENT MEETINGS OF THE CLUB OR ORGANIZATION YOU BELONG TO?: NEVER

## 2020-01-07 ASSESSMENT — PATIENT HEALTH QUESTIONNAIRE - PHQ9
SUM OF ALL RESPONSES TO PHQ QUESTIONS 1-9: 0
SUM OF ALL RESPONSES TO PHQ QUESTIONS 1-9: 0

## 2020-01-07 ASSESSMENT — LIFESTYLE VARIABLES: HOW OFTEN DO YOU HAVE A DRINK CONTAINING ALCOHOL: 0

## 2020-01-07 NOTE — PROGRESS NOTES
Medicare Annual Wellness Visit  Name: Aryan Holden Date: 2020   MRN: [id number] Sex: Female   Age: 81 y.o. Ethnicity: Non-/Non    : 1950 Race: Julio Bates is here for Medicare AWV (wanting to discuss mood )    Screenings for behavioral, psychosocial and functional/safety risks, and cognitive dysfunction are all negative except as indicated below. These results, as well as other patient data from the [address] form, are documented in Flowsheets linked to this Encounter. Allergies   Allergen Reactions    Naproxen Swelling    Lisinopril Rash       Prior to Visit Medications    Medication Sig Taking?  Authorizing Provider   rivaroxaban (XARELTO) 20 MG TABS tablet Take 1 tablet by mouth daily (with breakfast) Yes Compa Rodas MD   methIMAzole (TAPAZOLE) 5 MG tablet Take 1 tablet by mouth daily  Patient taking differently: Take 2.5 mg by mouth every other day Indications: 2.5mg mg daily 19  Yes Brina [facility] MD   amLODIPine (NORVASC) 5 MG tablet Take 1 tablet by mouth daily Yes Tenzin Herrera MD   hydrochlorothiazide (HYDRODIURIL) 25 MG tablet TAKE ONE TABLET BY MOUTH DAILY Yes Tenzin Herrera MD   Multiple Vitamins-Minerals (THERAPEUTIC MULTIVITAMIN-MINERALS) tablet Take 1 tablet by mouth daily Yes Historical Provider, MD       Past Medical History:   Diagnosis Date    Anal bleeding     CT scan clear     Anemia     Atrial fibrillation ([address]     cleared by cardiologist , was related to cancer     Colon polyps     Diabetes mellitus ([address]     pre diabetic diet controlled    Endometrial cancer ([address]     Hypertension     Small bowel obstruction ([address]     resolved without surgery    Thyroid nodule        Past Surgical History:   Procedure Laterality Date    CARDIOVERSION      CARDIOVERSION  2019    COLONOSCOPY N/A 3/14/2019    COLONOSCOPY POLYPECTOMY SNARE/COLD BIOPSY performed by Arcenio Pride MD at [id number] [place] [address] you say your health is?: Good  In the past 7 days, have you experienced any of the following? New or Increased Pain, New or Increased Fatigue, Loneliness, Social Isolation, Stress or Anger?: (!) Loneliness, Stress  Do you get the social and emotional support that you need?: Yes  Do you have a Living Will?: (!) No  General Health Risk Interventions:  · Fatigue: regular exercise recommended- 3-5 times per week, 30-45 minutes per session  · Loneliness: patient declines any further intervention for this issue  · No Living Will: provided the state-specific advance directive document to the patient    Health Habits/Nutrition:  Health Habits/Nutrition  Do you exercise for at least 20 minutes 2-3 times per week?: (!) No  Have you lost any weight without trying in the past 3 months?: No  Do you eat fewer than 2 meals per day?: No  Have you seen a dentist within the past year?: (!) No  Body mass index is 30.86 kg/m².   Health Habits/Nutrition Interventions:  · Inadequate physical activity:  patient agrees to exercise for at least 150 minutes/week    Personalized Preventive Plan   Current Health Maintenance Status  Immunization History   Administered Date(s) Administered    Influenza Vaccine, unspecified formulation 10/30/2014, 10/22/2015, 12/01/2016    Influenza, High Dose (Fluzone 65 yrs and older) 09/23/2017, 09/27/2018    Influenza, Triv, inactivated, subunit, adjuvanted, IM (Fluad 65 yrs and older) 11/19/2019    Pneumococcal Conjugate 13-valent ([id number]) 02/05/2018    Pneumococcal Polysaccharide ([id number]) 05/14/2019    Tdap (Boostrix, Adacel) 02/05/2008, 07/18/2017    Zoster Live (Zostavax) 10/30/2014        Health Maintenance   Topic Date Due    Hepatitis C screen  1950    Lipid screen  09/08/1992    Shingles Vaccine (2 of 3) 12/25/2014    DEXA (modify frequency per FRAX score)  09/08/2017    Annual Wellness Visit (AWV)  05/29/2019    A1C test (Diabetic or Prediabetic)  09/26/2019    Potassium

## 2020-01-07 NOTE — TELEPHONE ENCOUNTER
Psychiatric Progress Note





- Psychiatric Progress Note


Patient seen today, length of contact: chart review case discussed with team


Patient Chief Complaint: 





pt. is reported to have been exhibiting increased energy, walking running about 

unit, talking fast at times, being intrusive requiring redirection, pt received 

prns today. pt has submitted 48 hour notice which will end tomorrow early 

afternoon and defers recinding at the time of this writing. verbally agreeable 

to have medications adjusted. 


Problems Identified/Issues Discussed: 





lability in mood


decreased insight and judgment





Medical Problems: 


per chart


Diagnostic Results: 





per psychiatry


per medicine


per nursing


per social work





DSM 5 Symptoms Update: 





continues to be labile requiring redirection and the receipt of prns today per 

nursing staff


Medication Change: Yes ( am seroquel 25mg w/125mg divalproic increase pm dose 

seroquel to 100mg  )


Medical Record Reviewed: Yes


Consults ordered or reviewed: 





pt being followed by hospitalist 





Mental Status Examination





- Cognitive Function


Orientation: Person, Place, Situation


Attention: Poor


Concentration: Poor


Association: Loose


Fund of Knowledge: WNL


Decription of patient's judgement and insights: 





impaired





- Mood


Additional comments: 





labile





- Affect


Additional comments: 





labile irritable at times





- Speech


Speech: Pressured





- Formal Thought Process


Formal Thought Process: Loosening of associations, Flight of ideas





- Homicidal Ideation


Homicidal Ideation: No





Goal/Treatment Plan





- Goal/Treatment Plan


Need for Continued Stay: Remain at risks for inpatient hospitalization, Failed 

transitioning


Progress Toward Problem(s) and Goals/Treatment Plan: 





inpt milieu


vital signs and clinical observation per protocol and per status


will start seroquel 25mg po am with divalproic acid 125mg and increase pm 

seroquel to 100mg 


obtain divalproic acid level in am [id number] with [facility]fts     


pt defers recinding 48 hour notice:pt submitted a 48 hr notice-was explained to 

patient in both english and [place]ish the reason , purpose-up until 48 hours to 

make decision (within and by end of 48 hours) including possible calling of 

[facility] screeners for possible involuntary admission pt verbalized understanding 

and submitted signed form and was witnessed by this writer


Estimated Date of D/C: 08/31/17 Pt calling to request refills on Xarelto. Pt received a refill on 12/19 qty 90 and was told to increase dose from QD to BID. Pt needs a refill on the medication. Medication Refill    Medication needing refilled:rivaroxaban (Tam Drone)        Doseage of the medication:20 mg    How are you taking this medication (QD, BID, TID, QID, PRN): BID    30 or 90 day supply called in: 80    Which Pharmacy are we sending the medication to?:[facility], [address] [place] [address] [phone]      Medication Question/Concern    What is the name of the medication you need to speak with someone about? Doseage of the medication:    How are you taking this medication:    What issues/concerns are you having with this medication:      Medication Samples    Medication:    Doseage of the medication:    How are you taking this medication (QD, BID, TID, QID, PRN):     in the office or Mail to your home?

## 2020-01-08 RX ORDER — METHIMAZOLE 5 MG/1
TABLET ORAL
Qty: 45 TABLET | Refills: 3 | Status: SHIPPED | OUTPATIENT
Start: 2020-01-08 | End: 2020-06-16 | Stop reason: SDUPTHER

## 2020-02-11 DIAGNOSIS — E05.00 GRAVES DISEASE: ICD-10-CM

## 2020-02-11 DIAGNOSIS — Z00.00 ROUTINE GENERAL MEDICAL EXAMINATION AT A HEALTH CARE FACILITY: ICD-10-CM

## 2020-02-11 LAB
A/G RATIO: 1.6 (ref 1.1–2.2)
ALBUMIN SERPL-MCNC: 4.7 G/DL (ref 3.4–5)
ALP BLD-CCNC: 118 U/L (ref 40–129)
ALT SERPL-CCNC: 20 U/L (ref 10–40)
ANION GAP SERPL CALCULATED.3IONS-SCNC: 11 MMOL/L (ref 3–16)
AST SERPL-CCNC: 26 U/L (ref 15–37)
BILIRUB SERPL-MCNC: 0.4 MG/DL (ref 0–1)
BUN BLDV-MCNC: 13 MG/DL (ref 7–20)
CALCIUM SERPL-MCNC: 9.7 MG/DL (ref 8.3–10.6)
CHLORIDE BLD-SCNC: 97 MMOL/L (ref 99–110)
CHOLESTEROL, TOTAL: 187 MG/DL (ref 0–199)
CO2: 30 MMOL/L (ref 21–32)
CREAT SERPL-MCNC: 0.8 MG/DL (ref 0.6–1.2)
GFR AFRICAN AMERICAN: >60
GFR NON-AFRICAN AMERICAN: >60
GLOBULIN: 3 G/DL
GLUCOSE BLD-MCNC: 92 MG/DL (ref 70–99)
HDLC SERPL-MCNC: 87 MG/DL (ref 40–60)
LDL CHOLESTEROL CALCULATED: 91 MG/DL
POTASSIUM SERPL-SCNC: 3.8 MMOL/L (ref 3.5–5.1)
SODIUM BLD-SCNC: 138 MMOL/L (ref 136–145)
T3 TOTAL: 1.3 NG/ML (ref 0.8–2)
T4 FREE: 1.2 NG/DL (ref 0.9–1.8)
TOTAL PROTEIN: 7.7 G/DL (ref 6.4–8.2)
TRIGL SERPL-MCNC: 46 MG/DL (ref 0–150)
TSH SERPL DL<=0.05 MIU/L-ACNC: 0.29 UIU/ML (ref 0.27–4.2)
VLDLC SERPL CALC-MCNC: 9 MG/DL

## 2020-02-12 LAB
ESTIMATED AVERAGE GLUCOSE: 99.7 MG/DL
HBA1C MFR BLD: 5.1 %

## 2020-02-25 ENCOUNTER — OFFICE VISIT (OUTPATIENT)
Dept: ENDOCRINOLOGY | Age: 70
End: 2020-02-25
Payer: MEDICARE

## 2020-02-25 VITALS
BODY MASS INDEX: 31.55 KG/M2 | DIASTOLIC BLOOD PRESSURE: 80 MMHG | SYSTOLIC BLOOD PRESSURE: 130 MMHG | RESPIRATION RATE: 14 BRPM | HEIGHT: 69 IN | HEART RATE: 61 BPM | WEIGHT: 213 LBS | OXYGEN SATURATION: 97 %

## 2020-02-25 PROCEDURE — G8427 DOCREV CUR MEDS BY ELIG CLIN: HCPCS | Performed by: INTERNAL MEDICINE

## 2020-02-25 PROCEDURE — 1123F ACP DISCUSS/DSCN MKR DOCD: CPT | Performed by: INTERNAL MEDICINE

## 2020-02-25 PROCEDURE — 4040F PNEUMOC VAC/ADMIN/RCVD: CPT | Performed by: INTERNAL MEDICINE

## 2020-02-25 PROCEDURE — G8400 PT W/DXA NO RESULTS DOC: HCPCS | Performed by: INTERNAL MEDICINE

## 2020-02-25 PROCEDURE — 3017F COLORECTAL CA SCREEN DOC REV: CPT | Performed by: INTERNAL MEDICINE

## 2020-02-25 PROCEDURE — 1036F TOBACCO NON-USER: CPT | Performed by: INTERNAL MEDICINE

## 2020-02-25 PROCEDURE — G8417 CALC BMI ABV UP PARAM F/U: HCPCS | Performed by: INTERNAL MEDICINE

## 2020-02-25 PROCEDURE — 99214 OFFICE O/P EST MOD 30 MIN: CPT | Performed by: INTERNAL MEDICINE

## 2020-02-25 PROCEDURE — 1090F PRES/ABSN URINE INCON ASSESS: CPT | Performed by: INTERNAL MEDICINE

## 2020-02-25 PROCEDURE — G8482 FLU IMMUNIZE ORDER/ADMIN: HCPCS | Performed by: INTERNAL MEDICINE

## 2020-02-25 NOTE — PROGRESS NOTES
SUBJECTIVE:  Dorian Freeman is a 81 y.o. female  seen in my clinic for Graves' disease and multinodular goiter  Patient was initially referred for thyroid nodule  which was identified on a CT scan done in April 2018 as a workup for her ovarian cancer  . Patient Current complaints: denies fatigue, weight changes, heat/cold intolerance, bowel/skin changes or CVS symptoms  History of obstructive symptoms: difficulty swallowing No, changes in voice/hoarseness No.    History of radiation to patient's neck: NO  Recent iodine exposure: No  Family history includes no thyroid abnormalities.   Family history of thyroid cancer: No    Ovarian cancer is treated with surgery , chemo and RT ---she was diagnosed in jan 2018   Patient also has hypertension and is on Toprol and hydrochlorothiazide along with Lotrel  Patient also has atrial fibrillation and is on anticoagulation    INTERIM   She had cardiac ablation done , she is on Xeralto   She has been taking 2.5 mg Tapazole daily she denies any significant palpitation, heat intolerance or insomnia    Past Medical History:   Diagnosis Date    Anal bleeding 2019    CT scan clear     Anemia     Atrial fibrillation ([address]     cleared by cardiologist 2018, was related to cancer     Colon polyps     Diabetes mellitus ([address]     pre diabetic diet controlled    Endometrial cancer ([address]     Hypertension     Small bowel obstruction ([address]     resolved without surgery    Thyroid nodule      Patient Active Problem List    Diagnosis Date Noted    Graves disease 05/07/2019    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    GIB (gastrointestinal bleeding) 03/26/2019    Persistent atrial fibrillation 07/26/2018    Syncope 07/17/2018    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[address] 06/19/2018    Partial small bowel obstruction ([address] 06/08/2018    On antineoplastic chemotherapy 06/07/2018    Abdominal pain, generalized 06/07/2018    Endometrial cancer ([address] 04/03/2018    Alkaline phosphatase elevation organization: No     Attends meetings of clubs or organizations: Never     Relationship status: Never     Intimate partner violence:     Fear of current or ex partner: No     Emotionally abused: No     Physically abused: No     Forced sexual activity: No   Other Topics Concern    None   Social History Narrative    Lives home with her 3 children. Doing well 8 grandchildren. Current Outpatient Medications   Medication Sig Dispense Refill    methIMAzole (TAPAZOLE) 5 MG tablet Take 0.5 tab PO daily. 45 tablet 3    rivaroxaban (XARELTO) 20 MG TABS tablet Take 1 tablet by mouth daily (with breakfast) 90 tablet 3    amLODIPine (NORVASC) 5 MG tablet Take 1 tablet by mouth daily 90 tablet 1    hydrochlorothiazide (HYDRODIURIL) 25 MG tablet TAKE ONE TABLET BY MOUTH DAILY 90 tablet 2    Multiple Vitamins-Minerals (THERAPEUTIC MULTIVITAMIN-MINERALS) tablet Take 1 tablet by mouth daily       No current facility-administered medications for this visit. Allergies   Allergen Reactions    Naproxen Swelling    Lisinopril Rash         Review of Systems:  I have reviewed the review of system questionnaire filled by the patient .   Patient was advised to contact PCP for non endocrine signs and symptoms       OBJECTIVE:   /80   Pulse 61   Resp 14   Ht 5' 9\" (1.753 m)   Wt 213 lb (96.6 kg)   LMP  (Exact Date)   SpO2 97%   BMI 31.45 kg/m²   Wt Readings from Last 3 Encounters:   02/25/20 213 lb (96.6 kg)   01/07/20 209 lb (94.8 kg)   12/10/19 206 lb (93.4 kg)       Physical Exam:  Constitutional: no acute distress, well appearing, well nourished  Psychiatric: oriented to person, place and time, judgement, insight and normal, recent and remote memory and intact and mood, affect are normal  Skin: skin and subcutaneous tissue is normal without mass,   Head and Face: examination of head and face revealed no abnormalities  Eyes: no lid or conjunctival swelling, no erythema or discharge, pupils are normal,

## 2020-02-26 NOTE — PROGRESS NOTES
CARDIOVERSION  09/19/2019    COLONOSCOPY N/A 3/14/2019    COLONOSCOPY POLYPECTOMY SNARE/COLD BIOPSY performed by Karen Jha MD at [address] [facility]  3/14/2019    COLONOSCOPY CONTROL HEMORRHAGE performed by Karen Jha MD at [address] [facility] N/A 3/26/2019    COLONOSCOPY CONTROL HEMORRHAGE performed by Karen Jha MD at [facility] N/A 9/10/2019    SIGMOIDOSCOPY DIAGNOSTIC FLEXIBLE performed by Karen Jha MD at [facility]      TUNNELED CENTRAL VENOUS CATHETER W/ SUBCUTANEOUS PORT Right      Allergies   Allergen Reactions    Naproxen Swelling    Lisinopril Rash       Social History:   reports that she has never smoked. She has never used smokeless tobacco. She reports current alcohol use. She reports that she does not use drugs. Family History:  family history includes Colon Cancer in her maternal grandmother; Hypertension in her mother. Review of System:  All other systems reviewed except for that noted above. Pertinent negatives and positives are:      General: negative for fever, chills   Ophthalmic ROS: negative for - eye pain or loss of vision  ENT ROS: negative for - headaches, sore throat   Respiratory: negative for - cough, sputum  Cardiovascular: Reviewed in HPI  Gastrointestinal: negative for - abdominal pain, diarrhea, N/V  Hematology: negative for - bleeding, blood clots, bruising or jaundice  Genito-Urinary:  negative for - Dysuria or incontinence  Musculoskeletal: negative for - Joint swelling, muscle pain  Neurological: negative for - confusion, dizziness, headaches   Psychiatric: No anxiety, no depression.   Dermatological: negative for - rash    Physical Examination:  Vitals:    03/03/20 1344   BP: (!) 156/84   Pulse: 60   Resp:       Wt Readings from Last 3 Encounters:   03/03/20 215 lb (97.5 kg)   02/25/20 213 lb (96.6 kg)   01/07/20 209 lb (94.8 kg) (THERAPEUTIC MULTIVITAMIN-MINERALS) tablet Take 1 tablet by mouth daily       No current facility-administered medications for this visit. Assessment and plan:     - Paroxysmal atrial fibrillation    S/p DCCV on 9/21/2019    2 DCCVs, last 9/2017 with recurrence. On metoprolol 50 mg daily for HR control     I discussed treatment options for atrial fibrillation today which includes ablation of atrial fibrillation. She denies having any palpitation or symptoms related to her atrial fibrillation. Feeling good. If she has recurrence of atrial fibrillation, will consider ablation. Xarelto 20 mg daily, Toloerating well without issues of bleeding   Also discussed watchman if she continues to have issues with bleeding    - HTN:       Vitals:    03/03/20 1344   BP: (!) 156/84   Pulse: 60   Resp:        Home BP monitoring encourage with a BP goal <130/80   Elevated today will increase Amlodipine to 10 mg daily     - Uterine cancer: In remission for the past 6 months   S/p chemo and radiation therapy. - GI bleeding, radiation proctitis: Follow up with GI. Healed per last exam with  9/2019   Can continue with anticoagulation. If recurrent episodes, she is a candidate for Watchman procedure. 6 month with NPAL    Thank you for allowing me to participate in the care of [facility]. Further evaluation will be based upon the patient's clinical course and testing results. All questions and concerns were addressed to the patient/family. Alternatives to my treatment were discussed. I have discussed the above stated plan and the patient verbalized understanding and agreed with the plan. NOTE: This report was transcribed using voice recognition software. Every effort was made to ensure accuracy, however, inadvertent computerized transcription errors may be present. Hemanth Rodriguez MD, MPH  [place] [address]   Office: [phone]       Scribe attestation:  This note was scribed in

## 2020-03-03 ENCOUNTER — OFFICE VISIT (OUTPATIENT)
Dept: CARDIOLOGY CLINIC | Age: 70
End: 2020-03-03
Payer: MEDICARE

## 2020-03-03 VITALS
HEIGHT: 69 IN | DIASTOLIC BLOOD PRESSURE: 84 MMHG | BODY MASS INDEX: 31.84 KG/M2 | SYSTOLIC BLOOD PRESSURE: 156 MMHG | WEIGHT: 215 LBS | HEART RATE: 60 BPM | RESPIRATION RATE: 18 BRPM

## 2020-03-03 PROCEDURE — G8400 PT W/DXA NO RESULTS DOC: HCPCS | Performed by: INTERNAL MEDICINE

## 2020-03-03 PROCEDURE — G8417 CALC BMI ABV UP PARAM F/U: HCPCS | Performed by: INTERNAL MEDICINE

## 2020-03-03 PROCEDURE — 99214 OFFICE O/P EST MOD 30 MIN: CPT | Performed by: INTERNAL MEDICINE

## 2020-03-03 PROCEDURE — 1036F TOBACCO NON-USER: CPT | Performed by: INTERNAL MEDICINE

## 2020-03-03 PROCEDURE — 93000 ELECTROCARDIOGRAM COMPLETE: CPT | Performed by: INTERNAL MEDICINE

## 2020-03-03 PROCEDURE — 1123F ACP DISCUSS/DSCN MKR DOCD: CPT | Performed by: INTERNAL MEDICINE

## 2020-03-03 PROCEDURE — 4040F PNEUMOC VAC/ADMIN/RCVD: CPT | Performed by: INTERNAL MEDICINE

## 2020-03-03 PROCEDURE — G8482 FLU IMMUNIZE ORDER/ADMIN: HCPCS | Performed by: INTERNAL MEDICINE

## 2020-03-03 PROCEDURE — 1090F PRES/ABSN URINE INCON ASSESS: CPT | Performed by: INTERNAL MEDICINE

## 2020-03-03 PROCEDURE — 3017F COLORECTAL CA SCREEN DOC REV: CPT | Performed by: INTERNAL MEDICINE

## 2020-03-03 PROCEDURE — G8427 DOCREV CUR MEDS BY ELIG CLIN: HCPCS | Performed by: INTERNAL MEDICINE

## 2020-03-03 RX ORDER — AMLODIPINE BESYLATE 10 MG/1
10 TABLET ORAL DAILY
Qty: 90 TABLET | Refills: 3 | Status: SHIPPED | OUTPATIENT
Start: 2020-03-03 | End: 2020-07-07 | Stop reason: SDUPTHER

## 2020-05-01 RX ORDER — METOPROLOL SUCCINATE 50 MG/1
TABLET, EXTENDED RELEASE ORAL
Qty: 90 TABLET | Refills: 0 | Status: SHIPPED | OUTPATIENT
Start: 2020-05-01 | End: 2020-07-07 | Stop reason: SDUPTHER

## 2020-05-22 DIAGNOSIS — E05.00 GRAVES DISEASE: ICD-10-CM

## 2020-05-22 LAB
T3 TOTAL: 2.04 NG/ML (ref 0.8–2)
T4 FREE: 2.3 NG/DL (ref 0.9–1.8)
TSH SERPL DL<=0.05 MIU/L-ACNC: <0.01 UIU/ML (ref 0.27–4.2)

## 2020-06-16 RX ORDER — METHIMAZOLE 5 MG/1
TABLET ORAL
Qty: 30 TABLET | Refills: 3 | Status: SHIPPED | OUTPATIENT
Start: 2020-06-16 | End: 2020-06-29 | Stop reason: SDUPTHER

## 2020-06-16 NOTE — TELEPHONE ENCOUNTER
Pt called and states she needs refill for Methimazole. She didn't want to call pharmacy because of her dosage change. She states she takes 10mg. Looks like lab result note states 5mg which was a double dose of her original 2.5mg.  Send to [facility] on [address] [facility] when ready    LOV    2-25-20  FOV   8-25-20

## 2020-06-16 NOTE — TELEPHONE ENCOUNTER
Returned patient's call. Clarified that the patient is only taking 5 mg. Prescription sent to pharmacy.

## 2020-06-18 ENCOUNTER — HOSPITAL ENCOUNTER (OUTPATIENT)
Dept: ULTRASOUND IMAGING | Age: 70
Discharge: HOME | End: 2020-06-18
Payer: MEDICARE

## 2020-06-18 LAB
T3 TOTAL: 1.7 NG/ML (ref 0.8–2)
T4 FREE: 1.9 NG/DL (ref 0.9–1.8)
TSH SERPL DL<=0.05 MIU/L-ACNC: <0.01 UIU/ML (ref 0.27–4.2)

## 2020-06-18 PROCEDURE — 76536 US EXAM OF HEAD AND NECK: CPT

## 2020-06-18 PROCEDURE — 84443 ASSAY THYROID STIM HORMONE: CPT

## 2020-06-18 PROCEDURE — 84480 ASSAY TRIIODOTHYRONINE (T3): CPT

## 2020-06-18 PROCEDURE — 84439 ASSAY OF FREE THYROXINE: CPT

## 2020-06-18 PROCEDURE — 36415 COLL VENOUS BLD VENIPUNCTURE: CPT

## 2020-06-29 RX ORDER — METHIMAZOLE 10 MG/1
TABLET ORAL
Qty: 30 TABLET | Refills: 2 | Status: SHIPPED | OUTPATIENT
Start: 2020-06-29 | End: 2020-10-06

## 2020-06-29 NOTE — TELEPHONE ENCOUNTER
Patient aware of test results and to couble dose of Tapazole.  Patient aware to have labs redrawn before appt in August.

## 2020-07-07 ENCOUNTER — OFFICE VISIT (OUTPATIENT)
Dept: INTERNAL MEDICINE CLINIC | Age: 70
End: 2020-07-07
Payer: MEDICARE

## 2020-07-07 VITALS
WEIGHT: 219 LBS | BODY MASS INDEX: 32.34 KG/M2 | TEMPERATURE: 97.2 F | DIASTOLIC BLOOD PRESSURE: 72 MMHG | OXYGEN SATURATION: 98 % | RESPIRATION RATE: 16 BRPM | SYSTOLIC BLOOD PRESSURE: 124 MMHG | HEART RATE: 64 BPM

## 2020-07-07 PROBLEM — C56.9 MALIGNANT NEOPLASM OF OVARY (HCC): Status: ACTIVE | Noted: 2020-07-07

## 2020-07-07 PROCEDURE — G8427 DOCREV CUR MEDS BY ELIG CLIN: HCPCS | Performed by: INTERNAL MEDICINE

## 2020-07-07 PROCEDURE — 1123F ACP DISCUSS/DSCN MKR DOCD: CPT | Performed by: INTERNAL MEDICINE

## 2020-07-07 PROCEDURE — 4040F PNEUMOC VAC/ADMIN/RCVD: CPT | Performed by: INTERNAL MEDICINE

## 2020-07-07 PROCEDURE — 99214 OFFICE O/P EST MOD 30 MIN: CPT | Performed by: INTERNAL MEDICINE

## 2020-07-07 PROCEDURE — G8400 PT W/DXA NO RESULTS DOC: HCPCS | Performed by: INTERNAL MEDICINE

## 2020-07-07 PROCEDURE — 1090F PRES/ABSN URINE INCON ASSESS: CPT | Performed by: INTERNAL MEDICINE

## 2020-07-07 PROCEDURE — G8417 CALC BMI ABV UP PARAM F/U: HCPCS | Performed by: INTERNAL MEDICINE

## 2020-07-07 PROCEDURE — 3017F COLORECTAL CA SCREEN DOC REV: CPT | Performed by: INTERNAL MEDICINE

## 2020-07-07 PROCEDURE — 1036F TOBACCO NON-USER: CPT | Performed by: INTERNAL MEDICINE

## 2020-07-07 RX ORDER — AMLODIPINE BESYLATE 10 MG/1
10 TABLET ORAL DAILY
Qty: 90 TABLET | Refills: 1 | Status: SHIPPED | OUTPATIENT
Start: 2020-07-07 | End: 2021-05-03

## 2020-07-07 RX ORDER — METOPROLOL SUCCINATE 50 MG/1
TABLET, EXTENDED RELEASE ORAL
Qty: 90 TABLET | Refills: 1 | Status: SHIPPED | OUTPATIENT
Start: 2020-07-07 | End: 2021-03-18

## 2020-07-07 RX ORDER — HYDROCHLOROTHIAZIDE 25 MG/1
TABLET ORAL
Qty: 90 TABLET | Refills: 1 | Status: SHIPPED | OUTPATIENT
Start: 2020-07-07 | End: 2021-03-18

## 2020-07-07 ASSESSMENT — PATIENT HEALTH QUESTIONNAIRE - PHQ9
2. FEELING DOWN, DEPRESSED OR HOPELESS: 0
SUM OF ALL RESPONSES TO PHQ9 QUESTIONS 1 & 2: 0
SUM OF ALL RESPONSES TO PHQ QUESTIONS 1-9: 0
SUM OF ALL RESPONSES TO PHQ QUESTIONS 1-9: 0
1. LITTLE INTEREST OR PLEASURE IN DOING THINGS: 0

## 2020-07-07 ASSESSMENT — ENCOUNTER SYMPTOMS
RESPIRATORY NEGATIVE: 1
GASTROINTESTINAL NEGATIVE: 1
BLURRED VISION: 0
BACK PAIN: 1
ALLERGIC/IMMUNOLOGIC NEGATIVE: 1

## 2020-07-07 NOTE — PROGRESS NOTES
Subjective:      Patient ID: Beth Mcmahon is a 81 y.o. female. Hypertension   This is a chronic problem. The current episode started more than 1 year ago. The problem is unchanged. The problem is controlled. Pertinent negatives include no blurred vision or chest pain. Risk factors for coronary artery disease include obesity, sedentary lifestyle and post-menopausal state. Past treatments include calcium channel blockers, beta blockers and diuretics. The current treatment provides significant improvement. There are no compliance problems. There is no history of angina, kidney disease, CVA, heart failure, PVD or retinopathy. Knee Pain    The incident occurred more than 1 week ago. The incident occurred at home. Injury mechanism: strained on a swing. The pain is present in the left knee and right knee. The quality of the pain is described as aching. The pain is at a severity of 5/10. The pain is mild. The pain has been intermittent since onset. Associated symptoms include a loss of motion. Pertinent negatives include no inability to bear weight, loss of sensation, muscle weakness, numbness or tingling. Associated symptoms comments: Stiffness and pain. She reports no foreign bodies present. The symptoms are aggravated by weight bearing. She has tried immobilization, non-weight bearing and acetaminophen (icey hot) for the symptoms. The treatment provided mild relief. Review of Systems   Constitutional: Negative. HENT: Negative. Eyes: Negative for blurred vision. Respiratory: Negative. Cardiovascular: Negative. Negative for chest pain. Gastrointestinal: Negative. Endocrine: Negative. Musculoskeletal: Positive for arthralgias and back pain. Allergic/Immunologic: Negative. Neurological: Negative. Negative for tingling and numbness. Psychiatric/Behavioral: Negative. All other systems reviewed and are negative.         Allergies   Allergen Reactions    Naproxen Swelling    Lisinopril Rash       Current Outpatient Medications   Medication Sig Dispense Refill    methIMAzole (TAPAZOLE) 10 MG tablet Take one tab by mouth daily. 30 tablet 2    metoprolol succinate (TOPROL XL) 50 MG extended release tablet TAKE ONE TABLET BY MOUTH DAILY 90 tablet 0    amLODIPine (NORVASC) 10 MG tablet Take 1 tablet by mouth daily 90 tablet 3    rivaroxaban (XARELTO) 20 MG TABS tablet Take 1 tablet by mouth daily (with breakfast) 90 tablet 3    hydrochlorothiazide (HYDRODIURIL) 25 MG tablet TAKE ONE TABLET BY MOUTH DAILY 90 tablet 2    Multiple Vitamins-Minerals (THERAPEUTIC MULTIVITAMIN-MINERALS) tablet Take 1 tablet by mouth daily       No current facility-administered medications for this visit. Vitals:    07/07/20 0937   BP: 124/72   Pulse: 64   Resp: 16   Temp: 97.2 °F (36.2 °C)   TempSrc: Temporal   SpO2: 98%   Weight: 219 lb (99.3 kg)     Body mass index is 32.34 kg/m². Wt Readings from Last 3 Encounters:   07/07/20 219 lb (99.3 kg)   03/03/20 215 lb (97.5 kg)   02/25/20 213 lb (96.6 kg)     BP Readings from Last 3 Encounters:   07/07/20 124/72   03/03/20 (!) 156/84   02/25/20 130/80       Objective:   Physical Exam  Vitals signs and nursing note reviewed. Constitutional:       General: She is not in acute distress. Appearance: Normal appearance. She is well-developed. She is obese. She is not ill-appearing. HENT:      Head: Normocephalic and atraumatic. Right Ear: External ear normal.      Left Ear: External ear normal.   Eyes:      Conjunctiva/sclera: Conjunctivae normal.      Pupils: Pupils are equal, round, and reactive to light. Neck:      Musculoskeletal: Normal range of motion. Cardiovascular:      Rate and Rhythm: Normal rate. Rhythm regularly irregular. Pulses: Normal pulses. Heart sounds: Normal heart sounds. No murmur. No friction rub. Pulmonary:      Effort: Pulmonary effort is normal. No respiratory distress.       Breath sounds: Normal breath sounds. No stridor. No wheezing or rhonchi. Abdominal:      General: There is no distension. Tenderness: There is no abdominal tenderness. Hernia: No hernia is present. Musculoskeletal: Normal range of motion. General: No swelling, tenderness, deformity or signs of injury. Right knee: She exhibits normal range of motion and no swelling. Left knee: She exhibits normal range of motion and no swelling. Right ankle: Normal.      Left ankle: Normal.      Right upper leg: Normal.      Left upper leg: Normal.      Right lower leg: No edema. Left lower leg: No edema. Comments: Bilateral knee pain and crepitance   Skin:     General: Skin is warm. Capillary Refill: Capillary refill takes less than 2 seconds. Neurological:      General: No focal deficit present. Mental Status: She is alert and oriented to person, place, and time. Mental status is at baseline. Psychiatric:         Behavior: Behavior normal.         Thought Content: Thought content normal.         Judgment: Judgment normal.         Assessment/Plan:  Hilary Mansfield was seen today for pain and hypertension. Diagnoses and all orders for this visit:    Chronic pain of both knees  -     [facility]    Persistent atrial fibrillation  -     metoprolol succinate (TOPROL XL) 50 MG extended release tablet; TAKE ONE TABLET BY MOUTH DAILY  -     rivaroxaban (XARELTO) 20 MG TABS tablet; Take 1 tablet by mouth daily (with breakfast)    Essential hypertension  -     metoprolol succinate (TOPROL XL) 50 MG extended release tablet; TAKE ONE TABLET BY MOUTH DAILY  -     amLODIPine (NORVASC) 10 MG tablet; Take 1 tablet by mouth daily  -     hydroCHLOROthiazide (HYDRODIURIL) 25 MG tablet; TAKE ONE TABLET BY MOUTH DAILY    Malignant neoplasm of ovary, unspecified laterality ([address]  - followed by oncology    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[address]  - stable no worsening pain.         [address], MD

## 2020-07-16 ENCOUNTER — HOSPITAL ENCOUNTER (OUTPATIENT)
Age: 70
Discharge: HOME | End: 2020-07-16
Payer: MEDICARE

## 2020-07-16 LAB
T3 TOTAL: 1.73 NG/ML (ref 0.8–2)
T4 FREE: 1.5 NG/DL (ref 0.9–1.8)
TSH SERPL DL<=0.05 MIU/L-ACNC: <0.01 UIU/ML (ref 0.27–4.2)

## 2020-07-16 PROCEDURE — 84439 ASSAY OF FREE THYROXINE: CPT

## 2020-07-16 PROCEDURE — 36415 COLL VENOUS BLD VENIPUNCTURE: CPT

## 2020-07-16 PROCEDURE — 84443 ASSAY THYROID STIM HORMONE: CPT

## 2020-07-16 PROCEDURE — 84480 ASSAY TRIIODOTHYRONINE (T3): CPT

## 2020-08-11 ENCOUNTER — TELEPHONE (OUTPATIENT)
Dept: INTERNAL MEDICINE CLINIC | Age: 70
End: 2020-08-11

## 2020-08-11 NOTE — TELEPHONE ENCOUNTER
----- Message from Micah Franklin sent at 8/10/2020 12:51 PM EDT -----  Subject: Message to Provider    QUESTIONS  Information for Provider? PT KNEES ARE NOT GETTING BETTER   THEY'RE BOTH SWELLING (L KNEE WORSE). PT REQUESTS MEDICATION TO BE CALLED   INTO [facility] IN [place]. SHE ALSO REQUESTS A ORDER FOR A X-RAY.   ---------------------------------------------------------------------------  --------------  CALL BACK INFO  What is the best way for the office to contact you? OK to leave message on   voicemail  Preferred Call Back Phone Number? [phone]  ---------------------------------------------------------------------------  --------------  SCRIPT ANSWERS  Relationship to Patient?  Self

## 2020-08-18 ENCOUNTER — OFFICE VISIT (OUTPATIENT)
Dept: INTERNAL MEDICINE CLINIC | Age: 70
End: 2020-08-18
Payer: MEDICARE

## 2020-08-18 VITALS
DIASTOLIC BLOOD PRESSURE: 78 MMHG | SYSTOLIC BLOOD PRESSURE: 134 MMHG | BODY MASS INDEX: 32.25 KG/M2 | HEART RATE: 74 BPM | TEMPERATURE: 97.3 F | WEIGHT: 218.4 LBS | RESPIRATION RATE: 16 BRPM | OXYGEN SATURATION: 98 %

## 2020-08-18 PROCEDURE — 4040F PNEUMOC VAC/ADMIN/RCVD: CPT | Performed by: INTERNAL MEDICINE

## 2020-08-18 PROCEDURE — G8400 PT W/DXA NO RESULTS DOC: HCPCS | Performed by: INTERNAL MEDICINE

## 2020-08-18 PROCEDURE — 3017F COLORECTAL CA SCREEN DOC REV: CPT | Performed by: INTERNAL MEDICINE

## 2020-08-18 PROCEDURE — 99213 OFFICE O/P EST LOW 20 MIN: CPT | Performed by: INTERNAL MEDICINE

## 2020-08-18 PROCEDURE — G8417 CALC BMI ABV UP PARAM F/U: HCPCS | Performed by: INTERNAL MEDICINE

## 2020-08-18 PROCEDURE — 1123F ACP DISCUSS/DSCN MKR DOCD: CPT | Performed by: INTERNAL MEDICINE

## 2020-08-18 PROCEDURE — G8427 DOCREV CUR MEDS BY ELIG CLIN: HCPCS | Performed by: INTERNAL MEDICINE

## 2020-08-18 PROCEDURE — 1036F TOBACCO NON-USER: CPT | Performed by: INTERNAL MEDICINE

## 2020-08-18 PROCEDURE — 1090F PRES/ABSN URINE INCON ASSESS: CPT | Performed by: INTERNAL MEDICINE

## 2020-08-18 NOTE — PROGRESS NOTES
Subjective:      Patient ID: Suzy Mcfadden is a 81 y.o. female. HPI  Knee pain due to fall 3 week s ago. Still with pain and week knee. Reports knee gave out.      Past Medical History:   Diagnosis Date    Anal bleeding 2019    CT scan clear     Anemia     Atrial fibrillation ([address]     cleared by cardiologist 2018, was related to cancer     Colon polyps     Diabetes mellitus ([address]     pre diabetic diet controlled    Endometrial cancer ([address]     Hypertension     IFG (impaired fasting glucose)     Small bowel obstruction (HCC)     resolved without surgery    Thyroid nodule      Past Surgical History:   Procedure Laterality Date    CARDIOVERSION      CARDIOVERSION  09/19/2019    COLONOSCOPY N/A 3/14/2019    COLONOSCOPY POLYPECTOMY SNARE/COLD BIOPSY performed by Yamila Willis MD at [address]  3/14/2019    COLONOSCOPY CONTROL HEMORRHAGE performed by Yamila Willis MD at [address] [facility] COLONOSCOPY N/A 3/26/2019    COLONOSCOPY CONTROL HEMORRHAGE performed by Yamila Willis MD at [address] N/A 9/10/2019    [address] performed by Yamila Willis MD at [address]      TUBAL LIGATION      TUNNELED CENTRAL VENOUS CATHETER W/ SUBCUTANEOUS PORT Right      Family History   Problem Relation Age of Onset    Hypertension Mother     Colon Cancer Maternal Grandmother      Social History     Socioeconomic History    Marital status:      Spouse name: Not on file    Number of children: Not on file    Years of education: Not on file    Highest education level: Not on file   Occupational History    Occupation: cash checking   Social Needs    Financial resource strain: Not very hard    Food insecurity     Worry: Never true     Inability: Never true    Transportation needs     Medical: No     Non-medical: No   Tobacco Use    Smoking status: Never Smoker    Smokeless tobacco: Never Used Substance and Sexual Activity    Alcohol use: Yes     Comment: occ wine    Drug use: No    Sexual activity: Never   Lifestyle    Physical activity     Days per week: 4 days     Minutes per session: 30 min    Stress: Only a little   Relationships    Social connections     Talks on phone: More than three times a week     Gets together: More than three times a week     Attends Latter day service: Never     Active member of club or organization: No     Attends meetings of clubs or organizations: Never     Relationship status: Never     Intimate partner violence     Fear of current or ex partner: No     Emotionally abused: No     Physically abused: No     Forced sexual activity: No   Other Topics Concern    Not on file   Social History Narrative    Lives home with her 3 children. Doing well 8 grandchildren. Review of Systems   Musculoskeletal: Positive for arthralgias. @DOS@    Allergies   Allergen Reactions    Naproxen Swelling    Lisinopril Rash       Current Outpatient Medications   Medication Sig Dispense Refill    metoprolol succinate (TOPROL XL) 50 MG extended release tablet TAKE ONE TABLET BY MOUTH DAILY 90 tablet 1    amLODIPine (NORVASC) 10 MG tablet Take 1 tablet by mouth daily 90 tablet 1    rivaroxaban (XARELTO) 20 MG TABS tablet Take 1 tablet by mouth daily (with breakfast) 90 tablet 0    hydroCHLOROthiazide (HYDRODIURIL) 25 MG tablet TAKE ONE TABLET BY MOUTH DAILY 90 tablet 1    methIMAzole (TAPAZOLE) 10 MG tablet Take one tab by mouth daily. 30 tablet 2    Multiple Vitamins-Minerals (THERAPEUTIC MULTIVITAMIN-MINERALS) tablet Take 1 tablet by mouth daily       No current facility-administered medications for this visit. Vitals:    08/18/20 1602   BP: 134/78   Pulse: 74   Resp: 16   Temp: 97.3 °F (36.3 °C)   TempSrc: Temporal   SpO2: 98%   Weight: 218 lb 6.4 oz (99.1 kg)     Body mass index is 32.25 kg/m².      Wt Readings from Last 3 Encounters:   08/18/20 218 lb 6.4 oz (99.1 kg)   07/07/20 219 lb (99.3 kg)   03/03/20 215 lb (97.5 kg)     BP Readings from Last 3 Encounters:   08/18/20 134/78   07/07/20 124/72   03/03/20 (!) 156/84       Objective:   Physical Exam  Vitals signs and nursing note reviewed. Constitutional:       Appearance: Normal appearance. She is well-developed and normal weight. HENT:      Head: Normocephalic and atraumatic. Right Ear: External ear normal.      Left Ear: External ear normal.      Nose: Nose normal.      Mouth/Throat:      Mouth: Mucous membranes are dry. Eyes:      Conjunctiva/sclera: Conjunctivae normal.      Pupils: Pupils are equal, round, and reactive to light. Neck:      Musculoskeletal: Normal range of motion and neck supple. Thyroid: No thyromegaly. Cardiovascular:      Rate and Rhythm: Normal rate and regular rhythm. Heart sounds: Normal heart sounds. Pulmonary:      Effort: Pulmonary effort is normal.      Breath sounds: Normal breath sounds. Abdominal:      General: Bowel sounds are normal. There is no distension. Palpations: Abdomen is soft. Musculoskeletal: Normal range of motion. Right hip: Normal. She exhibits normal range of motion and normal strength. Left hip: Normal. She exhibits normal range of motion and normal strength. Right knee: She exhibits normal range of motion. Left knee: She exhibits normal range of motion. Cervical back: She exhibits deformity. Lumbar back: She exhibits no bony tenderness and no swelling. Comments: Bilateral knee pain   Skin:     General: Skin is warm. Neurological:      Mental Status: She is alert and oriented to person, place, and time. Cranial Nerves: No cranial nerve deficit. Coordination: Coordination normal.   Psychiatric:         Behavior: Behavior normal.         Thought Content:  Thought content normal.         Judgment: Judgment normal.         Assessment/Plan:  Jacinto Gutiérrez was seen today for pain.    Diagnoses and all orders for this visit:    Pain in both knees, unspecified chronicity  -     DEXA BONE DENSITY AXIAL SKELETON; Future  -     Betty Seymour MD, Orthopedic Surgery, [facility]  -     Encourage patient to go to PT    Acquired absence of other genital organ(s)   -     DEXA BONE DENSITY AXIAL SKELETON; Future      Mary Lou Summers MD

## 2020-08-18 NOTE — PATIENT INSTRUCTIONS
slowly. 4. Relax for up to 10 seconds between repetitions. 5. Repeat 8 to 12 times. 6. Switch legs and repeat steps 1 through 5, even if only one knee is sore. Active knee flexion   1. Lie on your stomach with your knees straight. If your kneecap is uncomfortable, roll up a washcloth and put it under your leg just above your kneecap. 2. Lift the foot of your affected leg by bending the knee so that you bring the foot up toward your buttock. If this motion hurts, try it without bending your knee quite as far. This may help you avoid any painful motion. 3. Slowly move your leg up and down. 4. Repeat 8 to 12 times. 5. Switch legs and repeat steps 1 through 4, even if only one knee is sore. Quadriceps stretch (facedown)   1. Lie flat on your stomach, and rest your face on the floor. 2. Wrap a towel or belt strap around the lower part of your affected leg. Then use the towel or belt strap to slowly pull your heel toward your buttock until you feel a stretch. 3. Hold for about 15 to 30 seconds, then relax your leg against the towel or belt strap. 4. Repeat 2 to 4 times. 5. Switch legs and repeat steps 1 through 4, even if only one knee is sore. Stationary exercise bike   1. If you do not have a stationary exercise bike at home, you can find one to ride at your local health club or community center. 2. Adjust the height of the bike seat so that your knee is slightly bent when your leg is extended downward. If your knee hurts when the pedal reaches the top, you can raise the seat so that your knee does not bend as much. 3. Start slowly. At first, try to do 5 to 10 minutes of cycling with little to no resistance. Then increase your time and the resistance bit by bit until you can do 20 to 30 minutes without pain. 4. If you start to have pain, rest your knee until your pain gets back to the level that is normal for you. Or cycle for less time or with less effort.     Follow-up care is a key part of your treatment and safety. Be sure to make and go to all appointments, and call your doctor if you are having problems. It's also a good idea to know your test results and keep a list of the medicines you take. Where can you learn more? Go to [email or web address] org and sign in to your [employer] account. Enter C159 in the [place] box to learn more about \"Knee Arthritis: Exercises. \"     If you do not have an account, please click on the \"Sign Up Now\" link. Current as of: March 2, 2020               Content Version: 12.5  © 1933-9107 Healthwise, Incorporated. Care instructions adapted under license by [facility] ([facility]). If you have questions about a medical condition or this instruction, always ask your healthcare professional. [address] any warranty or liability for your use of this information.

## 2020-08-25 ENCOUNTER — HOSPITAL ENCOUNTER (OUTPATIENT)
Dept: PHYSICAL THERAPY | Age: 70
Setting detail: THERAPIES SERIES
Discharge: HOME | End: 2020-08-25
Payer: MEDICARE

## 2020-08-25 ENCOUNTER — OFFICE VISIT (OUTPATIENT)
Dept: ENDOCRINOLOGY | Age: 70
End: 2020-08-25
Payer: MEDICARE

## 2020-08-25 VITALS
BODY MASS INDEX: 32.29 KG/M2 | DIASTOLIC BLOOD PRESSURE: 79 MMHG | HEART RATE: 69 BPM | HEIGHT: 69 IN | SYSTOLIC BLOOD PRESSURE: 151 MMHG | TEMPERATURE: 97.9 F | WEIGHT: 218 LBS | RESPIRATION RATE: 16 BRPM

## 2020-08-25 PROCEDURE — 97110 THERAPEUTIC EXERCISES: CPT

## 2020-08-25 PROCEDURE — 97530 THERAPEUTIC ACTIVITIES: CPT

## 2020-08-25 PROCEDURE — G8400 PT W/DXA NO RESULTS DOC: HCPCS | Performed by: INTERNAL MEDICINE

## 2020-08-25 PROCEDURE — 3017F COLORECTAL CA SCREEN DOC REV: CPT | Performed by: INTERNAL MEDICINE

## 2020-08-25 PROCEDURE — 4040F PNEUMOC VAC/ADMIN/RCVD: CPT | Performed by: INTERNAL MEDICINE

## 2020-08-25 PROCEDURE — 1090F PRES/ABSN URINE INCON ASSESS: CPT | Performed by: INTERNAL MEDICINE

## 2020-08-25 PROCEDURE — 1036F TOBACCO NON-USER: CPT | Performed by: INTERNAL MEDICINE

## 2020-08-25 PROCEDURE — G8417 CALC BMI ABV UP PARAM F/U: HCPCS | Performed by: INTERNAL MEDICINE

## 2020-08-25 PROCEDURE — 99213 OFFICE O/P EST LOW 20 MIN: CPT | Performed by: INTERNAL MEDICINE

## 2020-08-25 PROCEDURE — 97161 PT EVAL LOW COMPLEX 20 MIN: CPT

## 2020-08-25 PROCEDURE — 1123F ACP DISCUSS/DSCN MKR DOCD: CPT | Performed by: INTERNAL MEDICINE

## 2020-08-25 PROCEDURE — G8427 DOCREV CUR MEDS BY ELIG CLIN: HCPCS | Performed by: INTERNAL MEDICINE

## 2020-08-25 NOTE — PLAN OF CARE
[place], [facility], [address]  Phone: [phone]   Fax: [phone]                                                       Physical Therapy Certification    Dear Referring Practitioner: Dr. Steffi Luna,    We had the pleasure of evaluating the following patient for physical therapy services at [address] [place] [address]. A summary of our findings can be found in the initial assessment below. This includes our plan of care. If you have any questions or concerns regarding these findings, please do not hesitate to contact me at the office phone number checked above. Thank you for the referral.       Physician Signature:_______________________________Date:__________________  By signing above (or electronic signature), therapists plan is approved by physician      Patient: Lisa Salmon   : 1950   MRN: [id number]  Referring Physician: Referring Practitioner: Dr. Steffi Luna      Evaluation Date: 2020      Medical Diagnosis Information:  Diagnosis: M25.561, M25.562, G89.29 (ICD-10-CM) - Chronic pain of both knees   Treatment Diagnosis: Decreased Knee AROM, Decreased Strength, Decreased Balance/Proprioception                                         Insurance information: PT Insurance Information: [facility] Medicare (Med [employer]ec)     Precautions/ Contra-indications:   Latex Allergy:  [x]NO      []YES  Preferred Language for Healthcare:   [x]English       []other:    SUBJECTIVE: Patient stated complaint: pt presents with c/o chronic bilateral knee pain (L > R). She reports having a fall on or around 22 as a result of her left knee giving out on her. C/o left knee swelling. Difficulty walking, difficulty ascending/descending (currently non-reciprocally), difficulty with sit to stand transfers. Going to see knee doctor tomorrow.  States her Mid patella     Suprapatellar     Figure 8     Transmalleolar     Metatarsal Heads         Joint mobility:    []Normal    [x]Hypo   []Hyper    Orthopaedic Special Tests  Positive  Negative  NT Comments    Hip       MARIA ANTONIA / Baldemar's       FADIR       Scour       Trendelenburg              Knee       Lachman's / Anterior Drawer       Posterior Drawer       Varus Stress       Valgus Stress       Dyana's        Appley's       Thessaly's       Patellar Tracking              Ankle       Anterior Drawer       Talar Tilt       Martinez       Obdulio's                   Balance: SLS Balance Left: 4 seconds, Right: 7 seconds                         [x] Patient history, allergies, meds reviewed. Medical chart reviewed. See intake form. Review Of Systems (ROS):  [x]Performed Review of systems (Integumentary, CardioPulmonary, Neurological) by intake and observation. Intake form has been scanned into medical record. Patient has been instructed to contact their primary care physician regarding ROS issues if not already being addressed at this time.       Co-morbidities/Complexities (which will affect course of rehabilitation):   []None           Arthritic conditions   []Rheumatoid arthritis (M05.9)  []Osteoarthritis (M19.91)   Cardiovascular conditions   [x]Hypertension (I10)  []Hyperlipidemia (E78.5)  []Angina pectoris (I20)  []Atherosclerosis (I70)   Musculoskeletal conditions   []Disc pathology   []Congenital spine pathologies   []Prior surgical intervention  []Osteoporosis (M81.8)  []Osteopenia (M85.8)   Endocrine conditions   []Hypothyroid (E03.9)  []Hyperthyroid Gastrointestinal conditions   []Constipation (V68.30)   Metabolic conditions   []Morbid obesity (E66.01)  [x]Diabetes type 1(E10.65) or 2 (E11.65)   []Neuropathy (G60.9)     Pulmonary conditions   []Asthma (J45)  []Coughing   []COPD (J44.9)   Psychological Disorders  []Anxiety (F41.9)  []Depression (F32.9)   []Other:   []Other:          Barriers to/and or personal factors that will affect rehab potential:              []Age  []Sex    []Smoker              []Motivation/Lack of Motivation                        []Co-Morbidities              []Cognitive Function, education/learning barriers              []Environmental, home barriers              []profession/work barriers  []past PT/medical experience  []other:  Justification:     Falls Risk Assessment (30 days):   [x] Falls Risk assessed and no intervention required. [] Falls Risk assessed and Patient requires intervention due to being higher risk   TUG score (>12s at risk):     [] Falls education provided, including        ASSESSMENT:   Functional Impairments:     []Noted lumbar/proximal hip/LE joint hypomobility   [x]Decreased LE functional ROM   [x]Decreased core/proximal hip strength and neuromuscular control   [x]Decreased LE functional strength   [x]Reduced balance/proprioceptive control   []other:      Functional Activity Limitations (from functional questionnaire and intake)   []Reduced ability to tolerate prolonged functional positions   [x]Reduced ability or difficulty with changes of positions or transfers between positions   []Reduced ability to maintain good posture and demonstrate good body mechanics with sitting, bending, and lifting   []Reduced ability to sleep   [] Reduced ability or tolerance with driving and/or computer work   []Reduced ability to perform lifting, carrying tasks   [x]Reduced ability to squat   []Reduced ability to forward bend   [x]Reduced ability to ambulate prolonged functional periods/distances/surfaces   [x]Reduced ability to ascend/descend stairs   []Reduced ability to run, hop, cut or jump   []other:    Participation Restrictions   [x]Reduced participation in self care activities   [x]Reduced participation in home management activities   []Reduced participation in work activities   []Reduced participation in social activities.    []Reduced participation in sport/recreation activities. Classification :    []Signs/symptoms consistent with post-surgical status including decreased ROM, strength and function. []Signs/symptoms consistent with joint sprain/strain   []Signs/symptoms consistent with patella-femoral syndrome   [x]Signs/symptoms consistent with knee OA/hip OA   []Signs/symptoms consistent with internal derangement of knee/Hip   []Signs/symptoms consistent with functional hip weakness/NMR control      []Signs/symptoms consistent with tendinitis/tendinosis    []signs/symptoms consistent with pathology which may benefit from Dry needling      []other:      Prognosis/Rehab Potential:      []Excellent   [x]Good    []Fair   []Poor    Tolerance of evaluation/treatment:    []Excellent   [x]Good    []Fair   []Poor    Physical Therapy Evaluation Complexity Justification  [x] A history of present problem with:  [] no personal factors and/or comorbidities that impact the plan of care;  [x]1-2 personal factors and/or comorbidities that impact the plan of care  []3 personal factors and/or comorbidities that impact the plan of care  [x] An examination of body systems using standardized tests and measures addressing any of the following: body structures and functions (impairments), activity limitations, and/or participation restrictions;:  [] a total of 1-2 or more elements   [] a total of 3 or more elements   [] a total of 4 or more elements   [x] A clinical presentation with:  [] stable and/or uncomplicated characteristics   [] evolving clinical presentation with changing characteristics  [] unstable and unpredictable characteristics;   [x] Clinical decision making of [] Low, [] moderate, [] high complexity using standardized patient assessment instrument and/or measurable assessment of functional outcome.     [x] EVAL (LOW) 42651 (typically 15 minutes face-to-face)  [] EVAL (MOD) 20596 (typically 30 minutes face-to-face)  [] EVAL (HIGH) 78261 (typically 45 minutes face-to-face)  [] RE-EVAL     PLAN:   Frequency/Duration:  2 days per week for 6 Weeks:  Interventions:  [x]  Therapeutic exercise including: strength training, ROM, for Lower extremity and core   [x]  NMR activation and proprioception for LE, Glutes and Core   [x]  Manual therapy as indicated for LE, Hip and spine to include: Dry Needling/IASTM, STM, PROM, Gr I-IV mobilizations, manipulation. [x] Modalities as needed that may include: thermal agents, E-stim, Biofeedback, US, iontophoresis as indicated  [x] Patient education on joint protection, postural re-education, activity modification, progression of HEP. HEP instruction: Written HEP instructions provided and reviewed. 8/25/20: The following exercises were performed and added to the pt's home program (educated on appropriate frequency, intensity and duration etc.): Supine SLR, SL hip aBd, Knee flexion with towel, Seated Hip aBd with TB. Distributed HO and TB     GOALS:  Patient stated goal: Taking walks and chores   [] Progressing: [] Met: [] Not Met: [] Adjusted    Therapist goals for Patient:   Short Term Goals: To be achieved in: 2 weeks  1. Independent in HEP and progression per patient tolerance, in order to prevent re-injury. [] Progressing: [] Met: [] Not Met: [] Adjusted  2. Patient will have a decrease in pain to facilitate improvement in movement, function, and ADLs as indicated by Functional Deficits. [] Progressing: [] Met: [] Not Met: [] Adjusted    Long Term Goals: To be achieved in: 6 weeks  1. Disability index score of 0% or less for the LEFS to assist with reaching prior level of function. [] Progressing: [] Met: [] Not Met: [] Adjusted  2. Patient will demonstrate increased AROM to 120 degrees to allow for proper joint functioning as indicated by patients Functional Deficits. [] Progressing: [] Met: [] Not Met: [] Adjusted  3.  Patient will demonstrate an increase in Strength to at least 4+/5 as well as good proximal hip strength and control to allow for proper functional mobility as indicated by patients Functional Deficits. [] Progressing: [] Met: [] Not Met: [] Adjusted  4. Patient will return to functional activities including prolonged ambulation of greater than or equal to 20 minutes  without increased symptoms or restriction.    [] Progressing: [] Met: [] Not Met: [] Adjusted      Electronically signed by:  Lily Odell, PT

## 2020-08-25 NOTE — PROGRESS NOTES
SUBJECTIVE:  Hanna Truong is a 81 y.o. female  seen in my clinic for Graves' disease and multinodular goiter  Patient was initially referred for thyroid nodule  which was identified on a CT scan done in April 2018 as a workup for her ovarian cancer  . Patient Current complaints: denies fatigue, weight changes, heat/cold intolerance, bowel/skin changes or CVS symptoms  History of obstructive symptoms: difficulty swallowing No, changes in voice/hoarseness No.    History of radiation to patient's neck: NO  Recent iodine exposure: No  Family history includes no thyroid abnormalities. Family history of thyroid cancer: No    Ovarian cancer is treated with surgery , chemo and RT ---she was diagnosed in jan 2018   Patient also has hypertension and is on Toprol and hydrochlorothiazide along with Lotrel  Patient also has atrial fibrillation and is on anticoagulation    INTERIM   She had cardiac ablation done , she is on Xeralto   She has been taking 2.5 mg Tapazole daily she denies any significant palpitation, heat intolerance or insomnia.     Past Medical History:   Diagnosis Date    Anal bleeding 2019    CT scan clear     Anemia     Atrial fibrillation ([address]     cleared by cardiologist 2018, was related to cancer     Colon polyps     Diabetes mellitus ([address]     pre diabetic diet controlled    Endometrial cancer ([address]     Hypertension     IFG (impaired fasting glucose)     Small bowel obstruction (HCC)     resolved without surgery    Thyroid nodule      Patient Active Problem List    Diagnosis Date Noted    Malignant neoplasm of ovary ([address] 07/07/2020    Graves disease 05/07/2019    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    GIB (gastrointestinal bleeding) 03/26/2019    Persistent atrial fibrillation 07/26/2018    Syncope 07/17/2018    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[address] 06/19/2018    Partial small bowel obstruction ([address] 06/08/2018    On antineoplastic chemotherapy 06/07/2018    Abdominal pain, generalized 06/07/2018    Endometrial cancer [facility] 04/03/2018    Alkaline phosphatase elevation 03/08/2018    Prediabetes 03/08/2018    Chronic anticoagulation 10/13/2017    Anemia 07/20/2017    Abnormal liver enzymes 07/20/2017    Essential hypertension 07/06/2016     Past Surgical History:   Procedure Laterality Date    CARDIOVERSION      CARDIOVERSION  09/19/2019    COLONOSCOPY N/A 3/14/2019    COLONOSCOPY POLYPECTOMY SNARE/COLD BIOPSY performed by Paul Tatum MD at [facility] COLONOSCOPY  3/14/2019    COLON[facility]EMORRHAGE [facility] COLONOSCOPY N/A 3/26/2019    COLONOSCOPY CONTROL HEMORRHAGE performed by Paul Tatum MD at [address] N/A 9/10/2019    SIGMOIDOSCOPY DIAGNOSTIC FLEXIBLE performed by Paul Tatum MD at [address]      TUBAL LIGATION      TUNNELED CENTRAL VENOUS CATHETER W/ SUBCUTANEOUS PORT Right      Family History   Problem Relation Age of Onset    Hypertension Mother     Colon Cancer Maternal Grandmother      Social History     Socioeconomic History    Marital status:      Spouse name: None    Number of children: None    Years of education: None    Highest education level: None   Occupational History    Occupation: cash checking   Social Needs    Financial resource strain: Not very hard    Food insecurity     Worry: Never true     Inability: Never true    Transportation needs     Medical: No     Non-medical: No   Tobacco Use    Smoking status: Never Smoker    Smokeless tobacco: Never Used   Substance and Sexual Activity    Alcohol use: Yes     Comment: occ wine    Drug use: No    Sexual activity: Never   Lifestyle    Physical activity     Days per week: 4 days     Minutes per session: 30 min    Stress:  Only a little   Relationships    Social connections     Talks on phone: More than three times a week     Gets together: More than three times a week     Attends Protestant service: Never     Active member of club or organization: No     Attends meetings of clubs or organizations: Never     Relationship status: Never     Intimate partner violence     Fear of current or ex partner: No     Emotionally abused: No     Physically abused: No     Forced sexual activity: No   Other Topics Concern    None   Social History Narrative    Lives home with her 3 children. Doing well 8 grandchildren. Current Outpatient Medications   Medication Sig Dispense Refill    metoprolol succinate (TOPROL XL) 50 MG extended release tablet TAKE ONE TABLET BY MOUTH DAILY 90 tablet 1    amLODIPine (NORVASC) 10 MG tablet Take 1 tablet by mouth daily 90 tablet 1    rivaroxaban (XARELTO) 20 MG TABS tablet Take 1 tablet by mouth daily (with breakfast) 90 tablet 0    hydroCHLOROthiazide (HYDRODIURIL) 25 MG tablet TAKE ONE TABLET BY MOUTH DAILY 90 tablet 1    methIMAzole (TAPAZOLE) 10 MG tablet Take one tab by mouth daily. 30 tablet 2    Multiple Vitamins-Minerals (THERAPEUTIC MULTIVITAMIN-MINERALS) tablet Take 1 tablet by mouth daily       No current facility-administered medications for this visit. Allergies   Allergen Reactions    Naproxen Swelling    Lisinopril Rash         Review of Systems:  I have reviewed the review of system questionnaire filled by the patient .   Patient was advised to contact PCP for non endocrine signs and symptoms       OBJECTIVE:   BP (!) 151/79   Pulse 69   Temp 97.9 °F (36.6 °C)   Resp 16   Ht 5' 9\" (1.753 m)   Wt 218 lb (98.9 kg)   LMP  (LMP Unknown)   BMI 32.19 kg/m²   Wt Readings from Last 3 Encounters:   08/25/20 218 lb (98.9 kg)   08/18/20 218 lb 6.4 oz (99.1 kg)   07/07/20 219 lb (99.3 kg)       Physical Exam:  Constitutional: no acute distress, well appearing, well nourished  Psychiatric: oriented to person, place and time, judgement, insight and normal, recent and remote memory and intact and mood, affect are normal  Skin: skin and subcutaneous tissue is normal without mass,   Head and Face: examination of head and face revealed no abnormalities  Eyes: no lid or conjunctival swelling, no erythema or discharge, pupils are normal,   Ears/Nose: external inspection of ears and nose revealed no abnormalities, hearing is grossly normal  Oropharynx/Mouth/Face: lips, tongue and gums are normal with no lesions, the voice quality was normal  Neck: neck is supple and symmetric, with midline trachea and no masses, thyroid is normal    Pulmonary: no increased work of breathing or signs of respiratory distress, lungs are clear to auscultation  Cardiovascular: normal heart rate and rhythm, normal S1 and S2,   Musculoskeletal: normal gait and station,   Neurological: normal coordination, normal general cortical function      Lab Review:  Lab Results   Component Value Date    TSH <0.01 07/16/2020    TSH <0.01 06/18/2020    TSH <0.01 05/22/2020     Lab Results   Component Value Date    T4FREE 1.5 07/16/2020 7/23/2019       COMPARISON:   02/26/2019       HISTORY:   ORDERING SYSTEM PROVIDED HISTORY: Thyroid nodule       FINDINGS:   Right thyroid lobe:  5.7 x 2.7 x 2.3 cm       Left thyroid lobe:  4.6 x 2.2 x 1.6 cm       Isthmus:  5 mm       Thyroid Gland:  Thyroid gland is heterogeneous and mildly hypervascular.  The   thyroid gland is mildly enlarged.       Nodules: No suspicious solid nodules are identified.  Mixed solid and cystic   subcentimeter right thyroid lobe nodule is redemonstrated, 8 mm, not   significantly changed.       Cervical lymphadenopathy: No abnormal lymph nodes in the imaged portions of   the neck.           Impression          ASSESSMENT/PLAN:    ---Graves disease trab and TSI positive. She has been taking tapazole  daily since march 2019   Currently she is taking 2.5 mg daily. Most recent TSH in July still suppressed but free thyroid hormone levels did come down into normal range.   She will have another set treated, side effects of medications, diagnosis, treatment options, and prognosis, risks, benefits, complications, and alternatives of treatment, labs, imaging and other studies and treatment targets and goals. She understands instructions and counseling. Return in about 6 months (around 2/25/2021). Please note that some or all of this report was generated using voice recognition software. Please notify me in case of any questions about the content of this document, as some errors in transcription may have occurred .

## 2020-08-26 ENCOUNTER — OFFICE VISIT (OUTPATIENT)
Dept: ORTHOPEDIC SURGERY | Age: 70
End: 2020-08-26
Payer: MEDICARE

## 2020-08-26 VITALS — BODY MASS INDEX: 32.29 KG/M2 | HEIGHT: 69 IN | TEMPERATURE: 98.2 F | WEIGHT: 218 LBS

## 2020-08-26 PROCEDURE — 4040F PNEUMOC VAC/ADMIN/RCVD: CPT | Performed by: ORTHOPAEDIC SURGERY

## 2020-08-26 PROCEDURE — 1036F TOBACCO NON-USER: CPT | Performed by: ORTHOPAEDIC SURGERY

## 2020-08-26 PROCEDURE — 20610 DRAIN/INJ JOINT/BURSA W/O US: CPT | Performed by: ORTHOPAEDIC SURGERY

## 2020-08-26 PROCEDURE — G8417 CALC BMI ABV UP PARAM F/U: HCPCS | Performed by: ORTHOPAEDIC SURGERY

## 2020-08-26 PROCEDURE — 1123F ACP DISCUSS/DSCN MKR DOCD: CPT | Performed by: ORTHOPAEDIC SURGERY

## 2020-08-26 PROCEDURE — G8427 DOCREV CUR MEDS BY ELIG CLIN: HCPCS | Performed by: ORTHOPAEDIC SURGERY

## 2020-08-26 PROCEDURE — 3017F COLORECTAL CA SCREEN DOC REV: CPT | Performed by: ORTHOPAEDIC SURGERY

## 2020-08-26 PROCEDURE — G8400 PT W/DXA NO RESULTS DOC: HCPCS | Performed by: ORTHOPAEDIC SURGERY

## 2020-08-26 PROCEDURE — 1090F PRES/ABSN URINE INCON ASSESS: CPT | Performed by: ORTHOPAEDIC SURGERY

## 2020-08-26 PROCEDURE — 99204 OFFICE O/P NEW MOD 45 MIN: CPT | Performed by: ORTHOPAEDIC SURGERY

## 2020-08-26 RX ORDER — METHYLPREDNISOLONE ACETATE 40 MG/ML
40 INJECTION, SUSPENSION INTRA-ARTICULAR; INTRALESIONAL; INTRAMUSCULAR; SOFT TISSUE ONCE
Status: COMPLETED | OUTPATIENT
Start: 2020-08-26 | End: 2020-08-26

## 2020-08-26 RX ADMIN — METHYLPREDNISOLONE ACETATE 40 MG: 40 INJECTION, SUSPENSION INTRA-ARTICULAR; INTRALESIONAL; INTRAMUSCULAR; SOFT TISSUE at 16:58

## 2020-08-26 NOTE — PROGRESS NOTES
8/26/20 5:00 PM     Lidocaine Injection      NDC: [address]-96    Lot Number: [id number]    Body Part: LEFT KNEE

## 2020-08-26 NOTE — PROGRESS NOTES
Date:  2020    Name:  Virgil Gutierres  Address:  [address] [place] [address]    :  1950      Age:   81 y.o.    SSN:        Medical Record Number:  [id number]    Reason for Visit:    Chief Complaint    Knee Pain (Bilateral knee)      DOS:2020     HPI: Virgil Gutierres is a 81 y.o. female here today for evaluation of bilateral knee pain that has been ongoing for 1 month. She states she had a fall about a month ago onto both of her knees. She complains of diffuse bilateral knee pain and swelling, left worse than the right. Pain is exacerbated with activity. Denies catching or locking. She does feel like the knees will give out on her. ROS: Review of systems reviewed from Patient History Form completed today and available in the patient's chart under the Media tab.      Past Medical History:   Diagnosis Date    Anal bleeding     CT scan clear     Anemia     Atrial fibrillation ([address]     cleared by cardiologist , was related to cancer     Colon polyps     Diabetes mellitus ([address]     pre diabetic diet controlled    Endometrial cancer ([address]     Hypertension     IFG (impaired fasting glucose)     Small bowel obstruction ([address]     resolved without surgery    Thyroid nodule         Past Surgical History:   Procedure Laterality Date    CARDIOVERSION      CARDIOVERSION  2019    COLONOSCOPY N/A 3/14/2019    COLONOSCOPY POLYPECTOMY SNARE/COLD BIOPSY performed by Daniel Stewart MD at [address] [facility]  3/14/2019    COLONOSCOPY CONTROL HEMORRHAGE performed by Daniel Stewart MD at [facility] N/A 3/26/2019    COLONOSCOPY CONTROL HEMORRHAGE performed by Daniel Stewart MD at [facility] N/A 9/10/2019    SIGMOIDOSCOPY DIAGNOSTIC FLEXIBLE performed by Daniel Stewart MD at [facility]      TUNNELED CENTRAL VENOUS CATHETER W/ SUBCUTANEOUS PORT Right        Family History   Problem Relation Age of Onset    Hypertension Mother     Colon Cancer Maternal Grandmother        Social History     Socioeconomic History    Marital status:      Spouse name: None    Number of children: None    Years of education: None    Highest education level: None   Occupational History    Occupation: cash checking   Social Needs    Financial resource strain: Not very hard    Food insecurity     Worry: Never true     Inability: Never true    Transportation needs     Medical: No     Non-medical: No   Tobacco Use    Smoking status: Never Smoker    Smokeless tobacco: Never Used   Substance and Sexual Activity    Alcohol use: Yes     Comment: occ wine    Drug use: No    Sexual activity: Never   Lifestyle    Physical activity     Days per week: 4 days     Minutes per session: 30 min    Stress: Only a little   Relationships    Social connections     Talks on phone: More than three times a week     Gets together: More than three times a week     Attends [employer] service: Never     Active member of club or organization: No     Attends meetings of clubs or organizations: Never     Relationship status: Never     Intimate partner violence     Fear of current or ex partner: No     Emotionally abused: No     Physically abused: No     Forced sexual activity: No   Other Topics Concern    None   Social History Narrative    Lives home with her 3 children. Doing well 8 grandchildren.           Current Outpatient Medications   Medication Sig Dispense Refill    metoprolol succinate (TOPROL XL) 50 MG extended release tablet TAKE ONE TABLET BY MOUTH DAILY 90 tablet 1    amLODIPine (NORVASC) 10 MG tablet Take 1 tablet by mouth daily 90 tablet 1    rivaroxaban (XARELTO) 20 MG TABS tablet Take 1 tablet by mouth daily (with breakfast) 90 tablet 0    hydroCHLOROthiazide (HYDRODIURIL) 25 MG tablet TAKE ONE TABLET BY MOUTH DAILY 90 tablet 1    methIMAzole (TAPAZOLE) 10 days.  They were advised to call us if there was any erythema, enduration, swelling or increasing pain. Orders Placed This Encounter   Procedures    XR KNEE LEFT (MIN 4 VIEWS)     Standing Status:   Future     Number of Occurrences:   1     Standing Expiration Date:   8/25/2021     Order Specific Question:   Reason for exam:     Answer:   knee pain    XR KNEE RIGHT (MIN 4 VIEWS)     Standing Status:   Future     Number of Occurrences:   1     Standing Expiration Date:   8/25/2021     Order Specific Question:   Reason for exam:     Answer:   knee pain         I, Jona Rubinstein, ATC, am scribing for and in the presence of Dr. Mohamud Lance. 08/26/20 4:53 PM Jona Rubinstein, ATC            I personally reviewed the patient's pain scale, review of systems, family history, social history, past medical history, allergies and medications. Review of systems was collected today, reviewed and is included in the medical record. It is available under the media tab. I personally performed the services described in this documentation and scribed by Jona Rubinstein, ATC. Andria Kelly MD  Sports Medicine, Arthroscopic Knee and Shoulder Surgery    This dictation was performed with a verbal recognition program [facility]) and it was checked for errors. It is possible that there are still dictated errors within this office note. If so, please bring any errors to my attention for an addendum. All efforts were made to ensure that this office note is accurate.

## 2020-08-26 NOTE — FLOWSHEET NOTE
[address] [facility]    Physical Therapy Daily Treatment Note  Date:  2020    Patient Name:  Jazmin Casey    :  1950  MRN: [id number]  Medical/Treatment Diagnosis Information:  · Diagnosis: M25.561, M25.562, G89.29 (ICD-10-CM) - Chronic pain of both knees  · Treatment Diagnosis: Decreased Knee AROM, Decreased Strength, Decreased Balance/Proprioception  Insurance/Certification information:  PT Insurance Information: [facility] Medicare (Med Nec)  Physician Information:  Referring Practitioner: Dr. Mamadou Jason of care signed (Y/N): Faxed      Date of Patient follow up with Physician:  21    Functional scale:  LEFS: raw score = 64; dysfunction = 20-39%    Progress Note: [x]  Yes  []  No  Next due by: Visit #10       Latex Allergy:  [x]NO      []YES  Preferred Language for Healthcare:   [x]English       []other:    Visit # Insurance Allowable Date Range (if applicable)    Med Nec      Pain level:  5-6/10     SUBJECTIVE:  See eval    OBJECTIVE: See eval      RESTRICTIONS/PRECAUTIONS:     Exercises/Interventions:     Therapeutic Exercises  (36345) Resistance / level Sets/sec Reps Notes                                                                         Therapeutic Activities (21187)                                   Neuromuscular Re-ed (73898)                                                 Manual Intervention ([address] [facility])                                                     Pt. Education:  -pt educated on diagnosis, prognosis and expectations for rehab  -all pt questions were answered    HEP instruction:  -pt provided with written and illustrated instructions for HEP (see scanned image in )  20: The following exercises were performed and added to the pt's home program (educated on appropriate frequency, intensity and duration etc.): Supine SLR, SL hip aBd, Knee flexion with towel, Seated Hip aBd with TB.  Distributed HO and TB     Therapeutic Exercise and NMR EXR  [x]  (91342) Provided verbal/tactile cueing for activities related to strengthening, flexibility, endurance, ROM for improvements in  [x] LE / Lumbar: LE, proximal hip, and core control with self care, mobility, lifting, ambulation. [] UE / Cervical: cervical, postural, scapular, scapulothoracic and UE control with self care, reaching, carrying, lifting, house/yardwork, driving, computer work.  []  (95502) Provided verbal/tactile cueing for activities related to improving balance, coordination, kinesthetic sense, posture, motor skill, proprioception to assist with   [] LE / lumbar: LE, proximal hip, and core control in self care, mobility, lifting, ambulation and eccentric single leg control. [] UE / cervical: cervical, scapular, scapulothoracic and UE control with self care, reaching, carrying, lifting, house/yardwork, driving, computer work.      NMR and Therapeutic Activities:    [x]  (80422 or 29395) Provided verbal/tactile cueing for activities related to improving balance, coordination, kinesthetic sense, posture, motor skill, proprioception and motor activation to allow for proper function of   [x] LE: / Lumbar core, proximal hip and LE with self care and ADLs  [] UE / Cervical: cervical, postural, scapular, scapulothoracic and UE control with self care, carrying, lifting, driving, computer work.   [] (83395) Gait Re-education- Provided training and instruction to the patient for proper LE, core and proximal hip recruitment and positioning and eccentric body weight control with ambulation re-education including up and down stairs     Home Exercise Program:    [x]  (56136) Reviewed/Progressed HEP activities related to strengthening, flexibility, endurance, ROM of   [x] LE / Lumbar: core, proximal hip and LE for functional self-care, mobility, lifting and ambulation/stair navigation   [] UE / Cervical: cervical, postural, scapular, scapulothoracic and UE control with self care, reaching, carrying, lifting, house/yardwork, driving, computer work  []  (52836)Reviewed/Progressed HEP activities related to improving balance, coordination, kinesthetic sense, posture, motor skill, proprioception of   [] LE: core, proximal hip and LE for self care, mobility, lifting, and ambulation/stair navigation    [] UE / Cervical: cervical, postural,  scapular, scapulothoracic and UE control with self care, reaching, carrying, lifting, house/yardwork, driving, computer work    Manual Treatments:  PROM / STM / Oscillations-Mobs:  G-I, II, III, IV (PA's, Inf., Post.)  []  (15729) Provided manual therapy to mobilize LE, proximal hip and/or LS spine soft tissue/joints for the purpose of modulating pain, promoting relaxation,  increasing ROM, reducing/eliminating soft tissue swelling/inflammation/restriction, improving soft tissue extensibility and allowing for proper ROM for normal function with   [] LE / lumbar: self care, mobility, lifting and ambulation. [] UE / Cervical: self care, reaching, carrying, lifting, house/yardwork, driving, computer work. Modalities:  [] (00198) Vasopneumatic compression: Utilized vasopneumatic compression to decrease edema / swelling for the purpose of improving mobility and quad tone / recruitment which will allow for increased overall function including but not limited to self-care, transfers, ambulation, and ascending / descending stairs. Modalities:      Charges:  Timed Code Treatment Minutes: 15   Total Treatment Minutes: 42     [x] EVAL - LOW (94833)   [] EVAL - MOD (55598)  [] EVAL - HIGH (13809)  [] RE-EVAL (95082)  [x] TE (98316) x  1    [] Ionto  [] NMR (14184) x      [] Vaso  [] Manual (15035) x      [] Ultrasound  [x] TA x   1    [] Mech Traction (08071)  [] Gait Training x     [] ES (un) (19431):   [] Aquatic therapy x   [] Other:   [] Group:     GOALS:   Short Term Goals: To be achieved in: 2 weeks  1.  Independent in HEP and progression per patient tolerance, in order to prevent re-injury. []? Progressing: []? Met: []? Not Met: []? Adjusted  2. Patient will have a decrease in pain to facilitate improvement in movement, function, and ADLs as indicated by Functional Deficits. []? Progressing: []? Met: []? Not Met: []? Adjusted     Long Term Goals: To be achieved in: 6 weeks  1. Disability index score of 0% or less for the LEFS to assist with reaching prior level of function. []? Progressing: []? Met: []? Not Met: []? Adjusted  2. Patient will demonstrate increased AROM to 120 degrees to allow for proper joint functioning as indicated by patients Functional Deficits. []? Progressing: []? Met: []? Not Met: []? Adjusted  3. Patient will demonstrate an increase in Strength to at least 4+/5 as well as good proximal hip strength and control to allow for proper functional mobility as indicated by patients Functional Deficits. []? Progressing: []? Met: []? Not Met: []? Adjusted  4. Patient will return to functional activities including prolonged ambulation of greater than or equal to 20 minutes  without increased symptoms or restriction. []? Progressing: []? Met: []? Not Met: []? Adjusted  Progression Towards Functional goals:  [] Patient is progressing as expected towards functional goals listed. [] Progression is slowed due to complexities listed. [] Progression has been slowed due to co-morbidities.   [x] Plan just implemented, too soon to assess goals progression  [] Other:     Persisting Functional Limitations/Impairments:  []Sleeping []Sitting               [x]Standing [x]Transfers        [x]Walking []Kneeling               [x]Stairs [x]Squatting / bending   [x]ADLs []Reaching  []Lifting  []Housework  []Driving []Job related tasks  []Sports/Recreation []Other:        ASSESSMENT:  See eval  Treatment/Activity Tolerance:  [x] Patient able to complete tx [] Patient limited by fatique  [] Patient limited by pain  [] Patient limited by other medical complications  [] Other:     Prognosis: [x] Good [] Fair  [] Poor    Patient Requires Follow-up: [x] Yes  [] No    PLAN: See eval. PT 2x / week for 6 weeks.    [] Continue per plan of care [] Alter current plan (see comments)  [x] Plan of care initiated [] Hold pending MD visit [] Discharge    Electronically signed by: Sue Pelletier PT, DPT

## 2020-08-28 ENCOUNTER — HOSPITAL ENCOUNTER (OUTPATIENT)
Dept: PHYSICAL THERAPY | Age: 70
Setting detail: THERAPIES SERIES
Discharge: HOME | End: 2020-08-28
Payer: MEDICARE

## 2020-08-28 PROCEDURE — 97530 THERAPEUTIC ACTIVITIES: CPT

## 2020-08-28 PROCEDURE — 97110 THERAPEUTIC EXERCISES: CPT

## 2020-08-28 NOTE — FLOWSHEET NOTE
[address] [facility]    Physical Therapy Daily Treatment Note  Date:  2020    Patient Name:  Judith Saleh    :  1950  MRN: [id number]  Medical/Treatment Diagnosis Information:  · Diagnosis: M25.561, M25.562, G89.29 (ICD-10-CM) - Chronic pain of both knees  · Treatment Diagnosis: Decreased Knee AROM, Decreased Strength, Decreased Balance/Proprioception  Insurance/Certification information:  PT Insurance Information: [facility] Medicare (Med Nec)  Physician Information:  Referring Practitioner: Dr. Jass Singh of care signed (Y/N): Faxed      Date of Patient follow up with Physician:  21    Functional scale:  LEFS: raw score = 64; dysfunction = 20-39%    Progress Note: []  Yes  [x]  No  Next due by: Visit #10       Latex Allergy:  [x]NO      []YES  Preferred Language for Healthcare:   [x]English       []other:    Visit # Insurance Allowable Date Range (if applicable)    Med Nec      Pain level:  5/10 R knee ; 0/10 L Knee    SUBJECTIVE:  Pt reports she received a L knee injection that has completely resolved her pain on the L. R knee is still very bothersome. Pt had chemo/radiation treatment two years ago, becoming very weak to the point of walking was difficult. Pt reports compliance with HEP.      OBJECTIVE: See eval    RESTRICTIONS/PRECAUTIONS:     Exercises/Interventions: all exercises performed bilaterally unless otherwise noted    Therapeutic Exercises  (92520) Resistance / level Sets/sec Reps Notes   Upright Bike 2 5'     IB - Calf Stretch  30\" 3    Seated - HS Stretch  30\" 2    Prone Quad Stretch  30\" 2           Quad Sets  10\" 10    Supine Hip Flexion  3 10    Sidelying Hip Abduction   3 10    Clamshells npv      LAQ  3\" 15    HR  2 10           Therapeutic Activities (35373)       Sit<>Stand from high Mat  1 10 Max VCs and tactile cueing for performance; cues to facilitate glute/quad and avoid trunk flexion Neuromuscular Re-ed (68626)                                                 Manual Intervention ([phone])                                                     Pt. Education:  -pt educated on diagnosis, prognosis and expectations for rehab  -all pt questions were answered    HEP instruction:  -pt provided with written and illustrated instructions for HEP (see scanned image in )  8/25/20: The following exercises were performed and added to the pt's home program (educated on appropriate frequency, intensity and duration etc.): Supine SLR, SL hip aBd, Knee flexion with towel, Seated Hip aBd with TB. Distributed HO and TB     Access Code: [email or web address]   URL: [email or web address]   Date: 08/28/2020   Prepared by: Gus Guerra     Exercises   Standing Gastroc Stretch on Step - 3 reps - 1 sets - 30s hold - 3x daily - 7x weekly   Seated Hamstring Stretch - 3 reps - 1 sets - 30s hold - 3x daily - 7x weekly   Supine Quad Set - 10 reps - 2 sets - 10s hold - 1x daily - 7x weekly   Supine Active Straight Leg Raise - 10 reps - 3 sets - 1x daily - 7x weekly   Sidelying Hip Abduction - 10 reps - 3 sets - 1x daily - 7x weekly   Seated Long Arc Quad - 10 reps - 3 sets - 1x daily - 7x weekly   Standing Heel Raise with Support - 10 reps - 3 sets - 1x daily - 7x weekly     Therapeutic Exercise and NMR EXR  [x]  (36738) Provided verbal/tactile cueing for activities related to strengthening, flexibility, endurance, ROM for improvements in  [x] LE / Lumbar: LE, proximal hip, and core control with self care, mobility, lifting, ambulation.   [] UE / Cervical: cervical, postural, scapular, scapulothoracic and UE control with self care, reaching, carrying, lifting, house/yardwork, driving, computer work.  []  (08136) Provided verbal/tactile cueing for activities related to improving balance, coordination, kinesthetic sense, posture, motor skill, proprioception to assist with   [] LE / lumbar: LE, proximal hip, and core control spine soft tissue/joints for the purpose of modulating pain, promoting relaxation,  increasing ROM, reducing/eliminating soft tissue swelling/inflammation/restriction, improving soft tissue extensibility and allowing for proper ROM for normal function with   [] LE / lumbar: self care, mobility, lifting and ambulation. [] UE / Cervical: self care, reaching, carrying, lifting, house/yardwork, driving, computer work. Modalities:  [] (70734) Vasopneumatic compression: Utilized vasopneumatic compression to decrease edema / swelling for the purpose of improving mobility and quad tone / recruitment which will allow for increased overall function including but not limited to self-care, transfers, ambulation, and ascending / descending stairs. Modalities:      Charges:  Timed Code Treatment Minutes: 50   Total Treatment Minutes: 50     [] EVAL - LOW (47393)   [] EVAL - MOD (07165)  [] EVAL - HIGH (61934)  [] RE-EVAL (13003)  [x] TE (01181) x 2    [] Ionto  [] NMR (79010) x      [] Vaso  [] Manual (23665) x      [] Ultrasound  [x] TA x   1    [] Mech Traction (80584)  [] Gait Training x     [] ES (un) (65502):   [] Aquatic therapy x   [] Other:   [] Group:     GOALS:   Short Term Goals: To be achieved in: 2 weeks  1. Independent in HEP and progression per patient tolerance, in order to prevent re-injury. []? Progressing: []? Met: []? Not Met: []? Adjusted  2. Patient will have a decrease in pain to facilitate improvement in movement, function, and ADLs as indicated by Functional Deficits. []? Progressing: []? Met: []? Not Met: []? Adjusted     Long Term Goals: To be achieved in: 6 weeks  1. Disability index score of 0% or less for the LEFS to assist with reaching prior level of function. []? Progressing: []? Met: []? Not Met: []? Adjusted  2. Patient will demonstrate increased AROM to 120 degrees to allow for proper joint functioning as indicated by patients Functional Deficits. []? Progressing: []?  Met: Continue per plan of care [] Alter current plan (see comments)  [] Plan of care initiated [] Hold pending MD visit [] Discharge    Electronically signed by: Arlene Terrell PT, DPT

## 2020-09-02 ENCOUNTER — HOSPITAL ENCOUNTER (OUTPATIENT)
Dept: PHYSICAL THERAPY | Age: 70
Setting detail: THERAPIES SERIES
Discharge: HOME | End: 2020-09-02
Payer: MEDICARE

## 2020-09-02 PROCEDURE — 97112 NEUROMUSCULAR REEDUCATION: CPT

## 2020-09-02 PROCEDURE — 97110 THERAPEUTIC EXERCISES: CPT

## 2020-09-02 NOTE — FLOWSHEET NOTE
Therapeutic Activities (02310) x 8 min       Sit<>Stand from standard chair+ 1 Airex  1 10 Max VCs and tactile cueing for performance; cues to facilitate glute/quad and avoid trunk flexion. Limited by bilat knee pain with this today. Able to do without UE assist                        Neuromuscular Re-ed (21157) x 10 min       BOSU Lunges bilat 10sec 6 Foam roll for assist as needed for balance, cues for glute and quad activation   Band side stepping for glute med activation in stance Blue band tied above knees 2 20 Cues for feet facing forward   SLS       [employer] marching    Add NV for balance work and TKE quad control in stance                 Manual Intervention ([id number])                                                     Pt. Education:  -pt educated on diagnosis, prognosis and expectations for rehab  -all pt questions were answered    HEP instruction:  -pt provided with written and illustrated instructions for HEP (see scanned image in )  8/25/20: The following exercises were performed and added to the pt's home program (educated on appropriate frequency, intensity and duration etc.): Supine SLR, SL hip aBd, Knee flexion with towel, Seated Hip aBd with TB. Distributed HO and TB     Access Code: [email or web address]   URL: [employer]/   Date: 08/28/2020   Prepared by: Chuckie Saha     Exercises   Standing Gastroc Stretch on Step - 3 reps - 1 sets - 30s hold - 3x daily - 7x weekly   Seated Hamstring Stretch - 3 reps - 1 sets - 30s hold - 3x daily - 7x weekly   Supine Quad Set - 10 reps - 2 sets - 10s hold - 1x daily - 7x weekly   Supine Active Straight Leg Raise - 10 reps - 3 sets - 1x daily - 7x weekly   Sidelying Hip Abduction - 10 reps - 3 sets - 1x daily - 7x weekly   Seated Long Arc Quad - 10 reps - 3 sets - 1x daily - 7x weekly   Standing Heel Raise with Support - 10 reps - 3 sets - 1x daily - 7x weekly     Therapeutic Exercise and NMR EXR  [x]  (19560) Provided verbal/tactile cueing for activities related to strengthening, flexibility, endurance, ROM for improvements in  [x] LE / Lumbar: LE, proximal hip, and core control with self care, mobility, lifting, ambulation. [] UE / Cervical: cervical, postural, scapular, scapulothoracic and UE control with self care, reaching, carrying, lifting, house/yardwork, driving, computer work. [x]  (92196) Provided verbal/tactile cueing for activities related to improving balance, coordination, kinesthetic sense, posture, motor skill, proprioception to assist with   [x] LE / lumbar: LE, proximal hip, and core control in self care, mobility, lifting, ambulation and eccentric single leg control. [] UE / cervical: cervical, scapular, scapulothoracic and UE control with self care, reaching, carrying, lifting, house/yardwork, driving, computer work.      NMR and Therapeutic Activities:    [x]  (59140 or 76093) Provided verbal/tactile cueing for activities related to improving balance, coordination, kinesthetic sense, posture, motor skill, proprioception and motor activation to allow for proper function of   [x] LE: / Lumbar core, proximal hip and LE with self care and ADLs  [] UE / Cervical: cervical, postural, scapular, scapulothoracic and UE control with self care, carrying, lifting, driving, computer work.   [] (30276) Gait Re-education- Provided training and instruction to the patient for proper LE, core and proximal hip recruitment and positioning and eccentric body weight control with ambulation re-education including up and down stairs     Home Exercise Program:    [x]  (19749) Reviewed/Progressed HEP activities related to strengthening, flexibility, endurance, ROM of   [x] LE / Lumbar: core, proximal hip and LE for functional self-care, mobility, lifting and ambulation/stair navigation   [] UE / Cervical: cervical, postural, scapular, scapulothoracic and UE control with self care, reaching, carrying, lifting, house/yardwork, driving, computer work  []  (09702)Reviewed/Progressed HEP activities related to improving balance, coordination, kinesthetic sense, posture, motor skill, proprioception of   [] LE: core, proximal hip and LE for self care, mobility, lifting, and ambulation/stair navigation    [] UE / Cervical: cervical, postural,  scapular, scapulothoracic and UE control with self care, reaching, carrying, lifting, house/yardwork, driving, computer work    Manual Treatments:  PROM / STM / Oscillations-Mobs:  G-I, II, III, IV (PA's, Inf., Post.)  []  ([id number]) Provided manual therapy to mobilize LE, proximal hip and/or LS spine soft tissue/joints for the purpose of modulating pain, promoting relaxation,  increasing ROM, reducing/eliminating soft tissue swelling/inflammation/restriction, improving soft tissue extensibility and allowing for proper ROM for normal function with   [] LE / lumbar: self care, mobility, lifting and ambulation. [] UE / Cervical: self care, reaching, carrying, lifting, house/yardwork, driving, computer work. Modalities:  [] (16750) Vasopneumatic compression: Utilized vasopneumatic compression to decrease edema / swelling for the purpose of improving mobility and quad tone / recruitment which will allow for increased overall function including but not limited to self-care, transfers, ambulation, and ascending / descending stairs. Modalities:  None. Rest helps knee pain the most    Charges:  Timed Code Treatment Minutes: 57   Total Treatment Minutes: 60     [] EVAL - LOW (03730)   [] EVAL - MOD (49343)  [] EVAL - HIGH (82526)  [] RE-EVAL (67358)  [x] TE (25958) x 3    [] Ionto  [] NMR (75077) x      [] Vaso  [] Manual (56886) x      [] Ultrasound  [x] TA x   1    [] Mech Traction (65051)  [] Gait Training x     [] ES (un) (90080):   [] Aquatic therapy x   [] Other:   [] Group:     GOALS:   Short Term Goals: To be achieved in: 2 weeks  1.  Independent in HEP and progression per patient tolerance, in order to prevent re-injury. []? Progressing: []? Met: []? Not Met: []? Adjusted  2. Patient will have a decrease in pain to facilitate improvement in movement, function, and ADLs as indicated by Functional Deficits. []? Progressing: []? Met: []? Not Met: []? Adjusted     Long Term Goals: To be achieved in: 6 weeks  1. Disability index score of 0% or less for the LEFS to assist with reaching prior level of function. []? Progressing: []? Met: []? Not Met: []? Adjusted  2. Patient will demonstrate increased AROM to 120 degrees to allow for proper joint functioning as indicated by patients Functional Deficits. []? Progressing: []? Met: []? Not Met: []? Adjusted  3. Patient will demonstrate an increase in Strength to at least 4+/5 as well as good proximal hip strength and control to allow for proper functional mobility as indicated by patients Functional Deficits. []? Progressing: []? Met: []? Not Met: []? Adjusted  4. Patient will return to functional activities including prolonged ambulation of greater than or equal to 20 minutes  without increased symptoms or restriction. []? Progressing: []? Met: []? Not Met: []? Adjusted  Progression Towards Functional goals:  [] Patient is progressing as expected towards functional goals listed. [] Progression is slowed due to complexities listed. [] Progression has been slowed due to co-morbidities. [x] Plan just implemented, too soon to assess goals progression  [] Other:     Persisting Functional Limitations/Impairments:  []Sleeping []Sitting               [x]Standing [x]Transfers        [x]Walking []Kneeling               [x]Stairs [x]Squatting / bending   [x]ADLs []Reaching  []Lifting  []Housework  []Driving []Job related tasks  []Sports/Recreation []Other:        ASSESSMENT:  Patient able to complete exercises fairly well, though did have some knee pain at end of session with sit-to-stand exercise. standing.  Attempted sit<>stand from high surface to facilitate glute/quad activation with limited success this date. Continue to progress as patient becomes stronger. Pt cont to benefit from skilled PT for proximal hip strengthening, quad strengthening and work to restore proper proprioception. Treatment/Activity Tolerance:  [x] Patient able to complete tx  [] Patient limited by fatique  [] Patient limited by pain  [] Patient limited by other medical complications  [] Other:     Prognosis: [x] Good [] Fair  [] Poor    Patient Requires Follow-up: [x] Yes  [] No    PLAN: See eval. PT 2x / week for 6 weeks.    [x] Continue per plan of care [] Alter current plan (see comments)  [] Plan of care initiated [] Hold pending MD visit [] Discharge    Electronically signed by: Jony Hickman PTA [id number]

## 2020-09-04 ENCOUNTER — HOSPITAL ENCOUNTER (OUTPATIENT)
Dept: PHYSICAL THERAPY | Age: 70
Setting detail: THERAPIES SERIES
Discharge: HOME | End: 2020-09-04
Payer: MEDICARE

## 2020-09-04 PROCEDURE — 97110 THERAPEUTIC EXERCISES: CPT

## 2020-09-04 PROCEDURE — 97530 THERAPEUTIC ACTIVITIES: CPT

## 2020-09-04 NOTE — FLOWSHEET NOTE
[address] [facility]    Physical Therapy Daily Treatment Note  Date:  2020    Patient Name:  Lisa Salmon    :  1950  MRN: [id number]  Medical/Treatment Diagnosis Information:  · Diagnosis: M25.561, M25.562, G89.29 (ICD-10-CM) - Chronic pain of both knees  · Treatment Diagnosis: Decreased Knee AROM, Decreased Strength, Decreased Balance/Proprioception  Insurance/Certification information:  PT Insurance Information: [facility] Medicare (Med [employer])  Physician Information:  Referring Practitioner: Dr. Chanel Party of care signed (Y/N): Faxed      Date of Patient follow up with Physician:  21    Functional scale:  LEFS: raw score = 64; dysfunction = 20-39%    Progress Note: []  Yes  [x]  No  Next due by: Visit #10       Latex Allergy:  [x]NO      []YES  Preferred Language for Healthcare:   [x]English       []other:    Visit # Insurance Allowable Date Range (if applicable)    Med [facility]      Pain level:  1-2/10 R knee ; 0/10 L Knee    SUBJECTIVE:    Pt reports she feels that her knee pain is decreasing by performing HEP, however, she is still having a decent amount of buckling on a daily basis     OBJECTIVE:      RESTRICTIONS/PRECAUTIONS:     Exercises/Interventions: all exercises performed bilaterally unless otherwise noted    Therapeutic Exercises  (36512) Resistance / level Sets/sec Reps Notes   Upright Bike 2 5'     IB - Calf Stretch  30\" 3    Seated - HS Stretch  30\" 2    Prone Quad Stretch  30\" 2 Towel under R knee          Quad Sets     SAQ 2# 5\" hold 15    Supine Hip Flexion  2 10    Sidelying Hip Abduction   2 10 No wt, cues for positioning   Clamshells npv      LAQ     HR     Table Bridges    EOB Hip extension Bilat,                  Therapeutic Activities (67493)       Sit<>Stand from standard chair+ 1 Airex  Max VCs and tactile cueing for performance; cues to facilitate glute/quad and avoid trunk flexion.   Limited by bilat knee pain LE, proximal hip, and core control with self care, mobility, lifting, ambulation. [] UE / Cervical: cervical, postural, scapular, scapulothoracic and UE control with self care, reaching, carrying, lifting, house/yardwork, driving, computer work. [x]  (49525) Provided verbal/tactile cueing for activities related to improving balance, coordination, kinesthetic sense, posture, motor skill, proprioception to assist with   [x] LE / lumbar: LE, proximal hip, and core control in self care, mobility, lifting, ambulation and eccentric single leg control. [] UE / cervical: cervical, scapular, scapulothoracic and UE control with self care, reaching, carrying, lifting, house/yardwork, driving, computer work.      NMR and Therapeutic Activities:    [x]  (52527 or 90391) Provided verbal/tactile cueing for activities related to improving balance, coordination, kinesthetic sense, posture, motor skill, proprioception and motor activation to allow for proper function of   [x] LE: / Lumbar core, proximal hip and LE with self care and ADLs  [] UE / Cervical: cervical, postural, scapular, scapulothoracic and UE control with self care, carrying, lifting, driving, computer work.   [] (16962) Gait Re-education- Provided training and instruction to the patient for proper LE, core and proximal hip recruitment and positioning and eccentric body weight control with ambulation re-education including up and down stairs     Home Exercise Program:    [x]  (82573) Reviewed/Progressed HEP activities related to strengthening, flexibility, endurance, ROM of   [x] LE / Lumbar: core, proximal hip and LE for functional self-care, mobility, lifting and ambulation/stair navigation   [] UE / Cervical: cervical, postural, scapular, scapulothoracic and UE control with self care, reaching, carrying, lifting, house/yardwork, driving, computer work  []  (22282)Reviewed/Progressed HEP activities related to improving balance, coordination, kinesthetic sense, posture, motor skill, proprioception of   [] LE: core, proximal hip and LE for self care, mobility, lifting, and ambulation/stair navigation    [] UE / Cervical: cervical, postural,  scapular, scapulothoracic and UE control with self care, reaching, carrying, lifting, house/yardwork, driving, computer work    Manual Treatments:  PROM / STM / Oscillations-Mobs:  G-I, II, III, IV (PA's, Inf., Post.)  []  (73605) Provided manual therapy to mobilize LE, proximal hip and/or LS spine soft tissue/joints for the purpose of modulating pain, promoting relaxation,  increasing ROM, reducing/eliminating soft tissue swelling/inflammation/restriction, improving soft tissue extensibility and allowing for proper ROM for normal function with   [] LE / lumbar: self care, mobility, lifting and ambulation. [] UE / Cervical: self care, reaching, carrying, lifting, house/yardwork, driving, computer work. Modalities:  [] (30607) Vasopneumatic compression: Utilized vasopneumatic compression to decrease edema / swelling for the purpose of improving mobility and quad tone / recruitment which will allow for increased overall function including but not limited to self-care, transfers, ambulation, and ascending / descending stairs. Modalities:  None. Rest helps knee pain the most    Charges:  Timed Code Treatment Minutes: 45   Total Treatment Minutes: 45     [] EVAL - LOW (88419)   [] EVAL - MOD (70610)  [] EVAL - HIGH (34948)  [] RE-EVAL (08165)  [x] TE (37263) x 2    [] Ionto  [] NMR (38642) x      [] Vaso  [] Manual (51668) x      [] Ultrasound  [x] TA x   1    [] Mech Traction (36689)  [] Gait Training x     [] ES (un) (94113):   [] Aquatic therapy x   [] Other:   [] Group:     GOALS:   Short Term Goals: To be achieved in: 2 weeks  1. Independent in HEP and progression per patient tolerance, in order to prevent re-injury. []? Progressing: []? Met: []? Not Met: []? Adjusted  2.  Patient will have a decrease in pain to facilitate improvement in movement, function, and ADLs as indicated by Functional Deficits. []? Progressing: []? Met: []? Not Met: []? Adjusted     Long Term Goals: To be achieved in: 6 weeks  1. Disability index score of 0% or less for the LEFS to assist with reaching prior level of function. []? Progressing: []? Met: []? Not Met: []? Adjusted  2. Patient will demonstrate increased AROM to 120 degrees to allow for proper joint functioning as indicated by patients Functional Deficits. []? Progressing: []? Met: []? Not Met: []? Adjusted  3. Patient will demonstrate an increase in Strength to at least 4+/5 as well as good proximal hip strength and control to allow for proper functional mobility as indicated by patients Functional Deficits. []? Progressing: []? Met: []? Not Met: []? Adjusted  4. Patient will return to functional activities including prolonged ambulation of greater than or equal to 20 minutes  without increased symptoms or restriction. []? Progressing: []? Met: []? Not Met: []? Adjusted  Progression Towards Functional goals:  [] Patient is progressing as expected towards functional goals listed. [] Progression is slowed due to complexities listed. [] Progression has been slowed due to co-morbidities. [x] Plan just implemented, too soon to assess goals progression  [] Other:     Persisting Functional Limitations/Impairments:  []Sleeping []Sitting               [x]Standing [x]Transfers        [x]Walking []Kneeling               [x]Stairs [x]Squatting / bending   [x]ADLs []Reaching  []Lifting  []Housework  []Driving []Job related tasks  []Sports/Recreation []Other:        ASSESSMENT:  Patient with decreased knee pain with correction of mechanics for step ups after cueing. Pt encouraged to perform steps with corrected pattern at home.  Pt consistent compensates with femoral IR/ADD or weight on toes throughout session requiring cues for improving alignment and decreasing pressure on B knee

## 2020-09-08 ENCOUNTER — OFFICE VISIT (OUTPATIENT)
Dept: CARDIOLOGY CLINIC | Age: 70
End: 2020-09-08
Payer: MEDICARE

## 2020-09-08 VITALS
HEIGHT: 69 IN | SYSTOLIC BLOOD PRESSURE: 140 MMHG | DIASTOLIC BLOOD PRESSURE: 78 MMHG | BODY MASS INDEX: 32.29 KG/M2 | OXYGEN SATURATION: 98 % | WEIGHT: 218 LBS | HEART RATE: 69 BPM

## 2020-09-08 PROCEDURE — G8400 PT W/DXA NO RESULTS DOC: HCPCS | Performed by: NURSE PRACTITIONER

## 2020-09-08 PROCEDURE — 4040F PNEUMOC VAC/ADMIN/RCVD: CPT | Performed by: NURSE PRACTITIONER

## 2020-09-08 PROCEDURE — G8427 DOCREV CUR MEDS BY ELIG CLIN: HCPCS | Performed by: NURSE PRACTITIONER

## 2020-09-08 PROCEDURE — 99214 OFFICE O/P EST MOD 30 MIN: CPT | Performed by: NURSE PRACTITIONER

## 2020-09-08 PROCEDURE — 93000 ELECTROCARDIOGRAM COMPLETE: CPT | Performed by: NURSE PRACTITIONER

## 2020-09-08 PROCEDURE — 1123F ACP DISCUSS/DSCN MKR DOCD: CPT | Performed by: NURSE PRACTITIONER

## 2020-09-08 PROCEDURE — 1090F PRES/ABSN URINE INCON ASSESS: CPT | Performed by: NURSE PRACTITIONER

## 2020-09-08 PROCEDURE — 3017F COLORECTAL CA SCREEN DOC REV: CPT | Performed by: NURSE PRACTITIONER

## 2020-09-08 PROCEDURE — 1036F TOBACCO NON-USER: CPT | Performed by: NURSE PRACTITIONER

## 2020-09-08 PROCEDURE — G8417 CALC BMI ABV UP PARAM F/U: HCPCS | Performed by: NURSE PRACTITIONER

## 2020-09-08 NOTE — PATIENT INSTRUCTIONS
- Echocardiogram   - Keep log of BP and call if BP >150/90  - No medication changes  - Follow up in 6 months or sooner if symptoms develop

## 2020-09-08 NOTE — PROGRESS NOTES
[facility]   Electrophysiology  Lennie Fonseca, APRN-CNP  Attending EP: Dr. Aziza Hernadez   Date: 9/8/2020  I had the privilege of visiting Judith Saleh in the office. Chief Complaint:   Chief Complaint   Patient presents with    Hypertension     no complaints     Follow-up     6 m0      History of Present Illness: History obtained from patient and medical record. Judith Saleh is 78 y.o. female with a past medical history of HTN, DM, thyroid nodule, and atrial fibrillation. She was originally treated for afib in 7/2017 and followed with cardiologist at [facility]. She was dx with uterine CA and underwent chemo. S/p DCCV 8/2017 and 9/2017. Diagnosed with FIGO Stage IA (pT1a(2), N0, M0) serous carcinoma of the endometrium, status post TLH/BSO and five of a planned six cycles of adjuvant chemotherapy completed 08/28/2018. She had episode of syncope while receiving chemo in 7/2018. She had severe retal bleeding 3/26/2019 requiring 4 units PRBCs and her Xarelto was stopped. S/p DCCV 9/2019. Resumed Xarelto 12/2019. Interval history: Today, Judith Saleh is being seen for routine follow up for afib and HTN. She has not had known recurrence of AF. Denies palpitations, CP, SOB, peripheral edema, or dizziness. She remains active and walks daily without difficulty. Denies having chest pain, palpitations, shortness of breath, orthopnea/PND, cough, or dizziness at the time of this visit. With regard to medication therapy the patient has been compliant with prescribed regimen. She has tolerated therapy to date. Allergies: Allergies   Allergen Reactions    Naproxen Swelling    Lisinopril Rash     Home Medications:  Prior to Visit Medications    Medication Sig Taking?  Authorizing Provider   metoprolol succinate (TOPROL XL) 50 MG extended release tablet TAKE ONE TABLET BY MOUTH DAILY  Carlos Pruitt MD   amLODIPine (NORVASC) 10 MG tablet Take 1 tablet by mouth daily  Adilene Nakul Baez MD   rivaroxaban (XARELTO) 20 MG TABS tablet Take 1 tablet by mouth daily (with breakfast)  Chauncey Olszewski, MD   hydroCHLOROthiazide (HYDRODIURIL) 25 MG tablet TAKE ONE TABLET BY MOUTH DAILY  Chauncey Olszewski, MD   methIMAzole (TAPAZOLE) 10 MG tablet Take one tab by mouth daily. Daksha Weller MD   Multiple Vitamins-Minerals (THERAPEUTIC MULTIVITAMIN-MINERALS) tablet Take 1 tablet by mouth daily  Historical Provider, MD      Past Medical History:  Past Medical History:   Diagnosis Date    Anal bleeding 2019    CT scan clear     Anemia     Atrial fibrillation ([facility] [address]     cleared by cardiologist 2018, was related to cancer     Colon polyps     Diabetes mellitus ([facility] [address]     pre diabetic diet controlled    Endometrial cancer ([facility] [address]     Hypertension     IFG (impaired fasting glucose)     Small bowel obstruction (HCC)     resolved without surgery    Thyroid nodule      Past Surgical History:    has a past surgical history that includes Tubal ligation; Cardioversion; davy and bso (cervix removed); TUNNELED CENTRAL VENOUS CATHETER W/ SUBCUTANEOUS PORT (Right); Colonoscopy (N/A, 3/14/2019); Colonoscopy (3/14/2019); Colonoscopy (N/A, 3/26/2019); sigmoidoscopy (N/A, 9/10/2019); and Cardioversion (09/19/2019). Social History:  Reviewed. reports that she has never smoked. She has never used smokeless tobacco. She reports current alcohol use. She reports that she does not use drugs. Family History:  Reviewed. family history includes Colon Cancer in her maternal grandmother; Hypertension in her mother. Denies family history of sudden cardiac death, arrhythmia, premature CAD    Review of System:  · Constitutional: No fevers, chills, weight changes or weakness  · HEENT: No visual changes. No mouth sores or sore throat. · Cardiovascular: denies chest pain, denies dyspnea on exertion, denies palpitations or denies loss of consciousness.  No cough, hemoptysis, denies pleuritic pain, or 2019     BMP:   Lab Results   Component Value Date    CREATININE 0.8 2020    BUN 13 2020     2020    K 3.8 2020    CL 97 (L) 2020    CO2 30 2020     CrCl cannot be calculated (Patient's most recent lab result is older than the maximum 120 days allowed. ). No results found for: BNP    Thyroid:   Lab Results   Component Value Date    TSH <0.01 (L) 2020    C7TIRUH 1.73 2020     Lipid Panel:   Lab Results   Component Value Date    CHOL 187 2020    HDL 87 2020    TRIG 46 2020     LFTs:  Lab Results   Component Value Date    ALT 20 2020    AST 26 2020    ALKPHOS 118 2020    BILITOT 0.4 2020     Coags:   Lab Results   Component Value Date    PROTIME 21.1 (H) 2019    INR 1.85 (H) 2019    APTT 77.5 (H) 2019     EC2020 SR RBBB HR 68, , , QTc 470    Echo: 2018  Summary:  Overall left ventricular ejection fraction is estimated to be 50-55%. The left ventricular function is low normal.  The left ventricular wall motion is normal.  The left atrium is mildly dilated. There is mild to moderate mitral regurgitation. Mild pulmonic valvular regurgitation. Moderate tricuspid regurgitation is present. The right atrium is mildly dilated. Trace aortic regurgitation. The estimated right ventricular systolic pressure is consistent with  moderate pulmonary hypertension. Assessment:  Persistent Atrial Fibrillation  - ECG today shows SR  - On Toprol 50 mg daily   - [id number] score: 4 (age, gender, HTN., DM); [id number] Vasc score and anticoagulation discussed. High risk for stroke and thromboembolism. Anticoagulation is recommended.   ~ On Xarelto 20 mg daily,  Denies s/s of bleeding  - Afib risk factors including age, HTN, obesity, inactivity and SATISH were discussed with patient.  Risk factor modification recommended   ~ TSH <0.01 (2020)     - Treatment options including cardioversion, >140/90: N/A  3. Documentation to PCP: Note sent to PCP office visit  4. CAD patient on anti-platelet: N/A  5.   CAD patient on STATIN therapy: N/A  6. Patient with history of CHF and atrial fibrillation on anticoagulation: yes     I have addressed the patient's cardiac risk factors and adjusted pharmacologic treatment as needed. In addition, I have reinforced the need for patient directed risk factor modification. I independently reviewed the ECG    All questions and concerns were addressed with the patient. Alternatives to treatment were discussed. Thank you for allowing to us to participate in the care of [facility].     MARLENE Dumas-CNP  [address]   Office: [phone]

## 2020-09-09 ENCOUNTER — HOSPITAL ENCOUNTER (OUTPATIENT)
Dept: PHYSICAL THERAPY | Age: 70
Setting detail: THERAPIES SERIES
Discharge: HOME | End: 2020-09-09
Payer: MEDICARE

## 2020-09-09 PROCEDURE — 97530 THERAPEUTIC ACTIVITIES: CPT

## 2020-09-09 PROCEDURE — 97110 THERAPEUTIC EXERCISES: CPT

## 2020-09-09 NOTE — FLOWSHEET NOTE
[address] [facility]    Physical Therapy Daily Treatment Note  Date:  2020    Patient Name:  John Webb    :  1950  MRN: [id number]  Medical/Treatment Diagnosis Information:  · Diagnosis: M25.561, M25.562, G89.29 (ICD-10-CM) - Chronic pain of both knees  · Treatment Diagnosis: Decreased Knee AROM, Decreased Strength, Decreased Balance/Proprioception  Insurance/Certification information:  PT Insurance Information: [facility] ([employer] [facility])  Physician Information:  Referring Practitioner: Dr. Lenka Gaytan of care signed (Y/N): Faxed      Date of Patient follow up with Physician:  21    Functional scale:  LEFS: raw score = 64; dysfunction = 20-39%    Progress Note: []  Yes  [x]  No  Next due by: Visit #10       Latex Allergy:  [x]NO      []YES  Preferred Language for Healthcare:   [x]English       []other:    Visit # Insurance Allowable Date Range (if applicable)    [employer]      Pain level:  1-2/10 R knee ; 0/10 L Knee    SUBJECTIVE:  Pt reports she has decreased overall pain, and the buckling in her legs has decreased as well. Stairs have become much easier and pain free with the correction of her LE alignment. Most difficult task is walking down stairs at this time.      OBJECTIVE:      RESTRICTIONS/PRECAUTIONS:     Exercises/Interventions: all exercises performed bilaterally unless otherwise noted    Therapeutic Exercises  (10764) Resistance / level Sets/sec Reps Notes   Upright Bike 2 5'  Seat 7   IB - Calf Stretch  30\" 2    Seated - HS Stretch  30\" 2    Prone Quad Stretch  30\" 2 Towel under R knee          Quad Sets     SAQ 2# 5\" hold 15    Supine Hip Flexion 1# 2 10    Sidelying Hip Abduction  1# 2 10 No wt, cues for positioning   LAQ     HR     Table Bridges    EOB Hip extension Bilat,    Leg Press - DL 90# 3 10                  Therapeutic Activities (11924)       Sit<>Stand from standard chair  1 10 Much improved, cues to perform with good alignment and w/o UE support   Step Ups - Fwd VCs for knee alignment avoiding femoral IR/ADD to promote improved recruitment of glute/quad; no pain with correction    Step Up & Over 6\" 1 10 VCs for alignment on descend avoid femoral IR/ADD; decreases pain                         Neuromuscular Re-ed (39956)       BOSU Lunges Foam roll for assist as needed for balance, cues for glute and quad activation   Band side stepping for glute med activation in stance Cues for feet facing forward   SLS                     Manual Intervention (98247)                                                     Pt. Education:  -pt educated on diagnosis, prognosis and expectations for rehab  -all pt questions were answered    HEP instruction:  -pt provided with written and illustrated instructions for HEP (see scanned image in )  8/25/20: The following exercises were performed and added to the pt's home program (educated on appropriate frequency, intensity and duration etc.): Supine SLR, SL hip aBd, Knee flexion with towel, Seated Hip aBd with TB. Distributed HO and TB     Access Code: [email or web address]  URL: [email or web address]   Date: 08/28/2020   Prepared by: Dayron Fiore     Exercises   Standing Gastroc Stretch on Step - 3 reps - 1 sets - 30s hold - 3x daily - 7x weekly   Seated Hamstring Stretch - 3 reps - 1 sets - 30s hold - 3x daily - 7x weekly   Supine Quad Set - 10 reps - 2 sets - 10s hold - 1x daily - 7x weekly   Supine Active Straight Leg Raise - 10 reps - 3 sets - 1x daily - 7x weekly   Sidelying Hip Abduction - 10 reps - 3 sets - 1x daily - 7x weekly   Seated Long Arc Quad - 10 reps - 3 sets - 1x daily - 7x weekly   Standing Heel Raise with Support - 10 reps - 3 sets - 1x daily - 7x weekly     Therapeutic Exercise and NMR EXR  [x]  (04063) Provided verbal/tactile cueing for activities related to strengthening, flexibility, endurance, ROM for improvements in  [x] LE / Lumbar: LE, proximal hip, and core control with self care, mobility, lifting, ambulation. [] UE / Cervical: cervical, postural, scapular, scapulothoracic and UE control with self care, reaching, carrying, lifting, house/yardwork, driving, computer work. [x]  (99250) Provided verbal/tactile cueing for activities related to improving balance, coordination, kinesthetic sense, posture, motor skill, proprioception to assist with   [x] LE / lumbar: LE, proximal hip, and core control in self care, mobility, lifting, ambulation and eccentric single leg control. [] UE / cervical: cervical, scapular, scapulothoracic and UE control with self care, reaching, carrying, lifting, house/yardwork, driving, computer work.      NMR and Therapeutic Activities:    [x]  (50456 or 41287) Provided verbal/tactile cueing for activities related to improving balance, coordination, kinesthetic sense, posture, motor skill, proprioception and motor activation to allow for proper function of   [x] LE: / Lumbar core, proximal hip and LE with self care and ADLs  [] UE / Cervical: cervical, postural, scapular, scapulothoracic and UE control with self care, carrying, lifting, driving, computer work.   [] (77775) Gait Re-education- Provided training and instruction to the patient for proper LE, core and proximal hip recruitment and positioning and eccentric body weight control with ambulation re-education including up and down stairs     Home Exercise Program:    [x]  (96409) Reviewed/Progressed HEP activities related to strengthening, flexibility, endurance, ROM of   [x] LE / Lumbar: core, proximal hip and LE for functional self-care, mobility, lifting and ambulation/stair navigation   [] UE / Cervical: cervical, postural, scapular, scapulothoracic and UE control with self care, reaching, carrying, lifting, house/yardwork, driving, computer work  []  (61521)Reviewed/Progressed HEP activities related to improving balance, coordination, kinesthetic sense, posture, motor skill, proprioception of   [] LE: core, proximal hip and LE for self care, mobility, lifting, and ambulation/stair navigation    [] UE / Cervical: cervical, postural,  scapular, scapulothoracic and UE control with self care, reaching, carrying, lifting, house/yardwork, driving, computer work    Manual Treatments:  PROM / STM / Oscillations-Mobs:  G-I, II, III, IV (PA's, Inf., Post.)  []  (02616) Provided manual therapy to mobilize LE, proximal hip and/or LS spine soft tissue/joints for the purpose of modulating pain, promoting relaxation,  increasing ROM, reducing/eliminating soft tissue swelling/inflammation/restriction, improving soft tissue extensibility and allowing for proper ROM for normal function with   [] LE / lumbar: self care, mobility, lifting and ambulation. [] UE / Cervical: self care, reaching, carrying, lifting, house/yardwork, driving, computer work. Modalities:  [] (22065) Vasopneumatic compression: Utilized vasopneumatic compression to decrease edema / swelling for the purpose of improving mobility and quad tone / recruitment which will allow for increased overall function including but not limited to self-care, transfers, ambulation, and ascending / descending stairs. Modalities:  None. Rest helps knee pain the most    Charges:  Timed Code Treatment Minutes: 42   Total Treatment Minutes: 42     [] EVAL - LOW (07610)   [] EVAL - MOD (03674)  [] EVAL - HIGH (04027)  [] RE-EVAL (62991)  [x] TE (78670) x 2    [] Ionto  [] NMR (20040) x      [] Vaso  [] Manual (31001) x      [] Ultrasound  [x] TA x   1    [] Mech Traction (20398)  [] Gait Training x     [] ES (un) (64087):   [] Aquatic therapy x   [] Other:   [] Group:     GOALS:   Short Term Goals: To be achieved in: 2 weeks  1. Independent in HEP and progression per patient tolerance, in order to prevent re-injury. []? Progressing: []? Met: []? Not Met: []? Adjusted  2.  Patient will have a decrease in pain to facilitate improvement in movement, function, and ADLs as indicated by Functional Deficits. []? Progressing: []? Met: []? Not Met: []? Adjusted     Long Term Goals: To be achieved in: 6 weeks  1. Disability index score of 0% or less for the LEFS to assist with reaching prior level of function. []? Progressing: []? Met: []? Not Met: []? Adjusted  2. Patient will demonstrate increased AROM to 120 degrees to allow for proper joint functioning as indicated by patients Functional Deficits. []? Progressing: []? Met: []? Not Met: []? Adjusted  3. Patient will demonstrate an increase in Strength to at least 4+/5 as well as good proximal hip strength and control to allow for proper functional mobility as indicated by patients Functional Deficits. []? Progressing: []? Met: []? Not Met: []? Adjusted  4. Patient will return to functional activities including prolonged ambulation of greater than or equal to 20 minutes  without increased symptoms or restriction. []? Progressing: []? Met: []? Not Met: []? Adjusted  Progression Towards Functional goals:  [] Patient is progressing as expected towards functional goals listed. [] Progression is slowed due to complexities listed. [] Progression has been slowed due to co-morbidities. [x] Plan just implemented, too soon to assess goals progression  [] Other:     Persisting Functional Limitations/Impairments:  []Sleeping []Sitting               [x]Standing [x]Transfers        [x]Walking []Kneeling               [x]Stairs [x]Squatting / bending   [x]ADLs []Reaching  []Lifting  []Housework  []Driving []Job related tasks  []Sports/Recreation []Other:        ASSESSMENT:  Patient with decreased knee pain with correction of mechanics for step downs after cueing. Pt encouraged to perform steps with corrected pattern at home. Pt consistent compensates with femoral IR/ADD or weight on toes throughout session requiring cues for improving alignment and decreasing pressure on B knee joints.  Much improved and independent sit<>stand transitions following cueing. Progressed to LE strengthening via leg press this date with appropriate muscle work. Pt cont to benefit from skilled PT for proximal hip strengthening, quad strengthening and work to restore proper proprioception. Treatment/Activity Tolerance:  [x] Patient able to complete tx  [] Patient limited by fatique  [] Patient limited by pain  [] Patient limited by other medical complications  [] Other:     Prognosis: [x] Good [] Fair  [] Poor    Patient Requires Follow-up: [x] Yes  [] No    PLAN: See eval. PT 2x / week for 6 weeks.    [x] Continue per plan of care [] Alter current plan (see comments)  [] Plan of care initiated [] Hold pending MD visit [] Discharge    Electronically signed by: Gabino Beltran PT, DPT

## 2020-09-11 ENCOUNTER — HOSPITAL ENCOUNTER (OUTPATIENT)
Dept: PHYSICAL THERAPY | Age: 70
Setting detail: THERAPIES SERIES
Discharge: HOME | End: 2020-09-11
Payer: MEDICARE

## 2020-09-11 PROCEDURE — 97530 THERAPEUTIC ACTIVITIES: CPT

## 2020-09-11 PROCEDURE — 97110 THERAPEUTIC EXERCISES: CPT

## 2020-09-11 NOTE — FLOWSHEET NOTE
[address] [facility]    Physical Therapy Daily Treatment Note  Date:  2020    Patient Name:  Hanna Truong    :  1950  MRN: [id number]  Medical/Treatment Diagnosis Information:  · Diagnosis: M25.561, M25.562, G89.29 (ICD-10-CM) - Chronic pain of both knees  · Treatment Diagnosis: Decreased Knee AROM, Decreased Strength, Decreased Balance/Proprioception  Insurance/Certification information:  PT Insurance Information: [facility] Medicare ([employer] [facility])  Physician Information:  Referring Practitioner: Dr. David Ramos of care signed (Y/N): Faxed      Date of Patient follow up with Physician:  21    Functional scale:  LEFS: raw score = 64; dysfunction = 20-39%    Progress Note: []  Yes  [x]  No  Next due by: Visit #10       Latex Allergy:  [x]NO      []YES  Preferred Language for Healthcare:   [x]English       []other:    Visit # Insurance Allowable Date Range (if applicable)   6 [employer] [facility]      Pain level:  1-2/10 R knee ; 0/10 L Knee    SUBJECTIVE:  Pt reports pain continues to decrease, one episode of buckling in the shower this week. Reports her L hip was hurting, joint pain, after last session for almost two days after adding leg press. Plan to lower weight this date, see response, d/c if pain continues. Pt with history of chemo that can make bones susceptible to easier pain.      OBJECTIVE:      RESTRICTIONS/PRECAUTIONS:     Exercises/Interventions: all exercises performed bilaterally unless otherwise noted    Therapeutic Exercises  (87569) Resistance / level Sets/sec Reps Notes   Upright Bike 2 5'  Seat 7   IB - Calf Stretch  30\" 2    Seated - HS Stretch  30\" 2    Prone Quad Stretch  30\" 2 Towel under R knee          Quad Sets     SAQ    Prone TKE  10\" 10    Supine Hip Flexion 1# 2 10  - inc weight npv   Sidelying Hip Abduction  1# 2 10  - inc weight npv    LAQ     HR     Table Bridges    EOB Hip extension Bilat,    Leg Press - DL 70# 3 10 9/11 - dec weight due to new onset of hip pain from last session                  Therapeutic Activities ()       Sit<>Stand from standard chair  Much improved, cues to perform with good alignment and w/o UE support   Step Ups - Fwd VCs for knee alignment avoiding femoral IR/ADD to promote improved recruitment of glute/quad; no pain with correction    Step Up & Over VCs for alignment on descend avoid femoral IR/ADD; decreases pain    Side Stepping - TB Blue at distal thighs 20 feet 2 laps                  Neuromuscular Re-ed (87371)       BOSU Lunges Foam roll for assist as needed for balance, cues for glute and quad activation   Tandem Stance on AirEx  20\" 2 each    SLS on Floor npv             Manual Intervention ([phone])                                                     Pt. Education:  -pt educated on diagnosis, prognosis and expectations for rehab  -all pt questions were answered    HEP instruction:  -pt provided with written and illustrated instructions for HEP (see scanned image in )  8/25/20: The following exercises were performed and added to the pt's home program (educated on appropriate frequency, intensity and duration etc.): Supine SLR, SL hip aBd, Knee flexion with towel, Seated Hip aBd with TB. Distributed HO and TB     Access Code: [email or web address]  URL: [email or web address]   Date: 08/28/2020   Prepared by: Elias Taylor     Exercises   Standing Gastroc Stretch on Step - 3 reps - 1 sets - 30s hold - 3x daily - 7x weekly   Seated Hamstring Stretch - 3 reps - 1 sets - 30s hold - 3x daily - 7x weekly   Supine Quad Set - 10 reps - 2 sets - 10s hold - 1x daily - 7x weekly   Supine Active Straight Leg Raise - 10 reps - 3 sets - 1x daily - 7x weekly   Sidelying Hip Abduction - 10 reps - 3 sets - 1x daily - 7x weekly   Seated Long Arc Quad - 10 reps - 3 sets - 1x daily - 7x weekly   Standing Heel Raise with Support - 10 reps - 3 sets - 1x daily - 7x weekly     Date: 09/11/2020 (same code)  Exercises    Prone Quadriceps Stretch with Strap - 5 reps - 30s hold - 1-2x daily - 7x weekly   Supine Bridge - 10 reps - 3 sets - 1x daily - 7x weekly   Prone Terminal Knee Extension - 10 reps - 10s hold - 1x daily - 7x weekly   Step Up - 10 reps - 3 sets - 1x daily - 7x weekly   Forward Step Down - 10 reps - 3 sets - 1x daily - 7x weekly   Side Stepping with Resistance at Thighs - 10 reps - 3 sets - 1x daily - 7x weekly     Therapeutic Exercise and NMR EXR  [x]  (05033) Provided verbal/tactile cueing for activities related to strengthening, flexibility, endurance, ROM for improvements in  [x] LE / Lumbar: LE, proximal hip, and core control with self care, mobility, lifting, ambulation. [] UE / Cervical: cervical, postural, scapular, scapulothoracic and UE control with self care, reaching, carrying, lifting, house/yardwork, driving, computer work. [x]  (52366) Provided verbal/tactile cueing for activities related to improving balance, coordination, kinesthetic sense, posture, motor skill, proprioception to assist with   [x] LE / lumbar: LE, proximal hip, and core control in self care, mobility, lifting, ambulation and eccentric single leg control. [] UE / cervical: cervical, scapular, scapulothoracic and UE control with self care, reaching, carrying, lifting, house/yardwork, driving, computer work.      NMR and Therapeutic Activities:    [x]  (17056 or 52527) Provided verbal/tactile cueing for activities related to improving balance, coordination, kinesthetic sense, posture, motor skill, proprioception and motor activation to allow for proper function of   [x] LE: / Lumbar core, proximal hip and LE with self care and ADLs  [] UE / Cervical: cervical, postural, scapular, scapulothoracic and UE control with self care, carrying, lifting, driving, computer work.   [] (70859) Gait Re-education- Provided training and instruction to the patient for proper LE, core and proximal hip recruitment and positioning and eccentric body weight control with ambulation re-education including up and down stairs     Home Exercise Program:    [x]  (70662) Reviewed/Progressed HEP activities related to strengthening, flexibility, endurance, ROM of   [x] LE / Lumbar: core, proximal hip and LE for functional self-care, mobility, lifting and ambulation/stair navigation   [] UE / Cervical: cervical, postural, scapular, scapulothoracic and UE control with self care, reaching, carrying, lifting, house/yardwork, driving, computer work  []  (59972)Reviewed/Progressed HEP activities related to improving balance, coordination, kinesthetic sense, posture, motor skill, proprioception of   [] LE: core, proximal hip and LE for self care, mobility, lifting, and ambulation/stair navigation    [] UE / Cervical: cervical, postural,  scapular, scapulothoracic and UE control with self care, reaching, carrying, lifting, house/yardwork, driving, computer work    Manual Treatments:  PROM / STM / Oscillations-Mobs:  G-I, II, III, IV (PA's, Inf., Post.)  []  (22951) Provided manual therapy to mobilize LE, proximal hip and/or LS spine soft tissue/joints for the purpose of modulating pain, promoting relaxation,  increasing ROM, reducing/eliminating soft tissue swelling/inflammation/restriction, improving soft tissue extensibility and allowing for proper ROM for normal function with   [] LE / lumbar: self care, mobility, lifting and ambulation. [] UE / Cervical: self care, reaching, carrying, lifting, house/yardwork, driving, computer work. Modalities:  [] (58527) Vasopneumatic compression: Utilized vasopneumatic compression to decrease edema / swelling for the purpose of improving mobility and quad tone / recruitment which will allow for increased overall function including but not limited to self-care, transfers, ambulation, and ascending / descending stairs. Modalities:  None.   Rest helps knee pain the most    Charges:  Timed Code Treatment Minutes: 64 Total Treatment Minutes: 42     [] EVAL - LOW (48228)   [] EVAL - MOD (25944)  [] EVAL - HIGH (47803)  [] RE-EVAL (24039)  [x] TE (40608) x 2    [] Ionto  [] NMR (65066) x      [] Vaso  [] Manual (33494) x      [] Ultrasound  [x] TA x   1    [] Mech Traction (61707)  [] Gait Training x     [] ES (un) (68040):   [] Aquatic therapy x   [] Other:   [] Group:     GOALS:   Short Term Goals: To be achieved in: 2 weeks  1. Independent in HEP and progression per patient tolerance, in order to prevent re-injury. []? Progressing: []? Met: []? Not Met: []? Adjusted  2. Patient will have a decrease in pain to facilitate improvement in movement, function, and ADLs as indicated by Functional Deficits. []? Progressing: []? Met: []? Not Met: []? Adjusted     Long Term Goals: To be achieved in: 6 weeks  1. Disability index score of 0% or less for the LEFS to assist with reaching prior level of function. []? Progressing: []? Met: []? Not Met: []? Adjusted  2. Patient will demonstrate increased AROM to 120 degrees to allow for proper joint functioning as indicated by patients Functional Deficits. []? Progressing: []? Met: []? Not Met: []? Adjusted  3. Patient will demonstrate an increase in Strength to at least 4+/5 as well as good proximal hip strength and control to allow for proper functional mobility as indicated by patients Functional Deficits. []? Progressing: []? Met: []? Not Met: []? Adjusted  4. Patient will return to functional activities including prolonged ambulation of greater than or equal to 20 minutes  without increased symptoms or restriction. []? Progressing: []? Met: []? Not Met: []? Adjusted  Progression Towards Functional goals:  [] Patient is progressing as expected towards functional goals listed. [] Progression is slowed due to complexities listed. [] Progression has been slowed due to co-morbidities.   [x] Plan just implemented, too soon to assess goals progression  [] Other:     Persisting Functional Limitations/Impairments:  []Sleeping []Sitting               [x]Standing [x]Transfers        [x]Walking []Kneeling               [x]Stairs [x]Squatting / bending   [x]ADLs []Reaching  []Lifting  []Housework  []Driving []Job related tasks  []Sports/Recreation []Other:        ASSESSMENT:  Performed leg press with decreased weight after reports of deep hip joint pain following last session. Cont to progress quad/gluteal strength as tolerated secondary to history of chemo. LEs improving in strength and control. Pt cont to benefit from skilled PT for proximal hip strengthening, quad strengthening and work to restore proper proprioception. Treatment/Activity Tolerance:  [x] Patient able to complete tx  [] Patient limited by fatique  [] Patient limited by pain  [] Patient limited by other medical complications  [] Other:     Prognosis: [x] Good [] Fair  [] Poor    Patient Requires Follow-up: [x] Yes  [] No    PLAN: See eval. PT 2x / week for 6 weeks.    [x] Continue per plan of care [] Alter current plan (see comments)  [] Plan of care initiated [] Hold pending MD visit [] Discharge    Electronically signed by: Jackie Del Toro PT, DPT

## 2020-09-16 ENCOUNTER — HOSPITAL ENCOUNTER (OUTPATIENT)
Dept: PHYSICAL THERAPY | Age: 70
Setting detail: THERAPIES SERIES
Discharge: HOME | End: 2020-09-16
Payer: MEDICARE

## 2020-09-16 ENCOUNTER — HOSPITAL ENCOUNTER (OUTPATIENT)
Age: 70
Discharge: HOME | End: 2020-09-16
Payer: MEDICARE

## 2020-09-16 LAB
T3 TOTAL: 1.06 NG/ML (ref 0.8–2)
T4 FREE: 0.9 NG/DL (ref 0.9–1.8)
TSH SERPL DL<=0.05 MIU/L-ACNC: 0.51 UIU/ML (ref 0.27–4.2)

## 2020-09-16 PROCEDURE — 36415 COLL VENOUS BLD VENIPUNCTURE: CPT

## 2020-09-16 PROCEDURE — 97110 THERAPEUTIC EXERCISES: CPT

## 2020-09-16 PROCEDURE — 84480 ASSAY TRIIODOTHYRONINE (T3): CPT

## 2020-09-16 PROCEDURE — 84439 ASSAY OF FREE THYROXINE: CPT

## 2020-09-16 PROCEDURE — 97530 THERAPEUTIC ACTIVITIES: CPT

## 2020-09-16 PROCEDURE — 84443 ASSAY THYROID STIM HORMONE: CPT

## 2020-09-16 NOTE — FLOWSHEET NOTE
[facility] [facility]    Physical Therapy Daily Treatment Note  Date:  2020    Patient Name:  Nba Torre    :  1950  MRN: [id number]  Medical/Treatment Diagnosis Information:  · Diagnosis: M25.561, M25.562, G89.29 (ICD-10-CM) - Chronic pain of both knees  · Treatment Diagnosis: Decreased Knee AROM, Decreased Strength, Decreased Balance/Proprioception  Insurance/Certification information:  PT Insurance Information: [facility] Medicare (Med Nec)  Physician Information:  Referring Practitioner: Dr. Paulina Figueroa of care signed (Y/N):  Faxed      Date of Patient follow up with Physician:  21    Functional scale:  LEFS: raw score = 64; dysfunction = 20-39%    Progress Note: []  Yes  [x]  No  Next due by: Visit #10       Latex Allergy:  [x]NO      []YES  Preferred Language for Healthcare:   [x]English       []other:    Visit # Insurance Allowable Date Range (if applicable)   7 Med Nec      Pain level:  1-2/10 R knee ; 0/10 L Knee    SUBJECTIVE:  Pt      OBJECTIVE:      RESTRICTIONS/PRECAUTIONS:     Exercises/Interventions: all exercises performed bilaterally unless otherwise noted    Therapeutic Exercises  (66263) Resistance / level Sets/sec Reps Notes   Upright Bike 3 5'  Seat 7   IB - Calf Stretch  30\" 2    Seated - HS Stretch  30\" 2    Prone Quad Stretch  30\" 2 Towel under R knee          Quad Sets     SAQ    Prone TKE  10\" 10    Supine Hip Flexion 2# 2 10 ^ - inc weight   Sidelying Hip Abduction  2# 2 10 ^ - inc weight   LAQ     HR     Table Bridges    EOB Hip extension Bilat,    Leg Press - DL 70# 3 10  - dec weight due to new onset of hip pain from last session    Leg Press - SL 35# 3 10    Quantum - Quad Ext 15# 3 10 ^   Quantum - HS Curl 35# 3 10 ^                 Therapeutic Activities (95630)       Sit<>Stand from standard chair  Much improved, cues to perform with good alignment and w/o UE support   Step Ups - Fwd VCs for knee alignment avoiding femoral IR/ADD to promote improved recruitment of glute/quad; no pain with correction    Step Up & Over VCs for alignment on descend avoid femoral IR/ADD; decreases pain    Side Stepping - TB Blue at distal thighs 20 feet 2 laps    [facility] - Full Flight of Steps  10'  Max VCs and PT demo for appropriate alignment, improved ascend; descend falls into femoral ADD          Neuromuscular Re-ed (73751)       BOSU Lunges Foam roll for assist as needed for balance, cues for glute and quad activation   Tandem Stance on AirEx     SLS on Floor npv             Manual Intervention ([phone])                                                     Pt. Education:  -pt educated on diagnosis, prognosis and expectations for rehab  -all pt questions were answered    HEP instruction:  -pt provided with written and illustrated instructions for HEP (see scanned image in )  8/25/20: The following exercises were performed and added to the pt's home program (educated on appropriate frequency, intensity and duration etc.): Supine SLR, SL hip aBd, Knee flexion with towel, Seated Hip aBd with TB. Distributed HO and TB     Access Code: [email or web address]  URL: [email or web address]   Date: 08/28/2020   Prepared by: Bravo Camacho     Exercises   Standing Gastroc Stretch on Step - 3 reps - 1 sets - 30s hold - 3x daily - 7x weekly   Seated Hamstring Stretch - 3 reps - 1 sets - 30s hold - 3x daily - 7x weekly   Supine Quad Set - 10 reps - 2 sets - 10s hold - 1x daily - 7x weekly   Supine Active Straight Leg Raise - 10 reps - 3 sets - 1x daily - 7x weekly   Sidelying Hip Abduction - 10 reps - 3 sets - 1x daily - 7x weekly   Seated Long Arc Quad - 10 reps - 3 sets - 1x daily - 7x weekly   Standing Heel Raise with Support - 10 reps - 3 sets - 1x daily - 7x weekly     Date: 09/11/2020 (same code)  Exercises    Prone Quadriceps Stretch with Strap - 5 reps - 30s hold - 1-2x daily - 7x weekly   Supine Bridge - 10 reps - 3 sets - 1x daily - 7x weekly   Prone Terminal Knee Extension - 10 reps - 10s hold - 1x daily - 7x weekly   Step Up - 10 reps - 3 sets - 1x daily - 7x weekly   Forward Step Down - 10 reps - 3 sets - 1x daily - 7x weekly   Side Stepping with Resistance at Thighs - 10 reps - 3 sets - 1x daily - 7x weekly     Therapeutic Exercise and NMR EXR  [x]  (30039) Provided verbal/tactile cueing for activities related to strengthening, flexibility, endurance, ROM for improvements in  [x] LE / Lumbar: LE, proximal hip, and core control with self care, mobility, lifting, ambulation. [] UE / Cervical: cervical, postural, scapular, scapulothoracic and UE control with self care, reaching, carrying, lifting, house/yardwork, driving, computer work. [x]  (16022) Provided verbal/tactile cueing for activities related to improving balance, coordination, kinesthetic sense, posture, motor skill, proprioception to assist with   [x] LE / lumbar: LE, proximal hip, and core control in self care, mobility, lifting, ambulation and eccentric single leg control. [] UE / cervical: cervical, scapular, scapulothoracic and UE control with self care, reaching, carrying, lifting, house/yardwork, driving, computer work.      NMR and Therapeutic Activities:    [x]  (21449 or 51681) Provided verbal/tactile cueing for activities related to improving balance, coordination, kinesthetic sense, posture, motor skill, proprioception and motor activation to allow for proper function of   [x] LE: / Lumbar core, proximal hip and LE with self care and ADLs  [] UE / Cervical: cervical, postural, scapular, scapulothoracic and UE control with self care, carrying, lifting, driving, computer work.   [] (90460) Gait Re-education- Provided training and instruction to the patient for proper LE, core and proximal hip recruitment and positioning and eccentric body weight control with ambulation re-education including up and down stairs     Home Exercise Program:    [x] (74523)  [x] TE [phone] x 2    [] Ionto  [] NMR (37951) x      [] Vaso  [] Manual (13472) x      [] Ultrasound  [x] TA x   1    [] Mech Traction (00590)  [] Gait Training x     [] ES (un) (63183):   [] Aquatic therapy x   [] Other:   [] Group:     GOALS:   Short Term Goals: To be achieved in: 2 weeks  1. Independent in HEP and progression per patient tolerance, in order to prevent re-injury. []? Progressing: []? Met: []? Not Met: []? Adjusted  2. Patient will have a decrease in pain to facilitate improvement in movement, function, and ADLs as indicated by Functional Deficits. []? Progressing: []? Met: []? Not Met: []? Adjusted     Long Term Goals: To be achieved in: 6 weeks  1. Disability index score of 0% or less for the LEFS to assist with reaching prior level of function. []? Progressing: []? Met: []? Not Met: []? Adjusted  2. Patient will demonstrate increased AROM to 120 degrees to allow for proper joint functioning as indicated by patients Functional Deficits. []? Progressing: []? Met: []? Not Met: []? Adjusted  3. Patient will demonstrate an increase in Strength to at least 4+/5 as well as good proximal hip strength and control to allow for proper functional mobility as indicated by patients Functional Deficits. []? Progressing: []? Met: []? Not Met: []? Adjusted  4. Patient will return to functional activities including prolonged ambulation of greater than or equal to 20 minutes  without increased symptoms or restriction. []? Progressing: []? Met: []? Not Met: []? Adjusted  Progression Towards Functional goals:  [] Patient is progressing as expected towards functional goals listed. [] Progression is slowed due to complexities listed. [] Progression has been slowed due to co-morbidities.   [x] Plan just implemented, too soon to assess goals progression  [] Other:     Persisting Functional Limitations/Impairments:  []Sleeping []Sitting               [x]Standing [x]Transfers        [x]Walking []Kneeling               [x]Stairs [x]Squatting / bending   [x]ADLs []Reaching  []Lifting  []Housework  []Driving []Job related tasks  []Sports/Recreation []Other:        ASSESSMENT:  Cont to progress quad/gluteal strength as tolerated secondary to history of chemo. Utilized machines for quad/HS strengthening. LEs improving in strength and control. Stair navigation performed in HP with continued compensations requiring cueing to avoid femoral IR/ADD and pain. Upon correction, pain eliminated. Pt cont to benefit from skilled PT for proximal hip strengthening, quad strengthening and work to restore proper proprioception. Treatment/Activity Tolerance:  [x] Patient able to complete tx  [] Patient limited by fatique  [] Patient limited by pain  [] Patient limited by other medical complications  [] Other:     Prognosis: [x] Good [] Fair  [] Poor    Patient Requires Follow-up: [x] Yes  [] No    PLAN: See eval. PT 2x / week for 6 weeks.    [x] Continue per plan of care [] Alter current plan (see comments)  [] Plan of care initiated [] Hold pending MD visit [] Discharge    Electronically signed by: Angélica Hilliard PT, DPT

## 2020-09-18 ENCOUNTER — HOSPITAL ENCOUNTER (OUTPATIENT)
Dept: PHYSICAL THERAPY | Age: 70
Setting detail: THERAPIES SERIES
Discharge: HOME | End: 2020-09-18
Payer: MEDICARE

## 2020-09-18 PROCEDURE — 97530 THERAPEUTIC ACTIVITIES: CPT

## 2020-09-18 PROCEDURE — 97112 NEUROMUSCULAR REEDUCATION: CPT

## 2020-09-18 PROCEDURE — 97110 THERAPEUTIC EXERCISES: CPT

## 2020-09-18 NOTE — FLOWSHEET NOTE
[address] [facility]    Physical Therapy Daily Treatment Note  Date:  2020    Patient Name:  Catherine Nolasco    :  1950  MRN: [id number]  Medical/Treatment Diagnosis Information:  · Diagnosis: M25.561, M25.562, G89.29 (ICD-10-CM) - Chronic pain of both knees  · Treatment Diagnosis: Decreased Knee AROM, Decreased Strength, Decreased Balance/Proprioception  Insurance/Certification information:  PT Insurance Information: [facility] Medicare (Med [facility])  Physician Information:  Referring Practitioner: Dr. Nat Williamson of care signed (Y/N): Faxed      Date of Patient follow up with Physician:  21    Functional scale:  LEFS: raw score = 64; dysfunction = 20-39%    Progress Note: []  Yes  [x]  No  Next due by: Visit #10       Latex Allergy:  [x]NO      []YES  Preferred Language for Healthcare:   [x]English       []other:    Visit # Insurance Allowable Date Range (if applicable)   8 Med Nec      Pain level:  0/10 R knee ; 0/10 L Knee    SUBJECTIVE:  Pt reports she was able to go up the stairs this morning without any pain, still has to concentrate on descend to eliminate pain.  Hips were only sore     OBJECTIVE:      RESTRICTIONS/PRECAUTIONS:     Exercises/Interventions: all exercises performed bilaterally unless otherwise noted    Therapeutic Exercises  (66393) Resistance / level Sets/sec Reps Notes   Upright Bike 3 5'  Seat 7   IB - Calf Stretch  30\" 2    Seated - HS Stretch  30\" 2    Prone Quad Stretch  Towel under R knee          Quad Sets     SAQ    Prone TKE  10\" 10    Supine Hip Flexion ^ - inc weight   Sidelying Hip Abduction  ^ - inc weight   LAQ     HR     Table Bridges    EOB Hip extension Bilat,    Leg Press - DL 70# 3 10  - dec weight due to new onset of hip pain from last session    Leg Press - SL 35# 3 10    Quantum - Quad Ext 15# 3 10 ^   Quantum - HS Curl 35# 3 10 ^                 Therapeutic Activities (01592) Sit<>Stand from standard chair  Much improved, cues to perform with good alignment and w/o UE support   Step Ups - Fwd VCs for knee alignment avoiding femoral IR/ADD to promote improved recruitment of glute/quad; no pain with correction    Step Up & Over VCs for alignment on descend avoid femoral IR/ADD; decreases pain    LSU npv      Side Stepping - TB Blue at distal thighs 20 feet 2 laps    [facility] - TB Blue at distal thighs 20 feet 2 laps    [facility] - Full Flight of Steps    Max VCs and PT demo for appropriate alignment, improved ascend; descend falls into femoral ADD          Neuromuscular Re-ed (22931)       BOSU Lunges Foam roll for assist as needed for balance, cues for glute and quad activation   Tandem Stance on AirEx  20\" 2 each    SLS on AirEx  15\" 2 R/L           Manual Intervention ([phone])                                                     Pt. Education:  -pt educated on diagnosis, prognosis and expectations for rehab  -all pt questions were answered    HEP instruction:  -pt provided with written and illustrated instructions for HEP (see scanned image in )  8/25/20: The following exercises were performed and added to the pt's home program (educated on appropriate frequency, intensity and duration etc.): Supine SLR, SL hip aBd, Knee flexion with towel, Seated Hip aBd with TB. Distributed HO and TB     Access Code: [email or web address]  URL: [email or web address]   Date: 08/28/2020   Prepared by: Aga Andino     Exercises   Standing Gastroc Stretch on Step - 3 reps - 1 sets - 30s hold - 3x daily - 7x weekly   Seated Hamstring Stretch - 3 reps - 1 sets - 30s hold - 3x daily - 7x weekly   Supine Quad Set - 10 reps - 2 sets - 10s hold - 1x daily - 7x weekly   Supine Active Straight Leg Raise - 10 reps - 3 sets - 1x daily - 7x weekly   Sidelying Hip Abduction - 10 reps - 3 sets - 1x daily - 7x weekly   Seated Long Arc Quad - 10 reps - 3 sets - 1x daily - 7x weekly   Standing Heel Raise with Support - 10 reps - 3 sets - 1x daily - 7x weekly     Date: 09/11/2020 (same code)  Exercises    Prone Quadriceps Stretch with Strap - 5 reps - 30s hold - 1-2x daily - 7x weekly   Supine Bridge - 10 reps - 3 sets - 1x daily - 7x weekly   Prone Terminal Knee Extension - 10 reps - 10s hold - 1x daily - 7x weekly   Step Up - 10 reps - 3 sets - 1x daily - 7x weekly   Forward Step Down - 10 reps - 3 sets - 1x daily - 7x weekly   Side Stepping with Resistance at Thighs - 10 reps - 3 sets - 1x daily - 7x weekly     Therapeutic Exercise and NMR EXR  [x]  (05744) Provided verbal/tactile cueing for activities related to strengthening, flexibility, endurance, ROM for improvements in  [x] LE / Lumbar: LE, proximal hip, and core control with self care, mobility, lifting, ambulation. [] UE / Cervical: cervical, postural, scapular, scapulothoracic and UE control with self care, reaching, carrying, lifting, house/yardwork, driving, computer work. [x]  (22954) Provided verbal/tactile cueing for activities related to improving balance, coordination, kinesthetic sense, posture, motor skill, proprioception to assist with   [x] LE / lumbar: LE, proximal hip, and core control in self care, mobility, lifting, ambulation and eccentric single leg control. [] UE / cervical: cervical, scapular, scapulothoracic and UE control with self care, reaching, carrying, lifting, house/yardwork, driving, computer work.      NMR and Therapeutic Activities:    [x]  (23348 or 85537) Provided verbal/tactile cueing for activities related to improving balance, coordination, kinesthetic sense, posture, motor skill, proprioception and motor activation to allow for proper function of   [x] LE: / Lumbar core, proximal hip and LE with self care and ADLs  [] UE / Cervical: cervical, postural, scapular, scapulothoracic and UE control with self care, carrying, lifting, driving, computer work.   [] (72977) Gait Re-education- Provided training and instruction to the patient for proper LE, core and proximal hip recruitment and positioning and eccentric body weight control with ambulation re-education including up and down stairs     Home Exercise Program:    [x]  (51000) Reviewed/Progressed HEP activities related to strengthening, flexibility, endurance, ROM of   [x] LE / Lumbar: core, proximal hip and LE for functional self-care, mobility, lifting and ambulation/stair navigation   [] UE / Cervical: cervical, postural, scapular, scapulothoracic and UE control with self care, reaching, carrying, lifting, house/yardwork, driving, computer work  []  (08878)Reviewed/Progressed HEP activities related to improving balance, coordination, kinesthetic sense, posture, motor skill, proprioception of   [] LE: core, proximal hip and LE for self care, mobility, lifting, and ambulation/stair navigation    [] UE / Cervical: cervical, postural,  scapular, scapulothoracic and UE control with self care, reaching, carrying, lifting, house/yardwork, driving, computer work    Manual Treatments:  PROM / STM / Oscillations-Mobs:  G-I, II, III, IV (PA's, Inf., Post.)  []  (12285) Provided manual therapy to mobilize LE, proximal hip and/or LS spine soft tissue/joints for the purpose of modulating pain, promoting relaxation,  increasing ROM, reducing/eliminating soft tissue swelling/inflammation/restriction, improving soft tissue extensibility and allowing for proper ROM for normal function with   [] LE / lumbar: self care, mobility, lifting and ambulation. [] UE / Cervical: self care, reaching, carrying, lifting, house/yardwork, driving, computer work. Modalities:  [] (18515) Vasopneumatic compression: Utilized vasopneumatic compression to decrease edema / swelling for the purpose of improving mobility and quad tone / recruitment which will allow for increased overall function including but not limited to self-care, transfers, ambulation, and ascending / descending stairs.        Modalities: None.  Rest helps knee pain the most    Charges:  Timed Code Treatment Minutes: 43   Total Treatment Minutes: 43     [] EVAL - LOW (34037)   [] EVAL - MOD (96714)  [] EVAL - HIGH (05512)  [] RE-EVAL (53709)  [x] TE (02001) x 1    [] Ionto  [x] NMR (02384) x 1      [] Vaso  [] Manual (80836) x      [] Ultrasound  [x] TA x   1    [] Mech Traction (44886)  [] Gait Training x     [] ES (un) (80403):   [] Aquatic therapy x   [] Other:   [] Group:     GOALS:   Short Term Goals: To be achieved in: 2 weeks  1. Independent in HEP and progression per patient tolerance, in order to prevent re-injury. []? Progressing: []? Met: []? Not Met: []? Adjusted  2. Patient will have a decrease in pain to facilitate improvement in movement, function, and ADLs as indicated by Functional Deficits. []? Progressing: []? Met: []? Not Met: []? Adjusted     Long Term Goals: To be achieved in: 6 weeks  1. Disability index score of 0% or less for the LEFS to assist with reaching prior level of function. []? Progressing: []? Met: []? Not Met: []? Adjusted  2. Patient will demonstrate increased AROM to 120 degrees to allow for proper joint functioning as indicated by patients Functional Deficits. []? Progressing: []? Met: []? Not Met: []? Adjusted  3. Patient will demonstrate an increase in Strength to at least 4+/5 as well as good proximal hip strength and control to allow for proper functional mobility as indicated by patients Functional Deficits. []? Progressing: []? Met: []? Not Met: []? Adjusted  4. Patient will return to functional activities including prolonged ambulation of greater than or equal to 20 minutes  without increased symptoms or restriction. []? Progressing: []? Met: []? Not Met: []? Adjusted  Progression Towards Functional goals:  [] Patient is progressing as expected towards functional goals listed. [] Progression is slowed due to complexities listed. [] Progression has been slowed due to co-morbidities.   [x] Plan just implemented, too soon to assess goals progression  [] Other:     Persisting Functional Limitations/Impairments:  []Sleeping []Sitting               [x]Standing [x]Transfers        [x]Walking []Kneeling               [x]Stairs [x]Squatting / bending   [x]ADLs []Reaching  []Lifting  []Housework  []Driving []Job related tasks  []Sports/Recreation []Other:        ASSESSMENT:  Cont to progress quad/gluteal strength as tolerated secondary to history of chemo. Utilized machines for quad/HS strengthening. LEs improving in strength and control. Stair navigation performed in HP with continued compensations requiring cueing to avoid femoral IR/ADD and pain. Upon correction, pain eliminated. Pt cont to benefit from skilled PT for proximal hip strengthening, quad strengthening and work to restore proper proprioception. Treatment/Activity Tolerance:  [x] Patient able to complete tx  [] Patient limited by fatique  [] Patient limited by pain  [] Patient limited by other medical complications  [] Other:     Prognosis: [x] Good [] Fair  [] Poor    Patient Requires Follow-up: [x] Yes  [] No    PLAN: See eval. PT 2x / week for 6 weeks.    [x] Continue per plan of care [] Alter current plan (see comments)  [] Plan of care initiated [] Hold pending MD visit [] Discharge    Electronically signed by: Arlene Terrell PT, DPT

## 2020-09-23 ENCOUNTER — HOSPITAL ENCOUNTER (OUTPATIENT)
Dept: PHYSICAL THERAPY | Age: 70
Setting detail: THERAPIES SERIES
Discharge: HOME | End: 2020-09-23
Payer: MEDICARE

## 2020-09-23 ENCOUNTER — TELEPHONE (OUTPATIENT)
Dept: ENDOCRINOLOGY | Age: 70
End: 2020-09-23

## 2020-09-23 PROCEDURE — 97530 THERAPEUTIC ACTIVITIES: CPT

## 2020-09-23 PROCEDURE — 97110 THERAPEUTIC EXERCISES: CPT

## 2020-09-23 NOTE — FLOWSHEET NOTE
[facility] [facility]    Physical Therapy Daily Treatment Note  Date:  2020    Patient Name:  Beth Mcmahon    :  1950  MRN: [id number]  Medical/Treatment Diagnosis Information:  · Diagnosis: M25.561, M25.562, G89.29 (ICD-10-CM) - Chronic pain of both knees  · Treatment Diagnosis: Decreased Knee AROM, Decreased Strength, Decreased Balance/Proprioception  Insurance/Certification information:  PT Insurance Information: [facility] Medicare (Med [facility])  Physician Information:  Referring Practitioner: Dr. Angelic Downing of care signed (Y/N): Faxed      Date of Patient follow up with Physician:  21    Functional scale:  LEFS: raw score = 64; dysfunction = 20-39%    Progress Note: []  Yes  [x]  No  Next due by: Visit #10       Latex Allergy:  [x]NO      []YES  Preferred Language for Healthcare:   [x]English       []other:    Visit # Insurance Allowable Date Range (if applicable)   9 Med [facility]      Pain level:  0/10 R knee ; 0/10 L Knee    SUBJECTIVE:  Pt reports she has been pain free since last session, no episodes of buckling, and stair navigation has been much improved.       OBJECTIVE:      RESTRICTIONS/PRECAUTIONS:     Exercises/Interventions: all exercises performed bilaterally unless otherwise noted    Therapeutic Exercises  (54488) Resistance / level Sets/sec Reps Notes   Upright Bike 3 5'  Seat 7   IB - Calf Stretch  30\" 2    Seated - HS Stretch  30\" 2    Prone Quad Stretch  Towel under R knee          Quad Sets     SAQ    Prone TKE     Supine Hip Flexion ^ - inc weight   Sidelying Hip Abduction  ^ - inc weight   LAQ     HR     Table Bridges    EOB Hip extension Bilat,    Leg Press - DL 70# 3 10  - dec weight due to new onset of hip pain from last session    Leg Press - SL 35# 3 10    Quantum - Quad Ext 15# 3 10 ^   Quantum - HS Curl 35# 3 10 ^                 Therapeutic Activities (41173)       Sit<>Stand from standard chair  Much improved, cues to perform with good alignment and w/o UE support   Step Ups - Fwd VCs for knee alignment avoiding femoral IR/ADD to promote improved recruitment of glute/quad; no pain with correction    Step Up & Over VCs for alignment on descend avoid femoral IR/ADD; decreases pain    LSU npv      Side Stepping - TB Blue at distal thighs 20 feet 2 laps    Monster Walks - TB Blue at distal thighs 20 feet 2 laps    [facility] - Full Flight of Steps  10'  Max VCs and PT demo for appropriate alignment, improved ascend; descend falls into femoral ADD   HP - Gym education and tour  10'            Neuromuscular Re-ed (12209)       BOSU Lunges Foam roll for assist as needed for balance, cues for glute and quad activation   Tandem Stance on AirEx  20\" 2 each    SLS on AirEx  15\" 2 R/L           Manual Intervention ([phone])                                                     Pt. Education:  -pt educated on diagnosis, prognosis and expectations for rehab  -all pt questions were answered    HEP instruction:  -pt provided with written and illustrated instructions for HEP (see scanned image in )  8/25/20: The following exercises were performed and added to the pt's home program (educated on appropriate frequency, intensity and duration etc.): Supine SLR, SL hip aBd, Knee flexion with towel, Seated Hip aBd with TB. Distributed HO and TB     Access Code: [email or web address]  URL: [email or web address]   Date: 08/28/2020   Prepared by: Quinn Sosa     Exercises   Standing Gastroc Stretch on Step - 3 reps - 1 sets - 30s hold - 3x daily - 7x weekly   Seated Hamstring Stretch - 3 reps - 1 sets - 30s hold - 3x daily - 7x weekly   Supine Quad Set - 10 reps - 2 sets - 10s hold - 1x daily - 7x weekly   Supine Active Straight Leg Raise - 10 reps - 3 sets - 1x daily - 7x weekly   Sidelying Hip Abduction - 10 reps - 3 sets - 1x daily - 7x weekly   Seated Long Arc Quad - 10 reps - 3 sets - 1x daily - 7x weekly   Standing Heel Raise with Support - 10 reps - 3 sets - 1x daily - 7x weekly     Date: 09/11/2020 (same code)  Exercises    Prone Quadriceps Stretch with Strap - 5 reps - 30s hold - 1-2x daily - 7x weekly   Supine Bridge - 10 reps - 3 sets - 1x daily - 7x weekly   Prone Terminal Knee Extension - 10 reps - 10s hold - 1x daily - 7x weekly   Step Up - 10 reps - 3 sets - 1x daily - 7x weekly   Forward Step Down - 10 reps - 3 sets - 1x daily - 7x weekly   Side Stepping with Resistance at Thighs - 10 reps - 3 sets - 1x daily - 7x weekly     Therapeutic Exercise and NMR EXR  [x]  (19633) Provided verbal/tactile cueing for activities related to strengthening, flexibility, endurance, ROM for improvements in  [x] LE / Lumbar: LE, proximal hip, and core control with self care, mobility, lifting, ambulation. [] UE / Cervical: cervical, postural, scapular, scapulothoracic and UE control with self care, reaching, carrying, lifting, house/yardwork, driving, computer work. [x]  (25658) Provided verbal/tactile cueing for activities related to improving balance, coordination, kinesthetic sense, posture, motor skill, proprioception to assist with   [x] LE / lumbar: LE, proximal hip, and core control in self care, mobility, lifting, ambulation and eccentric single leg control. [] UE / cervical: cervical, scapular, scapulothoracic and UE control with self care, reaching, carrying, lifting, house/yardwork, driving, computer work.      NMR and Therapeutic Activities:    [x]  (53383 or 93954) Provided verbal/tactile cueing for activities related to improving balance, coordination, kinesthetic sense, posture, motor skill, proprioception and motor activation to allow for proper function of   [x] LE: / Lumbar core, proximal hip and LE with self care and ADLs  [] UE / Cervical: cervical, postural, scapular, scapulothoracic and UE control with self care, carrying, lifting, driving, computer work.   [] (50736) Gait Re-education- Provided training and instruction to the patient for proper LE, core and proximal hip recruitment and positioning and eccentric body weight control with ambulation re-education including up and down stairs     Home Exercise Program:    [x]  (33279) Reviewed/Progressed HEP activities related to strengthening, flexibility, endurance, ROM of   [x] LE / Lumbar: core, proximal hip and LE for functional self-care, mobility, lifting and ambulation/stair navigation   [] UE / Cervical: cervical, postural, scapular, scapulothoracic and UE control with self care, reaching, carrying, lifting, house/yardwork, driving, computer work  []  (78150)Reviewed/Progressed HEP activities related to improving balance, coordination, kinesthetic sense, posture, motor skill, proprioception of   [] LE: core, proximal hip and LE for self care, mobility, lifting, and ambulation/stair navigation    [] UE / Cervical: cervical, postural,  scapular, scapulothoracic and UE control with self care, reaching, carrying, lifting, house/yardwork, driving, computer work    Manual Treatments:  PROM / STM / Oscillations-Mobs:  G-I, II, III, IV (PA's, Inf., Post.)  []  (26049) Provided manual therapy to mobilize LE, proximal hip and/or LS spine soft tissue/joints for the purpose of modulating pain, promoting relaxation,  increasing ROM, reducing/eliminating soft tissue swelling/inflammation/restriction, improving soft tissue extensibility and allowing for proper ROM for normal function with   [] LE / lumbar: self care, mobility, lifting and ambulation. [] UE / Cervical: self care, reaching, carrying, lifting, house/yardwork, driving, computer work. Modalities:  [] (14379) Vasopneumatic compression: Utilized vasopneumatic compression to decrease edema / swelling for the purpose of improving mobility and quad tone / recruitment which will allow for increased overall function including but not limited to self-care, transfers, ambulation, and ascending / descending stairs. Modalities:  None. Rest helps knee pain the most    Charges:  Timed Code Treatment Minutes: 44   Total Treatment Minutes: 44     [] EVAL - LOW (03791)   [] EVAL - MOD (17071)  [] EVAL - HIGH (71998)  [] RE-EVAL (19000)  [x] TE (44045) x 1    [] Ionto  [] NMR (28123) x 1      [] Vaso  [] Manual (91455) x      [] Ultrasound  [x] TA x 2    [] Mech Traction (43904)  [] Gait Training x     [] ES (un) (60859):   [] Aquatic therapy x   [] Other:   [] Group:     GOALS:   Short Term Goals: To be achieved in: 2 weeks  1. Independent in HEP and progression per patient tolerance, in order to prevent re-injury. []? Progressing: []? Met: []? Not Met: []? Adjusted  2. Patient will have a decrease in pain to facilitate improvement in movement, function, and ADLs as indicated by Functional Deficits. []? Progressing: []? Met: []? Not Met: []? Adjusted     Long Term Goals: To be achieved in: 6 weeks  1. Disability index score of 0% or less for the LEFS to assist with reaching prior level of function. []? Progressing: []? Met: []? Not Met: []? Adjusted  2. Patient will demonstrate increased AROM to 120 degrees to allow for proper joint functioning as indicated by patients Functional Deficits. []? Progressing: []? Met: []? Not Met: []? Adjusted  3. Patient will demonstrate an increase in Strength to at least 4+/5 as well as good proximal hip strength and control to allow for proper functional mobility as indicated by patients Functional Deficits. []? Progressing: []? Met: []? Not Met: []? Adjusted  4. Patient will return to functional activities including prolonged ambulation of greater than or equal to 20 minutes  without increased symptoms or restriction. []? Progressing: []? Met: []? Not Met: []? Adjusted  Progression Towards Functional goals:  [] Patient is progressing as expected towards functional goals listed. [] Progression is slowed due to complexities listed.   [] Progression has been slowed due to

## 2020-09-23 NOTE — TELEPHONE ENCOUNTER
Pt called & states she was to call office and get her lab results since her next FU appt is not until Feb 2021.  Her result notes do say will discuss labs at next appt

## 2020-09-25 ENCOUNTER — HOSPITAL ENCOUNTER (OUTPATIENT)
Dept: PHYSICAL THERAPY | Age: 70
Setting detail: THERAPIES SERIES
Discharge: HOME | End: 2020-09-25
Payer: MEDICARE

## 2020-09-25 PROCEDURE — 97110 THERAPEUTIC EXERCISES: CPT

## 2020-09-25 PROCEDURE — 97530 THERAPEUTIC ACTIVITIES: CPT

## 2020-09-25 NOTE — PLAN OF CARE
[address] [facility]     Physical Therapy Discharge Summary    Dear Dr. Zee Escobar  ,    We had the pleasure of treating the following patient for physical therapy services at [address] [place] [address]. A summary of our findings can be found in the discharge summary below. If you have any questions or concerns regarding these findings, please do not hesitate to contact me at the office phone number checked above. Thank you for the referral.     Physician Signature:________________________________Date:__________________  By signing above (or electronic signature), therapists plan is approved by physician      Functional Outcome: LEFS raw 74; 7.5% LOF     PROM AROM     L R L R   Knee Flexion     125 125   Knee Extension     0 0         Strength (0-5) Left Right   Hip Flexion - supine  4+ 4+    Hip Extension 4 4   Hip Abduction 4 4   Hip ER 4+ 4   Hip IR 5 5   Quads 5 5   Hamstrings 5 5       Overall Response to Treatment:   [x]Patient is responding well to treatment and improvement is noted with regards to goals   [x]Patient should continue to improve if they continue HEP   []Patient has plateaued and is no longer responding to skilled PT intervention    []Patient is getting worse and would benefit from return to referring MD   []Patient unable to adhere to initial POC   [x]Other: Braeden Almanza has completed 10 PT visits over the past month and is now pain free with all mobility tasks, has improved motion, and increased strength. Patient educated on continuation of HEP and given [facility] pass to maintain independent exercise post discharge. Patient understanding of continuing to perform transitions and stair navigation with correct mechanics in order to avoid re-injury of knees or provoking pain. Pt understanding and in agreement with discharge this date.      Date range of Visits: 8/25/2020-9/25/2020  Total Visits: 10    Recommendation:    [x] Discharge to HEP. Follow up with PT PRN. Physical Therapy Daily Treatment Note  Date:  2020    Patient Name:  Ben Elena    :  1950  MRN: [id number]  Medical/Treatment Diagnosis Information:  · Diagnosis: M25.561, M25.562, G89.29 (ICD-10-CM) - Chronic pain of both knees  · Treatment Diagnosis: Decreased Knee AROM, Decreased Strength, Decreased Balance/Proprioception  Insurance/Certification information:  PT Insurance Information: [facility] Medicare (Med [facility])  Physician Information:  Referring Practitioner: Dr. Venkatesh Porter of care signed (Y/N): Faxed      Date of Patient follow up with Physician:  21    Functional scale:  LEFS: raw score = 64; dysfunction = 20-39%    Progress Note: [x]  Yes  []  No  Next due by: Visit #10       Latex Allergy:  [x]NO      []YES  Preferred Language for Healthcare:   [x]English       []other:    Visit # Insurance Allowable Date Range (if applicable)   10 Med [facility]      Pain level:  0/10 R knee ; 0/10 L Knee    SUBJECTIVE:  Pt reports she is pain free, has eliminated her \"buckling\" sensations, and is able to complete all tasks at home without pain. Pt states she feels at 100% and is in agreement with discharge and plan to go to [facility] for maintaining and progressing her strength. She has been very impressed with her progress and thankful to be pain free.        OBJECTIVE:      RESTRICTIONS/PRECAUTIONS:     Exercises/Interventions: all exercises performed bilaterally unless otherwise noted    Therapeutic Exercises  (14060) Resistance / level Sets/sec Reps Notes   Upright Bike 3 5'  Seat 7   IB - Calf Stretch  30\" 2    Seated - HS Stretch  30\" 2    Prone Quad Stretch  Towel under R knee          Quad Sets     SAQ    Prone TKE     Supine Hip Flexion ^ - inc weight   Sidelying Hip Abduction  ^ - inc weight   LAQ     HR     Table Bridges    EOB Hip extension Bilat,    Leg Press - DL 70# 3 10  - dec weight due to new onset of hip pain from last session Leg Press - SL 35# 3 10    Quantum - Quad Ext 25# 3 10 ^9/25   Quantum - HS Curl 35# 3 10 ^9/16                 Therapeutic Activities (13349)       Sit<>Stand from standard chair  Much improved, cues to perform with good alignment and w/o UE support   Step Ups - Fwd VCs for knee alignment avoiding femoral IR/ADD to promote improved recruitment of glute/quad; no pain with correction    Step Up & Over VCs for alignment on descend avoid femoral IR/ADD; decreases pain    LSU    Side Stepping - TB    Monster Walks - TB    [facility] - Full Flight of Steps Max VCs and PT demo for appropriate alignment, improved ascend; descend falls into femoral ADD   HP - Gym education and tour       D/C Assessment and HEP education  10'            Neuromuscular Re-ed (49911)       BOSU Lunges Foam roll for assist as needed for balance, cues for glute and quad activation   Tandem Stance on AirEx     SLS on AirEx            Manual Intervention ([address] [facility])                                                     Pt. Education:  -pt educated on diagnosis, prognosis and expectations for rehab  -all pt questions were answered    HEP instruction:  -pt provided with written and illustrated instructions for HEP (see scanned image in )  8/25/20: The following exercises were performed and added to the pt's home program (educated on appropriate frequency, intensity and duration etc.): Supine SLR, SL hip aBd, Knee flexion with towel, Seated Hip aBd with TB. Distributed HO and TB     Access Code: [email or web address]  URL: [email or web address]   Date: 08/28/2020   Prepared by: More Hines     Exercises   Standing Gastroc Stretch on Step - 3 reps - 1 sets - 30s hold - 3x daily - 7x weekly   Seated Hamstring Stretch - 3 reps - 1 sets - 30s hold - 3x daily - 7x weekly   Supine Quad Set - 10 reps - 2 sets - 10s hold - 1x daily - 7x weekly   Supine Active Straight Leg Raise - 10 reps - 3 sets - 1x daily - 7x weekly   Sidelying Hip Abduction - 10 reps - 3 sets - 1x daily - 7x weekly   Seated Long Arc Quad - 10 reps - 3 sets - 1x daily - 7x weekly   Standing Heel Raise with Support - 10 reps - 3 sets - 1x daily - 7x weekly     Date: 09/11/2020 (same code)  Exercises    Prone Quadriceps Stretch with Strap - 5 reps - 30s hold - 1-2x daily - 7x weekly   Supine Bridge - 10 reps - 3 sets - 1x daily - 7x weekly   Prone Terminal Knee Extension - 10 reps - 10s hold - 1x daily - 7x weekly   Step Up - 10 reps - 3 sets - 1x daily - 7x weekly   Forward Step Down - 10 reps - 3 sets - 1x daily - 7x weekly   Side Stepping with Resistance at Thighs - 10 reps - 3 sets - 1x daily - 7x weekly     Therapeutic Exercise and NMR EXR  [x]  (01587) Provided verbal/tactile cueing for activities related to strengthening, flexibility, endurance, ROM for improvements in  [x] LE / Lumbar: LE, proximal hip, and core control with self care, mobility, lifting, ambulation. [] UE / Cervical: cervical, postural, scapular, scapulothoracic and UE control with self care, reaching, carrying, lifting, house/yardwork, driving, computer work. [x]  (79664) Provided verbal/tactile cueing for activities related to improving balance, coordination, kinesthetic sense, posture, motor skill, proprioception to assist with   [x] LE / lumbar: LE, proximal hip, and core control in self care, mobility, lifting, ambulation and eccentric single leg control. [] UE / cervical: cervical, scapular, scapulothoracic and UE control with self care, reaching, carrying, lifting, house/yardwork, driving, computer work.      NMR and Therapeutic Activities:    [x]  (98976 or 92797) Provided verbal/tactile cueing for activities related to improving balance, coordination, kinesthetic sense, posture, motor skill, proprioception and motor activation to allow for proper function of   [x] LE: / Lumbar core, proximal hip and LE with self care and ADLs  [] UE / Cervical: cervical, postural, scapular, scapulothoracic and UE control with self care, carrying, lifting, driving, computer work.   [] (83073) Gait Re-education- Provided training and instruction to the patient for proper LE, core and proximal hip recruitment and positioning and eccentric body weight control with ambulation re-education including up and down stairs     Home Exercise Program:    [x]  (93252) Reviewed/Progressed HEP activities related to strengthening, flexibility, endurance, ROM of   [x] LE / Lumbar: core, proximal hip and LE for functional self-care, mobility, lifting and ambulation/stair navigation   [] UE / Cervical: cervical, postural, scapular, scapulothoracic and UE control with self care, reaching, carrying, lifting, house/yardwork, driving, computer work  []  (00183)Reviewed/Progressed HEP activities related to improving balance, coordination, kinesthetic sense, posture, motor skill, proprioception of   [] LE: core, proximal hip and LE for self care, mobility, lifting, and ambulation/stair navigation    [] UE / Cervical: cervical, postural,  scapular, scapulothoracic and UE control with self care, reaching, carrying, lifting, house/yardwork, driving, computer work    Manual Treatments:  PROM / STM / Oscillations-Mobs:  G-I, II, III, IV (PA's, Inf., Post.)  []  (42376) Provided manual therapy to mobilize LE, proximal hip and/or LS spine soft tissue/joints for the purpose of modulating pain, promoting relaxation,  increasing ROM, reducing/eliminating soft tissue swelling/inflammation/restriction, improving soft tissue extensibility and allowing for proper ROM for normal function with   [] LE / lumbar: self care, mobility, lifting and ambulation. [] UE / Cervical: self care, reaching, carrying, lifting, house/yardwork, driving, computer work.      Modalities:  [] (62613) Vasopneumatic compression: Utilized vasopneumatic compression to decrease edema / swelling for the purpose of improving mobility and quad tone / recruitment which will allow for increased overall function including but not limited to self-care, transfers, ambulation, and ascending / descending stairs. Modalities:  None. Rest helps knee pain the most    Charges:  Timed Code Treatment Minutes: 35   Total Treatment Minutes: 35     [] EVAL - LOW (84590)   [] EVAL - MOD (19750)  [] EVAL - HIGH (74053)  [] RE-EVAL (74142)  [x] TE (54777) x 1    [] Ionto  [] NMR (10057) x 1      [] Vaso  [] Manual (10579) x      [] Ultrasound  [x] TA x 1    [] Mech Traction (06233)  [] Gait Training x     [] ES (un) (08329):   [] Aquatic therapy x   [] Other:   [] Group:     GOALS:   Short Term Goals: To be achieved in: 2 weeks  1. Independent in HEP and progression per patient tolerance, in order to prevent re-injury. []? Progressing: [x]? Met: []? Not Met: []? Adjusted  2. Patient will have a decrease in pain to facilitate improvement in movement, function, and ADLs as indicated by Functional Deficits. []? Progressing: [x]? Met: []? Not Met: []? Adjusted     Long Term Goals: To be achieved in: 6 weeks  1. Disability index score of 0% or less for the LEFS to assist with reaching prior level of function. []? Progressing: []? Met: [x]? Not Met: []? Adjusted  2. Patient will demonstrate increased AROM to 120 degrees to allow for proper joint functioning as indicated by patients Functional Deficits. []? Progressing: [x]? Met: []? Not Met: []? Adjusted  3. Patient will demonstrate an increase in Strength to at least 4+/5 as well as good proximal hip strength and control to allow for proper functional mobility as indicated by patients Functional Deficits. []? Progressing: [x]? Met: []? Not Met: []? Adjusted  4. Patient will return to functional activities including prolonged ambulation of greater than or equal to 20 minutes  without increased symptoms or restriction. []? Progressing: [x]? Met: []? Not Met: []?  Adjusted  Progression Towards Functional goals:  [x] Patient is progressing as expected towards functional goals listed. [] Progression is slowed due to complexities listed. [] Progression has been slowed due to co-morbidities. [] Plan just implemented, too soon to assess goals progression  [] Other:     Persisting Functional Limitations/Impairments:  []Sleeping []Sitting               [x]Standing [x]Transfers        [x]Walking []Kneeling               [x]Stairs [x]Squatting / bending   [x]ADLs []Reaching  []Lifting  []Housework  []Driving []Job related tasks  []Sports/Recreation []Other:        ASSESSMENT:  See D/C Note above. Treatment/Activity Tolerance:  [x] Patient able to complete tx  [] Patient limited by fatique  [] Patient limited by pain  [] Patient limited by other medical complications  [] Other:     Prognosis: [x] Good [] Fair  [] Poor    Patient Requires Follow-up: [] Yes  [x] No    PLAN: See eval. PT 2x / week for 6 weeks.    [] Continue per plan of care [] Alter current plan (see comments)  [] Plan of care initiated [] Hold pending MD visit [x] Discharge    Electronically signed by: Mercedez Godwni PT, DPT

## 2020-09-30 ENCOUNTER — APPOINTMENT (OUTPATIENT)
Dept: PHYSICAL THERAPY | Age: 70
End: 2020-09-30
Payer: MEDICARE

## 2020-10-05 ENCOUNTER — HOSPITAL ENCOUNTER (OUTPATIENT)
Dept: GENERAL RADIOLOGY | Age: 70
Discharge: HOME | End: 2020-10-05
Payer: MEDICARE

## 2020-10-05 PROCEDURE — 77080 DXA BONE DENSITY AXIAL: CPT

## 2020-10-21 ENCOUNTER — HOSPITAL ENCOUNTER (OUTPATIENT)
Dept: NON INVASIVE DIAGNOSTICS | Age: 70
Discharge: HOME | End: 2020-10-21
Payer: MEDICARE

## 2020-10-21 LAB
LV EF: 60 %
LVEF MODALITY: NORMAL

## 2020-10-21 PROCEDURE — 93306 TTE W/DOPPLER COMPLETE: CPT

## 2020-10-28 ENCOUNTER — TELEPHONE (OUTPATIENT)
Dept: CARDIOLOGY CLINIC | Age: 70
End: 2020-10-28

## 2020-11-03 ENCOUNTER — TELEPHONE (OUTPATIENT)
Dept: CARDIOLOGY CLINIC | Age: 70
End: 2020-11-03

## 2020-11-03 NOTE — TELEPHONE ENCOUNTER
----- Message from MARLENE Ulloa CNP sent at 11/3/2020  2:51 PM EST -----  Please call patient and let her know her echocardiogram as unremarkable. Heart function was strong. Mildly leaky valves were noted, which is not an abnormal finding for her and requires no further treatment at this time.      BERNADETTE Ulloa

## 2020-11-14 ENCOUNTER — NURSE ONLY (OUTPATIENT)
Dept: INTERNAL MEDICINE CLINIC | Age: 70
End: 2020-11-14
Payer: MEDICARE

## 2020-11-14 PROCEDURE — G0008 ADMIN INFLUENZA VIRUS VAC: HCPCS | Performed by: INTERNAL MEDICINE

## 2020-11-14 PROCEDURE — 90694 VACC AIIV4 NO PRSRV 0.5ML IM: CPT | Performed by: INTERNAL MEDICINE

## 2020-11-14 NOTE — PROGRESS NOTES
Vaccine Information Sheet, \"Influenza - Inactivated\"  given to Miracle Crowell, or parent/legal guardian of  Miracle Crowell and verbalized understanding. Patient responses:    Have you ever had a reaction to a flu vaccine? No  Do you have any current illness? No  Have you ever had Guillian Raceland Syndrome? No  Do you have a serious allergy to any of the follow: Neomycin, Polymyxin, Thimerosal, eggs or egg products? No    Flu vaccine given per order. Please see immunization tab. Risks and benefits explained. Current VIS given.       Immunizations Administered     Name Date Dose Route    Influenza, Quadv, adjuvanted, 65 yrs +, IM, PF (Fluad) 11/14/2020 0.5 mL Intramuscular    Site: Deltoid- Left    Lot: [id number]    NDC: 60165-019-17

## 2020-11-19 ENCOUNTER — HOSPITAL ENCOUNTER (OUTPATIENT)
Age: 70
Discharge: HOME | End: 2020-11-19
Payer: MEDICARE

## 2020-11-19 ENCOUNTER — HOSPITAL ENCOUNTER (OUTPATIENT)
Dept: WOMENS IMAGING | Age: 70
Discharge: HOME | End: 2020-11-19
Payer: MEDICARE

## 2020-11-19 LAB
T3 TOTAL: 1.09 NG/ML (ref 0.8–2)
T4 FREE: 0.8 NG/DL (ref 0.9–1.8)
TSH SERPL DL<=0.05 MIU/L-ACNC: 3.91 UIU/ML (ref 0.27–4.2)

## 2020-11-19 PROCEDURE — 84480 ASSAY TRIIODOTHYRONINE (T3): CPT

## 2020-11-19 PROCEDURE — 36415 COLL VENOUS BLD VENIPUNCTURE: CPT

## 2020-11-19 PROCEDURE — 84439 ASSAY OF FREE THYROXINE: CPT

## 2020-11-19 PROCEDURE — 84443 ASSAY THYROID STIM HORMONE: CPT

## 2020-11-19 PROCEDURE — 77063 BREAST TOMOSYNTHESIS BI: CPT

## 2020-11-23 ENCOUNTER — TELEPHONE (OUTPATIENT)
Dept: ENDOCRINOLOGY | Age: 70
End: 2020-11-23

## 2020-11-23 NOTE — TELEPHONE ENCOUNTER
----- Message from Courtney Morgan MD sent at 11/21/2020 12:50 PM EST -----  Please advise patient that I would recommend reducing the dose of Tapazole from her current dose of 10 mg to 5 mg daily and have repeat TSH free T4 and T3 in 4 weeks after dose change

## 2020-12-22 ENCOUNTER — HOSPITAL ENCOUNTER (OUTPATIENT)
Age: 70
Discharge: HOME | End: 2020-12-22
Payer: MEDICARE

## 2020-12-22 LAB
T3 TOTAL: 1.22 NG/ML (ref 0.8–2)
T4 FREE: 0.9 NG/DL (ref 0.9–1.8)
TSH SERPL DL<=0.05 MIU/L-ACNC: 6.42 UIU/ML (ref 0.27–4.2)

## 2020-12-22 PROCEDURE — 84439 ASSAY OF FREE THYROXINE: CPT

## 2020-12-22 PROCEDURE — 84480 ASSAY TRIIODOTHYRONINE (T3): CPT

## 2020-12-22 PROCEDURE — 84443 ASSAY THYROID STIM HORMONE: CPT

## 2020-12-22 PROCEDURE — 36415 COLL VENOUS BLD VENIPUNCTURE: CPT

## 2020-12-30 ENCOUNTER — TELEPHONE (OUTPATIENT)
Dept: ENDOCRINOLOGY | Age: 70
End: 2020-12-30

## 2020-12-30 RX ORDER — METHIMAZOLE 5 MG/1
TABLET ORAL
Qty: 15 TABLET | Refills: 2 | Status: SHIPPED | OUTPATIENT
Start: 2020-12-30 | End: 2021-02-23 | Stop reason: SDUPTHER

## 2020-12-30 NOTE — TELEPHONE ENCOUNTER
----- Message from Kika Pedroza MD sent at 12/28/2020  4:34 PM EST -----  Please advise Artist Josiane  that I have reviewed her  labs and it shows abnormal thyroid hormone levels   ---if the she  has been taking her  thyroid medication regularly without any concomitant Iron or calcium tablets then I will  increase her dose of Thyroid hormone replacement  and she  will have to repeat her  labs 2 months after dose change and  she should make follow up apt in 2-3 months

## 2021-01-08 ENCOUNTER — OFFICE VISIT (OUTPATIENT)
Dept: INTERNAL MEDICINE CLINIC | Age: 71
End: 2021-01-08
Payer: MEDICARE

## 2021-01-08 VITALS
WEIGHT: 215.4 LBS | TEMPERATURE: 97.5 F | SYSTOLIC BLOOD PRESSURE: 136 MMHG | DIASTOLIC BLOOD PRESSURE: 78 MMHG | HEIGHT: 69 IN | RESPIRATION RATE: 16 BRPM | OXYGEN SATURATION: 98 % | BODY MASS INDEX: 31.9 KG/M2 | HEART RATE: 74 BPM

## 2021-01-08 DIAGNOSIS — G62.0 CHEMOTHERAPY-INDUCED NEUROPATHY (HCC): Primary | ICD-10-CM

## 2021-01-08 DIAGNOSIS — T45.1X5A CHEMOTHERAPY-INDUCED NEUROPATHY (HCC): Primary | ICD-10-CM

## 2021-01-08 DIAGNOSIS — E05.00 GRAVES DISEASE: ICD-10-CM

## 2021-01-08 DIAGNOSIS — K56.600 PARTIAL SMALL BOWEL OBSTRUCTION (HCC): ICD-10-CM

## 2021-01-08 DIAGNOSIS — Z00.00 ROUTINE GENERAL MEDICAL EXAMINATION AT A HEALTH CARE FACILITY: ICD-10-CM

## 2021-01-08 DIAGNOSIS — Z79.899 ENCOUNTER FOR LONG-TERM (CURRENT) USE OF HIGH-RISK MEDICATION: ICD-10-CM

## 2021-01-08 DIAGNOSIS — C54.1 ENDOMETRIAL CANCER (HCC): ICD-10-CM

## 2021-01-08 DIAGNOSIS — Z72.89 OTHER PROBLEMS RELATED TO LIFESTYLE: ICD-10-CM

## 2021-01-08 DIAGNOSIS — Z23 NEED FOR PROPHYLACTIC VACCINATION AND INOCULATION AGAINST VARICELLA: ICD-10-CM

## 2021-01-08 LAB
BASOPHILS ABSOLUTE: 0 K/UL (ref 0–0.2)
BASOPHILS RELATIVE PERCENT: 0.9 %
EOSINOPHILS ABSOLUTE: 0.1 K/UL (ref 0–0.6)
EOSINOPHILS RELATIVE PERCENT: 2.6 %
HCT VFR BLD CALC: 36.6 % (ref 36–48)
HEMOGLOBIN: 12.1 G/DL (ref 12–16)
LYMPHOCYTES ABSOLUTE: 1.7 K/UL (ref 1–5.1)
LYMPHOCYTES RELATIVE PERCENT: 43.3 %
MCH RBC QN AUTO: 29.8 PG (ref 26–34)
MCHC RBC AUTO-ENTMCNC: 33.1 G/DL (ref 31–36)
MCV RBC AUTO: 90.2 FL (ref 80–100)
MONOCYTES ABSOLUTE: 0.3 K/UL (ref 0–1.3)
MONOCYTES RELATIVE PERCENT: 9 %
NEUTROPHILS ABSOLUTE: 1.7 K/UL (ref 1.7–7.7)
NEUTROPHILS RELATIVE PERCENT: 44.2 %
PDW BLD-RTO: 16.1 % (ref 12.4–15.4)
PLATELET # BLD: 159 K/UL (ref 135–450)
PMV BLD AUTO: 11.4 FL (ref 5–10.5)
RBC # BLD: 4.06 M/UL (ref 4–5.2)
WBC # BLD: 3.9 K/UL (ref 4–11)

## 2021-01-08 PROCEDURE — 4040F PNEUMOC VAC/ADMIN/RCVD: CPT | Performed by: INTERNAL MEDICINE

## 2021-01-08 PROCEDURE — G8484 FLU IMMUNIZE NO ADMIN: HCPCS | Performed by: INTERNAL MEDICINE

## 2021-01-08 PROCEDURE — 1123F ACP DISCUSS/DSCN MKR DOCD: CPT | Performed by: INTERNAL MEDICINE

## 2021-01-08 PROCEDURE — 3017F COLORECTAL CA SCREEN DOC REV: CPT | Performed by: INTERNAL MEDICINE

## 2021-01-08 PROCEDURE — G0439 PPPS, SUBSEQ VISIT: HCPCS | Performed by: INTERNAL MEDICINE

## 2021-01-08 ASSESSMENT — PATIENT HEALTH QUESTIONNAIRE - PHQ9
SUM OF ALL RESPONSES TO PHQ QUESTIONS 1-9: 0
SUM OF ALL RESPONSES TO PHQ QUESTIONS 1-9: 0

## 2021-01-08 ASSESSMENT — ENCOUNTER SYMPTOMS
RESPIRATORY NEGATIVE: 1
GASTROINTESTINAL NEGATIVE: 1

## 2021-01-08 ASSESSMENT — LIFESTYLE VARIABLES: HOW OFTEN DO YOU HAVE A DRINK CONTAINING ALCOHOL: 0

## 2021-01-08 NOTE — PATIENT INSTRUCTIONS
Advance Directives: Care Instructions  Overview  An advance directive is a legal way to state your wishes at the end of your life. It tells your family and your doctor what to do if you can't say what you want. There are two main types of advance directives. You can change them any time your wishes change. Living will. This form tells your family and your doctor your wishes about life support and other treatment. The form is also called a declaration. Medical power of . This form lets you name a person to make treatment decisions for you when you can't speak for yourself. This person is called a health care agent (health care proxy, health care surrogate). The form is also called a durable power of  for health care. If you do not have an advance directive, decisions about your medical care may be made by a family member, or by a doctor or a  who doesn't know you. It may help to think of an advance directive as a gift to the people who care for you. If you have one, they won't have to make tough decisions by themselves. Follow-up care is a key part of your treatment and safety. Be sure to make and go to all appointments, and call your doctor if you are having problems. It's also a good idea to know your test results and keep a list of the medicines you take. What should you include in an advance directive? Many states have a unique advance directive form. (It may ask you to address specific issues.) Or you might use a universal form that's approved by many states. If your form doesn't tell you what to address, it may be hard to know what to include in your advance directive. Use the questions below to help you get started. · Who do you want to make decisions about your medical care if you are not able to? · What life-support measures do you want if you have a serious illness that gets worse over time or can't be cured? · What are you most afraid of that might happen? against illness, muscle loss, bone loss, hair loss, and hormone problems. BMI is just one measure of your risk for weight-related health problems. You may be at higher risk for health problems if you are not active, you eat an unhealthy diet, or you drink too much alcohol or use tobacco products. Follow-up care is a key part of your treatment and safety. Be sure to make and go to all appointments, and call your doctor if you are having problems. It's also a good idea to know your test results and keep a list of the medicines you take. How can you care for yourself at home? · Practice healthy eating habits. This includes eating plenty of fruits, vegetables, whole grains, lean protein, and low-fat dairy. · If your doctor recommends it, get more exercise. Walking is a good choice. Bit by bit, increase the amount you walk every day. Try for at least 30 minutes on most days of the week. · Do not smoke. Smoking can increase your risk for health problems. If you need help quitting, talk to your doctor about stop-smoking programs and medicines. These can increase your chances of quitting for good. · Limit alcohol to 2 drinks a day for men and 1 drink a day for women. Too much alcohol can cause health problems. If you have a BMI higher than 25  · Your doctor may do other tests to check your risk for weight-related health problems. This may include measuring the distance around your waist. A waist measurement of more than 40 inches in men or 35 inches in women can increase the risk of weight-related health problems. · Talk with your doctor about steps you can take to stay healthy or improve your health. You may need to make lifestyle changes to lose weight and stay healthy, such as changing your diet and getting regular exercise. If you have a BMI lower than 18.5  · Your doctor may do other tests to check your risk for health problems.   · Talk with your doctor about steps you can take to stay healthy or improve your health. You may need to make lifestyle changes to gain or maintain weight and stay healthy, such as getting more healthy foods in your diet and doing exercises to build muscle. Where can you learn more? Go to https://jordan.NeuroSave. org and sign in to your [address] account. Enter S176 in the [facility] box to learn more about \"Body Mass Index: Care Instructions. \"     If you do not have an account, please click on the \"Sign Up Now\" link. Current as of: December 11, 2019               Content Version: 12.6  © 8697-1666 [employer]. Care instructions adapted under license by [facility] ([facility]). If you have questions about a medical condition or this instruction, always ask your healthcare professional. [address] any warranty or liability for your use of this information. Eating Healthy Foods: Care Instructions  Your Care Instructions     Eating healthy foods can help lower your risk for disease. Healthy food gives you energy and keeps your heart strong, your brain active, your muscles working, and your bones strong. A healthy diet includes a variety of foods from the basic food groups: grains, vegetables, fruits, milk and milk products, and meat and beans. Some people may eat more of their favorite foods from only one food group and, as a result, miss getting the nutrients they need. So, it is important to pay attention not only to what you eat but also to what you are missing from your diet. You can eat a healthy, balanced diet by making a few small changes. Follow-up care is a key part of your treatment and safety. Be sure to make and go to all appointments, and call your doctor if you are having problems. It's also a good idea to know your test results and keep a list of the medicines you take. How can you care for yourself at home? Look at what you eat  · Keep a food diary for a week or two and record everything you eat or drink.  Track the vegetables to meals and have them for snacks. ? Add lettuce, tomato, cucumber, and onion to sandwiches. ? Add fruit to yogurt and cereal.  Enjoy food  · You can still eat your favorite foods. You just may need to eat less of them. If your favorite foods are high in fat, salt, and sugar, limit how often you eat them, but do not cut them out entirely. · Eat a wide variety of foods. Make healthy choices when eating out  · The type of restaurant you choose can help you make healthy choices. Even fast-food chains are now offering more low-fat or healthier choices on the menu. · Choose smaller portions, or take half of your meal home. · When eating out, try:  ? A veggie pizza with a whole wheat crust or grilled chicken (instead of sausage or pepperoni). ? Pasta with roasted vegetables, grilled chicken, or marinara sauce instead of cream sauce. ? A vegetable wrap or grilled chicken wrap. ? Broiled or poached food instead of fried or breaded items. Make healthy choices easy  · Buy packaged, prewashed, ready-to-eat fresh vegetables and fruits, such as baby carrots, salad mixes, and chopped or shredded broccoli and cauliflower. · Buy packaged, presliced fruits, such as melon or pineapple. · Choose 100% fruit or vegetable juice instead of soda. Limit juice intake to 4 to 6 oz (½ to ¾ cup) a day. · Blend low-fat yogurt, fruit juice, and canned or frozen fruit to make a smoothie for breakfast or a snack. Where can you learn more? Go to https://SnoobechintanewCenturyLink.healthService Seeking. org and sign in to your [employer] account. Enter L523 in the [facility] box to learn more about \"Eating Healthy Foods: Care Instructions. \"     If you do not have an account, please click on the \"Sign Up Now\" link. Current as of: August 22, 2019               Content Version: 12.6  © 8238-0627 [employer]. Care instructions adapted under license by [facility] ([facility]).  If you have questions about a medical condition or this instruction, always ask your healthcare professional. [place] [address] any warranty or liability for your use of this information. Personalized Preventive Plan for Ancelmo Caputo - 1/8/2021  Medicare offers a range of preventive health benefits. Some of the tests and screenings are paid in full while other may be subject to a deductible, co-insurance, and/or copay. Some of these benefits include a comprehensive review of your medical history including lifestyle, illnesses that may run in your family, and various assessments and screenings as appropriate. After reviewing your medical record and screening and assessments performed today your provider may have ordered immunizations, labs, imaging, and/or referrals for you. A list of these orders (if applicable) as well as your Preventive Care list are included within your After Visit Summary for your review. Other Preventive Recommendations:    · A preventive eye exam performed by an eye specialist is recommended every 1-2 years to screen for glaucoma; cataracts, macular degeneration, and other eye disorders. · A preventive dental visit is recommended every 6 months. · Try to get at least 150 minutes of exercise per week or 10,000 steps per day on a pedometer . · Order or download the FREE \"Exercise & Physical Activity: Your Everyday Guide\" from The [employer]. Call 4-408.519.2374 or search The [employer] online. · You need 1373-7425 mg of calcium and 2899-0802 IU of vitamin D per day. It is possible to meet your calcium requirement with diet alone, but a vitamin D supplement is usually necessary to meet this goal.  · When exposed to the sun, use a sunscreen that protects against both UVA and UVB radiation with an SPF of 30 or greater. Reapply every 2 to 3 hours or after sweating, drying off with a towel, or swimming. · Always wear a seat belt when traveling in a car.  Always wear a helmet when for glaucoma; cataracts, macular degeneration, and other eye disorders. A preventive dental visit is recommended every 6 months. Try to get at least 150 minutes of exercise per week or 10,000 steps per day on a pedometer . Order or download the FREE \"Exercise & Physical Activity: Your Everyday Guide\" from [employer]. Call 6-920.591.1604 or search [employer] online. You need 4894-8849 mg of calcium and 8685-0276 IU of vitamin D per day. It is possible to meet your calcium requirement with diet alone, but a vitamin D supplement is usually necessary to meet this goal.  When exposed to the sun, use a sunscreen that protects against both UVA and UVB radiation with an SPF of 30 or greater. Reapply every 2 to 3 hours or after sweating, drying off with a towel, or swimming. Always wear a seat belt when traveling in a car. Always wear a helmet when riding a bicycle or motorcycle.

## 2021-01-08 NOTE — PROGRESS NOTES
Subjective:      Patient ID: Jake Bland is a 78 y.o. female. Chief Complaint   Patient presents with    Medicare [address]     Annual Medicare Well Visit         HPI    Presents for AWV no new complaints    Review of Systems   Constitutional: Negative. HENT: Negative. Respiratory: Negative. Cardiovascular: Negative. Gastrointestinal: Negative. Musculoskeletal: Negative. Skin: Negative. Psychiatric/Behavioral: Positive for sleep disturbance. All other systems reviewed and are negative. Allergies   Allergen Reactions    Naproxen Swelling    Lisinopril Rash       Current Outpatient Medications   Medication Sig Dispense Refill    methIMAzole (TAPAZOLE) 5 MG tablet Take 0.5 tab by mouth daily. 15 tablet 2    metoprolol succinate (TOPROL XL) 50 MG extended release tablet TAKE ONE TABLET BY MOUTH DAILY 90 tablet 1    amLODIPine (NORVASC) 10 MG tablet Take 1 tablet by mouth daily 90 tablet 1    rivaroxaban (XARELTO) 20 MG TABS tablet Take 1 tablet by mouth daily (with breakfast) 90 tablet 0    hydroCHLOROthiazide (HYDRODIURIL) 25 MG tablet TAKE ONE TABLET BY MOUTH DAILY 90 tablet 1    Multiple Vitamins-Minerals (THERAPEUTIC MULTIVITAMIN-MINERALS) tablet Take 1 tablet by mouth daily       No current facility-administered medications for this visit. Vitals:    01/08/21 1259   BP: 136/78   Pulse: 74   Resp: 16   Temp: 97.5 °F (36.4 °C)   TempSrc: Temporal   SpO2: 98%   Weight: 215 lb 6.4 oz (97.7 kg)   Height: 5' 9\" (1.753 m)     Body mass index is 31.81 kg/m². Wt Readings from Last 3 Encounters:   01/08/21 215 lb 6.4 oz (97.7 kg)   09/08/20 218 lb (98.9 kg)   08/26/20 218 lb (98.9 kg)     BP Readings from Last 3 Encounters:   01/08/21 136/78   09/08/20 (!) 140/78   08/25/20 (!) 151/79       Objective:   Physical Exam    Assessment/Plan:  Shraddha Tillman was seen today for medicare awv.     Diagnoses and all orders for this visit:    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[address]    Partial small bowel obstruction ([facility]    Endometrial cancer ([facility] [address]    Other problems related to lifestyle  -     Hepatitis C Antibody; Future    Routine general medical examination at a health care facility    Graves disease  -     TSH without Reflex; Future  -     T4, Free; Future  -     T3; Future    Encounter for long-term (current) use of high-risk medication  -     CBC WITH AUTO DIFFERENTIAL; Future    Need for prophylactic vaccination and inoculation against varicella  -     zoster recombinant adjuvanted vaccine [facility]) 50 MCG/0.5ML SUSR injection; Inject 0.5 mLs into the muscle once for 1 dose      Return for Medicare Annual Wellness Visit in 1 year, 6 months . Jamal Huynh MD  Medicare Annual Wellness Visit  Name: Kathy Rae Date: 2021   MRN: [id number] Sex: Female   Age: 78 y.o. Ethnicity: Non-/Non    : 1950 Race: Blaise Francois is here for Medicare AWV (Annual Medicare Well Visit  )    Screenings for behavioral, psychosocial and functional/safety risks, and cognitive dysfunction are all negative except as indicated below. These results, as well as other patient data from the [address] form, are documented in Flowsheets linked to this Encounter. Allergies   Allergen Reactions    Naproxen Swelling    Lisinopril Rash       Prior to Visit Medications    Medication Sig Taking? Authorizing Provider   methIMAzole (TAPAZOLE) 5 MG tablet Take 0.5 tab by mouth daily.  Yes Juan Jose Ocampo MD   metoprolol succinate (TOPROL XL) 50 MG extended release tablet TAKE ONE TABLET BY MOUTH DAILY Yes Nancy Blanco MD   amLODIPine (NORVASC) 10 MG tablet Take 1 tablet by mouth daily Yes Nancy Blanco MD   rivaroxaban (XARELTO) 20 MG TABS tablet Take 1 tablet by mouth daily (with breakfast) Yes Nancy Blanco MD   hydroCHLOROthiazide (HYDRODIURIL) 25 MG tablet TAKE ONE TABLET BY MOUTH DAILY Yes Nancy Blanco MD   Multiple Vitamins-Minerals (THERAPEUTIC MULTIVITAMIN-MINERALS) tablet Take 1 tablet by mouth daily Yes Historical Provider, MD       Past Medical History:   Diagnosis Date    Anal bleeding 2019    CT scan clear     Anemia     Atrial fibrillation ([facility] [address]     cleared by cardiologist 2018, was related to cancer     Colon polyps     Diabetes mellitus ([facility] [address]     pre diabetic diet controlled    Endometrial cancer ([facility] [address] [facility]     Hypertension     IFG (impaired fasting glucose)     Small bowel obstruction ([facility] [address] [facility]     resolved without surgery    Thyroid nodule        Past Surgical History:   Procedure Laterality Date    CARDIOVERSION      CARDIOVERSION  09/19/2019    COLONOSCOPY N/A 3/14/2019    COLONOSCOPY POLYPECTOMY SNARE/COLD BIOPSY performed by Radha Bell MD at [address]  3/14/2019    COLONOSCOPY CONTROL HEMORRHAGE performed by Radha Bell MD at [address] N/A 3/26/2019    COLONOSCOPY CONTROL HEMORRHAGE performed by Radha Bell MD at [address] [facility] N/A 9/10/2019    SIGMOIDOSCOPY DIAGNOSTIC FLEXIBLE performed by Radha Bell MD at [address]      TUBAL LIGATION      TUNNELED CENTRAL VENOUS CATHETER W/ SUBCUTANEOUS PORT Right        Family History   Problem Relation Age of Onset    Hypertension Mother     Colon Cancer Maternal Grandmother        CareTeam (Including outside providers/suppliers regularly involved in providing care):   Patient Care Team:  Anupama Krishnamurthy MD as PCP - Angy Garcia MD as PCP - [facility]    Wt Readings from Last 3 Encounters:   01/08/21 215 lb 6.4 oz (97.7 kg)   09/08/20 218 lb (98.9 kg)   08/26/20 218 lb (98.9 kg)     Vitals:    01/08/21 1259   BP: 136/78   Pulse: 74   Resp: 16   Temp: 97.5 °F (36.4 °C)   TempSrc: Temporal   SpO2: 98%   Weight: 215 lb 6.4 oz (97.7 kg)   Height: 5' 9\" (1.753 m)     Body mass index is 31.81 kg/m².     Based upon direct observation of the patient, evaluation of cognition reveals recent and remote memory intact. General Appearance: alert and oriented to person, place and time, well developed and well- nourished, in no acute distress  Skin: warm and dry, no rash or erythema  Head: normocephalic and atraumatic  Eyes: pupils equal, round, and reactive to light, extraocular eye movements intact, conjunctivae normal  ENT: tympanic membrane, external ear and ear canal normal bilaterally, nose without deformity, nasal mucosa and turbinates normal without polyps  Neck: supple and non-tender without mass, no thyromegaly or thyroid nodules, no cervical lymphadenopathy  Pulmonary/Chest: clear to auscultation bilaterally- no wheezes, rales or rhonchi, normal air movement, no respiratory distress  Cardiovascular: normal rate, regular rhythm, normal S1 and S2, no murmurs, rubs, clicks, or gallops, distal pulses intact, no carotid bruits  Abdomen: soft, non-tender, non-distended, normal bowel sounds, no masses or organomegaly  Extremities: no cyanosis, clubbing or edema  Musculoskeletal: normal range of motion, no joint swelling, deformity or tenderness  Neurologic: reflexes normal and symmetric, no cranial nerve deficit, gait, coordination and speech normal    Patient's complete Health Risk Assessment and screening values have been reviewed and are found in Flowsheets. The following problems were reviewed today and where indicated follow up appointments were made and/or referrals ordered. Positive Risk Factor Screenings with Interventions:      Cognitive:   Words recalled: 3 Words Recalled  Clock Drawing Test (CDT) Score: Normal  Total Score Interpretation: Positive Mini-Cog  Cognitive Impairment Interventions:  · Patient advised to follow-up in this office for further evaluation and treatment within 6 week(s)       General Health and ACP:  General  In general, how would you say your health is?: Very Good  In the past 7 days, have you experienced any of the following?  New or Increased Pain, New or Increased Fatigue, Loneliness, Social Isolation, Stress or Anger?: None of These  Do you get the social and emotional support that you need?: Yes  Do you have a Living Will?: (!) No  Advance Directives     Power of  Living Will ACP-Advance Directive ACP-Power of     Not on File Not on File Not on File Not on File      General Health Risk Interventions:  · No Living Will: Advance Care Planning addressed with patient today    Health Habits/Nutrition:  Health Habits/Nutrition  Do you exercise for at least 20 minutes 2-3 times per week?: Yes  Have you lost any weight without trying in the past 3 months?: No  Do you eat fewer than 2 meals per day?: No  Have you seen a dentist within the past year?: (!) No  Body mass index: (!) 31.81  Health Habits/Nutrition Interventions:  · Dental exam overdue:  patient encouraged to make appointment with his/her dentist    Hearing/Vision:  No exam data present  Hearing/Vision  Do you or your family notice any trouble with your hearing?: No  Do you have difficulty driving, watching TV, or doing any of your daily activities because of your eyesight?: No  Have you had an eye exam within the past year?: (!) No  Hearing/Vision Interventions:  · Vision concerns:  patient encouraged to make appointment with his/her eye specialist      Personalized Preventive Plan   Current Health Maintenance Status  Immunization History   Administered Date(s) Administered    Influenza Vaccine, unspecified formulation 10/30/2014, 10/22/2015, 12/01/2016    Influenza, High Dose (Fluzone 65 yrs and older) 09/23/2017, 09/27/2018    Influenza, Quadv, adjuvanted, 65 yrs +, IM, PF (Fluad) 11/14/2020    Influenza, Triv, inactivated, subunit, adjuvanted, IM (Fluad 65 yrs and older) 11/19/2019    Pneumococcal Conjugate 13-valent ([id number]) 02/05/2018    Pneumococcal Polysaccharide ([id number]) 05/14/2019    Tdap (Boostrix, Adacel) 02/05/2008, 07/18/2017    Zoster Live (Zostavax) 10/30/2014        Health Maintenance   Topic Date Due    Hepatitis C screen  1950    Shingles Vaccine (2 of 3) 12/25/2014    Annual Wellness Visit (AWV)  05/29/2019    A1C test (Diabetic or Prediabetic)  02/11/2021    Potassium monitoring  02/11/2021    Creatinine monitoring  02/11/2021    TSH testing  12/22/2021    Breast cancer screen  11/19/2022    Lipid screen  02/11/2025    DTaP/Tdap/Td vaccine (3 - Td) 07/18/2027    Colon cancer screen colonoscopy  03/26/2029    DEXA (modify frequency per FRAX score)  Completed    Flu vaccine  Completed    Pneumococcal 65+ years Vaccine  Completed    Hepatitis A vaccine  Aged Out    Hepatitis B vaccine  Aged Out    Hib vaccine  Aged Out    Meningococcal (ACWY) vaccine  Aged Out     Recommendations for Physiq Due: see orders and patient instructions/AVS.  . Recommended screening schedule for the next 5-10 years is provided to the patient in written form: see Patient Tri Rausch was seen today for medicare awv. Diagnoses and all orders for this visit:    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[address]    Partial small bowel obstruction ([address]    Endometrial cancer ([address]    Other problems related to lifestyle  -     Hepatitis C Antibody; Future    Routine general medical examination at a health care facility                   Advance Care Planning   Advanced Care Planning: Discussed the patients choices for care and treatment in case of a health event that adversely affects decision-making abilities. Also discussed the patients long-term treatment options. Reviewed with the patient the [address] [place] [address] & [facility] Declaration forms  Reviewed the process of designating a competent adult as an Agent (or -in-fact) that could take make health care decisions for the patient if incompetent.  Patient was asked to complete the declaration forms, either acknowledge the forms by a public notary or an eligible witness and provide a signed copy to the practice office.   Time spent (minutes): 5

## 2021-01-09 LAB
T3 TOTAL: 1.2 NG/ML (ref 0.8–2)
T4 FREE: 1 NG/DL (ref 0.9–1.8)
TSH SERPL DL<=0.05 MIU/L-ACNC: 2.03 UIU/ML (ref 0.27–4.2)

## 2021-01-29 DIAGNOSIS — I48.19 PERSISTENT ATRIAL FIBRILLATION (HCC): ICD-10-CM

## 2021-01-29 RX ORDER — RIVAROXABAN 20 MG/1
TABLET, FILM COATED ORAL
Qty: 90 TABLET | Refills: 2 | Status: SHIPPED | OUTPATIENT
Start: 2021-01-29 | End: 2021-07-09 | Stop reason: SDUPTHER

## 2021-02-12 ENCOUNTER — HOSPITAL ENCOUNTER (OUTPATIENT)
Age: 71
Discharge: HOME | End: 2021-02-12
Payer: MEDICARE

## 2021-02-12 DIAGNOSIS — E05.00 GRAVES DISEASE: ICD-10-CM

## 2021-02-12 DIAGNOSIS — Z72.89 OTHER PROBLEMS RELATED TO LIFESTYLE: ICD-10-CM

## 2021-02-12 LAB
HEPATITIS C ANTIBODY INTERPRETATION: NORMAL
T3 TOTAL: 1.12 NG/ML (ref 0.8–2)
T4 FREE: 1.2 NG/DL (ref 0.9–1.8)
TSH SERPL DL<=0.05 MIU/L-ACNC: 1 UIU/ML (ref 0.27–4.2)

## 2021-02-12 PROCEDURE — 84439 ASSAY OF FREE THYROXINE: CPT

## 2021-02-12 PROCEDURE — 84480 ASSAY TRIIODOTHYRONINE (T3): CPT

## 2021-02-12 PROCEDURE — 36415 COLL VENOUS BLD VENIPUNCTURE: CPT

## 2021-02-12 PROCEDURE — 84443 ASSAY THYROID STIM HORMONE: CPT

## 2021-02-12 PROCEDURE — 86803 HEPATITIS C AB TEST: CPT

## 2021-02-23 ENCOUNTER — OFFICE VISIT (OUTPATIENT)
Dept: ENDOCRINOLOGY | Age: 71
End: 2021-02-23
Payer: MEDICARE

## 2021-02-23 VITALS
BODY MASS INDEX: 31.99 KG/M2 | RESPIRATION RATE: 16 BRPM | WEIGHT: 216 LBS | SYSTOLIC BLOOD PRESSURE: 166 MMHG | DIASTOLIC BLOOD PRESSURE: 85 MMHG | HEART RATE: 64 BPM | TEMPERATURE: 97.4 F | HEIGHT: 69 IN

## 2021-02-23 DIAGNOSIS — I10 ESSENTIAL HYPERTENSION: ICD-10-CM

## 2021-02-23 DIAGNOSIS — I48.19 PERSISTENT ATRIAL FIBRILLATION (HCC): Primary | ICD-10-CM

## 2021-02-23 DIAGNOSIS — E05.00 GRAVES DISEASE: ICD-10-CM

## 2021-02-23 DIAGNOSIS — C54.1 ENDOMETRIAL CANCER (HCC): ICD-10-CM

## 2021-02-23 PROCEDURE — G8484 FLU IMMUNIZE NO ADMIN: HCPCS | Performed by: INTERNAL MEDICINE

## 2021-02-23 PROCEDURE — G8417 CALC BMI ABV UP PARAM F/U: HCPCS | Performed by: INTERNAL MEDICINE

## 2021-02-23 PROCEDURE — G8427 DOCREV CUR MEDS BY ELIG CLIN: HCPCS | Performed by: INTERNAL MEDICINE

## 2021-02-23 PROCEDURE — G8399 PT W/DXA RESULTS DOCUMENT: HCPCS | Performed by: INTERNAL MEDICINE

## 2021-02-23 PROCEDURE — 3017F COLORECTAL CA SCREEN DOC REV: CPT | Performed by: INTERNAL MEDICINE

## 2021-02-23 PROCEDURE — 99213 OFFICE O/P EST LOW 20 MIN: CPT | Performed by: INTERNAL MEDICINE

## 2021-02-23 PROCEDURE — 4040F PNEUMOC VAC/ADMIN/RCVD: CPT | Performed by: INTERNAL MEDICINE

## 2021-02-23 PROCEDURE — 1123F ACP DISCUSS/DSCN MKR DOCD: CPT | Performed by: INTERNAL MEDICINE

## 2021-02-23 PROCEDURE — 1036F TOBACCO NON-USER: CPT | Performed by: INTERNAL MEDICINE

## 2021-02-23 PROCEDURE — 1090F PRES/ABSN URINE INCON ASSESS: CPT | Performed by: INTERNAL MEDICINE

## 2021-02-23 RX ORDER — METHIMAZOLE 5 MG/1
TABLET ORAL
Qty: 15 TABLET | Refills: 2 | Status: SHIPPED | OUTPATIENT
Start: 2021-02-23 | End: 2021-05-26

## 2021-02-23 NOTE — PROGRESS NOTES
SUBJECTIVE:  Debbi Son is a 78 y.o. female  seen in my clinic for Graves' disease and multinodular goiter  Patient was initially referred for thyroid nodule  which was identified on a CT scan done in April 2018 as a workup for her ovarian cancer  . Patient Current complaints: denies fatigue, weight changes, heat/cold intolerance, bowel/skin changes or CVS symptoms  History of obstructive symptoms: difficulty swallowing No, changes in voice/hoarseness No.    History of radiation to patient's neck: NO  Recent iodine exposure: No  Family history includes no thyroid abnormalities. Family history of thyroid cancer: No    Ovarian cancer is treated with surgery , chemo and RT ---she was diagnosed in jan 2018   Patient also has hypertension and is on Toprol and hydrochlorothiazide along with Lotrel  Patient also has atrial fibrillation and is on anticoagulation    INTERIM   She had cardiac ablation done , she is on Xeralto   She has been taking 2.5 mg Tapazole daily she denies any significant palpitation, heat intolerance or insomnia.     Past Medical History:   Diagnosis Date    Anal bleeding 2019    CT scan clear     Anemia     Atrial fibrillation ([address]     cleared by cardiologist 2018, was related to cancer     Colon polyps     Diabetes mellitus ([address]     pre diabetic diet controlled    Endometrial cancer ([address]     Hypertension     IFG (impaired fasting glucose)     Small bowel obstruction (HCC)     resolved without surgery    Thyroid nodule      Patient Active Problem List    Diagnosis Date Noted    Malignant neoplasm of ovary ([address] 07/07/2020    Graves disease 05/07/2019    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 04/03/2018    Alkaline phosphatase elevation 03/08/2018    Prediabetes 03/08/2018    Chronic anticoagulation 10/13/2017    Anemia 07/20/2017    Abnormal liver enzymes 07/20/2017    Essential hypertension 07/06/2016     Past Surgical History:   Procedure Laterality Date    CARDIOVERSION      CARDIOVERSION  09/19/2019    COLONOSCOPY N/A 3/14/2019    COLONOSCOPY POLYPECTOMY SNARE/COLD BIOPSY performed by Heide Dawkins MD at [facility] COLONOSCOPY  3/14/2019    COLONOSCOPY CONTROL HEMORRHAGE [facility] COLONOSCOPY N/A 3/26/2019    COLONOSCOPY CONTROL HEMORRHAGE performed by Heide Dawkins MD at [address] N/A 9/10/2019    SIGMOIDOSCOPY DIAGNOSTIC FLEXIBLE performed by Heide Dawkins MD at [address]      TUBAL LIGATION      TUNNELED CENTRAL VENOUS CATHETER W/ SUBCUTANEOUS PORT Right      Family History   Problem Relation Age of Onset    Hypertension Mother     Colon Cancer Maternal Grandmother      Social History     Socioeconomic History    Marital status:      Spouse name: None    Number of children: None    Years of education: None    Highest education level: None   Occupational History    Occupation: cash checking   Social Needs    Financial resource strain: Not very hard    Food insecurity     Worry: Never true     Inability: Never true    Transportation needs     Medical: No     Non-medical: No   Tobacco Use    Smoking status: Never Smoker    Smokeless tobacco: Never Used   Substance and Sexual Activity    Alcohol use: Yes     Comment: occ wine    Drug use: No    Sexual activity: Never   Lifestyle    Physical activity     Days per week: 4 days     Minutes per session: 30 min    Stress:  Only a little   Relationships    Social connections     Talks on phone: More than three times a week     Gets together: More than three times a week     Attends Hindu service: Never     Active member of club or organization: No     Attends meetings of clubs or organizations: Never     Relationship status: Never     Intimate partner violence     Fear of current or ex partner: No     Emotionally abused: No     Physically abused: No     Forced sexual activity: No   Other Topics Concern    None   Social History Narrative    Lives home with her 3 children. Doing well 8 grandchildren. Current Outpatient Medications   Medication Sig Dispense Refill    methIMAzole (TAPAZOLE) 5 MG tablet Take 0.5 tab by mouth daily. 15 tablet 2    XARELTO 20 MG TABS tablet TAKE ONE TABLET BY MOUTH DAILY WITH BREAKFAST 90 tablet 2    metoprolol succinate (TOPROL XL) 50 MG extended release tablet TAKE ONE TABLET BY MOUTH DAILY 90 tablet 1    amLODIPine (NORVASC) 10 MG tablet Take 1 tablet by mouth daily 90 tablet 1    hydroCHLOROthiazide (HYDRODIURIL) 25 MG tablet TAKE ONE TABLET BY MOUTH DAILY 90 tablet 1    Multiple Vitamins-Minerals (THERAPEUTIC MULTIVITAMIN-MINERALS) tablet Take 1 tablet by mouth daily       No current facility-administered medications for this visit. Allergies   Allergen Reactions    Naproxen Swelling    Lisinopril Rash         Review of Systems:  I have reviewed the review of system questionnaire filled by the patient .   Patient was advised to contact PCP for non endocrine signs and symptoms       OBJECTIVE:   BP (!) 166/85   Pulse 64   Temp 97.4 °F (36.3 °C)   Resp 16   Ht 5' 9\" (1.753 m)   Wt 216 lb (98 kg)   LMP  (LMP Unknown)   BMI 31.90 kg/m²   Wt Readings from Last 3 Encounters:   02/23/21 216 lb (98 kg)   01/08/21 215 lb 6.4 oz (97.7 kg)   09/08/20 218 lb (98.9 kg)       Physical Exam:  Constitutional: no acute distress, well appearing, well nourished  Psychiatric: oriented to person, place and time, judgement, insight and normal, recent and remote memory and intact and mood, affect are normal  Skin: skin and subcutaneous tissue is normal without mass,   Head and Face: examination of head and face revealed no abnormalities  Eyes: no lid or conjunctival swelling, no erythema or discharge, pupils are normal,   Ears/Nose: external inspection of ears and nose revealed no abnormalities, hearing is grossly normal  Oropharynx/Mouth/Face: lips, tongue and gums are normal with no lesions, the voice quality was normal  Neck: neck is supple and symmetric, with midline trachea and no masses, thyroid is normal    Pulmonary: no increased work of breathing or signs of respiratory distress, lungs are clear to auscultation  Cardiovascular: normal heart rate and rhythm, normal S1 and S2,   Musculoskeletal: normal gait and station,   Neurological: normal coordination, normal general cortical function      Lab Review:  Lab Results   Component Value Date    TSH 1.00 02/12/2021    TSH 2.03 01/08/2021    TSH 6.42 12/22/2020     Lab Results   Component Value Date    T4FREE 1.2 02/12/2021 7/23/2019       COMPARISON:   02/26/2019       HISTORY:   ORDERING SYSTEM PROVIDED HISTORY: Thyroid nodule       FINDINGS:   Right thyroid lobe:  5.7 x 2.7 x 2.3 cm       Left thyroid lobe:  4.6 x 2.2 x 1.6 cm       Isthmus:  5 mm       Thyroid Gland:  Thyroid gland is heterogeneous and mildly hypervascular.  The   thyroid gland is mildly enlarged.       Nodules: No suspicious solid nodules are identified.  Mixed solid and cystic   subcentimeter right thyroid lobe nodule is redemonstrated, 8 mm, not   significantly changed.       Cervical lymphadenopathy: No abnormal lymph nodes in the imaged portions of   the neck.           Impression     Lab Results   Component Value Date    TSH 1.00 02/12/2021        ASSESSMENT/PLAN:    ---Graves disease trab and TSI positive. She has been taking tapazole  daily since march 2019   Currently she is taking 2.5 mg daily.   TSH was 1 in feb 2021   She will repeat labs in 3 months and then again in 6 months  dx with Graves on bloodwork and she was not symptomatic after she started taking Tapazole she noted improvement in her palpitation    she  was told that Methimazole can cause allergic reaction including skin rash, arthralgias and hepatic damage. Autumn Ari  was told about the rare side effect of agranulocytosis and warning symptoms of sore throat, mouth sores, fever and chills to to let us know in case if any of these symptoms develop The patient verbalized understanding and agreed to start taking the medicine.     ----Thyroid nodule  Repeat thyroid in June 2020 showed subcentimeter nodules in Rt lobe, she was given order to repeat thyroid ultrasound in June 2021  ---uls done in 7/2019 shows stable size of the thyroid nodule. Left lobe nodules not visible any more   Patient has 2 nodules in the right lobe which meet the criteria for fine-needle aspiration biopsy which was offered to the patient. Patient was advised that the right lobe thyroid nodule which measures 1.9 cm should be biopsied, patient does not want to get a fine-needle biopsy done at this time    ---Persistent atrial fibrillation   Patient is on anticoagulation and beta blockers  She had cardioversion x2   Follows with cardiology    ---Partial small bowel obstruction   Stable    --Ovarian Cancer ---S/p complete hysterectomy and chemo   Stable   She follows with Oncology at [facility]     .  Essential hypertension  Control is good   She takes Amlodipine and HCTZ and metoprolol       Reviewed and/or ordered clinical lab results Yes  Reviewed and/or ordered radiology tests Yes   Reviewed and/or ordered other diagnostic tests Yes  Made a decision to obtain old records Yes  Reviewed and summarized old records Yes      Rosalva Chapman was counseled regarding symptoms of current diagnosis, course and complications of disease if inadequately treated, side effects of medications, diagnosis, treatment options, and prognosis, risks, benefits, complications, and alternatives of treatment, labs, imaging and other studies and treatment targets and goals. She understands instructions and counseling. Return in about 6 months (around 8/23/2021). Please note that some or all of this report was generated using voice recognition software. Please notify me in case of any questions about the content of this document, as some errors in transcription may have occurred . Hatchet Flap Text: The defect edges were debeveled with a #15 scalpel blade.  Given the location of the defect, shape of the defect and the proximity to free margins a hatchet flap was deemed most appropriate.  Using a sterile surgical marker, an appropriate hatchet flap was drawn incorporating the defect and placing the expected incisions within the relaxed skin tension lines where possible.    The area thus outlined was incised deep to adipose tissue with a #15 scalpel blade.  The skin margins were undermined to an appropriate distance in all directions utilizing iris scissors.

## 2021-04-01 DIAGNOSIS — I10 ESSENTIAL HYPERTENSION: ICD-10-CM

## 2021-04-01 DIAGNOSIS — I48.19 PERSISTENT ATRIAL FIBRILLATION (HCC): ICD-10-CM

## 2021-04-01 RX ORDER — METOPROLOL SUCCINATE 50 MG/1
TABLET, EXTENDED RELEASE ORAL
Qty: 90 TABLET | Refills: 0 | Status: ON HOLD
Start: 2021-04-01 | End: 2021-06-20 | Stop reason: HOSPADM

## 2021-04-01 RX ORDER — HYDROCHLOROTHIAZIDE 25 MG/1
TABLET ORAL
Qty: 90 TABLET | Refills: 0 | Status: SHIPPED | OUTPATIENT
Start: 2021-04-01 | End: 2021-07-09 | Stop reason: SDUPTHER

## 2021-04-01 NOTE — TELEPHONE ENCOUNTER
Recent Visits  Date Type Provider Dept   01/08/21 Office Visit Zeyad Pruett MD [facility]   08/18/20 Office Visit Zeyad Pruett MD [facility]   07/07/20 Office Visit Zeyad Pruett MD [facility]   01/07/20 Office Visit Zeyad Pruett MD [facility]   Showing recent visits within past 540 days with a meds authorizing provider and meeting all other requirements     Future Appointments  Date Type Provider Dept   07/09/21 Appointment Zeyad Pruett MD [facility]   Showing future appointments within next 150 days with a meds authorizing provider and meeting all other requirements      Both medication were filled on 3/18/21 and 1 refills on each.   Both #90 tablet

## 2021-04-13 ENCOUNTER — HOSPITAL ENCOUNTER (OUTPATIENT)
Age: 71
Discharge: HOME | End: 2021-04-13
Payer: MEDICARE

## 2021-04-13 DIAGNOSIS — E05.00 GRAVES DISEASE: ICD-10-CM

## 2021-04-13 LAB
T3 TOTAL: 1.16 NG/ML (ref 0.8–2)
T4 FREE: 1.2 NG/DL (ref 0.9–1.8)
TSH SERPL DL<=0.05 MIU/L-ACNC: 0.56 UIU/ML (ref 0.27–4.2)

## 2021-04-13 PROCEDURE — 36415 COLL VENOUS BLD VENIPUNCTURE: CPT

## 2021-04-13 PROCEDURE — 84480 ASSAY TRIIODOTHYRONINE (T3): CPT

## 2021-04-13 PROCEDURE — 84443 ASSAY THYROID STIM HORMONE: CPT

## 2021-04-13 PROCEDURE — 84439 ASSAY OF FREE THYROXINE: CPT

## 2021-04-30 ENCOUNTER — TELEPHONE (OUTPATIENT)
Dept: ENDOCRINOLOGY | Age: 71
End: 2021-04-30

## 2021-05-03 DIAGNOSIS — I10 ESSENTIAL HYPERTENSION: ICD-10-CM

## 2021-05-03 RX ORDER — AMLODIPINE BESYLATE 10 MG/1
TABLET ORAL
Qty: 90 TABLET | Refills: 1 | Status: SHIPPED | OUTPATIENT
Start: 2021-05-03 | End: 2021-07-09 | Stop reason: SDUPTHER

## 2021-05-03 NOTE — TELEPHONE ENCOUNTER
Received refill request for amlodipine from [facility] pharmacy. Last ov: 9/8/2020 NPAL    Last Refill: 7/7/2020 #90 with 2 refills     Next appointment:no future appt scheduled.

## 2021-06-18 ENCOUNTER — APPOINTMENT (OUTPATIENT)
Dept: GENERAL RADIOLOGY | Age: 71
End: 2021-06-18
Payer: MEDICARE

## 2021-06-18 ENCOUNTER — TELEPHONE (OUTPATIENT)
Dept: CARDIOLOGY CLINIC | Age: 71
End: 2021-06-18

## 2021-06-18 ENCOUNTER — HOSPITAL ENCOUNTER (OUTPATIENT)
Age: 71
Setting detail: OBSERVATION
Discharge: HOME | End: 2021-06-20
Attending: FAMILY MEDICINE | Admitting: FAMILY MEDICINE
Payer: MEDICARE

## 2021-06-18 DIAGNOSIS — R07.9 ACUTE CHEST PAIN: Primary | ICD-10-CM

## 2021-06-18 LAB
A/G RATIO: 1.2 (ref 1.1–2.2)
ALBUMIN SERPL-MCNC: 4.3 G/DL (ref 3.4–5)
ALP BLD-CCNC: 102 U/L (ref 40–129)
ALT SERPL-CCNC: 17 U/L (ref 10–40)
ANION GAP SERPL CALCULATED.3IONS-SCNC: 9 MMOL/L (ref 3–16)
APTT: 47 SEC (ref 24.2–36.2)
AST SERPL-CCNC: 26 U/L (ref 15–37)
BASOPHILS ABSOLUTE: 0 K/UL (ref 0–0.2)
BASOPHILS RELATIVE PERCENT: 0.7 %
BILIRUB SERPL-MCNC: <0.2 MG/DL (ref 0–1)
BUN BLDV-MCNC: 14 MG/DL (ref 7–20)
CALCIUM SERPL-MCNC: 10.2 MG/DL (ref 8.3–10.6)
CHLORIDE BLD-SCNC: 103 MMOL/L (ref 99–110)
CO2: 30 MMOL/L (ref 21–32)
CREAT SERPL-MCNC: 0.7 MG/DL (ref 0.6–1.2)
EKG ATRIAL RATE: 56 BPM
EKG DIAGNOSIS: NORMAL
EKG P AXIS: 76 DEGREES
EKG P-R INTERVAL: 184 MS
EKG Q-T INTERVAL: 474 MS
EKG QRS DURATION: 126 MS
EKG QTC CALCULATION (BAZETT): 457 MS
EKG R AXIS: -29 DEGREES
EKG T AXIS: 10 DEGREES
EKG VENTRICULAR RATE: 56 BPM
EOSINOPHILS ABSOLUTE: 0.1 K/UL (ref 0–0.6)
EOSINOPHILS RELATIVE PERCENT: 1.9 %
GFR AFRICAN AMERICAN: >60
GFR NON-AFRICAN AMERICAN: >60
GLOBULIN: 3.5 G/DL
GLUCOSE BLD-MCNC: 93 MG/DL (ref 70–99)
HCT VFR BLD CALC: 35.8 % (ref 36–48)
HEMOGLOBIN: 12.1 G/DL (ref 12–16)
INR BLD: 2.76 (ref 0.86–1.14)
LYMPHOCYTES ABSOLUTE: 1.6 K/UL (ref 1–5.1)
LYMPHOCYTES RELATIVE PERCENT: 39.8 %
MCH RBC QN AUTO: 30.4 PG (ref 26–34)
MCHC RBC AUTO-ENTMCNC: 33.8 G/DL (ref 31–36)
MCV RBC AUTO: 90.1 FL (ref 80–100)
MONOCYTES ABSOLUTE: 0.3 K/UL (ref 0–1.3)
MONOCYTES RELATIVE PERCENT: 7.6 %
NEUTROPHILS ABSOLUTE: 2.1 K/UL (ref 1.7–7.7)
NEUTROPHILS RELATIVE PERCENT: 50 %
PDW BLD-RTO: 16.1 % (ref 12.4–15.4)
PLATELET # BLD: 166 K/UL (ref 135–450)
PMV BLD AUTO: 9.7 FL (ref 5–10.5)
POTASSIUM SERPL-SCNC: 3.6 MMOL/L (ref 3.5–5.1)
PRO-BNP: 533 PG/ML (ref 0–124)
PROTHROMBIN TIME: 32.4 SEC (ref 10–13.2)
RBC # BLD: 3.97 M/UL (ref 4–5.2)
SODIUM BLD-SCNC: 142 MMOL/L (ref 136–145)
TOTAL PROTEIN: 7.8 G/DL (ref 6.4–8.2)
TROPONIN: <0.01 NG/ML
WBC # BLD: 4.1 K/UL (ref 4–11)

## 2021-06-18 PROCEDURE — 85025 COMPLETE CBC W/AUTO DIFF WBC: CPT

## 2021-06-18 PROCEDURE — 36415 COLL VENOUS BLD VENIPUNCTURE: CPT

## 2021-06-18 PROCEDURE — 93010 ELECTROCARDIOGRAM REPORT: CPT | Performed by: INTERNAL MEDICINE

## 2021-06-18 PROCEDURE — 6370000000 HC RX 637 (ALT 250 FOR IP): Performed by: PHYSICIAN ASSISTANT

## 2021-06-18 PROCEDURE — 2580000003 HC RX 258: Performed by: FAMILY MEDICINE

## 2021-06-18 PROCEDURE — 80053 COMPREHEN METABOLIC PANEL: CPT

## 2021-06-18 PROCEDURE — 83880 ASSAY OF NATRIURETIC PEPTIDE: CPT

## 2021-06-18 PROCEDURE — 85730 THROMBOPLASTIN TIME PARTIAL: CPT

## 2021-06-18 PROCEDURE — G0378 HOSPITAL OBSERVATION PER HR: HCPCS

## 2021-06-18 PROCEDURE — 99283 EMERGENCY DEPT VISIT LOW MDM: CPT

## 2021-06-18 PROCEDURE — 93005 ELECTROCARDIOGRAM TRACING: CPT | Performed by: EMERGENCY MEDICINE

## 2021-06-18 PROCEDURE — 71045 X-RAY EXAM CHEST 1 VIEW: CPT

## 2021-06-18 PROCEDURE — 84484 ASSAY OF TROPONIN QUANT: CPT

## 2021-06-18 PROCEDURE — 85610 PROTHROMBIN TIME: CPT

## 2021-06-18 RX ORDER — SODIUM CHLORIDE 0.9 % (FLUSH) 0.9 %
5-40 SYRINGE (ML) INJECTION EVERY 12 HOURS SCHEDULED
Status: DISCONTINUED | OUTPATIENT
Start: 2021-06-18 | End: 2021-06-20 | Stop reason: HOSPADM

## 2021-06-18 RX ORDER — SODIUM CHLORIDE 9 MG/ML
25 INJECTION, SOLUTION INTRAVENOUS PRN
Status: DISCONTINUED | OUTPATIENT
Start: 2021-06-18 | End: 2021-06-20 | Stop reason: HOSPADM

## 2021-06-18 RX ORDER — HYDROCHLOROTHIAZIDE 25 MG/1
25 TABLET ORAL DAILY
Status: DISCONTINUED | OUTPATIENT
Start: 2021-06-18 | End: 2021-06-20 | Stop reason: HOSPADM

## 2021-06-18 RX ORDER — LANOLIN ALCOHOL/MO/W.PET/CERES
9 CREAM (GRAM) TOPICAL NIGHTLY PRN
Status: DISCONTINUED | OUTPATIENT
Start: 2021-06-18 | End: 2021-06-20 | Stop reason: HOSPADM

## 2021-06-18 RX ORDER — AMLODIPINE BESYLATE 5 MG/1
10 TABLET ORAL DAILY
Status: DISCONTINUED | OUTPATIENT
Start: 2021-06-18 | End: 2021-06-20 | Stop reason: HOSPADM

## 2021-06-18 RX ORDER — POLYETHYLENE GLYCOL 3350 17 G/17G
17 POWDER, FOR SOLUTION ORAL DAILY PRN
Status: DISCONTINUED | OUTPATIENT
Start: 2021-06-18 | End: 2021-06-20 | Stop reason: HOSPADM

## 2021-06-18 RX ORDER — MELATONIN 10 MG
10 CAPSULE ORAL NIGHTLY
COMMUNITY
End: 2022-01-13

## 2021-06-18 RX ORDER — ONDANSETRON 4 MG/1
4 TABLET, ORALLY DISINTEGRATING ORAL EVERY 8 HOURS PRN
Status: DISCONTINUED | OUTPATIENT
Start: 2021-06-18 | End: 2021-06-20 | Stop reason: HOSPADM

## 2021-06-18 RX ORDER — SODIUM CHLORIDE 0.9 % (FLUSH) 0.9 %
5-40 SYRINGE (ML) INJECTION PRN
Status: DISCONTINUED | OUTPATIENT
Start: 2021-06-18 | End: 2021-06-20 | Stop reason: HOSPADM

## 2021-06-18 RX ORDER — ACETAMINOPHEN 650 MG/1
650 SUPPOSITORY RECTAL EVERY 6 HOURS PRN
Status: DISCONTINUED | OUTPATIENT
Start: 2021-06-18 | End: 2021-06-20 | Stop reason: HOSPADM

## 2021-06-18 RX ORDER — ACETAMINOPHEN 325 MG/1
650 TABLET ORAL EVERY 6 HOURS PRN
Status: DISCONTINUED | OUTPATIENT
Start: 2021-06-18 | End: 2021-06-20 | Stop reason: HOSPADM

## 2021-06-18 RX ORDER — ONDANSETRON 2 MG/ML
4 INJECTION INTRAMUSCULAR; INTRAVENOUS EVERY 6 HOURS PRN
Status: DISCONTINUED | OUTPATIENT
Start: 2021-06-18 | End: 2021-06-20 | Stop reason: HOSPADM

## 2021-06-18 RX ORDER — METHIMAZOLE 5 MG/1
2.5 TABLET ORAL DAILY
Status: DISCONTINUED | OUTPATIENT
Start: 2021-06-18 | End: 2021-06-20 | Stop reason: HOSPADM

## 2021-06-18 RX ADMIN — NITROGLYCERIN 1 INCH: 20 OINTMENT TOPICAL at 16:56

## 2021-06-18 RX ADMIN — Medication 10 ML: at 20:39

## 2021-06-18 RX ADMIN — MELATONIN TAB 3 MG 9 MG: 3 TAB at 23:47

## 2021-06-18 ASSESSMENT — ENCOUNTER SYMPTOMS
NAUSEA: 0
STRIDOR: 0
VOMITING: 0
ABDOMINAL DISTENTION: 0
WHEEZING: 0
DIARRHEA: 0
SHORTNESS OF BREATH: 0
ABDOMINAL PAIN: 0
COUGH: 0
CHEST TIGHTNESS: 1
CONSTIPATION: 0
COLOR CHANGE: 0

## 2021-06-18 ASSESSMENT — PAIN SCALES - GENERAL: PAINLEVEL_OUTOF10: 0

## 2021-06-18 NOTE — ED NOTES
Denies chest pain or shortness of breath at this time. Is having lots of stress at home. Rowan Saldaña RN  06/18/21 [id number]

## 2021-06-18 NOTE — ED PROVIDER NOTES
[facility]        Pt Name: Senia Bauer  MRN: [id number]  [place] 1950  Date of evaluation: 6/18/2021  Provider: Stormy Mercer PA-C  PCP: Rizwan Oneil MD  Note Started: 2:29 PM EDT       SAMEER. I have evaluated this patient. My supervising physician was available for consultation. CHIEF COMPLAINT       Chief Complaint   Patient presents with    Chest Pain     pt states she has been having chest pressure and dizziness since yesterday, worse today    Dizziness       HISTORY OF PRESENT ILLNESS   (Location, Timing/Onset, Context/Setting, Quality, Duration, Modifying Factors, Severity, Associated Signs and Symptoms)  Note limiting factors. Senia Bauer is a 78 y.o. female who presents with a Chief Complaint of chest tightness and lightheadedness on and off since yesterday. At this time, she denies chest tightness. She takes Xarelto for atrial fibrillation and sees Dr. Jeremy Arias. She has not had any recent cardiac evaluation. She does have history of hypertension and diabetes. She denies headache, spinning sensation, shortness of breath, abdominal pain, nausea, vomiting, diarrhea, pain or swelling in extremities. Nursing Notes were all reviewed and agreed with or any disagreements were addressed in the HPI. REVIEW OF SYSTEMS    (2-9 systems for level 4, 10 or more for level 5)     Review of Systems   Constitutional: Negative for chills and fever. HENT: Negative. Eyes: Negative for visual disturbance. Respiratory: Positive for chest tightness. Negative for cough, shortness of breath, wheezing and stridor. Cardiovascular: Positive for chest pain. Negative for palpitations and leg swelling. Gastrointestinal: Negative for abdominal distention, abdominal pain, constipation, diarrhea, nausea and vomiting. Genitourinary: Negative. Musculoskeletal: Negative for neck pain and neck stiffness. Skin: Negative for color change, pallor, rash and wound. Neurological: Positive for light-headedness. Negative for dizziness, tremors, seizures, syncope, facial asymmetry, speech difficulty, weakness, numbness and headaches. Psychiatric/Behavioral: Negative for confusion. All other systems reviewed and are negative. Positives and Pertinent negatives as per HPI. Except as noted above in the ROS, all other systems were reviewed and negative.        PAST MEDICAL HISTORY     Past Medical History:   Diagnosis Date    Anal bleeding 2019    CT scan clear     Anemia     Atrial [facility] ([facility]     cleared by cardiologist 2018, was related to cancer     Colon polyps     Diabetes mellitus ([facility]     pre diabetic diet controlled    Endometrial cancer ([facility]     Hypertension     IFG (impaired fasting glucose)     Small bowel obstruction ([facility]     resolved without surgery    Thyroid nodule          SURGICAL HISTORY     Past Surgical History:   Procedure Laterality Date    CARDIOVERSION      CARDIOVERSION  09/19/2019    COLONOSCOPY N/A 3/14/2019    COLONOSCOPY POLYPECTOMY SNARE/COLD BIOPSY performed by Carlos Limon MD at [facility]  3/14/2019    COLONOSCOPY CONTROL HEMORRHAGE performed by Carlos Limon MD at [facility] N/A 3/26/2019    COLONOSCOPY CONTROL HEMORRHAGE performed by [facility]on MD at [facility] 9/10/2019    SIGMOIDOSCOPY DIAGNOSTIC FLEXIBLE performed by Carlos Limon MD at [address] [place] [address]      TUNNELED CENTRAL VENOUS CATHETER W/ SUBCUTANEOUS PORT Right          CURRENTMEDICATIONS       Previous Medications    AMLODIPINE (NORVASC) 10 MG TABLET    TAKE ONE TABLET BY MOUTH DAILY    HYDROCHLOROTHIAZIDE (HYDRODIURIL) 25 MG TABLET    TAKE ONE TABLET BY MOUTH DAILY    MELATONIN 10 MG CAPS CAPSULE    Take 10 mg by mouth nightly    METHIMAZOLE (TAPAZOLE) 5 MG TABLET TAKE 1/2 TABLET BY MOUTH DAILY    METOPROLOL SUCCINATE (TOPROL XL) 50 MG EXTENDED RELEASE TABLET    TAKE ONE TABLET BY MOUTH DAILY    MULTIPLE VITAMINS-MINERALS (THERAPEUTIC MULTIVITAMIN-MINERALS) TABLET    Take 1 tablet by mouth daily    XARELTO 20 MG TABS TABLET    TAKE ONE TABLET BY MOUTH DAILY WITH BREAKFAST         ALLERGIES     Naproxen and Lisinopril    FAMILYHISTORY       Family History   Problem Relation Age of Onset    Hypertension Mother     Colon Cancer Maternal Grandmother           SOCIAL HISTORY       Social History     Tobacco Use    Smoking status: Never Smoker    Smokeless tobacco: Never Used   Vaping Use    Vaping Use: Never used   Substance Use Topics    Alcohol use: Yes     Comment: occ wine    Drug use: No       SCREENINGS   NIH Stroke Scale  NIH Stroke Scale Assessed: Yes  Interval: Baseline  Level of Consciousness (1a. ): Alert  LOC Questions (1b. ): Answers both correctly  LOC Commands (1c. ): Performs both tasks correctly  Best Gaze (2. ): Normal  Visual (3. ): No visual loss  Facial Palsy (4. ): Normal symmetrical movement  Motor Arm, Left (5a. ): No drift  Motor Arm, Right (5b. ): No drift  Motor Leg, Left (6a. ): No drift  Motor Leg, Right (6b. ): No drift  Limb Ataxia (7. ): Absent  Sensory (8. ): Normal  Best Language (9. ): No aphasia  Dysarthria (10. ): Normal  Extinction and Inattention (11): No abnormality  Total: 0         PHYSICAL EXAM    (up to 7 for level 4, 8 or more for level 5)     ED Triage Vitals [06/18/21 1328]   BP Temp Temp Source Pulse Resp SpO2 Height Weight   (!) 153/85 98.4 °F (36.9 °C) Oral 64 18 98 % 5' 9\" (1.753 m) 215 lb (97.5 kg)       Physical Exam  Vitals and nursing note reviewed. Constitutional:       Appearance: Normal appearance. She is well-developed. She is not toxic-appearing or diaphoretic. HENT:      Head: Normocephalic and atraumatic.       Right Ear: External ear normal.      Left Ear: External ear normal.      Nose: Nose normal. Mouth/Throat:      Mouth: Mucous membranes are moist.      Pharynx: Oropharynx is clear. Eyes:      General: No scleral icterus. Right eye: No discharge. Left eye: No discharge. Extraocular Movements: Extraocular movements intact. Conjunctiva/sclera: Conjunctivae normal.      Pupils: Pupils are equal, round, and reactive to light. Cardiovascular:      Rate and Rhythm: Normal rate. Pulmonary:      Effort: Pulmonary effort is normal.      Breath sounds: Normal breath sounds. Abdominal:      General: Bowel sounds are normal.      Palpations: Abdomen is soft. Tenderness: There is no abdominal tenderness. Musculoskeletal:         General: Normal range of motion. Cervical back: Normal range of motion. Skin:     General: Skin is warm and dry. Capillary Refill: Capillary refill takes less than 2 seconds. Coloration: Skin is not jaundiced or pale. Findings: No bruising, erythema, lesion or rash. Neurological:      General: No focal deficit present. Mental Status: She is alert and oriented to person, place, and time. Cranial Nerves: No cranial nerve deficit. Comments: No pronator drift, facial droop or slurred speech. Normal finger to nose coordination. Normal rapid alternating hand movement. Normal heel to shin coordination  Haley Hallpike negative without nystagmus. 5 out of 5 strength in all 4 extremities without focal weakness, paresthesia or radiculopathy.    Psychiatric:         Mood and Affect: Mood normal.         Behavior: Behavior normal.         DIAGNOSTIC RESULTS   LABS:    Labs Reviewed   CBC WITH AUTO DIFFERENTIAL - Abnormal; Notable for the following components:       Result Value    RBC 3.97 (*)     Hematocrit 35.8 (*)     RDW 16.1 (*)     All other components within normal limits    Narrative:     Performed at:  [facility] [address], [place], [address]   Phone [phone]   BRAIN NATRIURETIC PEPTIDE - Abnormal; Notable for the following components:    Pro- (*)     All other components within normal limits    Narrative:     Performed at:  [facility] [address]   Phone [phone]   PROTIME-INR - Abnormal; Notable for the following components:    Protime 32.4 (*)     INR 2.76 (*)     All other components within normal limits    Narrative:     Performed at:  [facility] [address] [email or web address]   Phone [phone]   APTT - Abnormal; Notable for the following components:    aPTT 47.0 (*)     All other components within normal limits    Narrative:     Performed at:  [facility] [address]   Phone [phone]   COMPREHENSIVE METABOLIC PANEL    Narrative:     Performed at:  [facility] [address]   Phone [phone]   TROPONIN    Narrative:     Performed at:  [facility] [email or web address] [email or web address]   Phone [phone]       All other labs were within normal range or not returned as of this dictation. EKG: All EKG's are interpreted by the Emergency Department Physician in the absence of a cardiologist.  Please see their note for interpretation of EKG. RADIOLOGY:   Non-plain film images such as CT, Ultrasound and MRI are read by the radiologist. Plain radiographic images are visualized and preliminarily interpreted by the ED Provider with the below findings:        Interpretation per the Radiologist below, if available at the time of this note:    XR CHEST PORTABLE   Final Result   No radiographic evidence of acute pulmonary disease.                  PROCEDURES   Unless otherwise noted below, none     Procedures    CRITICAL CARE TIME   N/A    CONSULTS:  Cathy Toro COURSE and DIFFERENTIAL DIAGNOSIS/MDM:   Vitals:    Vitals:    06/18/21 1328 06/18/21 1515   BP: (!) 153/85 (!) 146/72   Pulse: 64 56   Resp: 18 16   Temp: 98.4 °F (36.9 °C)    TempSrc: Oral    SpO2: 98% 99%   Weight: 215 lb (97.5 kg)    Height: 5' 9\" (1.753 m)        Patient was given the following medications:  Medications   nitroglycerin (NITRO-BID) 2 % ointment 1 inch (has no administration in time range)           This patient presents to the emergency department with complaints of chest discomfort and lightheadedness on and off for a few days. At this time, she is chest pain-free. Therefore, nitroglycerin paste ordered. She declines aspirin since she is anticoagulated on Xarelto. Blood work is stable at this time. Chest x-ray unremarkable. Given her cardiac history, I do feel admission is warranted for further evaluation. Patient understands and agrees with plan. We have addressed concerns and expectations. My suspicion is low for carotid dissection, sinus abscess, acute fracture, acute CVA, ICH, SAH, TIA, meningitis, encephalitis, pseudotumor cerebri, temporal arteritis, sentinel bleed from ruptured aneurysm, hypertensive urgency or emergency, subdural hematoma, epidural hematoma, PE, myocarditis, pericarditis, endocarditis, acute pulmonary edema, pleural effusion, pericardial effusion, cardiac tamponade, CHF exacerbation, thoracic aortic dissection, esophageal rupture, other life-threatening arrhythmia, hypertensive urgency or emergency, hemothorax, pulmonary contusion, subcutaneous emphysema, flail chest, pneumo mediastinum, rib fracture, Pneumonia, pneumothorax, covid19, or other concerning pathology. FINAL IMPRESSION      1. Acute chest pain          DISPOSITION/PLAN   DISPOSITION Admitted 06/18/2021 04:51:42 PM      PATIENT REFERRED TO:  No follow-up provider specified.     DISCHARGE MEDICATIONS:  New Prescriptions    No medications on file       DISCONTINUED MEDICATIONS:  Discontinued Medications    No medications on file              (Please note that portions of this note were completed with a voice recognition program.  Efforts were made to edit the dictations but occasionally words are mis-transcribed.)    Toy Hall PA-C (electronically signed)            Toy Hall PA-C  06/18/21 4036

## 2021-06-18 NOTE — TELEPHONE ENCOUNTER
Called and spoke to [facility] [employer] and per [facility] she wants the pt to go to the ER to be evaluated. Called and told the pt to go to the ER and she will have someone drive her.

## 2021-06-18 NOTE — PROGRESS NOTES
4 Eyes Skin Assessment     NAME:  Shivam Mackey  YOB: 1950  MEDICAL RECORD NUMBER:  [id number]    The patient is being assess for  Admission    I agree that 2 RN's have performed a thorough Head to Toe Skin Assessment on the patient. ALL assessment sites listed below have been assessed. Areas assessed by both nurses:    Head, Face, Ears, Shoulders, Back, Chest, Arms, Elbows, Hands, Sacrum. Buttock, Coccyx, Ischium and Legs. Feet and Heels        Does the Patient have a Wound?  No noted wound(s)       Mark Prevention initiated:  Yes   Wound Care Orders initiated:  No    Pressure Injury (Stage 3,4, Unstageable, DTI, NWPT, and Complex wounds) if present place consult order under [de-identified] No    New and Established Ostomies if present place consult order under : NA      Nurse 1 eSignature: Electronically signed by Gomez Hammond RN on 6/18/21 at 7:40 PM EDT    **SHARE this note so that the co-signing nurse is able to place an eSignature**    Nurse 2 eSignature: Electronically signed by Nathan Romero RN on 6/18/21 at 11:21 PM EDT

## 2021-06-18 NOTE — H&P
HOSPITALISTS HISTORY AND PHYSICAL    6/18/2021 6:50 PM    Patient Information:  Bryan Dozier is a 78 y.o. female [id number]  PCP:  Jenny Dumont MD (Tel: [phone] )    Chief complaint:    Chief Complaint   Patient presents with    Chest Pain     pt states she has been having chest pressure and dizziness since yesterday, worse today    Dizziness        History of Present Illness:  Kole Downing is a 78 y.o. female with h/o Afib , HTN , chronic anticoagulation , SATISH, presents with c/o chest pressure , palpitations and dizziness. The pt states sx are ongoing for couple of days. Present at rest, last for few moments. Pt has h/o AFib she follows up with Dr. Grover Rosas underwent electro cardioversion in 2019. EKG done at the ED showed sinus bradycardia and RBBB. Troponin < 0,,01. Chest xray is neg for acute findings. The pt was given nitro . She is allergic to Naproxen so ASA was not give, She is pain free now. REVIEW OF SYSTEMS:   Constitutional: Negative for fever,chills or night sweats  ENT: Negative for rhinorrhea, epistaxis, hoarseness, sore throat. Respiratory: Negative for shortness of breath,wheezing  Cardiovascular: +Ve for chest pain, palpitations   Gastrointestinal: Negative for nausea, vomiting, diarrhea  Genitourinary: Negative for polyuria, dysuria   Hematologic/Lymphatic: Negative for bleeding tendency, easy bruising  Musculoskeletal: Negative for myalgias and arthralgias  Neurologic: Negative for confusion,dysarthria. Skin: Negative for itching,rash  Psychiatric: Negative for depression,anxiety, agitation. Endocrine: Negative for polydipsia,polyuria,heat /cold intolerance.     Past Medical History:   has a past medical history of Anal bleeding, Anemia, Arthritis, Atrial fibrillation ([address] Colon polyps, Diabetes mellitus ([address] Endometrial cancer ([address] History of blood transfusion, Hypertension, IFG (impaired fasting glucose), SATISH (obstructive sleep apnea), Small bowel obstruction ([address] and Thyroid nodule. Past Surgical History:   has a past surgical history that includes Tubal ligation; Cardioversion; davy and bso (cervix removed); TUNNELED CENTRAL VENOUS CATHETER W/ SUBCUTANEOUS PORT (Right); Colonoscopy (N/A, 3/14/2019); Colonoscopy (3/14/2019); Colonoscopy (N/A, 3/26/2019); sigmoidoscopy (N/A, 9/10/2019); and Cardioversion (09/19/2019). Medications:  No current facility-administered medications on file prior to encounter.      Current Outpatient Medications on File Prior to Encounter   Medication Sig Dispense Refill    melatonin 10 MG CAPS capsule Take 10 mg by mouth nightly      methIMAzole (TAPAZOLE) 5 MG tablet TAKE 1/2 TABLET BY MOUTH DAILY 45 tablet 1    amLODIPine (NORVASC) 10 MG tablet TAKE ONE TABLET BY MOUTH DAILY 90 tablet 1    metoprolol succinate (TOPROL XL) 50 MG extended release tablet TAKE ONE TABLET BY MOUTH DAILY 90 tablet 0    hydroCHLOROthiazide (HYDRODIURIL) 25 MG tablet TAKE ONE TABLET BY MOUTH DAILY 90 tablet 0    XARELTO 20 MG TABS tablet TAKE ONE TABLET BY MOUTH DAILY WITH BREAKFAST 90 tablet 2    Multiple Vitamins-Minerals (THERAPEUTIC MULTIVITAMIN-MINERALS) tablet Take 1 tablet by mouth daily       Current Facility-Administered Medications   Medication Dose Route Frequency Provider Last Rate Last Admin    amLODIPine (NORVASC) tablet 10 mg  10 mg Oral Daily Gretta Sanderson MD        methIMAzole (TAPAZOLE) tablet 2.5 mg  2.5 mg Oral Daily Gretta Sanderson MD        rivaroxaban (XARELTO) tablet 15 mg  15 mg Oral Daily Gretta Sanderson MD        hydroCHLOROthiazide (HYDRODIURIL) tablet 25 mg  25 mg Oral Daily Gretta Sanderson MD        sodium chloride flush 0.9 % injection 5-40 mL  5-40 mL Intravenous 2 times per day Gretta Sanderson MD        sodium chloride flush 0.9 % injection 5-40 mL  5-40 mL Intravenous PRN Gretta Sanderson MD        0.9 % sodium chloride infusion  25 mL Intravenous PRN Emely Drew Walton MD        ondansetron (ZOFRAN-ODT) disintegrating tablet 4 mg  4 mg Oral Q8H PRN Zainab Regan MD        Or    ondansetron [facility]) injection 4 mg  4 mg Intravenous Q6H PRN Zainab Regan MD        polyethylene glycol (GLYCOLAX) packet 17 g  17 g Oral Daily PRN Zainab Regan MD        acetaminophen (TYLENOL) tablet 650 mg  650 mg Oral Q6H PRN Zainab Regan MD        Or   [address] [place] [address] acetaminophen (TYLENOL) suppository 650 mg  650 mg Rectal Q6H PRN Zainab Regan MD         Current Facility-Administered Medications   Medication Dose Route Frequency Provider Last Rate Last Admin    amLODIPine (NORVASC) tablet 10 mg  10 mg Oral Daily Zainab Regan MD        methIMAzole (TAPAZOLE) tablet 2.5 mg  2.5 mg Oral Daily Zainab Regan MD        rivaroxaban (XARELTO) tablet 15 mg  15 mg Oral Daily Zainab Regan MD        hydroCHLOROthiazide (HYDRODIURIL) tablet 25 mg  25 mg Oral Daily Zainab Regan MD        sodium chloride flush 0.9 % injection 5-40 mL  5-40 mL Intravenous 2 times per day Zainab Regan MD        sodium chloride flush 0.9 % injection 5-40 mL  5-40 mL Intravenous PRN Zainab Regan MD        0.9 % sodium chloride infusion  25 mL Intravenous PRN Zainab Regan MD        ondansetron (ZOFRAN-ODT) disintegrating tablet 4 mg  4 mg Oral Q8H PRN Zainab Regan MD        Or    ondansetron (ZOFRAN) injection 4 mg  4 mg Intravenous Q6H PRN Zainab Regan MD        polyethylene glycol (GLYCOLAX) packet 17 g  17 g Oral Daily PRN Zainab Regan MD        acetaminophen (TYLENOL) tablet 650 mg  650 mg Oral Q6H PRN Zainab Regan MD        Or   [address] [place] [address] acetaminophen (TYLENOL) suppository 650 mg  650 mg Rectal Q6H PRN Zainab Regan MD           Allergies: Allergies   Allergen Reactions    Naproxen Swelling    Lisinopril Rash        Social History:   reports that she has never smoked. She has never used smokeless tobacco. She reports current alcohol use. She reports that she does not use drugs.      Family History:  family history includes Colon Cancer in her maternal grandmother; Hypertension in her mother. ,     Physical Exam:  BP (!) 156/80   Pulse 55   Temp 98.2 °F (36.8 °C) (Temporal)   Resp 18   Ht 5' 9\" (1.753 m)   Wt 216 lb (98 kg)   LMP  (LMP Unknown)   SpO2 100%   BMI 31.90 kg/m²     General appearance:  Appears comfortable. Well nourished  Eyes: Sclera clear, pupils equal  ENT: Moist mucus membranes, no thrush. Trachea midline. Cardiovascular: Regular rhythm, normal S1, S2. No murmur, gallop, rub. No edema in lower extremities  Respiratory: Clear to auscultation bilaterally, no wheeze, good inspiratory effort  Gastrointestinal: Abdomen soft, non-tender, not distended, normal bowel sounds  Musculoskeletal: No cyanosis in digits, neck supple  Neurology: Cranial nerves grossly intact. Alert and oriented in time, place and person. No speech or motor deficits  Psychiatry: Appropriate affect. Not agitated  Skin: Warm, dry, normal turgor, no rash    Labs:  CBC:   Lab Results   Component Value Date    WBC 4.1 06/18/2021    RBC 3.97 06/18/2021    HGB 12.1 06/18/2021    HCT 35.8 06/18/2021    MCV 90.1 06/18/2021    MCH 30.4 06/18/2021    MCHC 33.8 06/18/2021    RDW 16.1 06/18/2021     06/18/2021    MPV 9.7 06/18/2021     BMP:    Lab Results   Component Value Date     06/18/2021    K 3.6 06/18/2021    K 3.5 03/26/2019     06/18/2021    CO2 30 06/18/2021    BUN 14 06/18/2021    CREATININE 0.7 06/18/2021    CALCIUM 10.2 06/18/2021    GFRAA >60 06/18/2021    LABGLOM >60 06/18/2021    GLUCOSE 93 06/18/2021       Chest Xray:   EKG:        Problem List  Active Problems:    Chest pain  Resolved Problems:    * No resolved hospital problems. *        Assessment/Plan:       1. Chest pain admit to r/out ACS  Cont to trend troponin  Nitro   Allergies to ASA  Stress test ordered  ECHO ordered     HTN cont home meds    H/o Afib   EKG showed NSR  Cont Anticoagulation   Admit as obs.  I anticipate hospitalization spanning less than two midnights for investigation and treatment of the above medically necessary diagnoses.       Mercedes Obregon MD    6/18/2021 6:50 PM

## 2021-06-18 NOTE — PLAN OF CARE
Problem: Falls - Risk of:  Goal: Will remain free from falls  Description: Will remain free from falls  Outcome: Ongoing  Goal: Absence of physical injury  Description: Absence of physical injury  Outcome: Ongoing     Problem: Cardiac:  Goal: Ability to maintain an adequate cardiac output will improve  Description: Ability to maintain an adequate cardiac output will improve  Outcome: Ongoing  Goal: Complications related to the disease process, condition or treatment will be avoided or minimized  Description: Complications related to the disease process, condition or treatment will be avoided or minimized  Outcome: Ongoing  Goal: Hemodynamic stability will improve  Description: Hemodynamic stability will improve  Outcome: Ongoing  Goal: Risk factors for ineffective tissue perfusion will decrease  Description: Risk factors for ineffective tissue perfusion will decrease  Outcome: Ongoing     Problem: Fluid Volume:  Goal: Will show no signs or symptoms of fluid imbalance  Description: Will show no signs or symptoms of fluid imbalance  Outcome: Ongoing     Problem: PAIN  Goal: Patient's pain/discomfort is manageable  Outcome: Ongoing     Problem: DISCHARGE BARRIERS  Goal: Patient's continuum of care needs are met  Outcome: Ongoing

## 2021-06-18 NOTE — TELEPHONE ENCOUNTER
Since yesterday she has been feeling like she is going to faint , her heart is racing and there is a tightness in her chest . Please call to advise asap .

## 2021-06-18 NOTE — ED PROVIDER NOTES
EKG: Sinus rhythm, right bundle branch block, no acute ST segment abnormalities      Gogo Spencer MD  16/94/05 0189

## 2021-06-19 LAB
LV EF: 58 %
LV EF: 67 %
LVEF MODALITY: NORMAL
LVEF MODALITY: NORMAL

## 2021-06-19 PROCEDURE — G0378 HOSPITAL OBSERVATION PER HR: HCPCS

## 2021-06-19 PROCEDURE — 93306 TTE W/DOPPLER COMPLETE: CPT

## 2021-06-19 PROCEDURE — 6370000000 HC RX 637 (ALT 250 FOR IP): Performed by: PHYSICIAN ASSISTANT

## 2021-06-19 PROCEDURE — 6370000000 HC RX 637 (ALT 250 FOR IP): Performed by: FAMILY MEDICINE

## 2021-06-19 PROCEDURE — 78452 HT MUSCLE IMAGE SPECT MULT: CPT | Performed by: INTERNAL MEDICINE

## 2021-06-19 PROCEDURE — 2580000003 HC RX 258: Performed by: FAMILY MEDICINE

## 2021-06-19 PROCEDURE — 3430000000 HC RX DIAGNOSTIC RADIOPHARMACEUTICAL: Performed by: INTERNAL MEDICINE

## 2021-06-19 PROCEDURE — 93017 CV STRESS TEST TRACING ONLY: CPT | Performed by: INTERNAL MEDICINE

## 2021-06-19 PROCEDURE — A9502 TC99M TETROFOSMIN: HCPCS | Performed by: INTERNAL MEDICINE

## 2021-06-19 RX ADMIN — Medication 10 ML: at 10:23

## 2021-06-19 RX ADMIN — Medication 10 ML: at 19:59

## 2021-06-19 RX ADMIN — RIVAROXABAN 15 MG: 15 TABLET, FILM COATED ORAL at 17:05

## 2021-06-19 RX ADMIN — HYDROCHLOROTHIAZIDE 25 MG: 25 TABLET ORAL at 10:28

## 2021-06-19 RX ADMIN — TETROFOSMIN 10 MILLICURIE: 1.38 INJECTION, POWDER, LYOPHILIZED, FOR SOLUTION INTRAVENOUS at 08:02

## 2021-06-19 RX ADMIN — MELATONIN TAB 3 MG 9 MG: 3 TAB at 23:28

## 2021-06-19 RX ADMIN — AMLODIPINE BESYLATE 10 MG: 5 TABLET ORAL at 10:28

## 2021-06-19 RX ADMIN — TETROFOSMIN 30 MILLICURIE: 1.38 INJECTION, POWDER, LYOPHILIZED, FOR SOLUTION INTRAVENOUS at 09:21

## 2021-06-19 RX ADMIN — METHIMAZOLE 2.5 MG: 5 TABLET ORAL at 10:28

## 2021-06-19 ASSESSMENT — PAIN SCALES - GENERAL
PAINLEVEL_OUTOF10: 0

## 2021-06-19 NOTE — PLAN OF CARE
Problem: Falls - Risk of:  Goal: Will remain free from falls  Description: Will remain free from falls  6/19/2021 0829 by Nhung Sandoval RN  Outcome: Ongoing  Note: Fall precautions in place. Bed alarm on, non-skid socks on, call light in reach.will monitor. Problem: Cardiac:  Goal: Ability to maintain an adequate cardiac output will improve  Description: Ability to maintain an adequate cardiac output will improve  Outcome: Ongoing  Note: Pt denies of having CP, pt is NPO for stress. trop negative.

## 2021-06-19 NOTE — PROGRESS NOTES
Pt is back from stress lab. Vitals obtained and are stable. Pt denies of having any CP. Awaiting stress results.

## 2021-06-19 NOTE — PROGRESS NOTES
Patient instructed on Ramakrishna Protocol Stress Test Procedure including possible side effects and adverse reactions. Verbalizes knowledge and understanding and denies having any questions.

## 2021-06-19 NOTE — FLOWSHEET NOTE
06/19/21 1100   Vital Signs   Temp 97.8 °F (36.6 °C)   Temp Source Temporal   Pulse 56   Heart Rate Source Monitor   Resp 16   BP Location Left upper arm   MAP (mmHg) 92   Patient Position Sitting   Orthostatic B/P and Pulse?  Yes   Blood Pressure Lying 141/75   Pulse Lying 55 PER MINUTE   Blood Pressure Sitting 130/75   Pulse Sitting 56 PER MINUTE   Blood Pressure Standing 130/84   Pulse Standing 61 PER MINUTE   Level of Consciousness Alert (0)   Patient Currently in Pain Denies

## 2021-06-19 NOTE — CARE COORDINATION
Patient admitted as Observation with an anticipated short hospitalization length of stay. Chart reviewed and it appears that patient has minimal needs for discharge at this time. Discussed with patients nurse and requested that case management be notified if discharge needs are identified. *Case management will continue to follow progress and update discharge plan as needed.       Electronically signed by ARMEN Donovan, SABINO on 6/19/2021 at 9:10 AM

## 2021-06-19 NOTE — PROGRESS NOTES
[facility]    6/19/2021 2:34 PM        Name: Shirley Judge . Admitted: 6/18/2021  Primary Care Provider: Claudia Zepeda MD (Tel: [phone])      Subjective:  . Patient admitted with chest pain and dizziness. Chest pain has been intermittent for the past few days. She states approximate 7 times she has felt like she was going to pass out but did not. It was not associated with position changes or standing. She states that she has been under a lot of stress and her daughter was in a mental institution for a week and is now back living with her. Reviewed interval ancillary notes    Current Medications  amLODIPine (NORVASC) tablet 10 mg, Daily  methIMAzole (TAPAZOLE) tablet 2.5 mg, Daily  rivaroxaban (XARELTO) tablet 15 mg, Daily  hydroCHLOROthiazide (HYDRODIURIL) tablet 25 mg, Daily  sodium chloride flush 0.9 % injection 5-40 mL, 2 times per day  sodium chloride flush 0.9 % injection 5-40 mL, PRN  0.9 % sodium chloride infusion, PRN  ondansetron (ZOFRAN-ODT) disintegrating tablet 4 mg, Q8H PRN   Or  ondansetron (ZOFRAN) injection 4 mg, Q6H PRN  polyethylene glycol (GLYCOLAX) packet 17 g, Daily PRN  acetaminophen (TYLENOL) tablet 650 mg, Q6H PRN   Or  acetaminophen (TYLENOL) suppository 650 mg, Q6H PRN  melatonin tablet 9 mg, Nightly PRN        Objective:  /70   Pulse 64   Temp 98.8 °F (37.1 °C) (Temporal)   Resp 16   Ht 5' 9\" (1.753 m)   Wt 215 lb 14.4 oz (97.9 kg)   LMP  (LMP Unknown)   SpO2 99%   BMI 31.88 kg/m²     Intake/Output Summary (Last 24 hours) at 6/20/2021 1434  Last data filed at 6/20/2021 0911  Gross per 24 hour   Intake 240 ml   Output --   Net 240 ml      Wt Readings from Last 3 Encounters:   06/20/21 215 lb 14.4 oz (97.9 kg)   02/23/21 216 lb (98 kg)   01/08/21 215 lb 6.4 oz (97.7 kg)       General appearance:  Appears comfortable  Eyes: Sclera clear. Pupils equal.  ENT: Moist oral mucosa. Trachea midline, no adenopathy. Cardiovascular: Regular rhythm, normal S1, S2. No murmur. No edema in lower extremities  Respiratory: Not using accessory muscles. Good inspiratory effort. Clear to auscultation bilaterally, no wheeze or crackles. GI: Abdomen soft, no tenderness, not distended, normal bowel sounds  Musculoskeletal: No cyanosis in digits, neck supple  Neurology: CN 2-12 grossly intact. No speech or motor deficits  Psych: Normal affect. Alert and oriented in time, place and person  Skin: Warm, dry, normal turgor    Labs and Tests:  CBC:   Recent Labs     06/18/21  1535   WBC 4.1   HGB 12.1        BMP:    Recent Labs     06/18/21  1535      K 3.6      CO2 30   BUN 14   CREATININE 0.7   GLUCOSE 93     Hepatic:   Recent Labs     06/18/21  1535   AST 26   ALT 17   BILITOT <0.2   ALKPHOS 102     GXT   Summary    There is no definite evidence of stress induced ischemia. LV function is    normal with uniform wall motion and ejection fraction of 67%. Lower risk    study.         ECHO   Normal LV systolic function with an estimated EF of 55-60%. No regional wall motion abnormalities are seen. Grade II diastolic dysfunction with elevated LV filling pressures. The right ventricle is mildly enlarged. Right ventricular systolic function is normal.   There is bi-atrial enlargement. The mitral valve leaflets are slightly thickened with normal leaflet   mobility. Moderate mitral regurgitation. Trivial aortic regurgitation. Mild tricuspid regurgitation with a PASP of 38 mmHg. Mild pulmonic regurgitation. Problem List  Active Problems:    Chest pain  Resolved Problems:    * No resolved hospital problems. *       Assessment & Plan:   1. Chest pain-resolved. GXT normal.   2. Near syncope-echo ok except diastolic dysfunction. Has history of afib. Will consult cards for opinion prior to dc. 3. afib-followed by Dr Kinza Carranza.  May need outpatient monitor. Diet: ADULT DIET;  Regular; Low Fat/Low Chol/High Fiber/JULIANE  Code:Full Code        Aleyda Hoover PA-C   6/19/2021 2:34 PM

## 2021-06-20 VITALS
HEART RATE: 64 BPM | OXYGEN SATURATION: 99 % | SYSTOLIC BLOOD PRESSURE: 110 MMHG | WEIGHT: 215.9 LBS | RESPIRATION RATE: 16 BRPM | TEMPERATURE: 98.8 F | BODY MASS INDEX: 31.98 KG/M2 | DIASTOLIC BLOOD PRESSURE: 70 MMHG | HEIGHT: 69 IN

## 2021-06-20 PROCEDURE — 99215 OFFICE O/P EST HI 40 MIN: CPT | Performed by: INTERNAL MEDICINE

## 2021-06-20 PROCEDURE — G0378 HOSPITAL OBSERVATION PER HR: HCPCS

## 2021-06-20 PROCEDURE — 6370000000 HC RX 637 (ALT 250 FOR IP): Performed by: FAMILY MEDICINE

## 2021-06-20 PROCEDURE — 2580000003 HC RX 258: Performed by: FAMILY MEDICINE

## 2021-06-20 RX ADMIN — HYDROCHLOROTHIAZIDE 25 MG: 25 TABLET ORAL at 07:59

## 2021-06-20 RX ADMIN — METHIMAZOLE 2.5 MG: 5 TABLET ORAL at 08:00

## 2021-06-20 RX ADMIN — AMLODIPINE BESYLATE 10 MG: 5 TABLET ORAL at 07:59

## 2021-06-20 RX ADMIN — Medication 10 ML: at 08:01

## 2021-06-20 ASSESSMENT — PAIN SCALES - GENERAL
PAINLEVEL_OUTOF10: 0

## 2021-06-20 NOTE — DISCHARGE SUMMARY
[facility]    Patient Demographics    Patient. Bebe Book  Date of Birth. 1950  MRN. [id number]     Primary care provider. Alberto Weiner MD  (Tel: [phone])    Admit date: 6/18/2021    Discharge date (blank if same as Note Date): Note Date: 6/20/2021     Reason for Hospitalization. Chief Complaint   Patient presents with    Chest Pain     pt states she has been having chest pressure and dizziness since yesterday, worse today    Dizziness         Significant Findings. Active Problems:    Near syncope    Chest pain    Dizziness    PAF (paroxysmal atrial fibrillation) ([facility])  Resolved Problems:    * No resolved hospital problems. *       Problems and results from this hospitalization that need follow up. 1. Bradycardia  2. Near-syncope  3. A. fib    Significant test results and incidental findings. GXT   Summary   Juanetta Cornet is no definite evidence of stress induced ischemia. LV function is    normal with uniform wall motion and ejection fraction of 67%. Lower risk    study. Echocardiogram   Normal LV systolic function with an estimated EF of 55-60%. No regional wall motion abnormalities are seen. Grade II diastolic dysfunction with elevated LV filling pressures. The right ventricle is mildly enlarged. Right ventricular systolic function is normal.   There is bi-atrial enlargement. The mitral valve leaflets are slightly thickened with normal leaflet   mobility. Moderate mitral regurgitation. Trivial aortic regurgitation. Mild tricuspid regurgitation with a PASP of 38 mmHg. Mild pulmonic regurgitation. Invasive procedures and treatments. 1. None     Problem-based Hospital Course. Patient is a very pleasant 60-year-old female with A. fib on chronic anticoagulation who presented to hospital with chest pressure palpitations and dizziness. Symptoms have been ongoing for the past couple of days. She also felt like she was going to faint over 10 times in the past 4 days. EKG in the emergency room revealed sinus bradycardia with a right bundle branch block. Troponins were normal.  Patient was admitted and underwent a stress test which was negative for ischemia. Given her symptoms of near syncope cardiology was consulted. She will need outpatient follow-up with electrophysiology for an event monitor. Her beta-blocker was held during admission and upon discharge. Patient has been under tremendous amount of stress lately with her daughter. Is not clear however if her bradycardia has been contributing to her symptoms. She was discharged home in stable condition. Consults. IP CONSULT TO HOSPITALIST  IP CONSULT TO CARDIOLOGY    Physical examination on discharge day. /70   Pulse 64   Temp 98.8 °F (37.1 °C) (Temporal)   Resp 16   Ht 5' 9\" (1.753 m)   Wt 215 lb 14.4 oz (97.9 kg)   LMP  (LMP Unknown)   SpO2 99%   BMI 31.88 kg/m²   General appearance. Alert. Looks comfortable. HEENT. Sclera clear. Moist mucus membranes. Cardiovascular. Regular rate and rhythm, normal S1, S2. No murmur. Respiratory. Not using accessory muscles. Clear to auscultation bilaterally, no wheeze. Gastrointestinal. Abdomen soft, non-tender, not distended, normal bowel sounds  Neurology. Facial symmetry. No speech deficits. Moving all extremities equally. Extremities. No edema in lower extremities. Skin. Warm, dry, normal turgor    Condition at time of discharge stable    Medication instructions provided to patient at discharge.      Medication List      CONTINUE taking these medications    amLODIPine 10 MG tablet  Commonly known as: NORVASC  TAKE ONE TABLET BY MOUTH DAILY     hydroCHLOROthiazide 25 MG tablet  Commonly known as: HYDRODIURIL  TAKE ONE TABLET BY MOUTH DAILY     melatonin 10 MG Caps capsule     methIMAzole 5 MG tablet  Commonly known as: TAPAZOLE  TAKE 1/2 TABLET BY MOUTH DAILY     therapeutic multivitamin-minerals tablet     Xarelto 20 MG Tabs tablet  Generic drug: rivaroxaban  TAKE ONE TABLET BY MOUTH DAILY WITH BREAKFAST        STOP taking these medications    metoprolol succinate 50 MG extended release tablet  Commonly known as: TOPROL XL            Discharge recommendations given to patient. Follow Up. Electrophysiology in 1 week   Disposition. home  Activity. activity as tolerated  Diet: ADULT DIET; Regular; Low Fat/Low Chol/High Fiber/JULIANE      Spent 35 minutes in discharge process. Signed:   Yolanda Estrada PA-C     6/20/2021 3:01 PM

## 2021-06-20 NOTE — PLAN OF CARE
Problem: Falls - Risk of:  Goal: Will remain free from falls  Description: Will remain free from falls  6/20/2021 1015 by Richard Guzman RN  Outcome: Ongoing  Note: Fall precautions in place. Bed alarm on, non-skid socks on, call light in reach. Will monitor. Problem: Cardiac:  Goal: Ability to maintain an adequate cardiac output will improve  Description: Ability to maintain an adequate cardiac output will improve  Outcome: Ongoing  Note: Pt denies of having CP but c/o of dizziness at times. Awaiting cardiology to see the pt.

## 2021-06-20 NOTE — PROGRESS NOTES
Data- discharge order received, pt verbalized agreement to discharge, disposition to previous residence, no needs for HHC/DME. Action- discharge instructions prepared and given to pt, pt verbalized understanding. Medication information packet given r/t NEW and/or CHANGED prescriptions emphasizing name/purpose/side effects, pt verbalized understanding. Discharge instruction summary: Diet- cardaic, Activity- independent, Primary Care Physician as follows: Desmond Lenz -[phone] f/u appointment reviewed and complete, immunizations reviewed and complete, prescription medications filled .ter. Response- Pt belongings gathered, IV removed. Disposition is home (no HHC/DME needs), transported with RN, taken to lobby via w/c w/ wheelchair, no complications.

## 2021-06-20 NOTE — CONSULTS
[facility]  [phone]        Reason for Consultation/Chief Complaint: \"I have been having CP and dizziness . \"  Consulted for CP and near syncope    History of Present Illness:  Hank Razo is a 78 y.o. patient who presented to [facility] 6/18/21 with c/o CP and dizziness. She has PMH of PAF dx 7/17 s/p DCCV and 2nd DCCV in 9/19 per Dr. Desean Mao on low dose xarelto (? 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 Most recent GXT myoview 6/19/21 normal without ischemia; EF=67%. Most recent ECHO 6/18/21 EF=55-60%; grade II DD with elevated filling pressure; mod MR, mild TR/LA. Now presents with c/o CP and dizziness. Reports CP starting while driving 3 days ago. Described as pressure pain in mid-chest, no radiation, associated with nausea, nothing made better or worse, and lasting 2-3 hours. Also felt dizzy \"like going to pass out\" but never had LOC. Reports 6-7 episodes over last few days. She reports stress at home with daughter who is mentally ill. She tried to put spray paint in patient's car gas tank recently and locks her door to bedroom not knowing what daughter will do. Admit EKG NSR; RBBB; PAC's (no change 9/20 EKG). CXR negative; negative orthostatics (11mmHg drop only between laying flat and standing). Irasema negative x 3; AUM=951; BUN/Cr=14/0.7. Patient with no complaints of SOB, palpitations, edema, or orthopnea/PND. I have been asked to provide consultation regarding further management and testing. Past Medical History:   has a past medical history of Anal bleeding, Anemia, Arthritis, Atrial fibrillation ([address] Colon polyps, Diabetes mellitus ([address] Endometrial cancer ([address] History of blood transfusion, Hypertension, IFG (impaired fasting glucose), SATISH (obstructive sleep apnea), Small bowel obstruction ([address] and Thyroid nodule.     Surgical History:   has a past surgical history that includes Tubal ligation; Cardioversion; davy and bso (cervix removed); TUNNELED CENTRAL VENOUS CATHETER W/ SUBCUTANEOUS PORT (Right); Colonoscopy (N/A, 3/14/2019); Colonoscopy (3/14/2019); Colonoscopy (N/A, 3/26/2019); sigmoidoscopy (N/A, 9/10/2019); and Cardioversion (09/19/2019). Social History:   reports that she has never smoked. She has never used smokeless tobacco. She reports current alcohol use. She reports that she does not use drugs. Family History:  family history includes Colon Cancer in her maternal grandmother; Hypertension in her mother. Home Medications:  Were reviewed and are listed in nursing record. and/or listed below  Prior to Admission medications    Medication Sig Start Date End Date Taking?  Authorizing Provider   melatonin 10 MG CAPS capsule Take 10 mg by mouth nightly   Yes Historical Provider, MD   methIMAzole (TAPAZOLE) 5 MG tablet TAKE 1/2 TABLET BY MOUTH DAILY 5/26/21  Yes Aimee Santos MD   amLODIPine (NORVASC) 10 MG tablet TAKE ONE TABLET BY MOUTH DAILY 5/3/21  Yes MARLENE Newman CNP   metoprolol succinate (TOPROL XL) 50 MG extended release tablet TAKE ONE TABLET BY MOUTH DAILY 4/1/21  Yes Sidney Garcia MD   hydroCHLOROthiazide (HYDRODIURIL) 25 MG tablet TAKE ONE TABLET BY MOUTH DAILY 4/1/21  Yes Sidney Garcia MD   XARELTO 20 MG TABS tablet TAKE ONE TABLET BY MOUTH DAILY WITH BREAKFAST 1/29/21  Yes MARLENE Newman CNP   Multiple Vitamins-Minerals (THERAPEUTIC MULTIVITAMIN-MINERALS) tablet Take 1 tablet by mouth daily   Yes Historical Provider, MD        Allergies:  Naproxen and Lisinopril     Review of Systems:   12 point ROS negative in all areas as listed below except as in [place]  Constitutional, EENT, Cardiovascular, pulmonary, GI, , Musculoskeletal, skin, neurological, hematological, endocrine, Psychiatric    Physical Examination:    Vitals:    06/20/21 1127   BP:    Pulse: 64   Resp: 16   Temp: 98.8 °F (37.1 °C)   SpO2: 99%    Weight: 215 lb 14.4 oz (97.9 kg)         General Appearance:  Alert, cooperative, no distress, appears stated age   Head:  Normocephalic, without obvious abnormality, atraumatic   Eyes:  PERRL, conjunctiva/corneas clear       Nose: Nares normal, no drainage or sinus tenderness   Throat: Lips, mucosa, and tongue normal   Neck: Supple, symmetrical, trachea midline, no adenopathy, thyroid: not enlarged, symmetric, no tenderness/mass/nodules, no carotid bruit or JVD       Lungs:   Clear to auscultation bilaterally, respirations unlabored   Chest Wall:  No tenderness or deformity   Heart:  Regular rate and rhythm, S1, S2 normal, no murmur, rub or gallop   Abdomen:   Soft, non-tender, bowel sounds active all four quadrants,  no masses, no organomegaly           Extremities: Extremities normal, atraumatic, no cyanosis or edema   Pulses: 2+ and symmetric   Skin: Skin color, texture, turgor normal, no rashes or lesions   Pysch: Normal mood and affect   Neurologic: Normal gross motor and sensory exam.         Labs  CBC:   Lab Results   Component Value Date    WBC 4.1 06/18/2021    RBC 3.97 06/18/2021    HGB 12.1 06/18/2021    HCT 35.8 06/18/2021    MCV 90.1 06/18/2021    RDW 16.1 06/18/2021     06/18/2021     CMP:    Lab Results   Component Value Date     06/18/2021    K 3.6 06/18/2021    K 3.5 03/26/2019     06/18/2021    CO2 30 06/18/2021    BUN 14 06/18/2021    CREATININE 0.7 06/18/2021    GFRAA >60 06/18/2021    AGRATIO 1.2 06/18/2021    LABGLOM >60 06/18/2021    GLUCOSE 93 06/18/2021    PROT 7.8 06/18/2021    CALCIUM 10.2 06/18/2021    BILITOT <0.2 06/18/2021    ALKPHOS 102 06/18/2021    AST 26 06/18/2021    ALT 17 06/18/2021     PT/INR:  No results found for: PTINR  Lab Results   Component Value Date    TROPONINI <0.01 06/18/2021       EKG:  I have reviewed EKG with the following interpretation:  Impression:  See HPI    GXT myoview 6/19/21  Summary    There is no definite evidence of stress induced ischemia.  LV function is    normal with uniform wall motion and ejection fraction of 67%. Lower risk    study. ECHO 6/18/21 Summary   Normal LV systolic function with an estimated EF of 55-60%. No regional wall motion abnormalities are seen. Grade II diastolic dysfunction with elevated LV filling pressures. The right ventricle is mildly enlarged. Right ventricular systolic function is normal.   There is bi-atrial enlargement. The mitral valve leaflets are slightly thickened with normal leaflet mobility. Moderate mitral regurgitation. Trivial aortic regurgitation. Mild tricuspid regurgitation with a PASP of 38 mmHg. Mild pulmonic regurgitation. Assessment: Justin Troy is a 78 y.o. patient who presented to [facility] 6/18/21 with c/o CP and dizziness. She has PMH of PAF dx 7/17 s/p DCCV and 2nd DCCV in 9/19 per Dr. Stanislav Collazo on low dose xarelto (? 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 Most recent GXT myoview 6/19/21 normal without ischemia; EF=67%. Most recent ECHO 6/18/21 EF=55-60%; grade II DD with elevated filling pressure; mod MR, mild TR/[place]. Now presents with c/o CP and dizziness. Reports CP starting while driving 3 days ago. Described as pressure pain in mid-chest, no radiation, associated with nausea, nothing made better or worse, and lasting 2-3 hours. Also felt dizzy \"like going to pass out\" but never had LOC. Reports 6-7 episodes over last few days. She reports stress at home with daughter who is mentally ill. She tried to put spray paint in patient's car gas tank recently and locks her door to bedroom not knowing what daughter will do. Admit EKG NSR; RBBB; PAC's (no change 9/20 EKG). CXR negative; negative orthostatics (11mmHg drop only between laying flat and standing). Irasema negative x 3; JUD=049; BUN/Cr=14/0.7.     Diagnosis of unspecified chest pain and dizziness with near syncope in older female with hx PAF.     Recs:  1. Ruled out for MI, no signs CHF, and ECHO and GXT nuclear stress test without concerning issues. 2. ? Rhythm issue with pauses or bradycardia contributing but no obvious issue on tele. 3. Certainly stress could be issue given problems with mentally ill daughter. OK for d/c from cardiology today. Will arrange OP f/u Dr. Debbie Nayak or EP partner and event monitor for 30 days to see if any arrhythmia detected. She is on 15mg xarelto and should be on full dose 20mg given no renal issues and CrCL > 50mL/min. Signing off    Patient Active Problem List   Diagnosis    Persistent atrial fibrillation (HCC)    Partial small bowel obstruction (HCC)    Syncope    Chronic anticoagulation    Endometrial cancer (HCC)    Alkaline phosphatase elevation    Anemia    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HCC)    Essential hypertension    On antineoplastic chemotherapy    Prediabetes    Abdominal pain, generalized    Abnormal liver enzymes    GIB (gastrointestinal bleeding)    Graves disease    Radiation colitis    Malignant neoplasm of [address] [address] [facility]    Chest pain       Thank you for allowing to us to participate in the care or Angela Harvey. Further evaluation will be based upon the patient's clinical course and testing results.

## 2021-06-21 DIAGNOSIS — R55 SYNCOPE, UNSPECIFIED SYNCOPE TYPE: Primary | ICD-10-CM

## 2021-06-22 ENCOUNTER — NURSE ONLY (OUTPATIENT)
Dept: CARDIOLOGY CLINIC | Age: 71
End: 2021-06-22
Payer: MEDICARE

## 2021-06-22 ENCOUNTER — TELEPHONE (OUTPATIENT)
Dept: CARDIOLOGY CLINIC | Age: 71
End: 2021-06-22

## 2021-06-22 DIAGNOSIS — R55 SYNCOPE, UNSPECIFIED SYNCOPE TYPE: ICD-10-CM

## 2021-06-22 PROCEDURE — 93228 REMOTE 30 DAY ECG REV/REPORT: CPT | Performed by: INTERNAL MEDICINE

## 2021-06-29 ENCOUNTER — TELEPHONE (OUTPATIENT)
Dept: CARDIOLOGY CLINIC | Age: 71
End: 2021-06-29

## 2021-06-29 NOTE — TELEPHONE ENCOUNTER
Spoke to the pt-went over the change from the patch, to the lanyard. Advised to call [employer], to make this switch. Stated she has the phone number.

## 2021-07-06 ENCOUNTER — HOSPITAL ENCOUNTER (OUTPATIENT)
Age: 71
Discharge: HOME | End: 2021-07-06
Payer: MEDICARE

## 2021-07-06 ENCOUNTER — HOSPITAL ENCOUNTER (OUTPATIENT)
Dept: ULTRASOUND IMAGING | Age: 71
Discharge: HOME | End: 2021-07-06
Payer: MEDICARE

## 2021-07-06 DIAGNOSIS — E05.00 GRAVES DISEASE: ICD-10-CM

## 2021-07-06 DIAGNOSIS — C56.1 MALIGNANT NEOPLASM OF RIGHT OVARY (HCC): ICD-10-CM

## 2021-07-06 DIAGNOSIS — I48.19 PERSISTENT ATRIAL FIBRILLATION (HCC): ICD-10-CM

## 2021-07-06 DIAGNOSIS — C54.1 ENDOMETRIAL CANCER (HCC): ICD-10-CM

## 2021-07-06 LAB
T3 TOTAL: 1.45 NG/ML (ref 0.8–2)
T4 FREE: 1.3 NG/DL (ref 0.9–1.8)
TSH SERPL DL<=0.05 MIU/L-ACNC: 0.23 UIU/ML (ref 0.27–4.2)

## 2021-07-06 PROCEDURE — 36415 COLL VENOUS BLD VENIPUNCTURE: CPT

## 2021-07-06 PROCEDURE — 84443 ASSAY THYROID STIM HORMONE: CPT

## 2021-07-06 PROCEDURE — 76536 US EXAM OF HEAD AND NECK: CPT

## 2021-07-06 PROCEDURE — 84480 ASSAY TRIIODOTHYRONINE (T3): CPT

## 2021-07-06 PROCEDURE — 84439 ASSAY OF FREE THYROXINE: CPT

## 2021-07-07 DIAGNOSIS — E05.00 GRAVES DISEASE: ICD-10-CM

## 2021-07-07 RX ORDER — METHIMAZOLE 5 MG/1
TABLET ORAL
Qty: 15 TABLET | Refills: 1 | Status: SHIPPED | OUTPATIENT
Start: 2021-07-07 | End: 2021-07-09 | Stop reason: SDUPTHER

## 2021-07-09 ENCOUNTER — OFFICE VISIT (OUTPATIENT)
Dept: INTERNAL MEDICINE CLINIC | Age: 71
End: 2021-07-09
Payer: MEDICARE

## 2021-07-09 ENCOUNTER — TELEPHONE (OUTPATIENT)
Dept: CARDIOLOGY CLINIC | Age: 71
End: 2021-07-09

## 2021-07-09 VITALS
SYSTOLIC BLOOD PRESSURE: 134 MMHG | WEIGHT: 216.4 LBS | BODY MASS INDEX: 31.96 KG/M2 | OXYGEN SATURATION: 98 % | HEART RATE: 60 BPM | DIASTOLIC BLOOD PRESSURE: 68 MMHG | TEMPERATURE: 96.8 F

## 2021-07-09 DIAGNOSIS — Z13.1 SCREENING FOR DIABETES MELLITUS: ICD-10-CM

## 2021-07-09 DIAGNOSIS — R79.9 ABNORMAL FINDING OF BLOOD CHEMISTRY, UNSPECIFIED: ICD-10-CM

## 2021-07-09 DIAGNOSIS — G62.0 CHEMOTHERAPY-INDUCED NEUROPATHY (HCC): Primary | ICD-10-CM

## 2021-07-09 DIAGNOSIS — T45.1X5A CHEMOTHERAPY-INDUCED NEUROPATHY (HCC): Primary | ICD-10-CM

## 2021-07-09 DIAGNOSIS — I10 ESSENTIAL HYPERTENSION: ICD-10-CM

## 2021-07-09 DIAGNOSIS — E05.00 GRAVES DISEASE: ICD-10-CM

## 2021-07-09 DIAGNOSIS — R55 NEAR SYNCOPE: ICD-10-CM

## 2021-07-09 DIAGNOSIS — I48.19 PERSISTENT ATRIAL FIBRILLATION (HCC): ICD-10-CM

## 2021-07-09 PROBLEM — C54.1 ENDOMETRIAL CANCER (HCC): Status: ACTIVE | Noted: 2018-03-27

## 2021-07-09 PROBLEM — C56.9 MALIGNANT NEOPLASM OF OVARY (HCC): Status: RESOLVED | Noted: 2020-07-07 | Resolved: 2021-07-09

## 2021-07-09 PROCEDURE — G8427 DOCREV CUR MEDS BY ELIG CLIN: HCPCS | Performed by: INTERNAL MEDICINE

## 2021-07-09 PROCEDURE — 4040F PNEUMOC VAC/ADMIN/RCVD: CPT | Performed by: INTERNAL MEDICINE

## 2021-07-09 PROCEDURE — 1111F DSCHRG MED/CURRENT MED MERGE: CPT | Performed by: INTERNAL MEDICINE

## 2021-07-09 PROCEDURE — 99214 OFFICE O/P EST MOD 30 MIN: CPT | Performed by: INTERNAL MEDICINE

## 2021-07-09 PROCEDURE — G8399 PT W/DXA RESULTS DOCUMENT: HCPCS | Performed by: INTERNAL MEDICINE

## 2021-07-09 PROCEDURE — 3017F COLORECTAL CA SCREEN DOC REV: CPT | Performed by: INTERNAL MEDICINE

## 2021-07-09 PROCEDURE — G8417 CALC BMI ABV UP PARAM F/U: HCPCS | Performed by: INTERNAL MEDICINE

## 2021-07-09 PROCEDURE — 1036F TOBACCO NON-USER: CPT | Performed by: INTERNAL MEDICINE

## 2021-07-09 PROCEDURE — 1123F ACP DISCUSS/DSCN MKR DOCD: CPT | Performed by: INTERNAL MEDICINE

## 2021-07-09 PROCEDURE — 1090F PRES/ABSN URINE INCON ASSESS: CPT | Performed by: INTERNAL MEDICINE

## 2021-07-09 RX ORDER — AMLODIPINE BESYLATE 10 MG/1
10 TABLET ORAL DAILY
Qty: 90 TABLET | Refills: 1 | Status: SHIPPED | OUTPATIENT
Start: 2021-07-09 | End: 2021-12-02 | Stop reason: ALTCHOICE

## 2021-07-09 RX ORDER — METHIMAZOLE 5 MG/1
2.5 TABLET ORAL DAILY
Qty: 45 TABLET | Refills: 1 | Status: SHIPPED | OUTPATIENT
Start: 2021-07-09 | End: 2021-08-24 | Stop reason: SDUPTHER

## 2021-07-09 RX ORDER — HYDROCHLOROTHIAZIDE 25 MG/1
25 TABLET ORAL DAILY
Qty: 90 TABLET | Refills: 1 | Status: SHIPPED | OUTPATIENT
Start: 2021-07-09 | End: 2021-11-24 | Stop reason: SDUPTHER

## 2021-07-09 SDOH — ECONOMIC STABILITY: FOOD INSECURITY: WITHIN THE PAST 12 MONTHS, THE FOOD YOU BOUGHT JUST DIDN'T LAST AND YOU DIDN'T HAVE MONEY TO GET MORE.: NEVER TRUE

## 2021-07-09 SDOH — ECONOMIC STABILITY: FOOD INSECURITY: WITHIN THE PAST 12 MONTHS, YOU WORRIED THAT YOUR FOOD WOULD RUN OUT BEFORE YOU GOT MONEY TO BUY MORE.: NEVER TRUE

## 2021-07-09 ASSESSMENT — SOCIAL DETERMINANTS OF HEALTH (SDOH): HOW HARD IS IT FOR YOU TO PAY FOR THE VERY BASICS LIKE FOOD, HOUSING, MEDICAL CARE, AND HEATING?: NOT HARD AT ALL

## 2021-07-09 NOTE — PROGRESS NOTES
Subjective:      Patient ID: Kole Downing is a 78 y.o. female. Chief Complaint   Patient presents with    Follow-Up from Hospital     for fainting  feelling a little better       HPI     Patient presents in follow-up for hospital admission lasting 2 days. She was having chest pain and dizziness. She states that she is feeling much better today, but continues to have a small amount of fatigue member. She continues to take her medications as instructed. Her blood pressure is well controlled she denies side effects of her medications    Review of Systems   Constitutional: Positive for fatigue. Review of systems as per indicated in HPI. Psychiatric/Behavioral: The patient is nervous/anxious. All other systems reviewed and are negative. Allergies   Allergen Reactions    Naproxen Swelling    Lisinopril Rash       Current Outpatient Medications   Medication Sig Dispense Refill    amLODIPine (NORVASC) 10 MG tablet Take 1 tablet by mouth daily 90 tablet 1    hydroCHLOROthiazide (HYDRODIURIL) 25 MG tablet Take 1 tablet by mouth daily 90 tablet 1    methIMAzole (TAPAZOLE) 5 MG tablet Take 0.5 tablets by mouth daily 45 tablet 1    rivaroxaban (XARELTO) 20 MG TABS tablet TAKE ONE TABLET BY MOUTH DAILY WITH BREAKFAST 90 tablet 2    melatonin 10 MG CAPS capsule Take 10 mg by mouth nightly      Multiple Vitamins-Minerals (THERAPEUTIC MULTIVITAMIN-MINERALS) tablet Take 1 tablet by mouth daily       No current facility-administered medications for this visit. Vitals:    07/09/21 1407   BP: 134/68   Pulse: 60   Temp: 96.8 °F (36 °C)   TempSrc: Temporal   SpO2: 98%   Weight: 216 lb 6.4 oz (98.2 kg)     Body mass index is 31.96 kg/m².      Wt Readings from Last 3 Encounters:   07/09/21 216 lb 6.4 oz (98.2 kg)   06/20/21 215 lb 14.4 oz (97.9 kg)   02/23/21 216 lb (98 kg)     BP Readings from Last 3 Encounters:   07/09/21 134/68   06/20/21 110/70   02/23/21 (!) 166/85       Objective:   Physical Exam  Vitals and nursing note reviewed. Constitutional:       General: She is not in acute distress. Appearance: She is well-developed. She is obese. HENT:      Head: Normocephalic. Right Ear: Tympanic membrane normal.      Left Ear: Tympanic membrane normal.   Eyes:      Pupils: Pupils are equal, round, and reactive to light. Cardiovascular:      Rate and Rhythm: Normal rate and regular rhythm. Pulses: Normal pulses. Heart sounds: Normal heart sounds. Pulmonary:      Effort: Pulmonary effort is normal.      Breath sounds: Normal breath sounds. Musculoskeletal:         General: Normal range of motion. Cervical back: Normal range of motion. Deformity present. Lumbar back: No swelling or bony tenderness. Right hip: Normal. Normal range of motion. Normal strength. Left hip: Normal. Normal range of motion. Normal strength. Skin:     General: Skin is warm. Capillary Refill: Capillary refill takes less than 2 seconds. Neurological:      General: No focal deficit present. Mental Status: She is alert and oriented to person, place, and time. Cranial Nerves: No cranial nerve deficit. Coordination: Coordination normal.   Psychiatric:         Mood and Affect: Mood normal.         Behavior: Behavior normal.         Thought Content: Thought content normal.         Judgment: Judgment normal.         Assessment/Plan:  Kiah Gonzalez was seen today for follow-up from hospital.    Diagnoses and all orders for this visit:    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[address]  -     SC DISCHARGE MEDS RECONCILED W/ CURRENT OUTPATIENT MED LIST  -     SC DISCHARGE MEDS RECONCILED W/ CURRENT OUTPATIENT MED LIST    Essential hypertension  -     amLODIPine (NORVASC) 10 MG tablet; Take 1 tablet by mouth daily  -     hydroCHLOROthiazide (HYDRODIURIL) 25 MG tablet;  Take 1 tablet by mouth daily  -     SC DISCHARGE MEDS RECONCILED W/ CURRENT OUTPATIENT MED LIST    Graves disease  - methIMAzole (TAPAZOLE) 5 MG tablet; Take 0.5 tablets by mouth daily  -     [place] DISCHARGE MEDS RECONCILED W/ CURRENT OUTPATIENT MED LIST    Persistent atrial fibrillation (HCC)  -     rivaroxaban (XARELTO) 20 MG TABS tablet; TAKE ONE TABLET BY MOUTH DAILY WITH BREAKFAST  -     [place] DISCHARGE MEDS RECONCILED W/ CURRENT OUTPATIENT MED LIST  -  Patient has  Been on Rivaroxaban for greater than a year - consider decreasing to 10mg advised patient to discuss with cardioloty    Near syncope  -     [place] DISCHARGE MEDS RECONCILED W/ CURRENT OUTPATIENT MED LIST      Return in 1 month (on 8/9/2021).         Maru Rodriguez MD

## 2021-07-10 LAB
ESTIMATED AVERAGE GLUCOSE: 96.8 MG/DL
HBA1C MFR BLD: 5 %

## 2021-07-13 NOTE — PROGRESS NOTES
[facility]   Electrophysiology Follow up   Date: 7/15/2021  I had the privilege of visiting Devon Rojo in the office. Chief Complaint   Patient presents with    Atrial Fibrillation    Dizziness     for about 6 weeks      HPI: Devon Rojo is a 78 y.o. female with a past medical history of atrial fib, HTN, DM, thyroid nodule. She was originally treated for afib in 7/2017 and followed with cardiologist at [facility]. She was dx with uterine CA and underwent chemo. S/p DCCV 8/2017 and 9/2017. Diagnosed with FIGO Stage IA (pT1a(2), N0, M0) serous carcinoma of the endometrium, status post TLH/BSO and five of a planned six cycles of adjuvant chemotherapy completed 08/28/2018. She had episode of syncope while receiving chemo in 7/2018. She had severe retal bleeding 3/26/2019 requiring 4 units PRBCs, and her Xarelto was stopped. S/p DCCV 9/2019. Resumed Xarelto 12/2019. Today, she states that overall she feels well. However, she does have times of feeling light-headed, no syncope. She denies exertional chest pain, IVY/PND, palpitations, light-headedness, edema. She was referred for sleep apnea eval in the past but did not follow through. Assessment:     Paroxysmal Atrial Fibrillation  - ECG today 7/15/21> SB 53, 2nd degree avb, RBBB   - MCOT 6/22-7/6/21- NSR ahr 61, AF burden 14%, long pauses up to 5 sec   -Some dizziness  - On Toprol 50 mg daily   - [id number] score: 4 (age, gender, HTN., DM); [id number] Vasc score and anticoagulation discussed. High risk for stroke and thromboembolism. Anticoagulation is recommended.   ~ On Xarelto 20 mg daily,  Denies s/s of bleeding  - Afib risk factors including age, HTN, obesity, inactivity and SATISH were discussed with patient.  Risk factor modification recommended              ~ TSH <0.01 (7/16/2020)               - Treatment options including cardioversion, rate control strategy, antiarrhythmics, anticoagulation and possible ablation were discussed with patient. Risks, benefits and alternative of each treatment options were explained. All questions answered                          ~ S/p DCCV 8/2017, 9/2017, and 9/2019  At this point the burden of COURTNEY sandoval is reasonable and she is not very symptomatic with it. We will continue to monitor and consider ablation in the future if needed. RBBB/bifascicular block/pauses   - Noted on EKG              - Denies CP, SOB, or peripheral edema              - Denies lightheadedness, dizziness or syncope    She has history of syncope. She has significant pauses on the monitor. Although some of them may be related to sleep apnea which is undiagnosed some of them happen during the day and are associated with lightheadedness. Therefore she has class I indication for pacemaker implantation for symptomatic bradycardia and pauses. The risks, benefits and alternatives of the procedure were discussed with the patient. The risks including, but not limited to, the risks of bleeding, infection, pain, device malfunction, lead dislodgement, radiation exposure, injury to cardiac and surrounding structures (including pneumothorax), stroke, cardiac perforation, tamponade, need for emergent heart surgery, myocardial infarction and death were discussed in detail. Dual vs single chamber device, including risks and benefits were discussed with patient. Printed information about device implantation including risks were given. Patient was encouraged to review information given, and call back with any questions about risks, benefits and alternative of procedure. The patient opted to proceed with the device implantation.      HTN   -goal <130/80              - SBP elevated today              - On Norvasc 10mg daily, HCTZ 25mg daily, and Toprol 50mg daily               - Encouraged patient to check BP at home, log and bring to office visits  - Discussed lifestyle modifications, weight loss, low sodium diet    Syncope, h/o - Occurred with chemotherapy tx              - No recurrence     Uterine CA              - Remains in remission              - S/p hx of chemo and radiation    Hx of GI bleeding/Radiation proctitis              - Healed and she was cleared for [facility] by GI              - Candidate for Watchman procedure is recurrent              - Denies recurrence    PLAN:   PPM implant  Sleep apnea eval  Watch salt intake    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/19/2018    Partial small bowel obstruction ([address] 06/08/2018    On antineoplastic chemotherapy 06/07/2018    Abdominal pain, generalized 06/07/2018    Endometrial cancer ([address] 03/27/2018    Alkaline phosphatase elevation 03/08/2018    Prediabetes 03/08/2018    Chronic anticoagulation 10/13/2017    Anemia 07/20/2017    Abnormal liver enzymes 07/20/2017    Essential hypertension 07/06/2016     Diagnostic studies:     ECG 7/15/21  SB 53  AVB Type II  RBBB w/ LAB  QTcH 488    MCOT 6/22-7/6/21  NSR ahr 61  AF burden 14%, long pauses up to 5 sec  PAC's 4%    ECHO 6/19/21  Normal LV systolic function with an estimated EF of 55-60%. No regional wall motion abnormalities are seen. Grade II diastolic dysfunction with elevated LV filling pressures. The right ventricle is mildly enlarged. Right ventricular systolic function is normal.   There is bi-atrial enlargement. The mitral valve leaflets are slightly thickened with normal leaflet mobility. Moderate mitral regurgitation. Trivial aortic regurgitation. Mild tricuspid regurgitation with a PASP of 38 mmHg. Mild pulmonic regurgitation.     ECG 9/8/2020   SR RBBB HR 68, , QRS 140, QTc 470     Echo 7/18/2018  Summary:  Overall left ventricular ejection fraction is estimated to be 50-55%. The left ventricular function is low normal.  The left ventricular wall motion is normal.  The left atrium is mildly dilated. There is mild to moderate mitral regurgitation. Mild pulmonic valvular regurgitation. Moderate tricuspid regurgitation is present. The right atrium is mildly dilated. Trace aortic regurgitation. The estimated right ventricular systolic pressure is consistent with  moderate pulmonary hypertension.       I independently reviewed the cardiac diagnostic studies, ECG and relevant imaging studies. Lab Results   Component Value Date    LVEF 58 06/19/2021     Lab Results   Component Value Date    TSH 0.23 (L) 07/06/2021         Physical Examination:  Vitals:    07/15/21 1416   BP: (!) 155/83   Pulse: Wt Readings from Last 3 Encounters:   07/15/21 215 lb 9.6 oz (97.8 kg)   07/09/21 216 lb 6.4 oz (98.2 kg)   06/20/21 215 lb 14.4 oz (97.9 kg)       · Constitutional: Oriented. No distress. · Head: Normocephalic and atraumatic. · Mouth/Throat: Oropharynx is clear and moist.   · Eyes: Conjunctivae normal. EOM are normal.   · Neck: Neck supple. No rigidity. No JVD present. · Cardiovascular: Normal rate, regular rhythm, S1&S2. · Pulmonary/Chest: Bilateral respiratory sounds. No wheezes, No rhonchi. · Abdominal: Soft. Bowel sounds present. No distension, No tenderness. · Musculoskeletal: No tenderness. No edema    · Lymphadenopathy: Has no cervical adenopathy. · Neurological: Alert and oriented. Cranial nerve appears intact, No Gross deficit   · Skin: Skin is warm and dry. No rash noted. · Psychiatric: Has a normal behavior       Review of System:  [x] Full ROS obtained and negative except as mentioned in HPI    Prior to Admission medications    Medication Sig Start Date End Date Taking?  Authorizing Provider   amLODIPine (NORVASC) 10 MG tablet Take 1 tablet by mouth daily 7/9/21  Yes Marcell Honeycutt MD   hydroCHLOROthiazide (HYDRODIURIL) 25 MG tablet Take 1 tablet by mouth daily 7/9/21  Yes Marcell Honeycutt MD   methIMAzole (TAPAZOLE) 5 MG tablet Take 0.5 tablets by mouth daily 7/9/21 10/7/21 Yes Marcell Honeycutt MD   rivaroxaban (XARELTO) 20 MG TABS tablet TAKE ONE TABLET BY MOUTH DAILY WITH BREAKFAST 7/9/21  Yes Marcell Honeycutt MD   melatonin 10 MG CAPS capsule Take 10 mg by mouth nightly   Yes Historical Provider, MD   Multiple Vitamins-Minerals (THERAPEUTIC MULTIVITAMIN-MINERALS) tablet Take 1 tablet by mouth daily   Yes Historical Provider, MD       Allergies   Allergen Reactions    Naproxen Swelling    Lisinopril Rash       Social History:  Reviewed. reports that she has never smoked. She has never used smokeless tobacco. She reports current alcohol use. She reports that she does not use drugs. Family History:  Reviewed. Reviewed. No family history of SCD. Relevant and available labs, and cardiovascular diagnostics reviewed. Reviewed. I independently reviewed relevant and available cardiac diagnostic tests ECG, CXR, Echo, Stress test, Device interrogation, Holter, CT scan. Outside medical records via Care everywhere reviewed and summarized in H&P above. Complex medical condition with multiple medical problems affecting prognosis and outcome of EP interventions       - The patient is counseled to follow a low salt diet to assure blood pressure remains controlled for cardiovascular risk factor modification.   - The patient is counseled to avoid excess caffeine, and energy drinks as this may exacerbated ectopy and arrhythmia. - The patient is counseled to get regular exercise 3-5 times per week to control cardiovascular risk factors. - The patient is counseled to lose weigt to control cardiovascular risk factors. - The patient is counseled to avoid tobacco use.        All questions and concerns were addressed to the patient/family. Alternatives to my treatment were discussed. I have discussed the above stated plan and the patient verbalized understanding and agreed with the plan. I, Dr. Jose Raul Hart personally performed the services described in this documentation as scribed by RN in my presence, and it is both accurate and complete.       NOTE: This report was transcribed using voice recognition software. Every effort was made to ensure accuracy, however, inadvertent computerized transcription errors may be present.      [address] [place] [address] Road   Office: [phone]  Fax: [phone]

## 2021-07-15 ENCOUNTER — OFFICE VISIT (OUTPATIENT)
Dept: CARDIOLOGY CLINIC | Age: 71
End: 2021-07-15
Payer: MEDICARE

## 2021-07-15 VITALS
SYSTOLIC BLOOD PRESSURE: 155 MMHG | HEIGHT: 69 IN | BODY MASS INDEX: 31.93 KG/M2 | HEART RATE: 58 BPM | DIASTOLIC BLOOD PRESSURE: 83 MMHG | WEIGHT: 215.6 LBS

## 2021-07-15 DIAGNOSIS — R42 DIZZINESS: ICD-10-CM

## 2021-07-15 DIAGNOSIS — I44.1 2ND DEGREE ATRIOVENTRICULAR BLOCK: ICD-10-CM

## 2021-07-15 DIAGNOSIS — I45.5 SINUS PAUSE: Primary | ICD-10-CM

## 2021-07-15 DIAGNOSIS — I48.0 PAF (PAROXYSMAL ATRIAL FIBRILLATION) (HCC): ICD-10-CM

## 2021-07-15 DIAGNOSIS — I45.10 RBBB: ICD-10-CM

## 2021-07-15 PROCEDURE — 1123F ACP DISCUSS/DSCN MKR DOCD: CPT | Performed by: INTERNAL MEDICINE

## 2021-07-15 PROCEDURE — G8427 DOCREV CUR MEDS BY ELIG CLIN: HCPCS | Performed by: INTERNAL MEDICINE

## 2021-07-15 PROCEDURE — 1036F TOBACCO NON-USER: CPT | Performed by: INTERNAL MEDICINE

## 2021-07-15 PROCEDURE — 4040F PNEUMOC VAC/ADMIN/RCVD: CPT | Performed by: INTERNAL MEDICINE

## 2021-07-15 PROCEDURE — 1090F PRES/ABSN URINE INCON ASSESS: CPT | Performed by: INTERNAL MEDICINE

## 2021-07-15 PROCEDURE — 99215 OFFICE O/P EST HI 40 MIN: CPT | Performed by: INTERNAL MEDICINE

## 2021-07-15 PROCEDURE — G8399 PT W/DXA RESULTS DOCUMENT: HCPCS | Performed by: INTERNAL MEDICINE

## 2021-07-15 PROCEDURE — 93000 ELECTROCARDIOGRAM COMPLETE: CPT | Performed by: INTERNAL MEDICINE

## 2021-07-15 PROCEDURE — G8417 CALC BMI ABV UP PARAM F/U: HCPCS | Performed by: INTERNAL MEDICINE

## 2021-07-15 PROCEDURE — 3017F COLORECTAL CA SCREEN DOC REV: CPT | Performed by: INTERNAL MEDICINE

## 2021-07-15 NOTE — PATIENT INSTRUCTIONS
1. Del Booth will call you next week to schedule the pacemaker. 2. Call Dr. Kimberly Iraheta (sleep physician) in a couple weeks if you have not heard from them.   3. Continue to watch salt intake

## 2021-08-17 ENCOUNTER — HOSPITAL ENCOUNTER (OUTPATIENT)
Age: 71
Discharge: HOME | End: 2021-08-17
Payer: MEDICARE

## 2021-08-17 DIAGNOSIS — E05.00 GRAVES DISEASE: ICD-10-CM

## 2021-08-17 LAB
T3 TOTAL: 1.34 NG/ML (ref 0.8–2)
T4 FREE: 1.3 NG/DL (ref 0.9–1.8)
TSH SERPL DL<=0.05 MIU/L-ACNC: 0.24 UIU/ML (ref 0.27–4.2)

## 2021-08-17 PROCEDURE — 36415 COLL VENOUS BLD VENIPUNCTURE: CPT

## 2021-08-17 PROCEDURE — 84480 ASSAY TRIIODOTHYRONINE (T3): CPT

## 2021-08-17 PROCEDURE — 84439 ASSAY OF FREE THYROXINE: CPT

## 2021-08-17 PROCEDURE — 84443 ASSAY THYROID STIM HORMONE: CPT

## 2021-08-24 ENCOUNTER — OFFICE VISIT (OUTPATIENT)
Dept: ENDOCRINOLOGY | Age: 71
End: 2021-08-24
Payer: MEDICARE

## 2021-08-24 VITALS
BODY MASS INDEX: 31.55 KG/M2 | RESPIRATION RATE: 16 BRPM | DIASTOLIC BLOOD PRESSURE: 73 MMHG | SYSTOLIC BLOOD PRESSURE: 149 MMHG | WEIGHT: 213 LBS | HEIGHT: 69 IN | HEART RATE: 64 BPM

## 2021-08-24 DIAGNOSIS — E05.00 GRAVES DISEASE: Primary | ICD-10-CM

## 2021-08-24 PROCEDURE — 3017F COLORECTAL CA SCREEN DOC REV: CPT | Performed by: INTERNAL MEDICINE

## 2021-08-24 PROCEDURE — 1090F PRES/ABSN URINE INCON ASSESS: CPT | Performed by: INTERNAL MEDICINE

## 2021-08-24 PROCEDURE — G8417 CALC BMI ABV UP PARAM F/U: HCPCS | Performed by: INTERNAL MEDICINE

## 2021-08-24 PROCEDURE — 99214 OFFICE O/P EST MOD 30 MIN: CPT | Performed by: INTERNAL MEDICINE

## 2021-08-24 PROCEDURE — 1036F TOBACCO NON-USER: CPT | Performed by: INTERNAL MEDICINE

## 2021-08-24 PROCEDURE — 1123F ACP DISCUSS/DSCN MKR DOCD: CPT | Performed by: INTERNAL MEDICINE

## 2021-08-24 PROCEDURE — 4040F PNEUMOC VAC/ADMIN/RCVD: CPT | Performed by: INTERNAL MEDICINE

## 2021-08-24 PROCEDURE — G8399 PT W/DXA RESULTS DOCUMENT: HCPCS | Performed by: INTERNAL MEDICINE

## 2021-08-24 PROCEDURE — G8427 DOCREV CUR MEDS BY ELIG CLIN: HCPCS | Performed by: INTERNAL MEDICINE

## 2021-08-24 RX ORDER — METHIMAZOLE 5 MG/1
5 TABLET ORAL DAILY
Qty: 90 TABLET | Refills: 1 | Status: SHIPPED | OUTPATIENT
Start: 2021-08-24 | End: 2021-11-22

## 2021-08-24 NOTE — PROGRESS NOTES
SUBJECTIVE:  Nicolas Hughes is a 78 y.o. female  seen in my clinic for Graves' disease and multinodular goiter  Patient was initially referred for thyroid nodule  which was identified on a CT scan done in April 2018 as a workup for her ovarian cancer  . Patient Current complaints: denies fatigue, weight changes, heat/cold intolerance, bowel/skin changes or CVS symptoms  History of obstructive symptoms: difficulty swallowing No, changes in voice/hoarseness No.    History of radiation to patient's neck: NO  Recent iodine exposure: No  Family history includes no thyroid abnormalities. Family history of thyroid cancer: No    Ovarian cancer is treated with surgery , chemo and RT ---she was diagnosed in jan 2018   Patient also has hypertension and is on Toprol and hydrochlorothiazide along with Lotrel  Patient also has atrial fibrillation and is on anticoagulation    INTERIM   She has lost a few lbs but she has been walking daily   She has been taking 2.5 mg Tapazole daily she denies any significant palpitation, heat intolerance or insomnia.     Past Medical History:   Diagnosis Date    Anal bleeding 2019    CT scan clear     Anemia     Arthritis     bilateral knee    Atrial fibrillation (HCC)     cleared by cardiologist 2018, was related to cancer     Colon polyps     Diabetes mellitus ([address]     pre diabetic diet controlled    Endometrial cancer ([address]     History of blood transfusion     Hypertension     IFG (impaired fasting glucose)     SATISH (obstructive sleep apnea)     Small bowel obstruction (HCC)     resolved without surgery    Thyroid nodule      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    GIB (gastrointestinal bleeding) 03/26/2019    Persistent atrial fibrillation ([address] 07/26/2018    Near syncope 07/17/2018    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 06/19/2018    [facility] 06/08/2018    On antineoplastic chemotherapy 06/07/2018    Abdominal pain, generalized 06/07/2018    Endometrial cancer ([facility] 03/27/2018    Alkaline phosphatase elevation 03/08/2018    Prediabetes 03/08/2018    Chronic anticoagulation 10/13/2017    Anemia 07/20/2017    Abnormal liver enzymes 07/20/2017    Essential hypertension 07/06/2016     Past Surgical History:   Procedure Laterality Date    CARDIOVERSION      CARDIOVERSION  09/19/2019    COLONOSCOPY N/A 3/14/2019    COLONOSCOPY POLYPECTOMY SNARE/COLD BIOPSY [facility]ONOSCOPY  3/14/2019    COLONOSCOPY CONTROL HEMORRHAGE performed by Beverly Amaro MD at [facility] COLONOSCOPY N/A 3/26/2019    COLONOSCOPY CONTROL HEMORRHAGE performed by Beverly Amaro MD at [facility] N/A 9/10/2019    SIGMOIDOSCOPY DIAGNOSTIC FLEXIBLE performed by Beverly Amaro MD at [address]      TUBAL LIGATION      TUNNELED CENTRAL VENOUS CATHETER W/ SUBCUTANEOUS PORT Right      Family History   Problem Relation Age of Onset    Hypertension Mother     Colon Cancer Maternal Grandmother      Social History     Socioeconomic History    Marital status:      Spouse name: None    Number of children: None    Years of education: None    Highest education level: None   Occupational History    Occupation: cash checking   Tobacco Use    Smoking status: Never Smoker    Smokeless tobacco: Never Used   Vaping Use    Vaping Use: Never used   Substance and Sexual Activity    Alcohol use: Yes     Comment: occ wine    Drug use: No    Sexual activity: Never   Other Topics Concern    None   Social History Narrative    Lives home with her 3 children. Doing well 8 grandchildren.         Social Determinants of Health     Financial Resource Strain: Low Risk     Difficulty of Paying Living Expenses: Not hard at all   Food Insecurity: No Food Insecurity    Worried About [employer] in the Last Year: Never true    Zully of Food in the Last Year: Never true   Transportation Needs:     Lack of Transportation (Medical):  Lack of Transportation (Non-Medical):    Physical Activity:     Days of Exercise per Week:     Minutes of Exercise per Session:    Stress:     Feeling of Stress :    Social Connections:     Frequency of Communication with Friends and Family:     Frequency of Social Gatherings with Friends and Family:     Attends Church Services:     Active Member of Clubs or Organizations:     Attends Club or Organization Meetings:     Marital Status:    Intimate Partner Violence:     Fear of Current or Ex-Partner:     Emotionally Abused:     Physically Abused:     Sexually Abused:      Current Outpatient Medications   Medication Sig Dispense Refill    methIMAzole (TAPAZOLE) 5 MG tablet Take 1 tablet by mouth daily 90 tablet 1    amLODIPine (NORVASC) 10 MG tablet Take 1 tablet by mouth daily 90 tablet 1    hydroCHLOROthiazide (HYDRODIURIL) 25 MG tablet Take 1 tablet by mouth daily 90 tablet 1    rivaroxaban (XARELTO) 20 MG TABS tablet TAKE ONE TABLET BY MOUTH DAILY WITH BREAKFAST 90 tablet 2    melatonin 10 MG CAPS capsule Take 10 mg by mouth nightly      Multiple Vitamins-Minerals (THERAPEUTIC MULTIVITAMIN-MINERALS) tablet Take 1 tablet by mouth daily       No current facility-administered medications for this visit. Allergies   Allergen Reactions    Naproxen Swelling    Lisinopril Rash         Review of Systems:  I have reviewed the review of system questionnaire filled by the patient .   Patient was advised to contact PCP for non endocrine signs and symptoms       OBJECTIVE:   BP (!) 149/73   Pulse 64   Resp 16   Ht 5' 9\" (1.753 m)   Wt 213 lb (96.6 kg)   LMP  (LMP Unknown)   BMI 31.45 kg/m²   Wt Readings from Last 3 Encounters:   08/24/21 213 lb (96.6 kg)   07/15/21 215 lb 9.6 oz (97.8 kg)   07/09/21 216 lb 6.4 oz (98.2 kg)       Physical Exam:  Constitutional: no acute distress, well appearing, well nourished  Psychiatric: oriented to person, place and time, judgement, insight and normal, recent and remote memory and intact and mood, affect are normal  Skin: skin and subcutaneous tissue is normal without mass,   Head and Face: examination of head and face revealed no abnormalities  Eyes: no lid or conjunctival swelling, no erythema or discharge, pupils are normal,   Ears/Nose: external inspection of ears and nose revealed no abnormalities, hearing is grossly normal  Oropharynx/Mouth/Face: lips, tongue and gums are normal with no lesions, the voice quality was normal  Neck: neck is supple and symmetric, with midline trachea and no masses, thyroid is normal    Pulmonary: no increased work of breathing or signs of respiratory distress, lungs are clear to auscultation  Cardiovascular: normal heart rate and rhythm, normal S1 and S2,   Musculoskeletal: normal gait and station,   Neurological: normal coordination, normal general cortical function      Lab Review:  Lab Results   Component Value Date    TSH 0.24 08/17/2021    TSH 0.23 07/06/2021    TSH 0.56 04/13/2021     Lab Results   Component Value Date    T4FREE 1.3 08/17/2021 7/23/2019       COMPARISON:   02/26/2019       HISTORY:   ORDERING SYSTEM PROVIDED HISTORY: Thyroid nodule       FINDINGS:   Right thyroid lobe:  5.7 x 2.7 x 2.3 cm       Left thyroid lobe:  4.6 x 2.2 x 1.6 cm       Isthmus:  5 mm       Thyroid Gland:  Thyroid gland is heterogeneous and mildly hypervascular.  The   thyroid gland is mildly enlarged.       Nodules: No suspicious solid nodules are identified.  Mixed solid and cystic   subcentimeter right thyroid lobe nodule is redemonstrated, 8 mm, not   significantly changed.       Cervical lymphadenopathy: No abnormal lymph nodes in the imaged portions of   the neck.           Impression Lab Results   Component Value Date    TSH 0.24 (L) 08/17/2021        ASSESSMENT/PLAN:    ---Graves disease trab and TSI positive. She has been taking tapazole  daily since march 2019   Currently she is taking 2.5 mg daily increase dose to 5 mg daily . TSH was 1 in feb 2021 >>0.24in august 2021 and with her worsening atrial fibrillation would keep TSH above 1  She will repeat labs in 3 months and then again in 6 months  dx with Graves on bloodwork and she was not symptomatic after she started taking Tapazole she noted improvement in her palpitation    she  was told that Methimazole can cause allergic reaction including skin rash, arthralgias and hepatic damage. Shirley Judge  was told about the rare side effect of agranulocytosis and warning symptoms of sore throat, mouth sores, fever and chills to to let us know in case if any of these symptoms develop The patient verbalized understanding and agreed to start taking the medicine.     ----Thyroid nodule  Repeat thyroid in July 2021 stable no nodules   June 2020 showed subcentimeter nodules in Rt lobe,   ---uls done in 7/2019 shows stable size of the thyroid nodule. Left lobe nodules not visible any more   Patient has 2 nodules in the right lobe which meet the criteria for fine-needle aspiration biopsy which was offered to the patient. Patient was advised that the right lobe thyroid nodule which measures 1.9 cm should be biopsied, patient does not want to get a fine-needle biopsy done at this time    ---Persistent atrial fibrillation   Patient is on anticoagulation and beta blockers  She had cardioversion x2   Follows with cardiology    ---Partial small bowel obstruction   Stable    --Ovarian Cancer ---S/p complete hysterectomy and chemo   Stable   She follows with Oncology at [facility]     .  Essential hypertension  Control is good   She takes Amlodipine and HCTZ and metoprolol       Reviewed and/or ordered clinical lab results Yes  Reviewed and/or ordered

## 2021-09-22 ENCOUNTER — OFFICE VISIT (OUTPATIENT)
Dept: INTERNAL MEDICINE CLINIC | Age: 71
End: 2021-09-22
Payer: MEDICARE

## 2021-09-22 VITALS
OXYGEN SATURATION: 99 % | TEMPERATURE: 97.1 F | WEIGHT: 210 LBS | HEART RATE: 57 BPM | SYSTOLIC BLOOD PRESSURE: 138 MMHG | BODY MASS INDEX: 31.01 KG/M2 | DIASTOLIC BLOOD PRESSURE: 84 MMHG

## 2021-09-22 DIAGNOSIS — I10 ESSENTIAL HYPERTENSION: Primary | ICD-10-CM

## 2021-09-22 DIAGNOSIS — Z23 NEED FOR INFLUENZA VACCINATION: ICD-10-CM

## 2021-09-22 PROCEDURE — 99214 OFFICE O/P EST MOD 30 MIN: CPT | Performed by: INTERNAL MEDICINE

## 2021-09-22 PROCEDURE — 1123F ACP DISCUSS/DSCN MKR DOCD: CPT | Performed by: INTERNAL MEDICINE

## 2021-09-22 PROCEDURE — G8399 PT W/DXA RESULTS DOCUMENT: HCPCS | Performed by: INTERNAL MEDICINE

## 2021-09-22 PROCEDURE — 3017F COLORECTAL CA SCREEN DOC REV: CPT | Performed by: INTERNAL MEDICINE

## 2021-09-22 PROCEDURE — 4040F PNEUMOC VAC/ADMIN/RCVD: CPT | Performed by: INTERNAL MEDICINE

## 2021-09-22 PROCEDURE — G8427 DOCREV CUR MEDS BY ELIG CLIN: HCPCS | Performed by: INTERNAL MEDICINE

## 2021-09-22 PROCEDURE — 1036F TOBACCO NON-USER: CPT | Performed by: INTERNAL MEDICINE

## 2021-09-22 PROCEDURE — 1090F PRES/ABSN URINE INCON ASSESS: CPT | Performed by: INTERNAL MEDICINE

## 2021-09-22 PROCEDURE — 90694 VACC AIIV4 NO PRSRV 0.5ML IM: CPT | Performed by: INTERNAL MEDICINE

## 2021-09-22 PROCEDURE — G0008 ADMIN INFLUENZA VIRUS VAC: HCPCS | Performed by: INTERNAL MEDICINE

## 2021-09-22 PROCEDURE — G8417 CALC BMI ABV UP PARAM F/U: HCPCS | Performed by: INTERNAL MEDICINE

## 2021-09-22 RX ORDER — CHLORTHALIDONE 25 MG/1
25 TABLET ORAL DAILY
Qty: 90 TABLET | Refills: 2 | Status: SHIPPED | OUTPATIENT
Start: 2021-09-22 | End: 2021-12-02 | Stop reason: ALTCHOICE

## 2021-09-22 ASSESSMENT — PATIENT HEALTH QUESTIONNAIRE - PHQ9
SUM OF ALL RESPONSES TO PHQ QUESTIONS 1-9: 0
1. LITTLE INTEREST OR PLEASURE IN DOING THINGS: 0
SUM OF ALL RESPONSES TO PHQ9 QUESTIONS 1 & 2: 0
SUM OF ALL RESPONSES TO PHQ QUESTIONS 1-9: 0
SUM OF ALL RESPONSES TO PHQ QUESTIONS 1-9: 0
2. FEELING DOWN, DEPRESSED OR HOPELESS: 0

## 2021-09-22 NOTE — PROGRESS NOTES
nightly      Multiple Vitamins-Minerals (THERAPEUTIC MULTIVITAMIN-MINERALS) tablet Take 1 tablet by mouth daily       No current facility-administered medications for this visit. Vitals:    09/22/21 1432   BP: 138/84   Site: Right Upper Arm   Position: Sitting   Cuff Size: Large Adult   Pulse: 57   Temp: 97.1 °F (36.2 °C)   TempSrc: Infrared   SpO2: 99%   Weight: 210 lb (95.3 kg)     Body mass index is 31.01 kg/m². Wt Readings from Last 3 Encounters:   09/22/21 210 lb (95.3 kg)   08/24/21 213 lb (96.6 kg)   07/15/21 215 lb 9.6 oz (97.8 kg)     BP Readings from Last 3 Encounters:   09/22/21 138/84   08/24/21 (!) 149/73   07/15/21 (!) 155/83       Objective:   Physical Exam  Vitals and nursing note reviewed. Constitutional:       General: She is not in acute distress. Appearance: She is well-developed. She is obese. HENT:      Head: Normocephalic. Right Ear: Tympanic membrane normal.      Left Ear: Tympanic membrane normal.   Eyes:      Pupils: Pupils are equal, round, and reactive to light. Cardiovascular:      Rate and Rhythm: Normal rate and regular rhythm. Pulses: Normal pulses. Heart sounds: Normal heart sounds. Pulmonary:      Effort: Pulmonary effort is normal.      Breath sounds: Normal breath sounds. Musculoskeletal:         General: Normal range of motion. Cervical back: Normal range of motion. Deformity present. Lumbar back: No swelling or bony tenderness. Right hip: Normal. Normal range of motion. Normal strength. Left hip: Normal. Normal range of motion. Normal strength. Skin:     General: Skin is warm. Capillary Refill: Capillary refill takes less than 2 seconds. Neurological:      General: No focal deficit present. Mental Status: She is alert and oriented to person, place, and time. Cranial Nerves: No cranial nerve deficit.       Coordination: Coordination normal.   Psychiatric:         Mood and Affect: Mood normal. Behavior: Behavior normal.         Thought Content: Thought content normal.         Judgment: Judgment normal.         Assessment/Plan:  Bertin Kemp was seen today for hypertension and atrial fibrillation. Diagnoses and all orders for this visit:    Essential hypertension  -     chlorthalidone (HYGROTON) 25 MG tablet; Take 1 tablet by mouth daily D/c hydroCHLORothiazide  -     Microalbumin / Creatinine Urine Ratio; Future  -     Comprehensive Metabolic Panel; Future  -     Lipid Panel; Future    Need for influenza vaccination  -     INFLUENZA, QUADV, ADJUVANTED, 65 YRS =, IM, PF, PREFILL SYR, 0.5ML (FLUAD)      No follow-ups on file.         [address] Deborah Cary MD

## 2021-09-23 ENCOUNTER — TELEPHONE (OUTPATIENT)
Dept: INTERNAL MEDICINE CLINIC | Age: 71
End: 2021-09-23

## 2021-09-29 ENCOUNTER — HOSPITAL ENCOUNTER (OUTPATIENT)
Dept: CARDIAC CATH/INVASIVE PROCEDURES | Age: 71
Discharge: HOME | End: 2021-09-29
Attending: INTERNAL MEDICINE | Admitting: INTERNAL MEDICINE
Payer: MEDICARE

## 2021-09-29 ENCOUNTER — APPOINTMENT (OUTPATIENT)
Dept: GENERAL RADIOLOGY | Age: 71
End: 2021-09-29
Attending: INTERNAL MEDICINE
Payer: MEDICARE

## 2021-09-29 ENCOUNTER — PROCEDURE VISIT (OUTPATIENT)
Dept: CARDIOLOGY CLINIC | Age: 71
End: 2021-09-29

## 2021-09-29 VITALS
TEMPERATURE: 98.1 F | SYSTOLIC BLOOD PRESSURE: 167 MMHG | HEART RATE: 54 BPM | DIASTOLIC BLOOD PRESSURE: 83 MMHG | WEIGHT: 215 LBS | OXYGEN SATURATION: 98 % | HEIGHT: 69 IN | RESPIRATION RATE: 18 BRPM | BODY MASS INDEX: 31.84 KG/M2

## 2021-09-29 DIAGNOSIS — I49.5 TACHY-BRADY SYNDROME (HCC): ICD-10-CM

## 2021-09-29 DIAGNOSIS — Z95.0 CARDIAC PACEMAKER IN SITU: Primary | ICD-10-CM

## 2021-09-29 DIAGNOSIS — I48.0 PAF (PAROXYSMAL ATRIAL FIBRILLATION) (HCC): ICD-10-CM

## 2021-09-29 DIAGNOSIS — R00.1 BRADYCARDIA: ICD-10-CM

## 2021-09-29 DIAGNOSIS — I48.19 PERSISTENT ATRIAL FIBRILLATION (HCC): ICD-10-CM

## 2021-09-29 LAB
ANION GAP SERPL CALCULATED.3IONS-SCNC: 10 MMOL/L (ref 3–16)
BUN BLDV-MCNC: 11 MG/DL (ref 7–20)
CALCIUM SERPL-MCNC: 9.8 MG/DL (ref 8.3–10.6)
CHLORIDE BLD-SCNC: 104 MMOL/L (ref 99–110)
CO2: 28 MMOL/L (ref 21–32)
CREAT SERPL-MCNC: 0.8 MG/DL (ref 0.6–1.2)
EKG ATRIAL RATE: 54 BPM
EKG DIAGNOSIS: NORMAL
EKG P AXIS: 75 DEGREES
EKG P-R INTERVAL: 206 MS
EKG Q-T INTERVAL: 488 MS
EKG QRS DURATION: 138 MS
EKG QTC CALCULATION (BAZETT): 462 MS
EKG R AXIS: -27 DEGREES
EKG T AXIS: 9 DEGREES
EKG VENTRICULAR RATE: 54 BPM
GFR AFRICAN AMERICAN: >60
GFR NON-AFRICAN AMERICAN: >60
GLUCOSE BLD-MCNC: 107 MG/DL (ref 70–99)
HCT VFR BLD CALC: 29.2 % (ref 36–48)
HEMOGLOBIN: 9.7 G/DL (ref 12–16)
MCH RBC QN AUTO: 28.1 PG (ref 26–34)
MCHC RBC AUTO-ENTMCNC: 33 G/DL (ref 31–36)
MCV RBC AUTO: 84.9 FL (ref 80–100)
PDW BLD-RTO: 16.3 % (ref 12.4–15.4)
PLATELET # BLD: 211 K/UL (ref 135–450)
PMV BLD AUTO: 8.7 FL (ref 5–10.5)
POTASSIUM SERPL-SCNC: 3.4 MMOL/L (ref 3.5–5.1)
RBC # BLD: 3.44 M/UL (ref 4–5.2)
SODIUM BLD-SCNC: 142 MMOL/L (ref 136–145)
WBC # BLD: 3.9 K/UL (ref 4–11)

## 2021-09-29 PROCEDURE — C1894 INTRO/SHEATH, NON-LASER: HCPCS

## 2021-09-29 PROCEDURE — 99152 MOD SED SAME PHYS/QHP 5/>YRS: CPT

## 2021-09-29 PROCEDURE — 80048 BASIC METABOLIC PNL TOTAL CA: CPT

## 2021-09-29 PROCEDURE — 33208 INSRT HEART PM ATRIAL & VENT: CPT

## 2021-09-29 PROCEDURE — 71045 X-RAY EXAM CHEST 1 VIEW: CPT

## 2021-09-29 PROCEDURE — 93010 ELECTROCARDIOGRAM REPORT: CPT | Performed by: INTERNAL MEDICINE

## 2021-09-29 PROCEDURE — 2500000003 HC RX 250 WO HCPCS

## 2021-09-29 PROCEDURE — 85027 COMPLETE CBC AUTOMATED: CPT

## 2021-09-29 PROCEDURE — 99153 MOD SED SAME PHYS/QHP EA: CPT

## 2021-09-29 PROCEDURE — C1887 CATHETER, GUIDING: HCPCS

## 2021-09-29 PROCEDURE — C1898 LEAD, PMKR, OTHER THAN TRANS: HCPCS

## 2021-09-29 PROCEDURE — 2580000003 HC RX 258

## 2021-09-29 PROCEDURE — 6360000002 HC RX W HCPCS

## 2021-09-29 PROCEDURE — 93005 ELECTROCARDIOGRAM TRACING: CPT | Performed by: INTERNAL MEDICINE

## 2021-09-29 PROCEDURE — C1769 GUIDE WIRE: HCPCS

## 2021-09-29 PROCEDURE — 36415 COLL VENOUS BLD VENIPUNCTURE: CPT

## 2021-09-29 PROCEDURE — 33208 INSRT HEART PM ATRIAL & VENT: CPT | Performed by: INTERNAL MEDICINE

## 2021-09-29 PROCEDURE — C1785 PMKR, DUAL, RATE-RESP: HCPCS

## 2021-09-29 PROCEDURE — 99152 MOD SED SAME PHYS/QHP 5/>YRS: CPT | Performed by: INTERNAL MEDICINE

## 2021-09-29 RX ORDER — OXYCODONE HYDROCHLORIDE 5 MG/1
5 TABLET ORAL EVERY 4 HOURS PRN
Status: DISCONTINUED | OUTPATIENT
Start: 2021-09-29 | End: 2021-09-29 | Stop reason: HOSPADM

## 2021-09-29 RX ORDER — SODIUM CHLORIDE 0.9 % (FLUSH) 0.9 %
5-40 SYRINGE (ML) INJECTION EVERY 12 HOURS SCHEDULED
Status: DISCONTINUED | OUTPATIENT
Start: 2021-09-29 | End: 2021-09-29 | Stop reason: HOSPADM

## 2021-09-29 RX ORDER — SODIUM CHLORIDE 0.9 % (FLUSH) 0.9 %
5-40 SYRINGE (ML) INJECTION PRN
Status: DISCONTINUED | OUTPATIENT
Start: 2021-09-29 | End: 2021-09-29 | Stop reason: HOSPADM

## 2021-09-29 RX ORDER — SODIUM CHLORIDE 9 MG/ML
25 INJECTION, SOLUTION INTRAVENOUS PRN
Status: DISCONTINUED | OUTPATIENT
Start: 2021-09-29 | End: 2021-09-29 | Stop reason: HOSPADM

## 2021-09-29 RX ORDER — OXYCODONE HYDROCHLORIDE 5 MG/1
10 TABLET ORAL EVERY 4 HOURS PRN
Status: DISCONTINUED | OUTPATIENT
Start: 2021-09-29 | End: 2021-09-29 | Stop reason: HOSPADM

## 2021-09-29 NOTE — PROCEDURES
[address]     Electrophysiology Procedure Note       Date of Procedure: 9/29/2021  Patient's Name: Janny Thorne  YOB: 1950   Medical Record Number: [id number]  Referring Physician: Therese Garcia MD  Procedure Performed by: Therese Garcia MD    Procedures performed:     · Insertion of MRI compatible right ventricular  lead under fluoroscopy. · Insertion of MRI compatible right atrial lead under fluoroscopy  · Insertion of a MRI compatible  dual   chamber Pacemaker/   · Ultrasound for access  · IV sedation. · Programming and analysis of the device      Indication of the procedure:       Janny Thorne is a 73 y.o. female with symptomatic bradycardia, tachy alem syndrome    A dual chamber pacemaker was implanted for  non-reversible symptomatic bradycardia due to sinus node dysfunction, 2nd/third AV block  to  provide physiological pacing, the need for atrial pacing and avoiding ventricular pacing in the setting of heart failure, and avoid pacemaker syndrome. Details of procedure: The patient was brought to the electrophysiology laboratory in stable condition. The patient was in a fasting and non-sedated state. The risks, benefits and alternatives of the procedure were discussed with the patient ]. The risks including, but not limited to, the risks of vascular injury, bleeding, infection, device malfunction, lead dislodgement, radiation exposure, injury to cardiac and surrounding structures (including pneumothorax), stroke, myocardial infarction and death were discussed in detail. Patient opted to proceed with the device implantation. Written informed consent was signed and placed in the chart. Prophylactic antibiotic was given. An independent trained observer pushed medications at my direction. We monitored the patient's level of consciousness and vital signs/physiologic status throughout the procedure duration (see start and stop times below).   Sedation:  4.5 mg Versed, were no complications. Post-sedation evaluation was completed. Patient was transported to the holding area in stable condition. Blood JBJC<60 cc  No complication  Lead and device information:         Plan:     The patient will be observed and discharged if stable and have usual post-implant care, including chest x-ray,   and interrogation of the device.

## 2021-09-29 NOTE — H&P
[place] [address]   Electrophysiology              Chief Complaint   Patient presents with    Atrial Fibrillation    Dizziness     for about 6 weeks      HPI: Ciro Allan is a 78 y.o. female with a past medical history of atrial fib, HTN, DM, thyroid nodule. She was originally treated for afib in 7/2017 and followed with cardiologist at [facility]. She was dx with uterine CA and underwent chemo. S/p DCCV 8/2017 and 9/2017. Diagnosed with FIGO Stage IA (pT1a(2), N0, M0) serous carcinoma of the endometrium, status post TLH/BSO and five of a planned six cycles of adjuvant chemotherapy completed 08/28/2018. She had episode of syncope while receiving chemo in 7/2018. She had severe retal bleeding 3/26/2019 requiring 4 units PRBCs, and her Xarelto was stopped. S/p DCCV 9/2019. Resumed Xarelto 12/2019. Today, she states that overall she feels well. However, she does have times of feeling light-headed, no syncope. She denies exertional chest pain, IVY/PND, palpitations, light-headedness, edema. She was referred for sleep apnea eval in the past but did not follow through.       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/19/2018    Partial small bowel obstruction ([address] 06/08/2018    On antineoplastic chemotherapy 06/07/2018    Abdominal pain, generalized 06/07/2018    Endometrial cancer ([address] 03/27/2018    Alkaline phosphatase elevation 03/08/2018    Prediabetes 03/08/2018    Chronic anticoagulation 10/13/2017    Anemia 07/20/2017    Abnormal liver enzymes 07/20/2017    Essential hypertension 07/06/2016     Diagnostic studies:     ECG 7/15/21  SB 53  AVB Type II  RBBB w/ LAB  QTcH 488    MCOT 6/22-7/6/21  NSR ahr 61  AF burden 14%, long pauses up to 5 sec  PAC's 4%    ECHO 6/19/21  Normal LV systolic function with an estimated EF of 55-60%. No regional wall motion abnormalities are seen. Grade II diastolic dysfunction with elevated LV filling pressures. The right ventricle is mildly enlarged. Right ventricular systolic function is normal.   There is bi-atrial enlargement. The mitral valve leaflets are slightly thickened with normal leaflet mobility. Moderate mitral regurgitation. Trivial aortic regurgitation. Mild tricuspid regurgitation with a PASP of 38 mmHg. Mild pulmonic regurgitation. ECG 9/8/2020   SR RBBB HR 68, , , QTc 470     Echo 7/18/2018  Summary:  Overall left ventricular ejection fraction is estimated to be 50-55%. The left ventricular function is low normal.  The left ventricular wall motion is normal.  The left atrium is mildly dilated. There is mild to moderate mitral regurgitation. Mild pulmonic valvular regurgitation. Moderate tricuspid regurgitation is present. The right atrium is mildly dilated. Trace aortic regurgitation. The estimated right ventricular systolic pressure is consistent with  moderate pulmonary hypertension.       I independently reviewed the cardiac diagnostic studies, ECG and relevant imaging studies. Lab Results   Component Value Date    LVEF 58 06/19/2021     Lab Results   Component Value Date    TSH 0.23 (L) 07/06/2021         Physical Examination:  Vitals:    07/15/21 1416   BP: (!) 155/83   Pulse: Wt Readings from Last 3 Encounters:   07/15/21 215 lb 9.6 oz (97.8 kg)   07/09/21 216 lb 6.4 oz (98.2 kg)   06/20/21 215 lb 14.4 oz (97.9 kg)       · Constitutional: Oriented. No distress. · Head: Normocephalic and atraumatic. · Mouth/Throat: Oropharynx is clear and moist.   · Eyes: Conjunctivae normal. EOM are normal.   · Neck: Neck supple. No rigidity. No JVD present.     · Cardiovascular: Normal rate, regular rhythm, S1&S2. · Pulmonary/Chest: Bilateral respiratory sounds. No wheezes, No rhonchi. · Abdominal: Soft. Bowel sounds present. No distension, No tenderness. · Musculoskeletal: No tenderness. No edema    · Lymphadenopathy: Has no cervical adenopathy. · Neurological: Alert and oriented. Cranial nerve appears intact, No Gross deficit   · Skin: Skin is warm and dry. No rash noted. · Psychiatric: Has a normal behavior       Review of System:  [x] Full ROS obtained and negative except as mentioned in HPI    Prior to Admission medications    Medication Sig Start Date End Date Taking? Authorizing Provider   amLODIPine (NORVASC) 10 MG tablet Take 1 tablet by mouth daily 7/9/21  Yes Tisha Delgado MD   hydroCHLOROthiazide (HYDRODIURIL) 25 MG tablet Take 1 tablet by mouth daily 7/9/21  Yes Tisha Delgado MD   methIMAzole (TAPAZOLE) 5 MG tablet Take 0.5 tablets by mouth daily 7/9/21 10/7/21 Yes Tisha Delgado MD   rivaroxaban (XARELTO) 20 MG TABS tablet TAKE ONE TABLET BY MOUTH DAILY WITH BREAKFAST 7/9/21  Yes Tisha Delgado MD   melatonin 10 MG CAPS capsule Take 10 mg by mouth nightly   Yes Historical Provider, MD   Multiple Vitamins-Minerals (THERAPEUTIC MULTIVITAMIN-MINERALS) tablet Take 1 tablet by mouth daily   Yes Historical Provider, MD       Allergies   Allergen Reactions    Naproxen Swelling    Lisinopril Rash       Social History:  Reviewed. reports that she has never smoked. She has never used smokeless tobacco. She reports current alcohol use. She reports that she does not use drugs. Family History:  Reviewed. Reviewed. No family history of SCD. Relevant and available labs, and cardiovascular diagnostics reviewed. Reviewed.      Assessment:     Paroxysmal Atrial Fibrillation  - ECG today 7/15/21> SB 53, 2nd degree avb, RBBB   - MCOT 6/22-7/6/21- NSR ahr 61, AF burden 14%, long pauses up to 5 sec   -Some dizziness  - On Toprol 50 mg daily   - [id number] score: 4 (age, gender, HTN., DM); [id number] Vasc score and anticoagulation discussed. High risk for stroke and thromboembolism. Anticoagulation is recommended.   ~ On Xarelto 20 mg daily,  Denies s/s of bleeding  - Afib risk factors including age, HTN, obesity, inactivity and SATISH were discussed with patient. Risk factor modification recommended              ~ TSH <0.01 (7/16/2020)               - Treatment options including cardioversion, rate control strategy, antiarrhythmics, anticoagulation and possible ablation were discussed with patient. Risks, benefits and alternative of each treatment options were explained. All questions answered                          ~ S/p DCCV 8/2017, 9/2017, and 9/2019  At this point the burden of COURTNEY sandoval is reasonable and she is not very symptomatic with it. We will continue to monitor and consider ablation in the future if needed. RBBB/bifascicular block/pauses   - Noted on EKG              - Denies CP, SOB, or peripheral edema              - Denies lightheadedness, dizziness or syncope    She has history of syncope. She has significant pauses on the monitor. Although some of them may be related to sleep apnea which is undiagnosed some of them happen during the day and are associated with lightheadedness. Therefore she has class I indication for pacemaker implantation for symptomatic bradycardia and pauses. The risks, benefits and alternatives of the procedure were discussed with the patient. The risks including, but not limited to, the risks of bleeding, infection, pain, device malfunction, lead dislodgement, radiation exposure, injury to cardiac and surrounding structures (including pneumothorax), stroke, cardiac perforation, tamponade, need for emergent heart surgery, myocardial infarction and death were discussed in detail. Dual vs single chamber device, including risks and benefits were discussed with patient.      Printed information about device implantation including risks were given. Patient was encouraged to review information given, and call back with any questions about risks, benefits and alternative of procedure. The patient opted to proceed with the device implantation.      HTN   -goal <130/80              - SBP elevated today              - On Norvasc 10mg daily, HCTZ 25mg daily, and Toprol 50mg daily               - Encouraged patient to check BP at home, log and bring to office visits  - Discussed lifestyle modifications, weight loss, low sodium diet    Syncope, h/o               - Occurred with chemotherapy tx              - No recurrence     Uterine CA              - Remains in remission              - S/p hx of chemo and radiation    Hx of GI bleeding/Radiation proctitis              - Healed and she was cleared for [facility] by GI              - Candidate for Watchman procedure is recurrent              - Denies recurrence    PLAN:   PPM implant

## 2021-09-30 PROBLEM — Z95.0 CARDIAC PACEMAKER IN SITU: Status: ACTIVE | Noted: 2021-09-30

## 2021-10-05 ENCOUNTER — NURSE ONLY (OUTPATIENT)
Dept: CARDIOLOGY CLINIC | Age: 71
End: 2021-10-05
Payer: MEDICARE

## 2021-10-05 DIAGNOSIS — I48.19 PERSISTENT ATRIAL FIBRILLATION (HCC): ICD-10-CM

## 2021-10-05 DIAGNOSIS — Z95.0 CARDIAC PACEMAKER IN SITU: ICD-10-CM

## 2021-10-05 DIAGNOSIS — I48.0 PAF (PAROXYSMAL ATRIAL FIBRILLATION) (HCC): ICD-10-CM

## 2021-10-05 DIAGNOSIS — I49.5 TACHY-BRADY SYNDROME (HCC): ICD-10-CM

## 2021-10-05 DIAGNOSIS — R00.1 BRADYCARDIA: ICD-10-CM

## 2021-10-05 DIAGNOSIS — I44.2 CHB (COMPLETE HEART BLOCK) (HCC): ICD-10-CM

## 2021-10-05 PROCEDURE — 93280 PM DEVICE PROGR EVAL DUAL: CPT | Performed by: INTERNAL MEDICINE

## 2021-10-05 NOTE — PROGRESS NOTES
Patient comes in for programming evaluation for her pacemaker . All sensing and pacing parameters are within normal range. Battery life 12.3 years  AP 85.7%.  0.8%. Dependent today  Presenting APVS at 65 bpm.  Underlying CHB  No episodes noted. Patient remains on Xarelto. Patients incision is healing nicely. Patient to call the office immediately with any signs on infection. No changes need to be made at this time. Please see interrogation for more detail. Patient will follow up in 3 months in office or remotely. Home Monitor is connected and transmitting.

## 2021-10-12 ENCOUNTER — TELEPHONE (OUTPATIENT)
Dept: CARDIOLOGY CLINIC | Age: 71
End: 2021-10-12

## 2021-10-19 ENCOUNTER — OFFICE VISIT (OUTPATIENT)
Dept: PULMONOLOGY | Age: 71
End: 2021-10-19
Payer: MEDICARE

## 2021-10-19 VITALS
HEART RATE: 89 BPM | SYSTOLIC BLOOD PRESSURE: 124 MMHG | BODY MASS INDEX: 31.28 KG/M2 | OXYGEN SATURATION: 100 % | DIASTOLIC BLOOD PRESSURE: 82 MMHG | HEIGHT: 69 IN | WEIGHT: 211.2 LBS

## 2021-10-19 DIAGNOSIS — E66.9 NON MORBID OBESITY, UNSPECIFIED OBESITY TYPE: Chronic | ICD-10-CM

## 2021-10-19 DIAGNOSIS — I10 ESSENTIAL HYPERTENSION: Chronic | ICD-10-CM

## 2021-10-19 DIAGNOSIS — I48.0 PAF (PAROXYSMAL ATRIAL FIBRILLATION) (HCC): Chronic | ICD-10-CM

## 2021-10-19 DIAGNOSIS — G47.10 HYPERSOMNIA: Primary | ICD-10-CM

## 2021-10-19 DIAGNOSIS — R06.83 SNORING: ICD-10-CM

## 2021-10-19 PROCEDURE — 1090F PRES/ABSN URINE INCON ASSESS: CPT | Performed by: INTERNAL MEDICINE

## 2021-10-19 PROCEDURE — G8399 PT W/DXA RESULTS DOCUMENT: HCPCS | Performed by: INTERNAL MEDICINE

## 2021-10-19 PROCEDURE — 99204 OFFICE O/P NEW MOD 45 MIN: CPT | Performed by: INTERNAL MEDICINE

## 2021-10-19 PROCEDURE — G8417 CALC BMI ABV UP PARAM F/U: HCPCS | Performed by: INTERNAL MEDICINE

## 2021-10-19 PROCEDURE — G8484 FLU IMMUNIZE NO ADMIN: HCPCS | Performed by: INTERNAL MEDICINE

## 2021-10-19 PROCEDURE — 4040F PNEUMOC VAC/ADMIN/RCVD: CPT | Performed by: INTERNAL MEDICINE

## 2021-10-19 PROCEDURE — 1123F ACP DISCUSS/DSCN MKR DOCD: CPT | Performed by: INTERNAL MEDICINE

## 2021-10-19 PROCEDURE — G8427 DOCREV CUR MEDS BY ELIG CLIN: HCPCS | Performed by: INTERNAL MEDICINE

## 2021-10-19 PROCEDURE — 1036F TOBACCO NON-USER: CPT | Performed by: INTERNAL MEDICINE

## 2021-10-19 PROCEDURE — 3017F COLORECTAL CA SCREEN DOC REV: CPT | Performed by: INTERNAL MEDICINE

## 2021-10-19 ASSESSMENT — SLEEP AND FATIGUE QUESTIONNAIRES
HOW LIKELY ARE YOU TO NOD OFF OR FALL ASLEEP WHILE SITTING AND TALKING TO SOMEONE: 0
HOW LIKELY ARE YOU TO NOD OFF OR FALL ASLEEP WHILE SITTING AND READING: 0
HOW LIKELY ARE YOU TO NOD OFF OR FALL ASLEEP WHEN YOU ARE A PASSENGER IN A CAR FOR AN HOUR WITHOUT A BREAK: 0
HOW LIKELY ARE YOU TO NOD OFF OR FALL ASLEEP WHILE WATCHING TV: 0
ESS TOTAL SCORE: 0
HOW LIKELY ARE YOU TO NOD OFF OR FALL ASLEEP IN A CAR, WHILE STOPPED FOR A FEW MINUTES IN TRAFFIC: 0
HOW LIKELY ARE YOU TO NOD OFF OR FALL ASLEEP WHILE SITTING INACTIVE IN A PUBLIC PLACE: 0
HOW LIKELY ARE YOU TO NOD OFF OR FALL ASLEEP WHILE LYING DOWN TO REST IN THE AFTERNOON WHEN CIRCUMSTANCES PERMIT: 0
NECK CIRCUMFERENCE (INCHES): 15
HOW LIKELY ARE YOU TO NOD OFF OR FALL ASLEEP WHILE SITTING QUIETLY AFTER LUNCH WITHOUT ALCOHOL: 0

## 2021-10-19 NOTE — PROGRESS NOTES
Richy Mansfield MD, Geovanna Aragon, [facility]  Tiffanie Kehrt CNP Judye Salles CNP Maximino [address]  [address] [facility], [address] [facility]- [phone]   [facility] [address]. [address] [facility]. [address] [phone]     [address] [place], [place] [address] [address] [facility] [address] [address]  Dept: [phone]  Loc: [phone]    Assessment:      Visit Diagnoses and Associated Orders     Hypersomnia   (New Problem)  -  Primary    needs work-up    Home Sleep Study (HST) [63739 Custom]   - Future Order         Snoring   (New Problem)      needs work-up    Home Sleep Study (HST) [58172 Custom]   - Future Order         PAF (paroxysmal atrial fibrillation) (HCC)   (Stable)           Essential hypertension   (Stable)           Non morbid obesity, unspecified obesity type   (Not Stable)                  Plan:      One or more undiagnosed new problem with uncertain prognosis till final diagnosis is made. Differential diagnosis includes but not limited to: SATISH, PLMD's, narcolepsy, parasomnias. Reviewed SATISH (highest likelihood Dx): pathophysiology, diagnosis, complications and treatment. Instructed her not to drive if drowsy. Continue medications per her PCP and other physicians. Limit caffeine use after 3pm. Standard of care is to do in-lab PSG but insurance is mandating an inferior HST. 1 wk follow up after study to review her results. The chronic medical conditions listed are directly related to the primary diagnosis listed above. The management of the primary diagnosis affects the secondary diagnosis and vice versa. Referral note from patient's cardiologist dated 7/15/2021 showing the patient with A. fib and at risk for sleep apnea. Patient reviewed EKG tracing from 9/29/2021 that shows sinus rhythm but a wide QRS. Echo report from 6/19/2021 showed an EF of 55-60%.     This information was analyzed to assess complexity and medical decision making in regards to further testing and management. Continue meds for: HTN and a fib. Pt would medically benefit from wt loss for SATISH (diet, exercise, surgical). Orders Placed This Encounter   Procedures    Home Sleep Study (HST)          Subjective:     Patient ID: Pete Hernández is a 73 y.o. female. Chief Complaint   Patient presents with    Sleep Apnea       HPI:      Pete Hernández is a 73 y.o. female referred by Julissa Aparicio MD for a sleep evaluation. She complains of: snoring, excessive daytime sleepiness , non-restorative sleep, nocturia and tossing and turning at night. She denies: cataplexy and hypnagogic hallucinations. Symptoms began several years ago, gradually worsening since that time. Previous evaluation and treatment has included- none    DOT/CDL - No  FAA/'s license -No    Previous Report(s) Reviewed: historical medical records, office notes, andreferral letter(s). Pertinent data has been documented. [place] - Total score: 0    Caffeine Intake - Coffee: 2 cup(s) per week    Social History     Socioeconomic History    Marital status:      Spouse name: Not on file    Number of children: Not on file    Years of education: Not on file    Highest education level: Not on file   Occupational History    Occupation: cash checking   Tobacco Use    Smoking status: Never Smoker    Smokeless tobacco: Never Used   Vaping Use    Vaping Use: Never used   Substance and Sexual Activity    Alcohol use: Yes     Comment: occ wine    Drug use: No    Sexual activity: Never   Other Topics Concern    Not on file   Social History Narrative    Lives home with her 3 children. Doing well 8 grandchildren.         Social Determinants of Health     Financial Resource Strain: Low Risk     Difficulty of Paying Living Expenses: Not hard at all   Food Insecurity: No Food Insecurity    Worried About Running Out of Food in the Last Year: Never true    Zully of Food in the Last Year: Never true   Transportation Needs:  Lack of Transportation (Medical):  Lack of Transportation (Non-Medical):    Physical Activity:     Days of Exercise per Week:     Minutes of Exercise per Session:    Stress:     Feeling of Stress :    Social Connections:     Frequency of Communication with Friends and Family:     Frequency of Social Gatherings with Friends and Family:     Attends Confucianism Services:     Active Member of Clubs or Organizations:     Attends Club or Organization Meetings:     Marital Status:    Intimate Partner Violence:     Fear of Current or Ex-Partner:     Emotionally Abused:     Physically Abused:     Sexually Abused:         Current Outpatient Medications   Medication Instructions    amLODIPine (NORVASC) 10 mg, Oral, DAILY    chlorthalidone (HYGROTON) 25 mg, Oral, DAILY, D/c hydroCHLORothiazide    hydroCHLOROthiazide (HYDRODIURIL) 25 mg, Oral, DAILY    melatonin 10 mg, Oral, NIGHTLY    methIMAzole (TAPAZOLE) 5 mg, Oral, DAILY    Multiple Vitamins-Minerals (THERAPEUTIC MULTIVITAMIN-MINERALS) tablet 1 tablet, Oral, DAILY    rivaroxaban (XARELTO) 20 MG TABS tablet TAKE ONE TABLET BY MOUTH DAILY WITH BREAKFAST          Objective:     Vitals:  Weight BMI   Wt Readings from Last 3 Encounters:   10/19/21 211 lb 3.2 oz (95.8 kg)   09/29/21 215 lb (97.5 kg)   09/22/21 210 lb (95.3 kg)    Body mass index is 31.19 kg/m². BP HR SaO2   BP Readings from Last 3 Encounters:   10/19/21 124/82   09/29/21 (!) 167/83   09/22/21 138/84    Pulse Readings from Last 3 Encounters:   10/19/21 89   09/29/21 54   09/22/21 57    SpO2 Readings from Last 3 Encounters:   10/19/21 100%   09/29/21 98%   09/22/21 99%        Physical Exam  Vitals reviewed. Constitutional:       General: She is not in acute distress. Appearance: Normal appearance. She is well-developed. She is not toxic-appearing or diaphoretic. HENT:      Head: Normocephalic and atraumatic. Not macrocephalic and not microcephalic.       Right Ear: External ear normal.      Left Ear: External ear normal.      Nose: Septal deviation and mucosal edema present. No nasal deformity. Mouth/Throat:      Lips: Pink. Mouth: Mucous membranes are moist.      Dentition: Has dentures. Tongue: No lesions. Palate: No mass. Pharynx: Uvula midline. Uvula swelling present. No oropharyngeal exudate. Tonsils: No tonsillar exudate or tonsillar abscesses. Comments: Tonsils: normal size  Eyes:      General: Lids are normal.      Extraocular Movements: Extraocular movements intact. Conjunctiva/sclera: Conjunctivae normal.      Pupils: Pupils are equal, round, and reactive to light. Neck:      Vascular: No JVD. Trachea: Trachea normal.      Comments: Neck Circ: 15 inches    Cardiovascular:      Rate and Rhythm: Normal rate and regular rhythm. Heart sounds: Normal heart sounds. Pulmonary:      Effort: Pulmonary effort is normal.      Breath sounds: Normal breath sounds. Abdominal:      General: Bowel sounds are normal.   Musculoskeletal:      Cervical back: Normal range of motion. Comments: No evidence of cyanosis or clubbing of nails   Skin:     General: Skin is warm. Nails: There is no clubbing. Neurological:      General: No focal deficit present. Mental Status: She is alert. Psychiatric:         Attention and Perception: Attention normal.         Mood and Affect: Mood and affect normal.         Speech: Speech normal.         Behavior: Behavior normal. Behavior is cooperative. Thought Content:  Thought content normal.         Electronically signed by Leslie Taylor MD on10/19/2021 at 1:19 PM

## 2021-10-19 NOTE — LETTER
[facility]  [address] [facility]  [address] [address]  Phone: [phone]  Fax: [phone]           Priyanka Winter MD      October 19, 2021     Patient: Alex Guerrero   MR Number: [id number]   YOB: 1950   Date of Visit: 10/19/2021       Dear Dr. Kiana Henriquez:    Thank you for referring Alex Guerrero to me for evaluation/treatment. Below are the relevant portions of my assessment and plan of care. Visit Diagnoses and Associated Orders     Hypersomnia   (New Problem)  -  Primary    needs work-up    Home Sleep Study (HST) [[id number] Custom]   - Future Order         Snoring   (New Problem)      needs work-up    Home Sleep Study (HST) [[id number] Custom]   - Future Order         PAF (paroxysmal atrial fibrillation) (HCC)   (Stable)           Essential hypertension   (Stable)           Non morbid obesity, unspecified obesity type   (Not Stable)                 One or more undiagnosed new problem with uncertain prognosis till final diagnosis is made. Differential diagnosis includes but not limited to: SATISH, PLMD's, narcolepsy, parasomnias. Reviewed SATISH (highest likelihood Dx): pathophysiology, diagnosis, complications and treatment. Instructed her not to drive if drowsy. Continue medications per her PCP and other physicians. Limit caffeine use after 3pm. Standard of care is to do in-lab PSG but insurance is mandating an inferior HST. 1 wk follow up after study to review her results. The chronic medical conditions listed are directly related to the primary diagnosis listed above. The management of the primary diagnosis affects the secondary diagnosis and vice versa. Referral note from patient's cardiologist dated 7/15/2021 showing the patient with A. fib and at risk for sleep apnea. Patient reviewed EKG tracing from 9/29/2021 that shows sinus rhythm but a wide QRS. Echo report from 6/19/2021 showed an EF of 55-60%.     This information was analyzed to assess complexity and medical decision making in regards to further testing and management. Continue meds for: HTN and a fib. Pt would medically benefit from wt loss for SATISH (diet, exercise, surgical). Orders Placed This Encounter   Procedures    Home Sleep Study (HST)       If you have questions, please do not hesitate to call me. I look forward to following Jorje Brito along with you.     Sincerely,        Jenn Kuhn MD    CC providers:  Dennise Councilman, MD  [address] [facility] [address] [facility] [address]  Via In [facility], [address] [facility] [address] [facility]  Via In [place]

## 2021-10-20 ENCOUNTER — TELEPHONE (OUTPATIENT)
Dept: SLEEP CENTER | Age: 71
End: 2021-10-20

## 2021-10-20 NOTE — TELEPHONE ENCOUNTER
Called to schedule an hst per Mongolian Gaw     Left vm for the pt to return my call     Tyson Grimaldo

## 2021-11-11 ENCOUNTER — TELEPHONE (OUTPATIENT)
Dept: CARDIOLOGY CLINIC | Age: 71
End: 2021-11-11

## 2021-11-11 ENCOUNTER — HOSPITAL ENCOUNTER (OUTPATIENT)
Age: 71
Discharge: HOME | End: 2021-11-11
Payer: MEDICARE

## 2021-11-11 DIAGNOSIS — E05.00 GRAVES DISEASE: ICD-10-CM

## 2021-11-11 LAB
T3 TOTAL: 1.43 NG/ML (ref 0.8–2)
T4 FREE: 1.4 NG/DL (ref 0.9–1.8)
TSH SERPL DL<=0.05 MIU/L-ACNC: 0.02 UIU/ML (ref 0.27–4.2)

## 2021-11-11 PROCEDURE — 84480 ASSAY TRIIODOTHYRONINE (T3): CPT

## 2021-11-11 PROCEDURE — 84443 ASSAY THYROID STIM HORMONE: CPT

## 2021-11-11 PROCEDURE — 84439 ASSAY OF FREE THYROXINE: CPT

## 2021-11-11 PROCEDURE — 36415 COLL VENOUS BLD VENIPUNCTURE: CPT

## 2021-11-11 NOTE — TELEPHONE ENCOUNTER
Pt calling she called the answering service last night, she has a pacemaker (new) and her machine was flashing orange. Was told to unplug it and plug it back in and call [facility] today about this. Pt needs to know what to do?  Pls call to advise Thank you

## 2021-11-12 ENCOUNTER — TELEPHONE (OUTPATIENT)
Dept: CARDIOLOGY CLINIC | Age: 71
End: 2021-11-12

## 2021-11-12 NOTE — TELEPHONE ENCOUNTER
Circ requested. Informed consent obtained and recorded in chart. Infant placed on circ board. Using sterile technique circumcision was performed using 1cc 1% xylocaine dorsal penile block and gomco with good results. Patient tolerated procedure well with no significant bleeding. Circ care reviewed with parent. Circ checked after 15 minutes with no bleeding. Mother encouraged to call with questions.     Spoke with patient about remote transmission and future transmissions. Pt appears satisfied with provided information.

## 2021-11-12 NOTE — TELEPHONE ENCOUNTER
Pt states she came into office yesterday 11/11/21 and spoke to  [facility]. Juana Jansen was going to call her back regarding her transmission. Please call pt.

## 2021-11-15 ENCOUNTER — PATIENT MESSAGE (OUTPATIENT)
Dept: INTERNAL MEDICINE CLINIC | Age: 71
End: 2021-11-15

## 2021-11-15 NOTE — TELEPHONE ENCOUNTER
From: Richi Cristina  To: Dr. Victoria Veliz: 11/15/2021 11:19 AM EST  Subject: December 13    My daughter from out of town is coming in on the 13th of December to help me get my mentally ill daughter there. I would deeply appreciate it if you can see her on that date. Please let me know if you have any questions or concerns about that date.     Thank you

## 2021-11-18 ENCOUNTER — HOSPITAL ENCOUNTER (OUTPATIENT)
Dept: SLEEP CENTER | Age: 71
Discharge: HOME | End: 2021-11-18
Payer: MEDICARE

## 2021-11-18 DIAGNOSIS — R06.83 SNORING: ICD-10-CM

## 2021-11-18 DIAGNOSIS — G47.10 HYPERSOMNIA: ICD-10-CM

## 2021-11-18 PROCEDURE — 95806 SLEEP STUDY UNATT&RESP EFFT: CPT

## 2021-11-24 DIAGNOSIS — I10 ESSENTIAL HYPERTENSION: Primary | ICD-10-CM

## 2021-11-24 PROCEDURE — 95806 SLEEP STUDY UNATT&RESP EFFT: CPT | Performed by: INTERNAL MEDICINE

## 2021-11-24 RX ORDER — HYDROCHLOROTHIAZIDE 25 MG/1
25 TABLET ORAL DAILY
Qty: 30 TABLET | Refills: 0 | Status: SHIPPED | OUTPATIENT
Start: 2021-11-24 | End: 2022-03-29 | Stop reason: SDUPTHER

## 2021-11-24 NOTE — TELEPHONE ENCOUNTER
Patients picked up a new script for chlorthalidone (HYGROTON) 25 MG. The pharmacist told her it was listed in her allergy profile. Patient said she forgot this rx makes her b/p go mikel high and causes a severe headache. Patient is completely out of the b/p medication she was previously taking and would like something else called in as soon as possible.

## 2021-11-29 ENCOUNTER — TELEPHONE (OUTPATIENT)
Dept: PULMONOLOGY | Age: 71
End: 2021-11-29

## 2021-11-29 ENCOUNTER — PATIENT MESSAGE (OUTPATIENT)
Dept: INTERNAL MEDICINE CLINIC | Age: 71
End: 2021-11-29

## 2021-11-29 DIAGNOSIS — I10 ESSENTIAL HYPERTENSION: ICD-10-CM

## 2021-11-29 DIAGNOSIS — I48.19 PERSISTENT ATRIAL FIBRILLATION (HCC): ICD-10-CM

## 2021-11-29 RX ORDER — METOPROLOL SUCCINATE 50 MG/1
50 TABLET, EXTENDED RELEASE ORAL DAILY
Qty: 90 TABLET | Refills: 0 | Status: SHIPPED | OUTPATIENT
Start: 2021-11-29 | End: 2021-12-20 | Stop reason: SDUPTHER

## 2021-11-29 RX ORDER — METOPROLOL SUCCINATE 50 MG/1
TABLET, EXTENDED RELEASE ORAL
Qty: 90 TABLET | Refills: 1 | Status: SHIPPED | OUTPATIENT
Start: 2021-11-29 | End: 2021-12-20 | Stop reason: ALTCHOICE

## 2021-12-02 ENCOUNTER — TELEPHONE (OUTPATIENT)
Dept: PULMONOLOGY | Age: 71
End: 2021-12-02

## 2021-12-02 ENCOUNTER — TELEPHONE (OUTPATIENT)
Dept: ENDOCRINOLOGY | Age: 71
End: 2021-12-02

## 2021-12-02 ENCOUNTER — OFFICE VISIT (OUTPATIENT)
Dept: CARDIOLOGY CLINIC | Age: 71
End: 2021-12-02
Payer: MEDICARE

## 2021-12-02 ENCOUNTER — NURSE ONLY (OUTPATIENT)
Dept: CARDIOLOGY CLINIC | Age: 71
End: 2021-12-02
Payer: MEDICARE

## 2021-12-02 VITALS
OXYGEN SATURATION: 98 % | BODY MASS INDEX: 31.04 KG/M2 | SYSTOLIC BLOOD PRESSURE: 124 MMHG | DIASTOLIC BLOOD PRESSURE: 84 MMHG | HEIGHT: 69 IN | HEART RATE: 82 BPM | WEIGHT: 209.6 LBS

## 2021-12-02 DIAGNOSIS — E05.00 GRAVES DISEASE: Primary | ICD-10-CM

## 2021-12-02 DIAGNOSIS — R00.1 BRADYCARDIA: ICD-10-CM

## 2021-12-02 DIAGNOSIS — R55 SYNCOPE, UNSPECIFIED SYNCOPE TYPE: ICD-10-CM

## 2021-12-02 DIAGNOSIS — I48.0 PAF (PAROXYSMAL ATRIAL FIBRILLATION) (HCC): ICD-10-CM

## 2021-12-02 DIAGNOSIS — I48.19 PERSISTENT ATRIAL FIBRILLATION (HCC): ICD-10-CM

## 2021-12-02 DIAGNOSIS — Z95.0 CARDIAC PACEMAKER IN SITU: ICD-10-CM

## 2021-12-02 DIAGNOSIS — I10 ESSENTIAL HYPERTENSION: ICD-10-CM

## 2021-12-02 DIAGNOSIS — I48.0 PAF (PAROXYSMAL ATRIAL FIBRILLATION) (HCC): Primary | ICD-10-CM

## 2021-12-02 DIAGNOSIS — I49.5 TACHY-BRADY SYNDROME (HCC): ICD-10-CM

## 2021-12-02 PROCEDURE — 1090F PRES/ABSN URINE INCON ASSESS: CPT | Performed by: NURSE PRACTITIONER

## 2021-12-02 PROCEDURE — G8417 CALC BMI ABV UP PARAM F/U: HCPCS | Performed by: NURSE PRACTITIONER

## 2021-12-02 PROCEDURE — 99214 OFFICE O/P EST MOD 30 MIN: CPT | Performed by: NURSE PRACTITIONER

## 2021-12-02 PROCEDURE — 1123F ACP DISCUSS/DSCN MKR DOCD: CPT | Performed by: NURSE PRACTITIONER

## 2021-12-02 PROCEDURE — 4040F PNEUMOC VAC/ADMIN/RCVD: CPT | Performed by: NURSE PRACTITIONER

## 2021-12-02 PROCEDURE — 3017F COLORECTAL CA SCREEN DOC REV: CPT | Performed by: NURSE PRACTITIONER

## 2021-12-02 PROCEDURE — 1036F TOBACCO NON-USER: CPT | Performed by: NURSE PRACTITIONER

## 2021-12-02 PROCEDURE — G8399 PT W/DXA RESULTS DOCUMENT: HCPCS | Performed by: NURSE PRACTITIONER

## 2021-12-02 PROCEDURE — G8484 FLU IMMUNIZE NO ADMIN: HCPCS | Performed by: NURSE PRACTITIONER

## 2021-12-02 PROCEDURE — G8427 DOCREV CUR MEDS BY ELIG CLIN: HCPCS | Performed by: NURSE PRACTITIONER

## 2021-12-02 RX ORDER — METHIMAZOLE 5 MG/1
TABLET ORAL
COMMUNITY
Start: 2021-11-29 | End: 2022-02-07

## 2021-12-02 RX ORDER — DILTIAZEM HYDROCHLORIDE 180 MG/1
180 CAPSULE, COATED, EXTENDED RELEASE ORAL DAILY
Qty: 30 CAPSULE | Refills: 2 | Status: SHIPPED | OUTPATIENT
Start: 2021-12-02 | End: 2022-01-04 | Stop reason: SDUPTHER

## 2021-12-02 NOTE — PROGRESS NOTES
Patient comes in for programming evaluation for her pacemaker . All sensing and pacing parameters are within normal range. Battery life 12.6 years  AP 57.9%.  8.0%. Presenting AFVS  122 AF episodes noted. With a 15.8% burden. 1 NSVT episode noted on 11/24/2021 lasting 1 second. OBSERVATIONS (2)  · AT/AF >= 6 hr for 17 days. · Avg. Ventricular Rate >= 100 bpm during AT/AF (>= 6 hr) for 2 days. Patient remains on Xarelto and metoprolol. Today we lowered the RV output to preserve battery life and turned on Atrial Therapies to the [address] settings. Please see interrogation for more detail. Patient will see Oscar Solorzano and follow up in 3 months in office or remotely.

## 2021-12-02 NOTE — TELEPHONE ENCOUNTER
Please call patient and find out if she has been taking her methimazole regularly as her cardiology office contacted us that she is having more atrial fibrillation.   I would like her to get her thyroid function test done in the next week or so to see if we need to make further adjustments in her methimazole

## 2021-12-02 NOTE — LETTER
[address] [facility]  Dept: [phone]  Dept Fax: [phone]      2021    Patient: Giancarlo Bills  : 1950  DOS: 2021    To Whom it May Concern: It is my medical opinion that Giancarlo Bills require intermittent brief breaks 5-10 minutes throughout the work day due to chronic cardiac condition. If you have any questions or concerns, please do not hesitate to call.     Sincerely,    Olga Felty, APRN - CNP

## 2021-12-02 NOTE — PATIENT INSTRUCTIONS
- Stop amlodipine   - Start diltiazem 180 mg daily   - Please send device interrogation in 2 weeks  - Call office if symptoms do not improve  - Check your blood pressure at home, if your top number blood pressure is >140/90 or <95/50 please call the office  - Follow up in 3 months Dr. Bernadette Lepe

## 2021-12-02 NOTE — TELEPHONE ENCOUNTER
----- Message from MARLENE Belcher CNP sent at 12/2/2021 12:52 PM EST -----  Hello,      Our mutual patient I saw in the office today. Seems her AF burden has increased, she is having more palpitations and tachycardia. I switched her to diltiazem and discussed ablation. I know she has had recent medication changes and reviewed notes. For now she has decided to continue monitoring and will send me device interrogation to review heart rates in 2 weeks. Just FYI if you feel this is clinically relevant regarding her Grave's disease. Also she had Covid booster this week the day prior to her palpitations RVR.      BERNADETTE Belcher

## 2021-12-02 NOTE — TELEPHONE ENCOUNTER
Spoke with pt to review HST results. Order to be sent to [facility]. F/U scheduled.

## 2021-12-02 NOTE — PROGRESS NOTES
Ciro Allan         : 1950  [address] [facility]    Diagnosis: [x] SATISH (G47.33) [] CSA (G47.31) [] Apnea (G47.30)   Length of Need: [x] 12 Months [] 99 Months [] Other:    Machine (YADIRA!): [] Respironics Dream Station   2   Auto [x] ResMed AirSense     Auto S11 [] Other:     [x]  CPAP () [] Bilevel ()   Mode: [x] Auto [] Spontaneous    Mode: [] Auto [] Spontaneous      P min 6 cmH2O  P max 16 cmH2O      Comfort Settings:   - Ramp Pressure: 4 cmH2O                                        - Ramp time: 15 min                                     -  Flex/EPR - 3 full time                                    - For ResMed Bilevel (TiMax-4 sec   TiMin- 0.2 sec)     Humidifier: [x] Heated ()        [x] Water chamber replacement ()/ 1 per 6 months        Mask:   [x] Nasal () /1 per 3 months [] Full Face () /1 per 3 months   [x] Patient choice -Size and fit mask [] Patient Choice - Size and fit mask   [] Dispense:  [] Dispense:    [x] Headgear () / 1 per 3 months [] Headgear () / 1 per 3 months   [x] Replacement Nasal Cushion ()/2 per month [] Interface Replacement ()/1 per month   [] Replacement Nasal Pillows ()/2 per month         Tubing: [x] Heated ()/1 per 3 months    [] Standard ()/1 per 3 months [] Other:           Filters: [x] Non-disposable ()/1 per 6 months     [x] Ultra-Fine, Disposable ()/2 per month        Miscellaneous: [] Chin Strap ()/ 1 per 6 months [] O2 bleed-in:       LPM   [] Oximetry on CPAP/Bilevel [x]  Other: In office set up [x] Modem: ()         Start Order Date: 21    MEDICAL JUSTIFICATION:  I, the undersigned, certify that the above prescribed supplies are medically necessary for this patients wellbeing. In my opinion, the supplies are both reasonable and necessary in reference to accepted standards of medicalpractice in treatment of this patients condition.     Laurel Nguyen MD      NPI: [id number]       Order Signed Date: 21    Electronically signed by Winston Mata MD on 2021 at 11:16 AM    Ferrel Cranker  1950  [facility] [address]  [phone] (home)   [phone] (mobile)      Insurance Info (confirm with patient if correct):  Payor/Plan Subscr  Sex Relation Sub.  Ins. ID Effective Group Num

## 2021-12-02 NOTE — PROGRESS NOTES
[facility]   Electrophysiology  Elliot Kent, APRN-CNP  Attending EP: Dr. Arpit Rajan   Date: 12/2/2021  I had the privilege of visiting Janis Carty in the office. Chief Complaint:   Chief Complaint   Patient presents with    1 Year Follow Up    Atrial Fibrillation     History of Present Illness: History obtained from patient and medical record. Janis Carty is 73 y.o. female with a past medical history of HTN, DM, thyroid nodule, and atrial fibrillation. She was originally treated for afib in 7/2017 and followed with cardiologist at [facility]. She was dx with uterine CA and underwent chemo. S/p DCCV 8/2017 and 9/2017. Diagnosed with FIGO Stage IA (pT1a(2), N0, M0) serous carcinoma of the endometrium, status post TLH/BSO and five of a planned six cycles of adjuvant chemotherapy completed 08/28/2018. She had episode of syncope while receiving chemo in 7/2018. She had severe retal bleeding 3/26/2019 requiring 4 units PRBCs and her Xarelto was stopped. S/p DCCV 9/2019. Resumed Xarelto 12/2019. Presented to [facility] ER on 6/2021 with complaints of CP and dizziness. Show wore monitor which showed symptomatic pauses, also with bifascicular block and s/p dual chamber PPM 9/29/2021 (Dr. Mariely Troy). Interval history: Today, Janis Carty is being seen for atrial fibrillation and hypertension, and device monitoring. She has 16% PAF burden on PPM.  Episodes are symptomatic with palpitations and fatigue. They occur about 2 times weekly and generally last a few hours to a whole day. Yesterday she had episodes for the majority of the day which effected her ability to perform duties at work. Remains in AF today rate 90's and her symptoms have improved today with lower HR. With regard to medication therapy the patient has been compliant with prescribed regimen. She has tolerated therapy to date.      Assessment:  Paroxysmal Atrial Fibrillation  - ECG today shows atrial fibrillation CVR  - Device interrogation shows 16% AF burden with RVR  - Continue Toprol 50 mg daily   - [id number] score: 4 (age, gender, HTN., DM); [id number] Vasc score and anticoagulation discussed. High risk for stroke and thromboembolism. Anticoagulation is recommended.   ~ On Xarelto 20 mg daily,  Denies s/s of bleeding  - Afib risk factors including age, HTN, obesity, inactivity and SATISH were discussed with patient. Risk factor modification recommended   ~ TSH <0.01 (7/16/2020)     - Treatment options including cardioversion, rate control strategy, antiarrhythmics, anticoagulation and possible ablation were discussed with patient. Risks, benefits and alternative of each treatment options were explained. All questions answered    ~ S/p DCCV 8/2017, 9/2017, and 9/2019    ~ 16% burden with RVR and symptomatic, discussed ablation, medical therapy and she would like to consider ablation                          ~ The risks, benefits and alternatives of the ablation procedure were discussed with the patient. The risks including, but not limited to, the risks of bleeding, infection, radiation exposure, injury to vascular, cardiac and surrounding structures (including pneumothorax), stroke, cardiac perforation, tamponade, need for emergent open heart surgery, need for pacemaker implantation, Injury to the phrenic nerve, injury to the esophagus, myocardial infarction and death were discussed in detail.                                       - I explained the success rate considering possible need for a second procedure is about 60-70% and with addition of anti arrhythmics is about 80%.      -----> The patient opted to think about it and will call if she decides to proceed. Change amlodipine to diltiazem for better rate control and she will send transmission in 2 weeks to see if rates are acceptable. Also she had covid booster on Tuesday and it is possible this has triggered some of her episodes and may improve on its own.  In addition her TSH has been below goal and methimazole recently increased. SSS/Implantable device   - S/p dual chamber PPM (9/29/2021)   - The CIED was interrogated and I reviewed all data    - Device interrogation today shows FREDA 12.6 years, AT/AF 16%, AP 57.9%,  8%  Syncope   - No recurrence  HTN-goal <130/80   - Controlled   - Change amlodipine to diltiazem for rate control   - Encouraged patient to check BP at home, log and bring to office visits  - Discussed lifestyle modifications, weight loss, low sodium diet  Graves Disease   - She has been treated with me methimazole since 3/2019 and she recently had to increase dose, her follow up blood work 11/11 showed TSH 0.02 and no medication changes were made at that time. - Follows with Dr. Kasi Wilson of GI bleeding/Radiation proctitis   - Healed and she was cleared for [facility] by GI   - Candidate for Watchman procedure if recurrent  Plan  - Stop amlodipine   - Start diltiazem 180 mg daily   - Please send device interrogation in 2 weeks  - Call office if symptoms do not improve  - Check your blood pressure at home, if your top number blood pressure is >140/90 or <95/50 please call the office  - Will also update Dr. Rivera Dear    F/U: Follow-up with EP in 3 months [facility]  -Follow up with device clinic as scheduled  -Call [address] at [phone] with any questions    Allergies: Allergies   Allergen Reactions    Chlorthalidone Other (See Comments)     Causes increase in b/p and severe headache    Naproxen Swelling    Lisinopril Rash     Home Medications:  Prior to Visit Medications    Medication Sig Taking?  Authorizing Provider   metoprolol succinate (TOPROL XL) 50 MG extended release tablet TAKE ONE TABLET BY MOUTH DAILY  Princess Elias MD   metoprolol succinate (TOPROL XL) 50 MG extended release tablet Take 1 tablet by mouth daily  Princess Elias MD   hydroCHLOROthiazide (HYDRODIURIL) 25 MG tablet Take 1 tablet by mouth daily  Princess Elias MD   chlorthalidone (HYGROTON) 25 MG tablet Take 1 tablet by mouth daily D/c hydroCHLORothiazide  Tisha Delgado MD   amLODIPine (NORVASC) 10 MG tablet Take 1 tablet by mouth daily  Tisha Delgado MD   rivaroxaban (XARELTO) 20 MG TABS tablet TAKE ONE TABLET BY MOUTH DAILY WITH BREAKFAST  Tisha Delgado MD   melatonin 10 MG CAPS capsule Take 10 mg by mouth nightly  Historical Provider, MD   Multiple Vitamins-Minerals (THERAPEUTIC MULTIVITAMIN-MINERALS) tablet Take 1 tablet by mouth daily  Historical Provider, MD      Past Medical History:  Past Medical History:   Diagnosis Date    Anal bleeding 2019    CT scan clear     Anemia     Arthritis     bilateral knee    Atrial fibrillation ([facility]     cleared by cardiologist 2018, was related to cancer     Colon polyps     Diabetes mellitus ([facility]     pre diabetic diet controlled    Endometrial cancer ([facility]     History of blood transfusion     Hypertension     IFG (impaired fasting glucose)     SATISH (obstructive sleep apnea)     Small bowel obstruction (HCC)     resolved without surgery    Thyroid nodule      Past Surgical History:    has a past surgical history that includes Tubal ligation; Cardioversion; davy and bso (cervix removed); TUNNELED CENTRAL VENOUS CATHETER W/ SUBCUTANEOUS PORT (Right); Colonoscopy (N/A, 3/14/2019); Colonoscopy (3/14/2019); Colonoscopy (N/A, 3/26/2019); sigmoidoscopy (N/A, 9/10/2019); and Cardioversion (09/19/2019). Social History:  Reviewed. reports that she has never smoked. She has never used smokeless tobacco. She reports current alcohol use. She reports that she does not use drugs. Family History:  Reviewed. family history includes Colon Cancer in her maternal grandmother; Hypertension in her mother. Denies family history of sudden cardiac death, arrhythmia, premature CAD    Review of System:  · Constitutional: No weight changes or weakness  · HEENT: No visual changes.  No mouth sores or sore throat. · Cardiovascular: denies chest pain, denies dyspnea on exertion, admits to palpitations or denies loss of consciousness. No cough, hemoptysis, denies pleuritic pain, or phlebitis. denies dizziness. · Respiratory: denies cough or wheezing. · Gastrointestinal: Negative, No blood in stools. · Genitourinary: No hematuria. · Neurological: No focal weakness  · Psychiatric: No confusion, anxiety, or depression. · Hem/Lymph: Denies abnormal bruising or bleeding. Physical Examination:  There were no vitals filed for this visit. Wt Readings from Last 3 Encounters:   10/19/21 211 lb 3.2 oz (95.8 kg)   09/29/21 215 lb (97.5 kg)   09/22/21 210 lb (95.3 kg)      Constitutional: Cooperative and in no apparent distress, and appears well nourished   Skin: Warm and pink; no cyanosis or bruising   HEENT: Symmetric and normocephalic. Conjunctiva pink with clear sclera. Mucus membranes pink and moist.   Respiratory: Respirations symmetric and unlabored. Lungs clear to auscultation bilaterally, no wheezing, rhonchi, or crackles.  Cardiovascular:  Irregular rate and rhythm. S1 & S2 present, positive for murmur. negative elevation of JVP. No peripheral edema.  Musculoskeletal:  No focal weakness.  Neurological/Psych: Awake and orientated to person, place and time. Calm affect, appropriate mood. Pertinent labs, diagnostic, device, and imaging results reviewed as a part of this visit     LABS    CBC:   Lab Results   Component Value Date    WBC 3.9 (L) 09/29/2021    HGB 9.7 (L) 09/29/2021    HCT 29.2 (L) 09/29/2021    MCV 84.9 09/29/2021     09/29/2021     BMP:   Lab Results   Component Value Date    CREATININE 0.7 10/12/2021    BUN 9 10/12/2021     10/12/2021    K 4.4 10/12/2021     10/12/2021    CO2 27 10/12/2021     CrCl cannot be calculated (Unknown ideal weight.).    No results found for: BNP    Thyroid:   Lab Results   Component Value Date    TSH 0.02 (L) 11/11/2021    [id number] 1.43 2021     Lipid Panel:   Lab Results   Component Value Date    CHOL 162 10/12/2021    HDL 71 10/12/2021    TRIG 28 10/12/2021     LFTs:  Lab Results   Component Value Date    ALT 11 10/12/2021    AST 28 10/12/2021    ALKPHOS 90 10/12/2021    BILITOT <0.2 10/12/2021     Coags:   Lab Results   Component Value Date    PROTIME 32.4 (H) 2021    INR 2.76 (H) 2021    APTT 47.0 (H) 2021     EC2021 atrial fibrillation, PVC HR 90, , QTc 456     Echo: 2018  Summary:  Overall left ventricular ejection fraction is estimated to be 50-55%. The left ventricular function is low normal.  The left ventricular wall motion is normal.  The left atrium is mildly dilated. There is mild to moderate mitral regurgitation. Mild pulmonic valvular regurgitation. Moderate tricuspid regurgitation is present. The right atrium is mildly dilated. Trace aortic regurgitation. The estimated right ventricular systolic pressure is consistent with  moderate pulmonary hypertension. Diet & Exercise:   The patient is counseled to follow a low salt diet to assure blood pressure remains controlled for cardiovascular risk factor modification   The patient is counseled to avoid excess caffeine, and energy drinks as this may exacerbated ectopy and arrhythmia   The patient is counseled to lose weight to control cardiovascular risk factors   Exercise program discussed: To improve overall cardiovascular health, the patient is instructed to increase cardiovascular related activities with a goal of 150 min/week of moderate level activity or 10,000 steps per day. Encouraged to perform as much activity as tolerated     I have addressed the patient's cardiac risk factors and adjusted pharmacologic treatment as needed. In addition, I have reinforced the need for patient directed risk factor modification.     I independently reviewed the device check interrogation and ECG    All questions and concerns were addressed with the patient. Alternatives to treatment were discussed. Thank you for allowing to us to participate in the care of [facility].     Marla Beebe, APRN-FIDELINA  [address]   Office: [phone]

## 2021-12-03 ENCOUNTER — HOSPITAL ENCOUNTER (OUTPATIENT)
Age: 71
Discharge: HOME | End: 2021-12-03
Payer: MEDICARE

## 2021-12-03 DIAGNOSIS — E05.00 GRAVES DISEASE: ICD-10-CM

## 2021-12-03 LAB
T3 TOTAL: 2.33 NG/ML (ref 0.8–2)
T4 FREE: 2.7 NG/DL (ref 0.9–1.8)
TSH SERPL DL<=0.05 MIU/L-ACNC: <0.01 UIU/ML (ref 0.27–4.2)

## 2021-12-03 PROCEDURE — 36415 COLL VENOUS BLD VENIPUNCTURE: CPT

## 2021-12-03 PROCEDURE — 93280 PM DEVICE PROGR EVAL DUAL: CPT | Performed by: INTERNAL MEDICINE

## 2021-12-03 PROCEDURE — 84443 ASSAY THYROID STIM HORMONE: CPT

## 2021-12-03 PROCEDURE — 84480 ASSAY TRIIODOTHYRONINE (T3): CPT

## 2021-12-03 PROCEDURE — 84439 ASSAY OF FREE THYROXINE: CPT

## 2021-12-03 NOTE — TELEPHONE ENCOUNTER
PT called back, I gave her the message, she is going to [facility] to have labs done

## 2021-12-03 NOTE — TELEPHONE ENCOUNTER
Called patient and spoke with her. There are 0 openings on the specific day she requested. I told her the first opening is not until January and she said she is unable to do that. Patient requested to be put on a wait list for 12/13 in case someone cancels. I let patient know we would call her if there is a availability that opens up.

## 2021-12-17 ENCOUNTER — TELEPHONE (OUTPATIENT)
Dept: CARDIOLOGY CLINIC | Age: 71
End: 2021-12-17

## 2021-12-17 DIAGNOSIS — I48.19 PERSISTENT ATRIAL FIBRILLATION (HCC): ICD-10-CM

## 2021-12-17 DIAGNOSIS — I10 ESSENTIAL HYPERTENSION: ICD-10-CM

## 2021-12-17 NOTE — TELEPHONE ENCOUNTER
remote check manually sent in. Pt presenting in AF at time of transmission.   27.7% AT/AF burden - pt is on xarelto 20mg 1 tab po bid and toprol xl 1 tab po qd  Vrates appear <100bpm on avg during AT/AF  longest AT/AF episode since last in person check 12/2/2021 was on 12/7/2021 [id number]

## 2021-12-17 NOTE — TELEPHONE ENCOUNTER
Pt called stating she sent over a transmission this morning, and was checking to see if the office received it. Pt BP is 150/80, 150/82 she is a little SOB but not bas, does not have any pain.     Please advise thank you

## 2021-12-18 PROCEDURE — 93296 REM INTERROG EVL PM/IDS: CPT | Performed by: INTERNAL MEDICINE

## 2021-12-18 PROCEDURE — 93294 REM INTERROG EVL PM/LDLS PM: CPT | Performed by: INTERNAL MEDICINE

## 2021-12-20 ENCOUNTER — NURSE ONLY (OUTPATIENT)
Dept: CARDIOLOGY CLINIC | Age: 71
End: 2021-12-20
Payer: MEDICARE

## 2021-12-20 DIAGNOSIS — R00.1 BRADYCARDIA: ICD-10-CM

## 2021-12-20 DIAGNOSIS — I49.5 TACHY-BRADY SYNDROME (HCC): ICD-10-CM

## 2021-12-20 DIAGNOSIS — Z95.0 CARDIAC PACEMAKER IN SITU: ICD-10-CM

## 2021-12-20 DIAGNOSIS — I48.19 PERSISTENT ATRIAL FIBRILLATION (HCC): ICD-10-CM

## 2021-12-20 DIAGNOSIS — I48.0 PAF (PAROXYSMAL ATRIAL FIBRILLATION) (HCC): ICD-10-CM

## 2021-12-20 RX ORDER — METOPROLOL SUCCINATE 50 MG/1
100 TABLET, EXTENDED RELEASE ORAL DAILY
Qty: 180 TABLET | Refills: 0 | Status: SHIPPED | OUTPATIENT
Start: 2021-12-20 | End: 2022-03-07

## 2021-12-20 NOTE — PROGRESS NOTES
We received remote transmission from patient's monitor at home. Transmission shows normal sensing and pacing function. Pt has known AF and remains on Xarelto. EP physician will review. See interrogation under cardiology tab in the [address] [place] [address] [address] field for more details.

## 2021-12-20 NOTE — TELEPHONE ENCOUNTER
Called and discussed. Patient is not interested in ablation right now, she will call if she changes her mind. Increase metoprolol 100 mg daily, new rx sent. Also methimazole was increased by endocrinology after last visit.     BP have been 779'W systolic

## 2022-01-04 ENCOUNTER — PATIENT MESSAGE (OUTPATIENT)
Dept: CARDIOLOGY CLINIC | Age: 72
End: 2022-01-04

## 2022-01-04 RX ORDER — DILTIAZEM HYDROCHLORIDE 180 MG/1
180 CAPSULE, COATED, EXTENDED RELEASE ORAL DAILY
Qty: 90 CAPSULE | Refills: 1 | Status: SHIPPED | OUTPATIENT
Start: 2022-01-04 | End: 2022-03-29 | Stop reason: SDUPTHER

## 2022-01-04 RX ORDER — DILTIAZEM HYDROCHLORIDE 180 MG/1
180 CAPSULE, COATED, EXTENDED RELEASE ORAL DAILY
Qty: 30 CAPSULE | Refills: 0 | Status: SHIPPED | OUTPATIENT
Start: 2022-01-04 | End: 2022-01-04

## 2022-01-04 NOTE — TELEPHONE ENCOUNTER
From: Chao Carrizales  To: Martín Hall  Sent: 1/4/2022 3:43 PM EST  Subject: I have taken the last dose    Hi Araceli Menendez, today I took the last dose of DILTIAZEM 24H 180 MG, do I need a refill or that. Was all I needed. Thanks. Sincerely Chao Carrizales.

## 2022-01-12 ENCOUNTER — HOSPITAL ENCOUNTER (OUTPATIENT)
Age: 72
Discharge: HOME | End: 2022-01-12
Payer: MEDICARE

## 2022-01-12 DIAGNOSIS — E05.00 GRAVES DISEASE: ICD-10-CM

## 2022-01-12 LAB
T3 TOTAL: 2.24 NG/ML (ref 0.8–2)
T4 FREE: 1.7 NG/DL (ref 0.9–1.8)
TSH SERPL DL<=0.05 MIU/L-ACNC: <0.01 UIU/ML (ref 0.27–4.2)

## 2022-01-12 PROCEDURE — 36415 COLL VENOUS BLD VENIPUNCTURE: CPT

## 2022-01-12 PROCEDURE — 84480 ASSAY TRIIODOTHYRONINE (T3): CPT

## 2022-01-12 PROCEDURE — 84443 ASSAY THYROID STIM HORMONE: CPT

## 2022-01-12 PROCEDURE — 84439 ASSAY OF FREE THYROXINE: CPT

## 2022-01-13 ENCOUNTER — OFFICE VISIT (OUTPATIENT)
Dept: INTERNAL MEDICINE CLINIC | Age: 72
End: 2022-01-13
Payer: MEDICARE

## 2022-01-13 VITALS
OXYGEN SATURATION: 99 % | HEIGHT: 69 IN | HEART RATE: 105 BPM | SYSTOLIC BLOOD PRESSURE: 116 MMHG | TEMPERATURE: 97 F | DIASTOLIC BLOOD PRESSURE: 70 MMHG | BODY MASS INDEX: 30.45 KG/M2 | WEIGHT: 205.6 LBS

## 2022-01-13 DIAGNOSIS — I48.0 PAF (PAROXYSMAL ATRIAL FIBRILLATION) (HCC): ICD-10-CM

## 2022-01-13 DIAGNOSIS — G47.00 INSOMNIA, UNSPECIFIED TYPE: ICD-10-CM

## 2022-01-13 DIAGNOSIS — Z00.00 ROUTINE GENERAL MEDICAL EXAMINATION AT A HEALTH CARE FACILITY: ICD-10-CM

## 2022-01-13 DIAGNOSIS — C54.1 ENDOMETRIAL CANCER (HCC): ICD-10-CM

## 2022-01-13 DIAGNOSIS — D64.9 ANEMIA, UNSPECIFIED TYPE: ICD-10-CM

## 2022-01-13 DIAGNOSIS — G62.0 CHEMOTHERAPY-INDUCED NEUROPATHY (HCC): ICD-10-CM

## 2022-01-13 DIAGNOSIS — T45.1X5A CHEMOTHERAPY-INDUCED NEUROPATHY (HCC): ICD-10-CM

## 2022-01-13 DIAGNOSIS — I49.5 TACHY-BRADY SYNDROME (HCC): Primary | ICD-10-CM

## 2022-01-13 PROBLEM — I48.19 PERSISTENT ATRIAL FIBRILLATION (HCC): Status: RESOLVED | Noted: 2018-07-26 | Resolved: 2022-01-13

## 2022-01-13 PROCEDURE — G8399 PT W/DXA RESULTS DOCUMENT: HCPCS | Performed by: INTERNAL MEDICINE

## 2022-01-13 PROCEDURE — 3017F COLORECTAL CA SCREEN DOC REV: CPT | Performed by: INTERNAL MEDICINE

## 2022-01-13 PROCEDURE — G8417 CALC BMI ABV UP PARAM F/U: HCPCS | Performed by: INTERNAL MEDICINE

## 2022-01-13 PROCEDURE — 1090F PRES/ABSN URINE INCON ASSESS: CPT | Performed by: INTERNAL MEDICINE

## 2022-01-13 PROCEDURE — 99213 OFFICE O/P EST LOW 20 MIN: CPT | Performed by: INTERNAL MEDICINE

## 2022-01-13 PROCEDURE — G8484 FLU IMMUNIZE NO ADMIN: HCPCS | Performed by: INTERNAL MEDICINE

## 2022-01-13 PROCEDURE — G8427 DOCREV CUR MEDS BY ELIG CLIN: HCPCS | Performed by: INTERNAL MEDICINE

## 2022-01-13 PROCEDURE — 4040F PNEUMOC VAC/ADMIN/RCVD: CPT | Performed by: INTERNAL MEDICINE

## 2022-01-13 PROCEDURE — G0439 PPPS, SUBSEQ VISIT: HCPCS | Performed by: INTERNAL MEDICINE

## 2022-01-13 PROCEDURE — 1036F TOBACCO NON-USER: CPT | Performed by: INTERNAL MEDICINE

## 2022-01-13 PROCEDURE — 1123F ACP DISCUSS/DSCN MKR DOCD: CPT | Performed by: INTERNAL MEDICINE

## 2022-01-13 ASSESSMENT — LIFESTYLE VARIABLES
HOW OFTEN DO YOU HAVE A DRINK CONTAINING ALCOHOL: 0
AUDIT TOTAL SCORE: INCOMPLETE
HOW OFTEN DO YOU HAVE A DRINK CONTAINING ALCOHOL: 0
AUDIT-C TOTAL SCORE: INCOMPLETE
HOW OFTEN DO YOU HAVE A DRINK CONTAINING ALCOHOL: NEVER

## 2022-01-13 ASSESSMENT — PATIENT HEALTH QUESTIONNAIRE - PHQ9
SUM OF ALL RESPONSES TO PHQ QUESTIONS 1-9: 0
1. LITTLE INTEREST OR PLEASURE IN DOING THINGS: 0
SUM OF ALL RESPONSES TO PHQ9 QUESTIONS 1 & 2: 0
1. LITTLE INTEREST OR PLEASURE IN DOING THINGS: 0
SUM OF ALL RESPONSES TO PHQ QUESTIONS 1-9: 0
2. FEELING DOWN, DEPRESSED OR HOPELESS: 0
SUM OF ALL RESPONSES TO PHQ QUESTIONS 1-9: 0
SUM OF ALL RESPONSES TO PHQ QUESTIONS 1-9: 0
SUM OF ALL RESPONSES TO PHQ9 QUESTIONS 1 & 2: 0
2. FEELING DOWN, DEPRESSED OR HOPELESS: 0
SUM OF ALL RESPONSES TO PHQ QUESTIONS 1-9: 0

## 2022-01-13 NOTE — PATIENT INSTRUCTIONS
Effective  January 10, 2022  Lab services for ALL patients NOT affiliated with this office will be provided by the following locations:  [place]  [address]. [facility], [address] [facility] [address]       Advance Directives: Care Instructions  Overview  An advance directive is a legal way to state your wishes at the end of your life. It tells your family and your doctor what to do if you can't say what you want. There are two main types of advance directives. You can change them any time your wishes change. Living will. This form tells your family and your doctor your wishes about life support and other treatment. The form is also called a declaration. Medical power of . This form lets you name a person to make treatment decisions for you when you can't speak for yourself. This person is called a health care agent (health care proxy, health care surrogate). The form is also called a durable power of  for health care. If you do not have an advance directive, decisions about your medical care may be made by a family member, or by a doctor or a  who doesn't know you. It may help to think of an advance directive as a gift to the people who care for you. If you have one, they won't have to make tough decisions by themselves. Follow-up care is a key part of your treatment and safety. Be sure to make and go to all appointments, and call your doctor if you are having problems. It's also a good idea to know your test results and keep a list of the medicines you take. What should you include in an advance directive? Many states have a unique advance directive form. (It may ask you to address specific issues.) Or you might use a universal form that's approved by many states. If your form doesn't tell you what to address, it may be hard to know what to include in your advance directive.  Use the questions below to help you get started. · Who do you want to make decisions about your medical care if you are not able to? · What life-support measures do you want if you have a serious illness that gets worse over time or can't be cured? · What are you most afraid of that might happen? (Maybe you're afraid of having pain, losing your independence, or being kept alive by machines.)  · Where would you prefer to die? (Your home? A hospital? A nursing home?)  · Do you want to donate your organs when you die? · Do you want certain Religion practices performed before you die? When should you call for help? Be sure to contact your doctor if you have any questions. Where can you learn more? Go to https://InporiapeSecond Chance Staffingeb.Extended Systems. org and sign in to your BonitaSoft account. Enter R264 in the Entrecard box to learn more about \"Advance Directives: Care Instructions. \"     If you do not have an account, please click on the \"Sign Up Now\" link. Current as of: March 17, 2021               Content Version: 13.1  © 1088-9563 Healthwise, FreeMarkets. Care instructions adapted under license by [facility] ([facility]). If you have questions about a medical condition or this instruction, always ask your healthcare professional. [address] any warranty or liability for your use of this information. Body Mass Index: Care Instructions  Your Care Instructions     Body mass index (BMI) can help you see if your weight is raising your risk for health problems. It uses a formula to compare how much you weigh with how tall you are. · A BMI lower than 18.5 is considered underweight. · A BMI between 18.5 and 24.9 is considered healthy. · A BMI between 25 and 29.9 is considered overweight. A BMI of 30 or higher is considered obese. If your BMI is in the normal range, it means that you have a lower risk for weight-related health problems.  If your BMI is in the overweight or obese range, you may be at increased risk for weight-related health problems, such as high blood pressure, heart disease, stroke, arthritis or joint pain, and diabetes. If your BMI is in the underweight range, you may be at increased risk for health problems such as fatigue, lower protection (immunity) against illness, muscle loss, bone loss, hair loss, and hormone problems. BMI is just one measure of your risk for weight-related health problems. You may be at higher risk for health problems if you are not active, you eat an unhealthy diet, or you drink too much alcohol or use tobacco products. Follow-up care is a key part of your treatment and safety. Be sure to make and go to all appointments, and call your doctor if you are having problems. It's also a good idea to know your test results and keep a list of the medicines you take. How can you care for yourself at home? · Practice healthy eating habits. This includes eating plenty of fruits, vegetables, whole grains, lean protein, and low-fat dairy. · If your doctor recommends it, get more exercise. Walking is a good choice. Bit by bit, increase the amount you walk every day. Try for at least 30 minutes on most days of the week. · Do not smoke. Smoking can increase your risk for health problems. If you need help quitting, talk to your doctor about stop-smoking programs and medicines. These can increase your chances of quitting for good. · Limit alcohol to 2 drinks a day for men and 1 drink a day for women. Too much alcohol can cause health problems. If you have a BMI higher than 25  · Your doctor may do other tests to check your risk for weight-related health problems. This may include measuring the distance around your waist. A waist measurement of more than 40 inches in men or 35 inches in women can increase the risk of weight-related health problems. · Talk with your doctor about steps you can take to stay healthy or improve your health.  You may need to make lifestyle changes to lose weight and stay healthy, such as changing your diet and getting regular exercise. If you have a BMI lower than 18.5  · Your doctor may do other tests to check your risk for health problems. · Talk with your doctor about steps you can take to stay healthy or improve your health. You may need to make lifestyle changes to gain or maintain weight and stay healthy, such as getting more healthy foods in your diet and doing exercises to build muscle. Where can you learn more? Go to https://DakwakpepicewDataKraft.Zenogen. org and sign in to your Prime Connections account. Enter S176 in the Rockbot box to learn more about \"Body Mass Index: Care Instructions. \"     If you do not have an account, please click on the \"Sign Up Now\" link. Current as of: March 17, 2021               Content Version: 13.1  © 6768-7567 Healthwise, RentablesÂ®. Care instructions adapted under license by [facility] ([facility]). If you have questions about a medical condition or this instruction, always ask your healthcare professional. [address] any warranty or liability for your use of this information. Learning About Low-Carbohydrate Diets  What is a low-carbohydrate diet? A low-carbohydrate (or \"low-carb\") diet limits foods and drinks that have carbohydrates. This includes grains, fruits, milk and yogurt, and starchy vegetables like potatoes, beans, and corn. It also avoids foods and drinks that have added sugar. Instead, low-carb diets include foods that are high in protein and fat. Why might you follow a low-carb diet? Low-carb diets may be used for a variety of reasons, such as for weight loss. People who have diabetes may use a low-carb diet to help manage their blood sugar levels. What should you do before you start the diet? Talk to your doctor before you try any diet. This is even more important if you have health problems like kidney disease, heart disease, or diabetes.  Your doctor may suggest that you meet with a registered dietitian. A dietitian can help you make an eating plan that works for you. What foods do you eat on a low-carb diet? On a low-carb diet, you choose foods that are high in protein and fat. Examples of these are:  · Meat, poultry, and fish. · Eggs. · Nuts, such as walnuts, pecans, almonds, and peanuts. · Peanut butter and other nut butters. · Tofu. · Avocado. · Allie . · Non-starchy vegetables like broccoli, cauliflower, green beans, mushrooms, peppers, lettuce, and spinach. · Unsweetened non-dairy milks like almond milk and coconut milk. · Cheese, cottage cheese, and cream cheese. Current as of: September 8, 2021               Content Version: 13.1  © 2006-2021 Healthwise, Second Wind. Care instructions adapted under license by [facility] ([facility]). If you have questions about a medical condition or this instruction, always ask your healthcare professional. [place] [address] any warranty or liability for your use of this information. Personalized Preventive Plan for Rosio Bear - 1/13/2022  Medicare offers a range of preventive health benefits. Some of the tests and screenings are paid in full while other may be subject to a deductible, co-insurance, and/or copay. Some of these benefits include a comprehensive review of your medical history including lifestyle, illnesses that may run in your family, and various assessments and screenings as appropriate. After reviewing your medical record and screening and assessments performed today your provider may have ordered immunizations, labs, imaging, and/or referrals for you. A list of these orders (if applicable) as well as your Preventive Care list are included within your After Visit Summary for your review.     Other Preventive Recommendations:    · A preventive eye exam performed by an eye specialist is recommended every 1-2 years to screen for glaucoma; cataracts, macular degeneration, and other eye disorders. · A preventive dental visit is recommended every 6 months. · Try to get at least 150 minutes of exercise per week or 10,000 steps per day on a pedometer . · Order or download the FREE \"Exercise & Physical Activity: Your Everyday Guide\" from [employer]. Call 4-935.705.5867 or search [employer] online. · You need 2497-4750 mg of calcium and 4843-1076 IU of vitamin D per day. It is possible to meet your calcium requirement with diet alone, but a vitamin D supplement is usually necessary to meet this goal.  · When exposed to the sun, use a sunscreen that protects against both UVA and UVB radiation with an SPF of 30 or greater. Reapply every 2 to 3 hours or after sweating, drying off with a towel, or swimming. · Always wear a seat belt when traveling in a car. Always wear a helmet when riding a bicycle or motorcycle.

## 2022-01-13 NOTE — PROGRESS NOTES
Medicare Annual Wellness Visit  Name: Adrienne Kimble Date: 2022   MRN: [id number] Sex: Female   Age: 73 y.o. Ethnicity: Non- / Non    : 1950 Race: Jero Dai / Anca Nam is here for Annual Exam (medicare visit) and Insomnia    Screenings for behavioral, psychosocial and functional/safety risks, and cognitive dysfunction are all negative except as indicated below. These results, as well as other patient data from the [address] form, are documented in Flowsheets linked to this Encounter. Glenys Urena is a 73 y.o. female who complains of insomnia. Onset was several years ago. Patient describes symptoms as early morning awakening and difficulty falling asleep. Patient has found no relief with sleepytime tea. . Associated symptoms include: restless legs. Patient denies anxiety. Symptoms have waxed and waned but are worse overall. Allergies   Allergen Reactions    Chlorthalidone Other (See Comments)     Causes increase in b/p and severe headache    Naproxen Swelling    Lisinopril Rash       Prior to Visit Medications    Medication Sig Taking?  Authorizing Provider   dilTIAZem (DILTIAZEM CD) 180 MG extended release capsule Take 1 capsule by mouth daily Yes MARLENE Haley CNP   metoprolol succinate (TOPROL XL) 50 MG extended release tablet Take 2 tablets by mouth daily Yes MARLENE Haley CNP   methIMAzole (TAPAZOLE) 5 MG tablet  Yes Historical Provider, MD   rivaroxaban (XARELTO) 20 MG TABS tablet TAKE ONE TABLET BY MOUTH DAILY WITH BREAKFAST Yes Edin Patel MD   Multiple Vitamins-Minerals (THERAPEUTIC MULTIVITAMIN-MINERALS) tablet Take 1 tablet by mouth daily Yes Historical Provider, MD   hydroCHLOROthiazide (HYDRODIURIL) 25 MG tablet Take 1 tablet by mouth daily  Edin Patel MD   melatonin 10 MG CAPS capsule Take 10 mg by mouth nightly  Patient not taking: Reported on 2022  Historical Provider, MD Past Medical History:   Diagnosis Date    Anal bleeding 2019    CT scan clear     Anemia     Arthritis     bilateral knee    Atrial fibrillation ([facility]     cleared by cardiologist 2018, was related to cancer     Colon polyps     Diabetes mellitus ([facility]     pre diabetic diet controlled    Endometrial cancer ([facility]     History of blood transfusion     Hypertension     IFG (impaired fasting glucose)     SATISH (obstructive sleep apnea)     Small bowel obstruction ([address]     resolved without surgery    Thyroid nodule        Past Surgical History:   Procedure Laterality Date    CARDIOVERSION      CARDIOVERSION  09/19/2019    COLONOSCOPY N/A 3/14/2019    COLONOSCOPY POLYPECTOMY SNARE/COLD BIOPSY performed by Santiago Castro MD at [facility]  3/14/2019    COLONOSCOPY CONTROL HEMORRHAGE performed by Santiago Castro MD at [facility] N/A 3/26/2019    COLONOSCOPY CONTROL HEMORRHAGE performed by Santiago Castro MD at [facility] N/A 9/10/2019    SIGMOIDOSCOPY DIAGNOSTIC FLEXIBLE performed by Santiago Castro MD at [address]      TUBAL LIGATION      TUNNELED CENTRAL VENOUS CATHETER W/ SUBCUTANEOUS PORT Right        Family History   Problem Relation Age of Onset    Hypertension Mother     Colon Cancer Maternal Grandmother        CareTeam (Including outside providers/suppliers regularly involved in providing care):   Patient Care Team:  Kelli Garcia MD as PCP - General  Kelli Garcia MD as PCP - [facility]    Wt Readings from Last 3 Encounters:   01/13/22 205 lb 9.6 oz (93.3 kg)   12/02/21 209 lb 9.6 oz (95.1 kg)   10/19/21 211 lb 3.2 oz (95.8 kg)     Vitals:    01/13/22 1308   BP: 116/70   Site: Left Upper Arm   Position: Sitting   Pulse: 105   Temp: 97 °F (36.1 °C)   TempSrc: Temporal   SpO2: 99%   Weight: 205 lb 9.6 oz (93.3 kg)   Height: 5' 9\" (1.753 m)     Body mass index is 30.36 kg/m². Based upon direct observation of the patient, evaluation of cognition reveals recent and remote memory intact. General Appearance: alert and oriented to person, place and time, well developed and well- nourished, in no acute distress  Skin: warm and dry, no rash or erythema  Head: normocephalic and atraumatic  Eyes: pupils equal, round, and reactive to light, extraocular eye movements intact, conjunctivae normal  ENT: tympanic membrane, external ear and ear canal normal bilaterally, nose without deformity, nasal mucosa and turbinates normal without polyps  Neck: supple and non-tender without mass, no thyromegaly or thyroid nodules, no cervical lymphadenopathy  Pulmonary/Chest: clear to auscultation bilaterally- no wheezes, rales or rhonchi, normal air movement, no respiratory distress  Cardiovascular: normal rate, regular rhythm, normal S1 and S2, no murmurs, rubs, clicks, or gallops, distal pulses intact, no carotid bruits  Abdomen: soft, non-tender, non-distended, normal bowel sounds, no masses or organomegaly  Extremities: no cyanosis, clubbing or edema  Musculoskeletal: normal range of motion, no joint swelling, deformity or tenderness  Neurologic: reflexes normal and symmetric, no cranial nerve deficit, gait, coordination and speech normal    Patient's complete Health Risk Assessment and screening values have been reviewed and are found in Flowsheets. The following problems were reviewed today and where indicated follow up appointments were made and/or referrals ordered.       Positive Risk Factor Screenings with Interventions:     Fall Risk:  2 or more falls in past year?: (!) yes  Fall with injury in past year?: no  Fall Risk Interventions:    · Home safety tips provided  · Home exercises provided to promote strength and balance          General Health and ACP:  General  In general, how would you say your health is?: Fair  In the past 7 days, have you experienced any of the following?  New or Increased Pain, New or Increased Fatigue, Loneliness, Social Isolation, Stress or Anger?: (!) Stress  Do you get the social and emotional support that you need?: (!) No  Do you have a Living Will?: Yes  Advance Directives     Power of Dennis Taylor Will ACP-Advance Directive ACP-Power of     Not on File Not on File Not on File Not on File      General Health Risk Interventions:  · Stress: patient's comments regarding reasons for stress: she has a daughter with mental illness  · No Living Will: Advance Care Planning addressed with patient today    Health Habits/Nutrition:  Health Habits/Nutrition  Do you exercise for at least 20 minutes 2-3 times per week?: (!) No  Have you lost any weight without trying in the past 3 months?: (!) Yes  Do you eat only one meal per day?: No  Have you seen the dentist within the past year?: (!) No  Body mass index: (!) 30.36  Health Habits/Nutrition Interventions:  · Inadequate physical activity:  educational materials provided to promote increased physical activity  · Nutritional issues:  educational materials for healthy, well-balanced diet provided    Hearing/Vision:  No exam data present  Hearing/Vision  Do you or your family notice any trouble with your hearing that hasn't been managed with hearing aids?: No  Do you have difficulty driving, watching TV, or doing any of your daily activities because of your eyesight?: No  Have you had an eye exam within the past year?: (!) No  Hearing/Vision Interventions:  · Vision concerns:  patient encouraged to make appointment with his/her eye specialist    Safety:  Safety  Do you have working smoke detectors?: Yes  Have all throw rugs been removed or fastened?: (!) No  Do you have non-slip mats or surfaces in all bathtubs/showers?: Yes  Do all of your stairways have a railing or banister?: Yes  Are your doorways, halls and stairs free of clutter?: Yes  Do you always fasten your seatbelt when you are in a car?: Yes  Safety Interventions:  · Home safety tips provided       Personalized Preventive Plan   Current Health Maintenance Status  Immunization History   Administered Date(s) Administered    COVID-19, Yonathan Reyes, PF, 30mcg/0.3mL 02/25/2021, 03/25/2021, 11/30/2021    Influenza Vaccine, unspecified formulation 10/30/2014, 10/22/2015, 12/01/2016    Influenza, High Dose (Fluzone 65 yrs and older) 09/23/2017, 09/27/2018    Influenza, Quadv, adjuvanted, 65 yrs +, IM, PF (Fluad) 11/14/2020, 09/22/2021    Influenza, Triv, inactivated, subunit, adjuvanted, IM (Fluad 65 yrs and older) 11/19/2019    Pneumococcal Conjugate 13-valent ([id number]) 02/05/2018    Pneumococcal Polysaccharide ([id number]) 05/14/2019    Tdap (Boostrix, Adacel) 02/05/2008, 07/18/2017    Zoster Live (Zostavax) 10/30/2014    Zoster Recombinant (Shingrix) 01/14/2021, 04/17/2021        Health Maintenance   Topic Date Due    Annual Wellness Visit (AWV)  01/09/2022    A1C test (Diabetic or Prediabetic)  07/09/2022    Potassium monitoring  10/12/2022    Creatinine monitoring  10/12/2022    Breast cancer screen  11/19/2022    Depression Screen  01/13/2023    Lipid screen  10/12/2026    DTaP/Tdap/Td vaccine (3 - Td or Tdap) 07/18/2027    Colon cancer screen colonoscopy  03/26/2029    DEXA (modify frequency per FRAX score)  Completed    Flu vaccine  Completed    Shingles Vaccine  Completed    Pneumococcal 65+ years Vaccine  Completed    COVID-19 Vaccine  Completed    Hepatitis C screen  Completed    Hepatitis A vaccine  Aged Out    Hepatitis B vaccine  Aged Out    Hib vaccine  Aged Out    Meningococcal (ACWY) vaccine  Aged Out     Recommendations for [facility] Due: see orders and patient instructions/AVS.  . Recommended screening schedule for the next 5-10 years is provided to the patient in written form: see Patient Lacie Leger was seen today for annual exam and insomnia.     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[facility]  - diiscused neuropathy, patient prefers no additional medications    PAF (paroxysmal atrial fibrillation) ([facility]  - followed by cardioloty on NOAC    Endometrial cancer ([facility]  - followed by annual gynecology visits, s/p hysterectomy  Routine general medical examination at a health care facility    Insomnia, unspecified type  -     Ambulatory referral to Psychology    Anemia, unspecified type  -     Iron and TIBC; Future  -     Ferritin; Future  -     Amb External Referral To Gastroenterology  -     CBC WITH AUTO DIFFERENTIAL; Future  -     BLOOD OCCULT STOOL SCREEN #1; Future  -     BLOOD OCCULT STOOL SCREEN #2; Future               Advance Care Planning   Advanced Care Planning: Discussed the patients choices for care and treatment in case of a health event that adversely affects decision-making abilities. Also discussed the patients long-term treatment options. Reviewed with the patient the [address] [place] [address] Declaration forms  Reviewed the process of designating a competent adult as an Agent (or -in-fact) that could take make health care decisions for the patient if incompetent. Patient was asked to complete the declaration forms, either acknowledge the forms by a public notary or an eligible witness and provide a signed copy to the practice office. Time spent (minutes): 5     Obesity Counseling: Assessed behavioral health risks and factors affecting choice of behavior. Suggested weight control approaches, including dietary changes behavioral modification and follow up plan. Provided educational and support documentation.   Time spent (minutes): 5

## 2022-01-18 DIAGNOSIS — E05.00 GRAVES DISEASE: Primary | ICD-10-CM

## 2022-01-19 ENCOUNTER — HOSPITAL ENCOUNTER (OUTPATIENT)
Age: 72
Discharge: HOME | End: 2022-01-19

## 2022-01-19 PROCEDURE — 82270 OCCULT BLOOD FECES: CPT

## 2022-01-20 ENCOUNTER — HOSPITAL ENCOUNTER (OUTPATIENT)
Age: 72
Discharge: HOME | End: 2022-01-20
Payer: MEDICARE

## 2022-01-20 DIAGNOSIS — D64.9 ANEMIA, UNSPECIFIED TYPE: ICD-10-CM

## 2022-01-20 LAB
BASOPHILS ABSOLUTE: 0 K/UL (ref 0–0.2)
BASOPHILS RELATIVE PERCENT: 1 %
EOSINOPHILS ABSOLUTE: 0.1 K/UL (ref 0–0.6)
EOSINOPHILS RELATIVE PERCENT: 2.2 %
FERRITIN: 31.2 NG/ML (ref 15–150)
HCT VFR BLD CALC: 30.1 % (ref 36–48)
HEMOCCULT SP2 STL QL: NORMAL
HEMOGLOBIN: 9.3 G/DL (ref 12–16)
IRON SATURATION: 7 % (ref 15–50)
IRON: 24 UG/DL (ref 37–145)
LYMPHOCYTES ABSOLUTE: 1.1 K/UL (ref 1–5.1)
LYMPHOCYTES RELATIVE PERCENT: 40.2 %
MCH RBC QN AUTO: 22.4 PG (ref 26–34)
MCHC RBC AUTO-ENTMCNC: 31 G/DL (ref 31–36)
MCV RBC AUTO: 72.2 FL (ref 80–100)
MONOCYTES ABSOLUTE: 0.3 K/UL (ref 0–1.3)
MONOCYTES RELATIVE PERCENT: 10.7 %
NEUTROPHILS ABSOLUTE: 1.2 K/UL (ref 1.7–7.7)
NEUTROPHILS RELATIVE PERCENT: 45.9 %
OCCULT BLOOD SCREENING: NORMAL
PDW BLD-RTO: 19.6 % (ref 12.4–15.4)
PLATELET # BLD: 223 K/UL (ref 135–450)
PMV BLD AUTO: 9.3 FL (ref 5–10.5)
RBC # BLD: 4.17 M/UL (ref 4–5.2)
TOTAL IRON BINDING CAPACITY: 356 UG/DL (ref 260–445)
WBC # BLD: 2.7 K/UL (ref 4–11)

## 2022-01-20 PROCEDURE — 82728 ASSAY OF FERRITIN: CPT

## 2022-01-20 PROCEDURE — 85025 COMPLETE CBC W/AUTO DIFF WBC: CPT

## 2022-01-20 PROCEDURE — 36415 COLL VENOUS BLD VENIPUNCTURE: CPT

## 2022-01-20 PROCEDURE — 83550 IRON BINDING TEST: CPT

## 2022-01-20 PROCEDURE — 83540 ASSAY OF IRON: CPT

## 2022-02-01 ENCOUNTER — TELEPHONE (OUTPATIENT)
Dept: INTERNAL MEDICINE CLINIC | Age: 72
End: 2022-02-01

## 2022-02-01 NOTE — TELEPHONE ENCOUNTER
House Call nursing calling to let you know that her A1c was 5.7 this morning however the test she did that tests the circulation in her legs for PVD the test were moderate. She did state that she is doing well besides that and that a summary will be faxed over as well.

## 2022-02-09 ENCOUNTER — HOSPITAL ENCOUNTER (OUTPATIENT)
Age: 72
Discharge: HOME | End: 2022-02-09
Payer: MEDICARE

## 2022-02-09 ENCOUNTER — TELEPHONE (OUTPATIENT)
Dept: ENDOCRINOLOGY | Age: 72
End: 2022-02-09

## 2022-02-09 DIAGNOSIS — E05.00 GRAVES DISEASE: ICD-10-CM

## 2022-02-09 LAB
T3 TOTAL: 1.81 NG/ML (ref 0.8–2)
T4 FREE: 1.3 NG/DL (ref 0.9–1.8)
TSH SERPL DL<=0.05 MIU/L-ACNC: <0.01 UIU/ML (ref 0.27–4.2)

## 2022-02-09 PROCEDURE — 36415 COLL VENOUS BLD VENIPUNCTURE: CPT

## 2022-02-09 PROCEDURE — 84443 ASSAY THYROID STIM HORMONE: CPT

## 2022-02-09 PROCEDURE — 84439 ASSAY OF FREE THYROXINE: CPT

## 2022-02-09 PROCEDURE — 84480 ASSAY TRIIODOTHYRONINE (T3): CPT

## 2022-02-09 RX ORDER — IRON POLYSACCHARIDE COMPLEX 150 MG
150 CAPSULE ORAL 2 TIMES DAILY
Qty: 180 CAPSULE | Refills: 1 | Status: SHIPPED | OUTPATIENT
Start: 2022-02-09 | End: 2022-03-29 | Stop reason: SDUPTHER

## 2022-02-09 NOTE — TELEPHONE ENCOUNTER
The was called to made aware of the MD recommendations on 1/18/2022     The pt stated that she is taking the medication as directed 10mg daily      The pt has no missed a dose since her last blood work was completed      The pt wanted to make sure is is to increase the medication to 15mg or 20mg      Will send to the MD to review     Script was sent in on 2/7/2022 for the 5 mg daily

## 2022-02-09 NOTE — TELEPHONE ENCOUNTER
Pt phoned she is needing a new rx for Tapazole 5 mg it was increased to 3 daily and the pharmacy told her she needs to have a new script sent in.

## 2022-02-10 RX ORDER — METHIMAZOLE 5 MG/1
15 TABLET ORAL DAILY
Qty: 90 TABLET | Refills: 3 | Status: SHIPPED | OUTPATIENT
Start: 2022-02-10 | End: 2022-03-29 | Stop reason: SDUPTHER

## 2022-02-10 NOTE — TELEPHONE ENCOUNTER
Left VM for patient to call office back. Please transfer to [facility] when patient calls back.

## 2022-02-10 NOTE — TELEPHONE ENCOUNTER
Spoke to patient and she is currently taking Methimazole 15 mg daily. She had repeat bloodwork done yesterday.

## 2022-02-10 NOTE — TELEPHONE ENCOUNTER
Please advise patient her thyroid function test seems to have improved at least her free thyroid hormone levels are now in the normal range TSH still appears abnormal.  I would recommend continuing with 15 mg of Tapazole daily.

## 2022-02-15 ENCOUNTER — OFFICE VISIT (OUTPATIENT)
Dept: PSYCHIATRY | Age: 72
End: 2022-02-15
Payer: MEDICARE

## 2022-02-15 VITALS
TEMPERATURE: 97.3 F | HEART RATE: 88 BPM | SYSTOLIC BLOOD PRESSURE: 138 MMHG | OXYGEN SATURATION: 98 % | BODY MASS INDEX: 30.72 KG/M2 | DIASTOLIC BLOOD PRESSURE: 80 MMHG | WEIGHT: 208 LBS

## 2022-02-15 DIAGNOSIS — G47.00 INSOMNIA, UNSPECIFIED TYPE: Primary | ICD-10-CM

## 2022-02-15 PROCEDURE — 1090F PRES/ABSN URINE INCON ASSESS: CPT | Performed by: PSYCHIATRY & NEUROLOGY

## 2022-02-15 PROCEDURE — G8427 DOCREV CUR MEDS BY ELIG CLIN: HCPCS | Performed by: PSYCHIATRY & NEUROLOGY

## 2022-02-15 PROCEDURE — 3017F COLORECTAL CA SCREEN DOC REV: CPT | Performed by: PSYCHIATRY & NEUROLOGY

## 2022-02-15 PROCEDURE — G8417 CALC BMI ABV UP PARAM F/U: HCPCS | Performed by: PSYCHIATRY & NEUROLOGY

## 2022-02-15 PROCEDURE — G8399 PT W/DXA RESULTS DOCUMENT: HCPCS | Performed by: PSYCHIATRY & NEUROLOGY

## 2022-02-15 PROCEDURE — 4040F PNEUMOC VAC/ADMIN/RCVD: CPT | Performed by: PSYCHIATRY & NEUROLOGY

## 2022-02-15 PROCEDURE — 99204 OFFICE O/P NEW MOD 45 MIN: CPT | Performed by: PSYCHIATRY & NEUROLOGY

## 2022-02-15 PROCEDURE — G8484 FLU IMMUNIZE NO ADMIN: HCPCS | Performed by: PSYCHIATRY & NEUROLOGY

## 2022-02-15 PROCEDURE — 1123F ACP DISCUSS/DSCN MKR DOCD: CPT | Performed by: PSYCHIATRY & NEUROLOGY

## 2022-02-15 PROCEDURE — 1036F TOBACCO NON-USER: CPT | Performed by: PSYCHIATRY & NEUROLOGY
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
## 2022-03-07 DIAGNOSIS — I48.19 PERSISTENT ATRIAL FIBRILLATION (HCC): ICD-10-CM

## 2022-03-07 DIAGNOSIS — I10 ESSENTIAL HYPERTENSION: ICD-10-CM

## 2022-03-07 RX ORDER — METOPROLOL SUCCINATE 50 MG/1
TABLET, EXTENDED RELEASE ORAL
Qty: 90 TABLET | Refills: 0 | Status: SHIPPED | OUTPATIENT
Start: 2022-03-07 | End: 2022-03-08

## 2022-03-07 NOTE — PROGRESS NOTES
[address]   Electrophysiology Follow up   Date: 3/8/2022  I had the privilege of visiting Luna Dill in the office. CC: Atrial fibrillation    HPI: Luna Dill is a 73 y.o. female  with PMH of HTN is being referred by PCP, Dr. Miranda Treviño, for evaluation of Afib. She was originally diagnosed with Afib in 7/2017, followed by cardiologist at [facility].       Afib diagnosed in 7/2017. She was later diagnosed with uterine cancer and underwent surgery, chemotherapy.     She underwent DCCVs on 8/2017 and 9/2017.       Diagnosed with FIGO Stage IA (pT1a(2), N0, M0) serous carcinoma of the endometrium, status post TLH/BSO and five of a planned six cycles of adjuvant chemotherapy completed 08/28/2018. Her final treatment for radiation is today and completed with chemotherapy. She had episode of syncope while receiving chemo in 7/2018.        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 She received 4 units RBCs and discharged home without anticoagulation. Xarelto was stopped.      S/p DCCV 9/19/19     Xarelto 20 mg restarted 12/10/19      Presented to [facility] ER on 6/2021 with complaints of CP and dizziness. Show wore monitor which showed symptomatic pauses, also with bifascicular block. S/p dual chamber PPM 9/29/2021    Taylor Ramos presents today in follow up. She is doing well. She is following with oncology every 6 months. Her 46 y/o daughter lives with her and causes her stress. She works part time at [employer]. She has had episodes of palpitations. Assessment and plan:     Paroxysmal Atrial Fibrillation    EKG today Atrial paced    Afib [place] on device interrogation today : 32% worsening, also has episodes of RVR.       Increase Toprol  mg    daily, Cardizem 180 mg daily  ( started 12/02/2021 for rate control )     S/p DCCV 8/2017, 9/2017, and 9/2019  - [id number] score: 4 (age, gender, HTN., DM); [id number] Vasc score and anticoagulation discussed. High risk for stroke and thromboembolism. Anticoagulation is recommended.   ~ On Xarelto 20 mg daily,  Denies s/s of bleeding  ~ Creatinine Clearance of 113 ml/min based on creatinine of 0.7 10/12/2021      - Afib risk factors including age, HTN, obesity, inactivity and SATISH were discussed with patient. Risk factor modification recommended. - TSH 02/09/2021 < 0.01- on tapazole 15 mg daily    - she is not a candidate for amiodarone due to her thyroid issues. We discussed treatment options including antiarrhythmics, cardioversion, rate control with anticoagulation, and ablation in detail with patient. We discussed progressive nature of atrial fibrillation     Atrial fibrillation ablation procedure has been discussed in detail with patient. Risk, benefit, alternative, and rationale of the procedure has been discussed with the patient. I have explained that ablation therapy is symptoms driven and alternative such as anticoagulation and rate control can be considered. We discussed the need for repeat procedure. On average patients need more than one ablation procedure        Risks associated with procedure include but not limited to allergic reaction to the medications, pain, bleeding, infection, nerve injury, injury to diaphragm(breathing muscle), pulmonary embolus(blood clot in lungs), deep vein blood clot, pneumothorax, hemothorax, acute renal failure, cardiac perforation,  tamponade, need for emergent surgery (open heart), permanent pacemaker, pulmonary vein stenosis, left atrial to esophageal fistula, stroke, myocardial infarction and death. Difference between atrial fibrillation and atrial flutter discussed and treatment discussed. A detailed educational information about ablation has been provided and patient has been encouraged to review information and call back with any questions about risks, benefits and alternative of procedure.      Patient verbalized understanding of procedure, risks and benefits and wishes to proceed with ablation. SSS/ Implantable device    - s/p dual chamber pacemaker 09/29/2021   - The CIED was interrogated and programmed and I supervised and reviewed all the data. All findings and changes are in device interrogation sheet and reflect my personal interpretation and changes and is scanned to Epic.   12.7 years remaining, AP  53.7% ,  34.7 % 32% atrial fibrillation burden, last on 03/06/2022, longest 64 hours, NSVT episode noted on 01/02/2022 lasting 6-7 seconds , 20 beats     HTN  - borderline 135/80   -BP goal <130/80  -Home BP monitoring encouraged, printed information provided on how to accurately measure BP at home.  -Counseled to follow a low salt diet to assure blood pressure remains controlled for cardiovascular risk factor modification.   -The patient is counseled to get regular exercise 3-5 times per week and maintain a healthy weight reduce cardiovascular risk factors.    - continue Cardizem, Toprol,  Hydrodiuril         Syncope   No recurrence    S/p pacemaker      Graves Disease              - She has been treated with me methimazole since 3/2019 and she recently had to increase dose, her follow up blood work 10/2021  showed TSH < 0.01   - Tapazole 15 mg               - Follows with Dr. 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    GIB (gastrointestinal bleeding) 03/26/2019    Near syncope 07/17/2018    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[address] 06/19/2018    Partial small bowel obstruction ([facility] 06/08/2018    On antineoplastic chemotherapy 06/07/2018    Abdominal pain, generalized 06/07/2018    Endometrial cancer ([facility] 03/27/2018    Alkaline phosphatase elevation 03/08/2018    Prediabetes 03/08/2018    Chronic anticoagulation 10/13/2017    Anemia 07/20/2017    Abnormal liver enzymes 07/20/2017    Essential hypertension 07/06/2016     Diagnostic studies:   ECG 3/8/22  Atrial paced   465  QTcH,  136 QRS     MCOT 6/22-7/6/21  NSR [address]hr 61  AF burden 14%, long pauses up to 5 sec  PAC's 4%     ECHO 6/19/21  Normal LV systolic function with an estimated EF of 55-60%.   No regional wall motion abnormalities are seen.   Grade II diastolic dysfunction with elevated LV filling pressures.   The right ventricle is mildly enlarged.   Right ventricular systolic function is normal.   There is bi-atrial enlargement.   The mitral valve leaflets are slightly thickened with normal leaflet mobility.   Moderate mitral regurgitation.   Trivial aortic regurgitation.   Mild tricuspid regurgitation with a PASP of 38 mmHg.   Mild pulmonic regurgitation.       Echo: 7/18/18 ([facility])  Summary:  Overall left ventricular ejection fraction is estimated to be 50-55%. The left ventricular function is low normal.  The left ventricular wall motion is normal.  The left atrium is mildly dilated. There is mild to moderate mitral regurgitation. Mild pulmonic valvular regurgitation. Moderate tricuspid regurgitation is present. The right atrium is mildly dilated. Trace aortic regurgitation. The estimated right ventricular systolic pressure is consistent with  moderate pulmonary hypertension.       I independently reviewed the cardiac diagnostic studies, ECG and relevant imaging studies.      Lab Results   Component Value Date    LVEF 58 06/19/2021     Lab Results   Component Value Date    TSH <0.01 (L) 02/09/2022         Physical Examination:  Vitals:    03/08/22 1306   BP: 135/80   Pulse: 70   SpO2: 99%      Wt Readings from Last 3 Encounters:   03/08/22 208 lb 12.8 oz (94.7 kg)   02/15/22 208 lb (94.3 kg)   01/13/22 205 lb 9.6 oz (93.3 kg)       · Constitutional: Oriented. No distress. · Head: Normocephalic and atraumatic. · Mouth/Throat: Oropharynx is clear and moist.   · Eyes: Conjunctivae normal. EOM are normal.   · Neck: Neck supple. No JVD present. · Cardiovascular: Normal rate, regular rhythm, O1&X8.  + systolic murmur   · Pulmonary/Chest: Bilateral respiratory sounds. No rhonchi. · Abdominal: Soft. No tenderness. · Musculoskeletal: No tenderness. No edema    · Lymphadenopathy: Has no cervical adenopathy. · Neurological: Alert and oriented. Follows command, No Gross deficit   · Skin: Skin is warm, No rash noted. · Psychiatric: Has a normal behavior          Review of System:  [x] Full ROS obtained and negative except as mentioned in HPI    Prior to Admission medications    Medication Sig Start Date End Date Taking?  Authorizing Provider   metoprolol succinate (TOPROL XL) 50 MG extended release tablet Take 2 tablets by mouth 2 times daily 3/8/22  Yes Angelica Chapman MD   methIMAzole (TAPAZOLE) 5 MG tablet Take 3 tablets by mouth daily 2/10/22  Yes Vinh Cui MD   iron polysaccharides (NIFEREX) 150 MG capsule Take 1 capsule by mouth 2 times daily 2/9/22 5/10/22 Yes Imelda Payne MD   dilTIAZem (DILTIAZEM CD) 180 MG extended release capsule Take 1 capsule by mouth daily 1/4/22  Yes Charlene Morris, APRN - CNP   rivaroxaban (XARELTO) 20 MG TABS tablet TAKE ONE TABLET BY MOUTH DAILY WITH BREAKFAST 7/9/21  Yes Imelda Payne MD   hydroCHLOROthiazide (HYDRODIURIL) 25 MG tablet Take 1 tablet by mouth daily 11/24/21 12/24/21  Imelda Payne MD   Multiple Vitamins-Minerals (THERAPEUTIC MULTIVITAMIN-MINERALS) tablet Take 1 tablet by mouth daily    Historical Provider, MD       Allergies   Allergen Reactions    Chlorthalidone Other (See Comments) Causes increase in b/p and severe headache    Naproxen Swelling    Lisinopril Rash       Social History:  Reviewed. reports that she has never smoked. She has never used smokeless tobacco. She reports current alcohol use. She reports that she does not use drugs. Family History:  Reviewed. Reviewed. No family history of SCD. Relevant and available labs, and cardiovascular diagnostics reviewed. Reviewed. I independently reviewed relevant and available cardiac diagnostic tests ECG, CXR, Echo, Stress test, Device interrogation, Holter, CT scan. Complex medical condition with multiple medical problems affecting prognosis and outcome of EP interventions    All questions and concerns were addressed to the patient/family. Alternatives to my treatment were discussed. I have discussed the above stated plan and the patient verbalized understanding and agreed with the plan. NOTE: This report was transcribed using voice recognition software. Every effort was made to ensure accuracy, however, inadvertent computerized transcription errors may be present. Tim Whitaker MD, MPH  [facility]   Office: [phone]  Fax: [phone] attestation: This note was scribed in the presence of Tim Whitaker MD by Sinai Salinas RN  Physician Attestation: I, Dr. Tim Whitaker, confirm that the scribe's documentation has been prepared under my direction and personally reviewed by me in its entirety. I also confirm that the note above accurately reflects all work, treatment, procedures, and medical decision making performed by me. Spent 45 minutes reviewing chart, echocardiogram, prior diagnostic tests, discussing Afib, treatment options, ablation, risks, benefits and alternatives.

## 2022-03-08 ENCOUNTER — NURSE ONLY (OUTPATIENT)
Dept: CARDIOLOGY CLINIC | Age: 72
End: 2022-03-08
Payer: MEDICARE

## 2022-03-08 ENCOUNTER — OFFICE VISIT (OUTPATIENT)
Dept: CARDIOLOGY CLINIC | Age: 72
End: 2022-03-08
Payer: MEDICARE

## 2022-03-08 VITALS
HEIGHT: 68 IN | HEART RATE: 70 BPM | OXYGEN SATURATION: 99 % | DIASTOLIC BLOOD PRESSURE: 80 MMHG | BODY MASS INDEX: 31.64 KG/M2 | SYSTOLIC BLOOD PRESSURE: 135 MMHG | WEIGHT: 208.8 LBS

## 2022-03-08 DIAGNOSIS — Z95.0 CARDIAC PACEMAKER IN SITU: ICD-10-CM

## 2022-03-08 DIAGNOSIS — I49.5 TACHY-BRADY SYNDROME (HCC): ICD-10-CM

## 2022-03-08 DIAGNOSIS — E05.00 GRAVES DISEASE: ICD-10-CM

## 2022-03-08 DIAGNOSIS — I48.0 PAF (PAROXYSMAL ATRIAL FIBRILLATION) (HCC): ICD-10-CM

## 2022-03-08 DIAGNOSIS — I10 ESSENTIAL HYPERTENSION: Primary | Chronic | ICD-10-CM

## 2022-03-08 DIAGNOSIS — I48.0 PAF (PAROXYSMAL ATRIAL FIBRILLATION) (HCC): Chronic | ICD-10-CM

## 2022-03-08 DIAGNOSIS — I48.19 PERSISTENT ATRIAL FIBRILLATION (HCC): ICD-10-CM

## 2022-03-08 DIAGNOSIS — K62.5 GASTROINTESTINAL HEMORRHAGE ASSOCIATED WITH ANORECTAL SOURCE: ICD-10-CM

## 2022-03-08 DIAGNOSIS — R00.1 BRADYCARDIA: ICD-10-CM

## 2022-03-08 PROCEDURE — G8484 FLU IMMUNIZE NO ADMIN: HCPCS | Performed by: INTERNAL MEDICINE

## 2022-03-08 PROCEDURE — G8417 CALC BMI ABV UP PARAM F/U: HCPCS | Performed by: INTERNAL MEDICINE

## 2022-03-08 PROCEDURE — 3017F COLORECTAL CA SCREEN DOC REV: CPT | Performed by: INTERNAL MEDICINE

## 2022-03-08 PROCEDURE — 4040F PNEUMOC VAC/ADMIN/RCVD: CPT | Performed by: INTERNAL MEDICINE

## 2022-03-08 PROCEDURE — 93000 ELECTROCARDIOGRAM COMPLETE: CPT | Performed by: INTERNAL MEDICINE

## 2022-03-08 PROCEDURE — G8427 DOCREV CUR MEDS BY ELIG CLIN: HCPCS | Performed by: INTERNAL MEDICINE

## 2022-03-08 PROCEDURE — 1123F ACP DISCUSS/DSCN MKR DOCD: CPT | Performed by: INTERNAL MEDICINE

## 2022-03-08 PROCEDURE — G8399 PT W/DXA RESULTS DOCUMENT: HCPCS | Performed by: INTERNAL MEDICINE

## 2022-03-08 PROCEDURE — 1090F PRES/ABSN URINE INCON ASSESS: CPT | Performed by: INTERNAL MEDICINE

## 2022-03-08 PROCEDURE — 1036F TOBACCO NON-USER: CPT | Performed by: INTERNAL MEDICINE

## 2022-03-08 PROCEDURE — 99215 OFFICE O/P EST HI 40 MIN: CPT | Performed by: INTERNAL MEDICINE

## 2022-03-08 RX ORDER — METOPROLOL SUCCINATE 50 MG/1
100 TABLET, EXTENDED RELEASE ORAL 2 TIMES DAILY
Qty: 180 TABLET | Refills: 3 | Status: SHIPPED | OUTPATIENT
Start: 2022-03-08 | End: 2022-03-29 | Stop reason: SDUPTHER

## 2022-03-08 NOTE — PROGRESS NOTES
Patient comes in for programming evaluation for her pacemaker . All sensing and pacing parameters are within normal range. Battery life 12.7 years   AP 53.7%.  24.7%. 1 NSVT episode noted on 1/2/2022 lasting 6-7 seconds-20 beats. AT/AF with a 32.0% burden. Last on 3/6/2022, longest 64 1/2 hours. · AT/AF >= 6 hr for 42 days. Patient remains on Xarelto. Will star metoprolol today. Today we lowered the VT monitored Zone to 140 bpm from 150 bpm.   Please see interrogation for more detail. Patient will see Dr. Dav Brown today and follow up in 3 months in office or remotely.

## 2022-03-08 NOTE — PATIENT INSTRUCTIONS
Our  will be contacting you from [phone] to schedule your procedure. Patient Education        Learning About Catheter Ablation for Heart Rhythm Problems  What is catheter ablation? Catheter ablation is a procedure that treats heart rhythm problems. These problems include atrial fibrillation, supraventricular tachycardia (SVT), atrial flutter, and ventricular tachycardia. Your heart should have a strong, steady beat. That beat is controlled by the heart's electrical system. Sometimes that system misfires. This causes a heartbeat that is too fast and isn't steady. Catheter ablation is a way to get into your heart and fix the problem. Ablation is not surgery. How is catheter ablation done? Your doctor inserts thin tubes called catheters into a blood vessel in your groin, arm, or neck. Then your doctor feeds them into the heart. Wires in the catheters help the doctor find the problem areas. Then the doctor uses the wires to send energy to destroy the tiny areas of heart tissue that are causing the problems. It may seem like a bad idea to destroy parts of your heart on purpose. But the areas that are destroyed are very tiny. They should not affect your heart's ability to do its job. You may be awake during the procedure. Or you may be asleep. The doctor will give you medicines to help you feel relaxed and to numb the areas where the catheters go in. You may feel a little uncomfortable, but you should not feel pain. What can you expect after catheter ablation? You may stay overnight in the hospital. How long you stay in the hospital depends on the type of ablation you have. Do not exercise hard or lift anything heavy for a week. You will probably be able to go back to work and to your normal routine in 1 or 2 days. You may have swelling, bruising, or a small lump around the site where the catheters went into your body. These should go away in 3 to 4 weeks.   You may have to take some medicines for a while. Follow-up care is a key part of your treatment and safety. Be sure to make and go to all appointments, and call your doctor if you are having problems. It's also a good idea to know your test results and keep a list of the medicines you take. Where can you learn more? Go to https://chpepiceweb.[email or web address]. org and sign in to your [facility] account. Enter H489 in the Free For Kids box to learn more about \"Learning About Catheter Ablation for Heart Rhythm Problems. \"     If you do not have an account, please click on the \"Sign Up Now\" link. Current as of: April 29, 2021               Content Version: 13.1  © 2006-2021 [employer]wise[employer]. Care instructions adapted under license by [facility] ([facility]). If you have questions about a medical condition or this instruction, always ask your healthcare professional. [address] any warranty or liability for your use of this information.

## 2022-03-08 NOTE — LETTER
[address]  EP Procedure Sheet    3/8/22  Wilburn Plan  1950  EP Procedures  [] Pacemaker implant (single/dual) [] EP Study   [] ICD implant (single/dual) [] Atrial flutter ablation (GAYE Y/N)   [] Biv implant ICD [] Tilt Table   [] Biv implant PPM [x] Atrial fibrillation ablation (GAYE Yes)   [] Generator Change (PPM/ICD/BiV) [] SVT ablation   [] Lead revision (RV/LA/RA) (<1 month) [] PVC ablation     [] Lead extraction +/- upgrade (BiV/PPM/ICD) [] VT Ischemic/ non-ischemic   [] Loop implant/ removal [] VT RVOT   [] Cardioversion [] VT Left sided   [x] GAYE [] AVN ablation   Equipment  [] GeneAssess  [] CLAIRE Mapping System   [] [place] [x] [address]   [] [employer] [] CryoAblation   [] Biotronik [] Laser Lead Extraction   EP Procedures Scheduling Request  # hours Requested  1 []2 []2-4 [x] 4-6 Scheduled  Date:   Specific Day  Completed    Anesthesia [x]yes []no F/u Date:   CT surgery backup []yes []no COVID     Overnight stay      Performing MD  []RMM [x]MXA   []MKW [] Other _______ First vs repeat  [x]1st [] 2nd [] 3rd   Pre-Procedure Labs / Imaging  [] PT/INR [] Type & cross   [] CBC [] Units PRBC   [] BMP/Mg [] Units FFP   [] Venogram [] Cardiac CTA for Pulmonary vein mapping     RN INITIALS: DW    Patient Instructions  Do not eat or drink after midnight the night prior to procedure  Dx:paroxysmal atrial fibrillation ICD-10 code:  i48.0  Medication Instructions: Hold [x]Xarelto []Eliquis []Coumadin []pradaxa for  The night before and the day of the procedure

## 2022-03-09 PROCEDURE — 93280 PM DEVICE PROGR EVAL DUAL: CPT | Performed by: INTERNAL MEDICINE

## 2022-03-15 ENCOUNTER — HOSPITAL ENCOUNTER (OUTPATIENT)
Age: 72
Discharge: HOME | End: 2022-03-15
Payer: MEDICARE

## 2022-03-15 DIAGNOSIS — E05.00 GRAVES DISEASE: ICD-10-CM

## 2022-03-15 LAB
T3 TOTAL: 1.74 NG/ML (ref 0.8–2)
T4 FREE: 1.2 NG/DL (ref 0.9–1.8)

## 2022-03-15 PROCEDURE — 84480 ASSAY TRIIODOTHYRONINE (T3): CPT

## 2022-03-15 PROCEDURE — 36415 COLL VENOUS BLD VENIPUNCTURE: CPT

## 2022-03-15 PROCEDURE — 84439 ASSAY OF FREE THYROXINE: CPT

## 2022-03-21 ENCOUNTER — NURSE ONLY (OUTPATIENT)
Dept: CARDIOLOGY CLINIC | Age: 72
End: 2022-03-21
Payer: MEDICARE

## 2022-03-21 DIAGNOSIS — R00.1 BRADYCARDIA: ICD-10-CM

## 2022-03-21 DIAGNOSIS — I49.5 TACHY-BRADY SYNDROME (HCC): ICD-10-CM

## 2022-03-21 DIAGNOSIS — I48.19 PERSISTENT ATRIAL FIBRILLATION (HCC): ICD-10-CM

## 2022-03-21 DIAGNOSIS — Z95.0 CARDIAC PACEMAKER IN SITU: ICD-10-CM

## 2022-03-21 DIAGNOSIS — I48.0 PAF (PAROXYSMAL ATRIAL FIBRILLATION) (HCC): ICD-10-CM

## 2022-03-21 PROCEDURE — 93294 REM INTERROG EVL PM/LDLS PM: CPT | Performed by: INTERNAL MEDICINE

## 2022-03-21 PROCEDURE — 93296 REM INTERROG EVL PM/IDS: CPT | Performed by: INTERNAL MEDICINE

## 2022-03-21 NOTE — PROGRESS NOTES
We received remote transmission from patient's monitor at home. Transmission shows normal sensing and pacing function. Pt has known AF and remains on Xarelto. EP physician will review. See interrogation under cardiology tab in the [address] [place] [address] [address] field for more details.

## 2022-03-22 ENCOUNTER — PATIENT MESSAGE (OUTPATIENT)
Dept: ENDOCRINOLOGY | Age: 72
End: 2022-03-22

## 2022-03-22 ENCOUNTER — TELEPHONE (OUTPATIENT)
Dept: CARDIOLOGY CLINIC | Age: 72
End: 2022-03-22

## 2022-03-22 NOTE — TELEPHONE ENCOUNTER
Spoke with patient. Patient is scheduled with Dr. Nick Cannon (sheet marked MXA/pt kevinriver Walsh) for GAYE/Afib Ablation with Carto and anesthesia on 5/19/22 at 7:30am F, arrival time of 6:45am to the Cath Lab. Please have patient arrive to the main entrance of [facility] and check in with the Cath Lab. Medication Instructions: Hold [x]? Xarelto []? Eliquis []? Coumadin []? pradaxa for  The night before and the day of the procedure. Remind patient to be NPO after midnight (8 hours prior). Do not apply lotions/creams on skin the day of procedure.

## 2022-03-24 ENCOUNTER — TELEPHONE (OUTPATIENT)
Dept: ENDOCRINOLOGY | Age: 72
End: 2022-03-24

## 2022-03-24 DIAGNOSIS — I10 ESSENTIAL HYPERTENSION: Chronic | ICD-10-CM

## 2022-03-24 DIAGNOSIS — E05.00 GRAVES DISEASE: Primary | ICD-10-CM

## 2022-03-24 NOTE — TELEPHONE ENCOUNTER
[employer] message sent from patient:    Hi Dr Lloyd Lyles, Im just checking on my blood work results that I had done last Tuesday. Thanks you,                                                                                  Amanda Wise.

## 2022-03-28 NOTE — PROGRESS NOTES
Patient _X__ reached   _____not reached-preop instructions left on voice fwde___[phone]__________      DATE__4/1/22______ TIME_1130_______ARRIVAL_1000  FEC ________      Nothing to eat or drink after midnight the night before,except for what the prep instructions call for. If you do not have the instructions or do not understand them please contact your doctors office. Follow any instructions your doctors office has given you including what medications to take the AM of your procedure and which ones to hold. You may use your inhalers. If you take a long acting insulin the javy prior please cut the dose in half and take no diabetic medications that AM.Follow specific doctors office instructions regarding blood thinners and if they want you to hold and for how long. If you are on a blood thinner and have no instructions please contact the office and ask-CHECK Jerod Martinez comfortably,bring your insurance card,picture ID,and a complete list of medications, including supplements. You must have a responsible adult to stay with you during the procedure,drive you home and stay with you. [facility] phone number [phone] for any questions. OTHER INSTRUCTIONS(if applicable)_______take diltiazem, methimazole, metoprolol am of procedure__________________________________________________        VISITOR POLICY(subject to change)    Current visitor policy is 2 visitors per patient. No children allowed. Mask are required. Visiting hours are 8a-8p. Overnight visitors will be at the discretion of the nurse.

## 2022-03-29 ENCOUNTER — OFFICE VISIT (OUTPATIENT)
Dept: INTERNAL MEDICINE CLINIC | Age: 72
End: 2022-03-29
Payer: MEDICARE

## 2022-03-29 VITALS
OXYGEN SATURATION: 100 % | TEMPERATURE: 98.3 F | HEART RATE: 83 BPM | DIASTOLIC BLOOD PRESSURE: 86 MMHG | SYSTOLIC BLOOD PRESSURE: 132 MMHG | WEIGHT: 204.8 LBS | BODY MASS INDEX: 31.04 KG/M2 | HEIGHT: 68 IN

## 2022-03-29 DIAGNOSIS — I48.19 PERSISTENT ATRIAL FIBRILLATION (HCC): ICD-10-CM

## 2022-03-29 DIAGNOSIS — E05.00 GRAVES DISEASE: ICD-10-CM

## 2022-03-29 DIAGNOSIS — D50.9 IRON DEFICIENCY ANEMIA, UNSPECIFIED IRON DEFICIENCY ANEMIA TYPE: Primary | ICD-10-CM

## 2022-03-29 DIAGNOSIS — I10 ESSENTIAL HYPERTENSION: Chronic | ICD-10-CM

## 2022-03-29 PROCEDURE — G8399 PT W/DXA RESULTS DOCUMENT: HCPCS | Performed by: INTERNAL MEDICINE

## 2022-03-29 PROCEDURE — 4040F PNEUMOC VAC/ADMIN/RCVD: CPT | Performed by: INTERNAL MEDICINE

## 2022-03-29 PROCEDURE — 1036F TOBACCO NON-USER: CPT | Performed by: INTERNAL MEDICINE

## 2022-03-29 PROCEDURE — 1123F ACP DISCUSS/DSCN MKR DOCD: CPT | Performed by: INTERNAL MEDICINE

## 2022-03-29 PROCEDURE — G8427 DOCREV CUR MEDS BY ELIG CLIN: HCPCS | Performed by: INTERNAL MEDICINE

## 2022-03-29 PROCEDURE — G8484 FLU IMMUNIZE NO ADMIN: HCPCS | Performed by: INTERNAL MEDICINE

## 2022-03-29 PROCEDURE — 99213 OFFICE O/P EST LOW 20 MIN: CPT | Performed by: INTERNAL MEDICINE

## 2022-03-29 PROCEDURE — 3017F COLORECTAL CA SCREEN DOC REV: CPT | Performed by: INTERNAL MEDICINE

## 2022-03-29 PROCEDURE — 1090F PRES/ABSN URINE INCON ASSESS: CPT | Performed by: INTERNAL MEDICINE

## 2022-03-29 PROCEDURE — G8417 CALC BMI ABV UP PARAM F/U: HCPCS | Performed by: INTERNAL MEDICINE

## 2022-03-29 RX ORDER — METHIMAZOLE 5 MG/1
15 TABLET ORAL DAILY
Qty: 90 TABLET | Refills: 3 | Status: SHIPPED | OUTPATIENT
Start: 2022-03-29 | End: 2022-06-10

## 2022-03-29 RX ORDER — HYDROCHLOROTHIAZIDE 25 MG/1
25 TABLET ORAL DAILY
Qty: 30 TABLET | Refills: 0 | Status: SHIPPED | OUTPATIENT
Start: 2022-03-29 | End: 2022-07-14

## 2022-03-29 RX ORDER — METOPROLOL SUCCINATE 50 MG/1
100 TABLET, EXTENDED RELEASE ORAL 2 TIMES DAILY
Qty: 180 TABLET | Refills: 3 | Status: SHIPPED | OUTPATIENT
Start: 2022-03-29 | End: 2022-06-09

## 2022-03-29 RX ORDER — IRON POLYSACCHARIDE COMPLEX 150 MG
150 CAPSULE ORAL 2 TIMES DAILY
Qty: 180 CAPSULE | Refills: 1 | Status: SHIPPED | OUTPATIENT
Start: 2022-03-29 | End: 2022-09-13

## 2022-03-29 RX ORDER — DILTIAZEM HYDROCHLORIDE 180 MG/1
180 CAPSULE, COATED, EXTENDED RELEASE ORAL DAILY
Qty: 90 CAPSULE | Refills: 1 | Status: SHIPPED | OUTPATIENT
Start: 2022-03-29

## 2022-03-29 NOTE — PROGRESS NOTES
Subjective:      Patient ID: Jake Chawla is a 73 y.o. female. Chief Complaint   Patient presents with    Anemia     follow up       HPI   Anemia  Patient presents for evaluation of anemia. Anemia was found by routine CBC. It has been present for several months. Associated signs & symptoms: none. Hypertension:  Home blood pressure monitoring: No.  She is adherent to a low sodium diet. Patient denies shortness of breath, headache, blurred vision and palpitations. Antihypertensive medication side effects: no medication side effects noted. Use of agents associated with hypertension: none. Sodium (mmol/L)   Date Value   10/12/2021 139    BUN (mg/dL)   Date Value   10/12/2021 9    Glucose (mg/dL)   Date Value   10/12/2021 79      Potassium (mmol/L)   Date Value   10/12/2021 4.4     Potassium reflex Magnesium (mmol/L)   Date Value   03/26/2019 3.5    CREATININE (mg/dL)   Date Value   10/12/2021 0.7         Patient presents in follow-up for hospital admission lasting 2 days. She was having chest pain and dizziness. She states that she is feeling much better today, but continues to have a small amount of fatigue member. She continues to take her medications as instructed. Her blood pressure is well controlled she denies side effects of her medications    Review of Systems   Constitutional: Positive for fatigue. Review of systems as per indicated in HPI. Psychiatric/Behavioral: The patient is nervous/anxious. All other systems reviewed and are negative.         Allergies   Allergen Reactions    Chlorthalidone Other (See Comments)     Causes increase in b/p and severe headache    Naproxen Swelling    Lisinopril Rash       Current Outpatient Medications   Medication Sig Dispense Refill    metoprolol succinate (TOPROL XL) 50 MG extended release tablet Take 2 tablets by mouth 2 times daily 180 tablet 3    methIMAzole (TAPAZOLE) 5 MG tablet Take 3 tablets by mouth daily 90 tablet 3    iron polysaccharides (NIFEREX) 150 MG capsule Take 1 capsule by mouth 2 times daily 180 capsule 1    dilTIAZem (DILTIAZEM CD) 180 MG extended release capsule Take 1 capsule by mouth daily 90 capsule 1    rivaroxaban (XARELTO) 20 MG TABS tablet TAKE ONE TABLET BY MOUTH DAILY WITH BREAKFAST 90 tablet 2    hydroCHLOROthiazide (HYDRODIURIL) 25 MG tablet Take 1 tablet by mouth daily 30 tablet 0    Multiple Vitamins-Minerals (THERAPEUTIC MULTIVITAMIN-MINERALS) tablet Take 1 tablet by mouth daily (Patient not taking: Reported on 3/29/2022)       No current facility-administered medications for this visit. Vitals:    03/29/22 1510   BP: 132/86   Site: Right Upper Arm   Position: Sitting   Pulse: 83   Temp: 98.3 °F (36.8 °C)   TempSrc: Temporal   SpO2: 100%   Weight: 204 lb 12.8 oz (92.9 kg)   Height: 5' 8\" (1.727 m)     Body mass index is 31.14 kg/m². Wt Readings from Last 3 Encounters:   03/29/22 204 lb 12.8 oz (92.9 kg)   03/08/22 208 lb 12.8 oz (94.7 kg)   02/15/22 208 lb (94.3 kg)     BP Readings from Last 3 Encounters:   03/29/22 132/86   03/08/22 135/80   02/15/22 138/80       Objective:   Physical Exam  Vitals and nursing note reviewed. Constitutional:       General: She is not in acute distress. Appearance: She is well-developed. She is obese. HENT:      Head: Normocephalic. Right Ear: Tympanic membrane normal.      Left Ear: Tympanic membrane normal.   Eyes:      Pupils: Pupils are equal, round, and reactive to light. Cardiovascular:      Rate and Rhythm: Normal rate and regular rhythm. Pulses: Normal pulses. Heart sounds: Normal heart sounds. Pulmonary:      Effort: Pulmonary effort is normal.      Breath sounds: Normal breath sounds. Musculoskeletal:         General: Normal range of motion. Cervical back: Normal range of motion. Deformity present. Lumbar back: No swelling or bony tenderness.       Right hip: Normal. Normal range of motion. Normal strength. Left hip: Normal. Normal range of motion. Normal strength. Skin:     General: Skin is warm. Capillary Refill: Capillary refill takes less than 2 seconds. Neurological:      General: No focal deficit present. Mental Status: She is alert and oriented to person, place, and time. Cranial Nerves: No cranial nerve deficit. Coordination: Coordination normal.   Psychiatric:         Mood and Affect: Mood normal.         Behavior: Behavior normal.         Thought Content: Thought content normal.         Judgment: Judgment normal.         Assessment/Plan:  Nehemiah Dempsey was seen today for anemia. Diagnoses and all orders for this visit:    Iron deficiency anemia, unspecified iron deficiency anemia type  -     rivaroxaban (XARELTO) 20 MG TABS tablet; TAKE ONE TABLET BY MOUTH DAILY WITH BREAKFAST  -     iron polysaccharides (NIFEREX) 150 MG capsule; Take 1 capsule by mouth 2 times daily    Persistent atrial fibrillation (HCC)  -     rivaroxaban (XARELTO) 20 MG TABS tablet; TAKE ONE TABLET BY MOUTH DAILY WITH BREAKFAST  -     metoprolol succinate (TOPROL XL) 50 MG extended release tablet; Take 2 tablets by mouth 2 times daily  -     dilTIAZem (DILTIAZEM CD) 180 MG extended release capsule; Take 1 capsule by mouth daily    Essential hypertension  -     metoprolol succinate (TOPROL XL) 50 MG extended release tablet; Take 2 tablets by mouth 2 times daily  -     hydroCHLOROthiazide (HYDRODIURIL) 25 MG tablet; Take 1 tablet by mouth daily    Graves disease  -     methIMAzole (TAPAZOLE) 5 MG tablet;  Take 3 tablets by mouth daily      Brayden Martinez MD

## 2022-04-01 ENCOUNTER — ANESTHESIA EVENT (OUTPATIENT)
Dept: ENDOSCOPY | Age: 72
End: 2022-04-01
Payer: MEDICARE

## 2022-04-01 ENCOUNTER — ANESTHESIA (OUTPATIENT)
Dept: ENDOSCOPY | Age: 72
End: 2022-04-01
Payer: MEDICARE

## 2022-04-01 ENCOUNTER — HOSPITAL ENCOUNTER (OUTPATIENT)
Age: 72
Setting detail: OUTPATIENT SURGERY
Discharge: HOME | End: 2022-04-01
Attending: INTERNAL MEDICINE | Admitting: INTERNAL MEDICINE
Payer: MEDICARE

## 2022-04-01 VITALS
DIASTOLIC BLOOD PRESSURE: 80 MMHG | TEMPERATURE: 96.9 F | WEIGHT: 204 LBS | SYSTOLIC BLOOD PRESSURE: 140 MMHG | OXYGEN SATURATION: 99 % | HEIGHT: 68 IN | BODY MASS INDEX: 30.92 KG/M2 | HEART RATE: 60 BPM | RESPIRATION RATE: 16 BRPM

## 2022-04-01 VITALS
OXYGEN SATURATION: 100 % | RESPIRATION RATE: 15 BRPM | DIASTOLIC BLOOD PRESSURE: 69 MMHG | SYSTOLIC BLOOD PRESSURE: 123 MMHG

## 2022-04-01 PROCEDURE — 7100000010 HC PHASE II RECOVERY - FIRST 15 MIN: Performed by: INTERNAL MEDICINE

## 2022-04-01 PROCEDURE — 3700000001 HC ADD 15 MINUTES (ANESTHESIA): Performed by: INTERNAL MEDICINE

## 2022-04-01 PROCEDURE — 88305 TISSUE EXAM BY PATHOLOGIST: CPT

## 2022-04-01 PROCEDURE — 7100000011 HC PHASE II RECOVERY - ADDTL 15 MIN: Performed by: INTERNAL MEDICINE

## 2022-04-01 PROCEDURE — 2709999900 HC NON-CHARGEABLE SUPPLY: Performed by: INTERNAL MEDICINE

## 2022-04-01 PROCEDURE — 6360000002 HC RX W HCPCS: Performed by: NURSE ANESTHETIST, CERTIFIED REGISTERED

## 2022-04-01 PROCEDURE — 2500000003 HC RX 250 WO HCPCS: Performed by: NURSE ANESTHETIST, CERTIFIED REGISTERED

## 2022-04-01 PROCEDURE — 3700000000 HC ANESTHESIA ATTENDED CARE: Performed by: INTERNAL MEDICINE

## 2022-04-01 PROCEDURE — 3609010600 HC COLONOSCOPY POLYPECTOMY SNARE/COLD BIOPSY: Performed by: INTERNAL MEDICINE

## 2022-04-01 PROCEDURE — 2580000003 HC RX 258: Performed by: ANESTHESIOLOGY

## 2022-04-01 RX ORDER — PROPOFOL 10 MG/ML
INJECTION, EMULSION INTRAVENOUS PRN
Status: DISCONTINUED | OUTPATIENT
Start: 2022-04-01 | End: 2022-04-01 | Stop reason: SDUPTHER

## 2022-04-01 RX ORDER — LIDOCAINE HYDROCHLORIDE 20 MG/ML
INJECTION, SOLUTION INFILTRATION; PERINEURAL PRN
Status: DISCONTINUED | OUTPATIENT
Start: 2022-04-01 | End: 2022-04-01 | Stop reason: SDUPTHER

## 2022-04-01 RX ORDER — SODIUM CHLORIDE 9 MG/ML
INJECTION, SOLUTION INTRAVENOUS CONTINUOUS
Status: DISCONTINUED | OUTPATIENT
Start: 2022-04-01 | End: 2022-04-01 | Stop reason: HOSPADM

## 2022-04-01 RX ADMIN — PROPOFOL 80 MG: 10 INJECTION, EMULSION INTRAVENOUS at 12:50

## 2022-04-01 RX ADMIN — PROPOFOL 50 MG: 10 INJECTION, EMULSION INTRAVENOUS at 13:02

## 2022-04-01 RX ADMIN — PROPOFOL 50 MG: 10 INJECTION, EMULSION INTRAVENOUS at 13:05

## 2022-04-01 RX ADMIN — PROPOFOL 20 MG: 10 INJECTION, EMULSION INTRAVENOUS at 12:54

## 2022-04-01 RX ADMIN — PROPOFOL 50 MG: 10 INJECTION, EMULSION INTRAVENOUS at 13:08

## 2022-04-01 RX ADMIN — LIDOCAINE HYDROCHLORIDE 100 MG: 20 INJECTION, SOLUTION INFILTRATION; PERINEURAL at 12:50

## 2022-04-01 RX ADMIN — SODIUM CHLORIDE: 9 INJECTION, SOLUTION INTRAVENOUS at 10:55

## 2022-04-01 RX ADMIN — PROPOFOL 50 MG: 10 INJECTION, EMULSION INTRAVENOUS at 12:59

## 2022-04-01 ASSESSMENT — PAIN - FUNCTIONAL ASSESSMENT: PAIN_FUNCTIONAL_ASSESSMENT: 0-10

## 2022-04-01 ASSESSMENT — PAIN SCALES - GENERAL
PAINLEVEL_OUTOF10: 0

## 2022-04-01 ASSESSMENT — ENCOUNTER SYMPTOMS: SHORTNESS OF BREATH: 0

## 2022-04-01 NOTE — H&P
Gastroenterology Note             Pre-operative History and Physical    Patient: Selene Oneil  : 1950  CSN:     History Obtained From:  patient and/or guardian. HISTORY OF PRESENT ILLNESS:    The patient is a 73 y.o. female with history of colon polyps here for colonoscopy. Past Medical History:    Past Medical History:   Diagnosis Date    Anal bleeding     CT scan clear     Anemia     Arthritis     bilateral knee    Atrial fibrillation ([address]     cleared by cardiologist , was related to cancer     Colon polyps     Diabetes mellitus ([address]     pre diabetic diet controlled    Endometrial cancer ([address]     History of blood transfusion     Hypertension     IFG (impaired fasting glucose)     SATISH (obstructive sleep apnea)     3/28/22-currently being fitted for CPAP    Small bowel obstruction ([address]     resolved without surgery    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[address]     Thyroid nodule      Past Surgical History:    Past Surgical History:   Procedure Laterality Date    CARDIOVERSION      CARDIOVERSION  2019    COLONOSCOPY N/A 3/14/2019    COLONOSCOPY POLYPECTOMY SNARE/COLD BIOPSY performed by Ami Reynolds MD at [address] [place] [address]  3/14/2019    COLONOSCOPY CONTROL HEMORRHAGE performed by Ami Reynolds MD at [address] [place] [address] N/A 3/26/2019    COLONOSCOPY CONTROL HEMORRHAGE performed by Ami Reynolds MD at [place] [address]  2021    SIGMOIDOSCOPY N/A 9/10/2019    SIGMOIDOSCOPY DIAGNOSTIC FLEXIBLE performed by Ami Reynolds MD at [address] [place] [address]      TUNNELED CENTRAL VENOUS CATHETER W/ SUBCUTANEOUS PORT Right      Medications Prior to Admission:   No current facility-administered medications on file prior to encounter. No current outpatient medications on file prior to encounter.         Allergies:  Chlorthalidone, Naproxen, and Lisinopril      Social History:   Social History     Tobacco Use    Smoking status: Never Smoker    Smokeless tobacco: Never Used   Substance Use Topics    Alcohol use: Yes     Comment: occ wine     Family History:   Family History   Problem Relation Age of Onset    Hypertension Mother     Colon Cancer Maternal Grandmother        PHYSICAL EXAM:      BP (!) 168/75   Pulse 61   Temp 98.5 °F (36.9 °C) (Temporal)   Resp 18   Ht 5' 8\" (1.727 m)   Wt 204 lb (92.5 kg)   LMP  (LMP Unknown)   SpO2 100%   BMI 31.02 kg/m²  I        Heart:   RRR, normal s1s2    Lungs:  CTA bilat,  Normal effort    Abdomen:   NT, ND      ASA Grade:  ASA 3 - Patient with moderate systemic disease with functional limitations    Mallampati Class:  Class I: Soft palate, uvula, fauces, pillars visible  ____X_____  Class II: Soft palate, uvula, fauces visible  __________   Class III: Soft palate, base of uvula visible  __________  Class IV: Hard palate only visible   __________        ASSESSMENT AND PLAN:    1. Patient is a 73 y.o. female here for colonoscopy with MAC.   2.  Procedure options, risks and benefits reviewed with patient. Patient expresses understanding.      Kavitha Antony, [address] [place] [address]  4/1/2022

## 2022-04-01 NOTE — BRIEF OP NOTE
Brief Postoperative Note - Full Note in Chart Review/Procedures tab       Patient: Justin Troy  YOB: 1950  MRN: [id number]    Date of Procedure: 4/1/2022    Pre-Op Diagnosis: COLON CANCER SCREENING Z12.11    Post-Op Diagnosis: Same       Procedure(s):  COLONOSCOPY POLYPECTOMY SNARE/COLD BIOPSY    Surgeon(s):  Tuan Elizabeth MD    Assistant:  * No surgical staff found *    Anesthesia: Monitor Anesthesia Care    Estimated Blood Loss (mL): Minimal    Complications: None    Specimens:   ID Type Source Tests Collected by Time Destination   A : Cecal polyp Tissue Colon SURGICAL PATHOLOGY Tuan Elizabeth MD 4/1/2022 1302        Implants:  * No implants in log *      Drains: * No LDAs found *    Findings:  Diverticulosis of large intestine - K57.30    Benign neoplasm of cecum - D12.0 removed with cold snare    Normal terminal ileum    Rec:  Continue present diet  Continue present treatment. Resume Xarelto tomorrow  Await pathology results.    Colonoscopy in 5 years  F/U WITH ME AS NEEDED  Followup with referring physician as previously instucted    Electronically signed by Tuan Elizabeth MD on 4/1/2022 at 1:09 PM

## 2022-04-01 NOTE — ANESTHESIA PRE PROCEDURE
Department of Anesthesiology  Preprocedure Note       Name:  Shelby Freitas   Age:  73 y.o.  :  1950                                          MRN:  [id number]         Date:  2022      Surgeon: Alexa Nolasco):  Abrahan Griggs MD    Procedure: Procedure(s):  COLONOSCOPY DIAGNOSTIC    Medications prior to admission:   Prior to Admission medications    Medication Sig Start Date End Date Taking? Authorizing Provider   rivaroxaban (XARELTO) 20 MG TABS tablet TAKE ONE TABLET BY MOUTH DAILY WITH BREAKFAST 3/29/22   Carri Badillo MD   metoprolol succinate (TOPROL XL) 50 MG extended release tablet Take 2 tablets by mouth 2 times daily 3/29/22   Carri Badillo MD   iron polysaccharides (NIFEREX) 150 MG capsule Take 1 capsule by mouth 2 times daily 3/29/22 6/27/22  Carri Badillo MD   dilTIAZem (DILTIAZEM CD) 180 MG extended release capsule Take 1 capsule by mouth daily 3/29/22   Carri Badillo MD   hydroCHLOROthiazide (HYDRODIURIL) 25 MG tablet Take 1 tablet by mouth daily 3/29/22 4/28/22  Carri Badillo MD   methIMAzole (TAPAZOLE) 5 MG tablet Take 3 tablets by mouth daily 3/29/22   Carri Badillo MD       Current medications:    Current Facility-Administered Medications   Medication Dose Route Frequency Provider Last Rate Last Admin    0.9 % sodium chloride infusion   IntraVENous Continuous Faiza Salvador MD           Allergies:     Allergies   Allergen Reactions    Chlorthalidone Other (See Comments)     Causes increase in b/p and severe headache    Naproxen Swelling    Lisinopril Rash       Problem List:    Patient Active Problem List   Diagnosis Code    Partial small bowel obstruction (HCC) K56.600    Near syncope R55    Chronic anticoagulation Z79.01    Endometrial cancer (HCC) C54.1    Alkaline phosphatase elevation R74.8    Anemia D64.9    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, T45.1X5A    Essential hypertension I10    On antineoplastic chemotherapy Z79.899    Prediabetes R73.03    Abdominal pain, generalized R10.84    Abnormal liver enzymes R74.8    GIB (gastrointestinal bleeding) K92.2    Graves disease E05.00    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    Chest pain R07.9    Dizziness R42    PAF (paroxysmal atrial fibrillation) (HCC) I48.0    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    Bradycardia R00.1    Cardiac pacemaker in situ Z95.0    Non morbid obesity, unspecified obesity type E66.9       Past Medical History:        Diagnosis Date    Anal bleeding 2019    CT scan clear     Anemia     Arthritis     bilateral knee    Atrial fibrillation ([facility]     cleared by cardiologist 2018, was related to cancer     Colon polyps     Diabetes mellitus ([facility]     pre diabetic diet controlled    Endometrial cancer ([facility]     History of blood transfusion     Hypertension     IFG (impaired fasting glucose)     SATISH (obstructive sleep apnea)     3/28/22-currently being fitted for CPAP    Small bowel obstruction ([facility]     resolved without surgery    Thyroid nodule        Past Surgical History:        Procedure Laterality Date    CARDIOVERSION      CARDIOVERSION  09/19/2019    COLONOSCOPY N/A 3/14/2019    COLONOSCOPY POLYPECTOMY SNARE/COLD BIOPSY performed by Donnell Roberts MD at [address]  3/14/2019    COLONOSCOPY CONTROL HEMORRHAGE performed by Donnell Roberts MD at [address] N/A 3/26/2019    COLONOSCOPY CONTROL HEMORRHAGE performed by Donnell Roberts MD at [facility]  09/29/2021    SIGMOIDOSCOPY N/A 9/10/2019    SIGMOIDOSCOPY DIAGNOSTIC FLEXIBLE performed by Donnell Roberts MD at [facility] 1 TUNNELED CENTRAL VENOUS CATHETER W/ SUBCUTANEOUS PORT Right        Social History:    Social History     Tobacco Use    Smoking status: Never Smoker    Smokeless tobacco: Never Used   Substance Use Topics    Alcohol use: Yes     Comment: occ wine                                Counseling given: Not Answered      Vital Signs (Current):   Vitals:    03/28/22 1218 04/01/22 1037   BP:  (!) 168/75   Pulse:  61   Resp:  18   Temp:  98.5 °F (36.9 °C)   TempSrc:  Temporal   SpO2:  100%   Weight: 208 lb (94.3 kg) 204 lb (92.5 kg)   Height: 5' 8\" (1.727 m) 5' 8\" (1.727 m)                                              BP Readings from Last 3 Encounters:   04/01/22 (!) 168/75   03/29/22 132/86   03/08/22 135/80       NPO Status:                                                                                 BMI:   Wt Readings from Last 3 Encounters:   04/01/22 204 lb (92.5 kg)   03/29/22 204 lb 12.8 oz (92.9 kg)   03/08/22 208 lb 12.8 oz (94.7 kg)     Body mass index is 31.02 kg/m². CBC:   Lab Results   Component Value Date    WBC 2.7 01/20/2022    RBC 4.17 01/20/2022    HGB 9.3 01/20/2022    HCT 30.1 01/20/2022    MCV 72.2 01/20/2022    RDW 19.6 01/20/2022     01/20/2022       CMP:   Lab Results   Component Value Date     10/12/2021    K 4.4 10/12/2021    K 3.5 03/26/2019     10/12/2021    CO2 27 10/12/2021    BUN 9 10/12/2021    CREATININE 0.7 10/12/2021    GFRAA >60 10/12/2021    AGRATIO 1.2 10/12/2021    LABGLOM >60 10/12/2021    GLUCOSE 79 10/12/2021    PROT 7.7 10/12/2021    CALCIUM 9.7 10/12/2021    BILITOT <0.2 10/12/2021    ALKPHOS 90 10/12/2021    AST 28 10/12/2021    ALT 11 10/12/2021       POC Tests: No results for input(s): POCGLU, POCNA, POCK, POCCL, POCBUN, POCHEMO, POCHCT in the last 72 hours. Coags:   Lab Results   Component Value Date    PROTIME 32.4 06/18/2021    INR 2.76 06/18/2021    APTT 47.0 06/18/2021       HCG (If Applicable): No results found for: PREGTESTUR, PREGSERUM, HCG, HCGQUANT     ABGs: No results found for: PHART, PO2ART, KPT4FDU, SRP9QEK, BEART, [id number]     Type & Screen (If Applicable):  No results found for: LABABO, LABRH    Drug/Infectious Status (If Applicable):   No results found for: HIV, HEPCAB    COVID-19 Screening (If Applicable): No results found for: COVID19        Anesthesia Evaluation  Patient summary reviewed and Nursing notes reviewed no history of anesthetic complications:   Airway: Mallampati: I  TM distance: >3 FB   Neck ROM: full  Mouth opening: > = 3 FB Dental:    (+) upper dentures      Pulmonary:   (+) sleep apnea:      (-) asthma and shortness of breath                           Cardiovascular:    (+) hypertension:, pacemaker: pacemaker, AICD and no interval change, dysrhythmias: atrial fibrillation,     (-)  angina                Neuro/Psych:      (-) CVA           GI/Hepatic/Renal:        (-) GERD and liver disease       Endo/Other:    (+) DiabetesType II DM, , : arthritis:., malignancy/cancer. (-) hypothyroidism               Abdominal:             Vascular:     - PVD. Other Findings:             Anesthesia Plan      MAC     ASA 3       Induction: intravenous. Anesthetic plan and risks discussed with patient. Plan discussed with CRNA.                   Leigh Victoria MD   4/1/2022

## 2022-04-01 NOTE — ANESTHESIA POSTPROCEDURE EVALUATION
Department of Anesthesiology  Postprocedure Note    Patient: Sienna Salmon  MRN: [id number]  YOB: 1950  Date of evaluation: 4/1/2022  Time:  1:44 PM     Procedure Summary     Date: 04/01/22 Room / Location: [address] [place] [address]    Anesthesia Start: 7628 Anesthesia Stop: 7428    Procedure: COLONOSCOPY POLYPECTOMY SNARE/COLD BIOPSY (N/A ) Diagnosis: (COLON CANCER SCREENING Z12.11)    Surgeons: Jacqueline Burnham MD Responsible Provider: Kyung Valenzuela MD    Anesthesia Type: MAC ASA Status: 3          Anesthesia Type: MAC    Yelitza Phase I: Yelitza Score: 10    Yelitza Phase II: Yelitza Score: 10    Last vitals: Reviewed and per EMR flowsheets. Anesthesia Post Evaluation    Patient location during evaluation: PACU  Level of consciousness: awake  Airway patency: patent  Complications: no  Cardiovascular status: hemodynamically stable  Respiratory status: acceptable  There was medical reason for not using a multimodal analgesia pain management approach.

## 2022-04-05 ENCOUNTER — NURSE ONLY (OUTPATIENT)
Dept: INTERNAL MEDICINE CLINIC | Age: 72
End: 2022-04-05

## 2022-04-05 VITALS — SYSTOLIC BLOOD PRESSURE: 134 MMHG | DIASTOLIC BLOOD PRESSURE: 88 MMHG

## 2022-04-05 DIAGNOSIS — I10 ESSENTIAL HYPERTENSION: Primary | ICD-10-CM

## 2022-04-05 PROCEDURE — 99999 PR OFFICE/OUTPT VISIT,PROCEDURE ONLY: CPT | Performed by: INTERNAL MEDICINE

## 2022-04-05 NOTE — TELEPHONE ENCOUNTER
Please obtain the results of this blood work I do not see it in the [facility] labs tab or care everywhere.

## 2022-04-05 NOTE — TELEPHONE ENCOUNTER
Please advise lab only did free t4 and t3   No change in dose at this time repeat labs in 3-4 weeks   I have placed orders for TSH as well rest of labs as standing orders

## 2022-05-13 ENCOUNTER — HOSPITAL ENCOUNTER (OUTPATIENT)
Age: 72
Discharge: HOME | End: 2022-05-13
Payer: MEDICARE

## 2022-05-13 DIAGNOSIS — E05.00 GRAVES DISEASE: ICD-10-CM

## 2022-05-13 LAB
T3 TOTAL: 1.14 NG/ML (ref 0.8–2)
T4 FREE: 0.6 NG/DL (ref 0.9–1.8)
TSH SERPL DL<=0.05 MIU/L-ACNC: 1.35 UIU/ML (ref 0.27–4.2)

## 2022-05-13 PROCEDURE — 36415 COLL VENOUS BLD VENIPUNCTURE: CPT

## 2022-05-13 PROCEDURE — 84439 ASSAY OF FREE THYROXINE: CPT

## 2022-05-13 PROCEDURE — 84443 ASSAY THYROID STIM HORMONE: CPT

## 2022-05-13 PROCEDURE — 84480 ASSAY TRIIODOTHYRONINE (T3): CPT

## 2022-05-18 ENCOUNTER — ANESTHESIA EVENT (OUTPATIENT)
Dept: CARDIAC CATH/INVASIVE PROCEDURES | Age: 72
End: 2022-05-18
Payer: MEDICARE

## 2022-05-19 ENCOUNTER — HOSPITAL ENCOUNTER (OUTPATIENT)
Dept: CARDIAC CATH/INVASIVE PROCEDURES | Age: 72
Discharge: HOME | End: 2022-05-19
Attending: INTERNAL MEDICINE | Admitting: INTERNAL MEDICINE
Payer: MEDICARE

## 2022-05-19 ENCOUNTER — ANESTHESIA (OUTPATIENT)
Dept: CARDIAC CATH/INVASIVE PROCEDURES | Age: 72
End: 2022-05-19
Payer: MEDICARE

## 2022-05-19 VITALS
DIASTOLIC BLOOD PRESSURE: 72 MMHG | HEIGHT: 68 IN | WEIGHT: 204 LBS | SYSTOLIC BLOOD PRESSURE: 129 MMHG | TEMPERATURE: 97 F | HEART RATE: 62 BPM | OXYGEN SATURATION: 100 % | RESPIRATION RATE: 14 BRPM | BODY MASS INDEX: 30.92 KG/M2

## 2022-05-19 LAB
ABO/RH: NORMAL
ANION GAP SERPL CALCULATED.3IONS-SCNC: 11 MMOL/L (ref 3–16)
ANTIBODY SCREEN: NORMAL
BUN BLDV-MCNC: 15 MG/DL (ref 7–20)
CALCIUM SERPL-MCNC: 9.5 MG/DL (ref 8.3–10.6)
CHLORIDE BLD-SCNC: 100 MMOL/L (ref 99–110)
CO2: 28 MMOL/L (ref 21–32)
CREAT SERPL-MCNC: 0.9 MG/DL (ref 0.6–1.2)
GFR AFRICAN AMERICAN: >60
GFR NON-AFRICAN AMERICAN: >60
GLUCOSE BLD-MCNC: 97 MG/DL (ref 70–99)
HCT VFR BLD CALC: 40.6 % (ref 36–48)
HEMOGLOBIN: 13.3 G/DL (ref 12–16)
LV EF: 53 %
LVEF MODALITY: NORMAL
MCH RBC QN AUTO: 27.8 PG (ref 26–34)
MCHC RBC AUTO-ENTMCNC: 32.8 G/DL (ref 31–36)
MCV RBC AUTO: 84.8 FL (ref 80–100)
PDW BLD-RTO: 22.4 % (ref 12.4–15.4)
PLATELET # BLD: 126 K/UL (ref 135–450)
PMV BLD AUTO: 10.1 FL (ref 5–10.5)
POTASSIUM SERPL-SCNC: 3.3 MMOL/L (ref 3.5–5.1)
RBC # BLD: 4.79 M/UL (ref 4–5.2)
SODIUM BLD-SCNC: 139 MMOL/L (ref 136–145)
WBC # BLD: 3.8 K/UL (ref 4–11)

## 2022-05-19 PROCEDURE — 93623 PRGRMD STIMJ&PACG IV RX NFS: CPT | Performed by: INTERNAL MEDICINE

## 2022-05-19 PROCEDURE — 86850 RBC ANTIBODY SCREEN: CPT

## 2022-05-19 PROCEDURE — C1759 CATH, INTRA ECHOCARDIOGRAPHY: HCPCS

## 2022-05-19 PROCEDURE — C1894 INTRO/SHEATH, NON-LASER: HCPCS

## 2022-05-19 PROCEDURE — 3700000000 HC ANESTHESIA ATTENDED CARE

## 2022-05-19 PROCEDURE — 2580000003 HC RX 258

## 2022-05-19 PROCEDURE — 7100000001 HC PACU RECOVERY - ADDTL 15 MIN

## 2022-05-19 PROCEDURE — C1730 CATH, EP, 19 OR FEW ELECT: HCPCS

## 2022-05-19 PROCEDURE — 86900 BLOOD TYPING SEROLOGIC ABO: CPT

## 2022-05-19 PROCEDURE — 2500000003 HC RX 250 WO HCPCS

## 2022-05-19 PROCEDURE — 93325 DOPPLER ECHO COLOR FLOW MAPG: CPT

## 2022-05-19 PROCEDURE — 6360000002 HC RX W HCPCS: Performed by: REGISTERED NURSE

## 2022-05-19 PROCEDURE — 2500000003 HC RX 250 WO HCPCS: Performed by: REGISTERED NURSE

## 2022-05-19 PROCEDURE — 93622 COMP EP EVAL L VENTR PAC&REC: CPT | Performed by: INTERNAL MEDICINE

## 2022-05-19 PROCEDURE — 93622 COMP EP EVAL L VENTR PAC&REC: CPT

## 2022-05-19 PROCEDURE — 3700000001 HC ADD 15 MINUTES (ANESTHESIA)

## 2022-05-19 PROCEDURE — C1760 CLOSURE DEV, VASC: HCPCS

## 2022-05-19 PROCEDURE — 80048 BASIC METABOLIC PNL TOTAL CA: CPT

## 2022-05-19 PROCEDURE — 36415 COLL VENOUS BLD VENIPUNCTURE: CPT

## 2022-05-19 PROCEDURE — 93656 COMPRE EP EVAL ABLTJ ATR FIB: CPT

## 2022-05-19 PROCEDURE — C1769 GUIDE WIRE: HCPCS

## 2022-05-19 PROCEDURE — 2580000003 HC RX 258: Performed by: REGISTERED NURSE

## 2022-05-19 PROCEDURE — 85027 COMPLETE CBC AUTOMATED: CPT

## 2022-05-19 PROCEDURE — 7100000000 HC PACU RECOVERY - FIRST 15 MIN

## 2022-05-19 PROCEDURE — 93623 PRGRMD STIMJ&PACG IV RX NFS: CPT

## 2022-05-19 PROCEDURE — 93656 COMPRE EP EVAL ABLTJ ATR FIB: CPT | Performed by: INTERNAL MEDICINE

## 2022-05-19 PROCEDURE — C1766 INTRO/SHEATH,STRBLE,NON-PEEL: HCPCS

## 2022-05-19 PROCEDURE — 93312 ECHO TRANSESOPHAGEAL: CPT

## 2022-05-19 PROCEDURE — 93005 ELECTROCARDIOGRAM TRACING: CPT | Performed by: INTERNAL MEDICINE

## 2022-05-19 PROCEDURE — 6360000002 HC RX W HCPCS

## 2022-05-19 PROCEDURE — C1732 CATH, EP, DIAG/ABL, 3D/VECT: HCPCS

## 2022-05-19 PROCEDURE — 93320 DOPPLER ECHO COMPLETE: CPT

## 2022-05-19 PROCEDURE — 86901 BLOOD TYPING SEROLOGIC RH(D): CPT

## 2022-05-19 RX ORDER — SODIUM CHLORIDE 0.9 % (FLUSH) 0.9 %
5-40 SYRINGE (ML) INJECTION PRN
Status: DISCONTINUED | OUTPATIENT
Start: 2022-05-19 | End: 2022-05-19 | Stop reason: HOSPADM

## 2022-05-19 RX ORDER — HEPARIN SODIUM 1000 [USP'U]/ML
INJECTION, SOLUTION INTRAVENOUS; SUBCUTANEOUS PRN
Status: DISCONTINUED | OUTPATIENT
Start: 2022-05-19 | End: 2022-05-19 | Stop reason: SDUPTHER

## 2022-05-19 RX ORDER — MEPERIDINE HYDROCHLORIDE 25 MG/ML
12.5 INJECTION INTRAMUSCULAR; INTRAVENOUS; SUBCUTANEOUS EVERY 5 MIN PRN
Status: DISCONTINUED | OUTPATIENT
Start: 2022-05-19 | End: 2022-05-19 | Stop reason: HOSPADM

## 2022-05-19 RX ORDER — SODIUM CHLORIDE 0.9 % (FLUSH) 0.9 %
5-40 SYRINGE (ML) INJECTION EVERY 12 HOURS SCHEDULED
Status: DISCONTINUED | OUTPATIENT
Start: 2022-05-19 | End: 2022-05-19 | Stop reason: HOSPADM

## 2022-05-19 RX ORDER — DEXAMETHASONE SODIUM PHOSPHATE 4 MG/ML
INJECTION, SOLUTION INTRA-ARTICULAR; INTRALESIONAL; INTRAMUSCULAR; INTRAVENOUS; SOFT TISSUE PRN
Status: DISCONTINUED | OUTPATIENT
Start: 2022-05-19 | End: 2022-05-19 | Stop reason: SDUPTHER

## 2022-05-19 RX ORDER — CEFAZOLIN SODIUM 1 G/3ML
INJECTION, POWDER, FOR SOLUTION INTRAMUSCULAR; INTRAVENOUS PRN
Status: DISCONTINUED | OUTPATIENT
Start: 2022-05-19 | End: 2022-05-19 | Stop reason: SDUPTHER

## 2022-05-19 RX ORDER — DIPHENHYDRAMINE HYDROCHLORIDE 50 MG/ML
12.5 INJECTION INTRAMUSCULAR; INTRAVENOUS
Status: DISCONTINUED | OUTPATIENT
Start: 2022-05-19 | End: 2022-05-19 | Stop reason: HOSPADM

## 2022-05-19 RX ORDER — ROCURONIUM BROMIDE 10 MG/ML
INJECTION, SOLUTION INTRAVENOUS PRN
Status: DISCONTINUED | OUTPATIENT
Start: 2022-05-19 | End: 2022-05-19 | Stop reason: SDUPTHER

## 2022-05-19 RX ORDER — SODIUM CHLORIDE 9 MG/ML
INJECTION, SOLUTION INTRAVENOUS PRN
Status: DISCONTINUED | OUTPATIENT
Start: 2022-05-19 | End: 2022-05-19 | Stop reason: HOSPADM

## 2022-05-19 RX ORDER — SUCCINYLCHOLINE/SOD CL,ISO/PF 100 MG/5ML
SYRINGE (ML) INTRAVENOUS PRN
Status: DISCONTINUED | OUTPATIENT
Start: 2022-05-19 | End: 2022-05-19 | Stop reason: SDUPTHER

## 2022-05-19 RX ORDER — ONDANSETRON 2 MG/ML
INJECTION INTRAMUSCULAR; INTRAVENOUS PRN
Status: DISCONTINUED | OUTPATIENT
Start: 2022-05-19 | End: 2022-05-19 | Stop reason: SDUPTHER

## 2022-05-19 RX ORDER — SODIUM CHLORIDE 9 MG/ML
INJECTION, SOLUTION INTRAVENOUS CONTINUOUS PRN
Status: DISCONTINUED | OUTPATIENT
Start: 2022-05-19 | End: 2022-05-19 | Stop reason: SDUPTHER

## 2022-05-19 RX ORDER — PROPOFOL 10 MG/ML
INJECTION, EMULSION INTRAVENOUS PRN
Status: DISCONTINUED | OUTPATIENT
Start: 2022-05-19 | End: 2022-05-19 | Stop reason: SDUPTHER

## 2022-05-19 RX ORDER — ONDANSETRON 2 MG/ML
4 INJECTION INTRAMUSCULAR; INTRAVENOUS
Status: DISCONTINUED | OUTPATIENT
Start: 2022-05-19 | End: 2022-05-19 | Stop reason: HOSPADM

## 2022-05-19 RX ORDER — HYDROMORPHONE HCL 110MG/55ML
0.5 PATIENT CONTROLLED ANALGESIA SYRINGE INTRAVENOUS EVERY 5 MIN PRN
Status: DISCONTINUED | OUTPATIENT
Start: 2022-05-19 | End: 2022-05-19 | Stop reason: HOSPADM

## 2022-05-19 RX ORDER — PANTOPRAZOLE SODIUM 40 MG/1
40 TABLET, DELAYED RELEASE ORAL
Qty: 30 TABLET | Refills: 5 | Status: SHIPPED | OUTPATIENT
Start: 2022-05-19 | End: 2022-09-13 | Stop reason: ALTCHOICE

## 2022-05-19 RX ORDER — HYDROMORPHONE HCL 110MG/55ML
0.25 PATIENT CONTROLLED ANALGESIA SYRINGE INTRAVENOUS EVERY 5 MIN PRN
Status: DISCONTINUED | OUTPATIENT
Start: 2022-05-19 | End: 2022-05-19 | Stop reason: HOSPADM

## 2022-05-19 RX ORDER — FENTANYL CITRATE 50 UG/ML
INJECTION, SOLUTION INTRAMUSCULAR; INTRAVENOUS PRN
Status: DISCONTINUED | OUTPATIENT
Start: 2022-05-19 | End: 2022-05-19 | Stop reason: SDUPTHER

## 2022-05-19 RX ADMIN — HEPARIN SODIUM 5000 UNITS: 1000 INJECTION, SOLUTION INTRAVENOUS; SUBCUTANEOUS at 08:45

## 2022-05-19 RX ADMIN — ROCURONIUM BROMIDE 10 MG: 10 INJECTION, SOLUTION INTRAVENOUS at 07:43

## 2022-05-19 RX ADMIN — HEPARIN SODIUM 5000 UNITS: 1000 INJECTION, SOLUTION INTRAVENOUS; SUBCUTANEOUS at 08:17

## 2022-05-19 RX ADMIN — Medication 140 MG: at 07:43

## 2022-05-19 RX ADMIN — SODIUM CHLORIDE: 9 INJECTION, SOLUTION INTRAVENOUS at 07:36

## 2022-05-19 RX ADMIN — ISOPROTERENOL HYDROCHLORIDE 5 MCG/MIN: 0.2 INJECTION, SOLUTION INTRAMUSCULAR; INTRAVENOUS at 09:45

## 2022-05-19 RX ADMIN — PHENYLEPHRINE HYDROCHLORIDE 20 MCG/MIN: 10 INJECTION INTRAVENOUS at 07:55

## 2022-05-19 RX ADMIN — CEFAZOLIN 2000 MG: 1 INJECTION, POWDER, FOR SOLUTION INTRAVENOUS at 08:15

## 2022-05-19 RX ADMIN — ONDANSETRON 4 MG: 2 INJECTION INTRAMUSCULAR; INTRAVENOUS at 07:50

## 2022-05-19 RX ADMIN — DEXAMETHASONE SODIUM PHOSPHATE 10 MG: 4 INJECTION, SOLUTION INTRAMUSCULAR; INTRAVENOUS at 07:50

## 2022-05-19 RX ADMIN — FENTANYL CITRATE 50 MCG: 50 INJECTION, SOLUTION INTRAMUSCULAR; INTRAVENOUS at 07:43

## 2022-05-19 RX ADMIN — HEPARIN SODIUM 4000 UNITS: 1000 INJECTION, SOLUTION INTRAVENOUS; SUBCUTANEOUS at 08:30

## 2022-05-19 RX ADMIN — PROPOFOL 200 MG: 10 INJECTION, EMULSION INTRAVENOUS at 07:43

## 2022-05-19 RX ADMIN — SODIUM CHLORIDE: 9 INJECTION, SOLUTION INTRAVENOUS at 08:21

## 2022-05-19 RX ADMIN — HEPARIN SODIUM 3000 UNITS: 1000 INJECTION, SOLUTION INTRAVENOUS; SUBCUTANEOUS at 09:06

## 2022-05-19 RX ADMIN — PROPOFOL 30 MG: 10 INJECTION, EMULSION INTRAVENOUS at 09:34

## 2022-05-19 RX ADMIN — FENTANYL CITRATE 50 MCG: 50 INJECTION, SOLUTION INTRAMUSCULAR; INTRAVENOUS at 07:54

## 2022-05-19 RX ADMIN — HEPARIN SODIUM 5000 UNITS: 1000 INJECTION, SOLUTION INTRAVENOUS; SUBCUTANEOUS at 09:30

## 2022-05-19 ASSESSMENT — PAIN SCALES - GENERAL
PAINLEVEL_OUTOF10: 0

## 2022-05-19 ASSESSMENT — LIFESTYLE VARIABLES: SMOKING_STATUS: 0

## 2022-05-19 NOTE — PROGRESS NOTES
1019 Received from cath lab per cart. See VS and flowsheets. Pt reminded to keep right leg straight.

## 2022-05-19 NOTE — ANESTHESIA PRE PROCEDURE
Department of Anesthesiology  Preprocedure Note       Name:  Rebecca Peguero   Age:  73 y.o.  :  1950                                          MRN:  [id number]         Date:  2022      Surgeon: * Surgery not found *    Procedure:     Medications prior to admission:   Prior to Admission medications    Medication Sig Start Date End Date Taking? Authorizing Provider   rivaroxaban (XARELTO) 20 MG TABS tablet TAKE ONE TABLET BY MOUTH DAILY WITH BREAKFAST 3/29/22   Long Golden MD   metoprolol succinate (TOPROL XL) 50 MG extended release tablet Take 2 tablets by mouth 2 times daily 3/29/22   Long Golden MD   iron polysaccharides (NIFEREX) 150 MG capsule Take 1 capsule by mouth 2 times daily 3/29/22 6/27/22  Long Golden MD   dilTIAZem (DILTIAZEM CD) 180 MG extended release capsule Take 1 capsule by mouth daily 3/29/22   Long Golden MD   hydroCHLOROthiazide (HYDRODIURIL) 25 MG tablet Take 1 tablet by mouth daily 3/29/22 4/28/22  Long Golden MD   methIMAzole (TAPAZOLE) 5 MG tablet Take 3 tablets by mouth daily 3/29/22   Long Golden MD       Current medications:    Current Facility-Administered Medications   Medication Dose Route Frequency Provider Last Rate Last Admin    sodium chloride flush 0.9 % injection 5-40 mL  5-40 mL IntraVENous PRN Charis Ceja MD           Allergies:     Allergies   Allergen Reactions    Chlorthalidone Other (See Comments)     Causes increase in b/p and severe headache    Naproxen Swelling    Lisinopril Rash       Problem List:    Patient Active Problem List   Diagnosis Code    Partial small bowel obstruction (HCC) K56.600    Near syncope R55    Chronic anticoagulation Z79.01    Endometrial cancer ([facility] [address] C54.1    Alkaline phosphatase elevation R74.8    Anemia D64.9    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, T45.1X5A    Essential hypertension I10    On antineoplastic chemotherapy Z79.899    Prediabetes R73.03    Abdominal pain, generalized R10.84    Abnormal liver enzymes R74.8    GIB (gastrointestinal bleeding) K92.2    Graves disease E05.00    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    Chest pain R07.9    Dizziness R42    PAF (paroxysmal atrial fibrillation) (HCC) I48.0    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    Bradycardia R00.1    Cardiac pacemaker in situ Z95.0    Non morbid obesity, unspecified obesity type E66.9       Past Medical History:        Diagnosis Date    Anal bleeding 2019    CT scan clear     Anemia     Arthritis     bilateral knee    Atrial fibrillation ([address]     cleared by cardiologist 2018, was related to cancer     Colon polyps     Diabetes mellitus ([address]     pre diabetic diet controlled    Endometrial cancer ([address]     History of blood transfusion     Hypertension     IFG (impaired fasting glucose)     SATISH (obstructive sleep apnea)     3/28/22-currently being fitted for CPAP    Small bowel obstruction (HCC)     resolved without surgery    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[address]     Thyroid nodule        Past Surgical History:        Procedure Laterality Date    CARDIOVERSION      CARDIOVERSION  09/19/2019    COLONOSCOPY N/A 3/14/2019    COLONOSCOPY POLYPECTOMY SNARE/COLD BIOPSY performed by Romulo Hanson MD at [address]  3/14/2019    COLONOSCOPY CONTROL HEMORRHAGE performed by Romulo Hanson MD at [address] N/A 3/26/2019    COLONOSCOPY CONTROL HEMORRHAGE performed by Romulo Hansno MD at [address] N/A 4/1/2022    COLONOSCOPY POLYPECTOMY SNARE/COLD BIOPSY performed by Romulo Hanson MD at [place] [address]  09/29/2021    SIGMOIDOSCOPY N/A 9/10/2019    SIGMOIDOSCOPY DIAGNOSTIC FLEXIBLE performed by Romulo Hanson MD at [facility] 1 TUNNELED CENTRAL VENOUS CATHETER W/ SUBCUTANEOUS PORT Right        Social History:    Social History Tobacco Use    Smoking status: Never Smoker    Smokeless tobacco: Never Used   Substance Use Topics    Alcohol use: Yes     Comment: occ wine                                Counseling given: Not Answered      Vital Signs (Current): There were no vitals filed for this visit. BP Readings from Last 3 Encounters:   04/05/22 134/88   04/01/22 (!) 140/80   04/01/22 123/69       NPO Status:                                                                                 BMI:   Wt Readings from Last 3 Encounters:   04/01/22 204 lb (92.5 kg)   03/29/22 204 lb 12.8 oz (92.9 kg)   03/08/22 208 lb 12.8 oz (94.7 kg)     There is no height or weight on file to calculate BMI.    CBC:   Lab Results   Component Value Date    WBC 2.7 01/20/2022    RBC 4.17 01/20/2022    HGB 9.3 01/20/2022    HCT 30.1 01/20/2022    MCV 72.2 01/20/2022    RDW 19.6 01/20/2022     01/20/2022       CMP:   Lab Results   Component Value Date     10/12/2021    K 4.4 10/12/2021    K 3.5 03/26/2019     10/12/2021    CO2 27 10/12/2021    BUN 9 10/12/2021    CREATININE 0.7 10/12/2021    GFRAA >60 10/12/2021    AGRATIO 1.2 10/12/2021    LABGLOM >60 10/12/2021    GLUCOSE 79 10/12/2021    PROT 7.7 10/12/2021    CALCIUM 9.7 10/12/2021    BILITOT <0.2 10/12/2021    ALKPHOS 90 10/12/2021    AST 28 10/12/2021    ALT 11 10/12/2021       POC Tests: No results for input(s): POCGLU, POCNA, POCK, POCCL, POCBUN, POCHEMO, POCHCT in the last 72 hours.     Coags:   Lab Results   Component Value Date    PROTIME 32.4 06/18/2021    INR 2.76 06/18/2021    APTT 47.0 06/18/2021       HCG (If Applicable): No results found for: PREGTESTUR, PREGSERUM, HCG, HCGQUANT     ABGs: No results found for: PHART, PO2ART, ORU6ART, LXT1IVI, BEART, B6NPTYMA     Type & Screen (If Applicable):  No results found for: LABABO, LABRH    Drug/Infectious Status (If Applicable):  No results found for: HIV, HEPCAB    COVID-19 Screening (If Applicable): No results found for: COVID19        Anesthesia Evaluation  Patient summary reviewed and Nursing notes reviewed no history of anesthetic complications:   Airway: Mallampati: II  TM distance: >3 FB   Neck ROM: full  Mouth opening: > = 3 FB Dental:    (+) upper dentures      Pulmonary:normal exam  breath sounds clear to auscultation  (+) sleep apnea: on CPAP,      (-) COPD, asthma and not a current smoker                           Cardiovascular:    (+) hypertension:, valvular problems/murmurs: MR, pacemaker (MDT PPM, 2/2 KHAN dysfcn/HB/tachybrady, AAIR ): pacemaker, dysrhythmias (paf, on bb and ac as outpt, s/p PPM 2/2 KHAN dysfcn/HB/tachybrady): atrial fibrillation,     (-) past MI, CAD (neg stress test 2021), CABG/stent,  angina and  CHF (echo 2021 EF 55-60 no rwma mod mr gr 2 dd)    ECG reviewed  Rhythm: regular  Rate: normal  Echocardiogram reviewed  Stress test reviewed       Beta Blocker:  Dose within 24 Hrs         Neuro/Psych:   (+) neuromuscular disease (neuropathy):,    (-) seizures, TIA and CVA           GI/Hepatic/Renal: Neg GI/Hepatic/Renal ROS       (-) GERD, liver disease and no renal disease       Endo/Other:    (+) hyperthyroidism (on methimazole, stable): arthritis: OA., .    (-) diabetes mellitus               Abdominal:   (+) obese,           Vascular: Other Findings:           Anesthesia Plan      general     ASA 3       Induction: intravenous. Anesthetic plan and risks discussed with patient. Plan discussed with CRNA.                   Steffen Levi MD   5/19/2022

## 2022-05-19 NOTE — PROCEDURES
[address]     Electrophysiology Procedure Note       Date of Procedure: 5/19/2022  Patient's Name: Evelyn Hernandez  YOB: 1950   Medical Record Number: [id number]  Procedure Performed by: Paulo Hong MD    Procedure performed:     · Electrophysiology study with radiofrequency ablation of atrial fibrillation and pulmonary vein isolation   · 3-D electroanatomical mapping of the left atrium  · Transseptal puncture through an intact septum    · Intracardiac echocardiography. Indications for procedure: Symptomatic atrial fibrillation  Evelyn Hernandez is a 73 y.o. female with symptomatic atrial fibrillation. Details of Procedure: The risks, benefits and alternatives of the ablation procedure were discussed with the patient. The risks including, but not limited to, the risks of bleeding, infection, radiation exposure, injury to vascular, cardiac and surrounding structures (including pneumothorax), stroke, cardiac perforation, tamponade, need for emergent open heart surgery, need for pacemaker implantation, esophageal injury and fistula, myocardial infarction and death were discussed in detail. The patient opted to proceed with the ablation. Written informed consent was signed and placed in the chart. Patient was brought to the EP lab in a fasting non-sedate state. Patient underwent general anesthesia by anesthesia team. We initially performed a transesophageal echo that showed no left atrial appendage clot/thrombus. Groins were prepped in a sterile fashion. Femoral venous access was obtained under live ultrasound guidance. The femoral vein was identified with real time visualization of needle passage to the venous lumen. We gained access to right femoral vein. Two 8F and one long 8.5 sheath was using and were placed in the right femoral vein using modified Seldinger technique and ultrasound guidance.      Then a CS cathter was placed inside the coronary sinus under fluoroscopy for recording and mapping of the left atrium. Using ICE we delineated the left pulmonary vein, left atrial appendage,  mitral valve, and right superior and right inferior pulmonary veins. Prior to full heparinization, ICE images did not reveal any significant pericardial effusion. Patient received a bolus of heparin prior transseptal puncture followed by additional heparin bolus / heparin drip with continuous monitoring of the ACT during the procedure to keep the ACT more than 350 seconds. Transseptal punctures through intact septum were done using ICE, and pressure monitoring, using VersaCross      Using Penta ray and Carto navigation system a three dimensional electro anatomical mapping of the left atrium, in addition to right and left sided pulmonary vein was created. Using Penta ray catheter voltage mapping of the left atrium was created. An esophageal temperature probe in addition to Esophastar catheter was advanced into the esophagus for mapping of the esophagus and careful monitoring of the esophageal temperature during ablation. Ablation stopped if the temperature was rising for more than ~ 0.5 C. All pulmonary veins had PV fascicles, the triggers for the atrial fibrillation. Then wide area circumferential ablation for right and left sided pulmonary veins were performed. Linear RF lesions was placed around both superior and inferior pulmonary vein in right and left side well outside the pulmonary vein ostium till all four pulmonary veins were isolated. The power was limited to 35W, on the posterior wall and careful monitoring of esophageal temperature was done to prevent injury to esophagus. On the right side before ablating the anterior wall high amp pacing was performed to check and locate phrenic capture and avoid injury during ablation. We had to ablate the nghia on the left side to be able to isolate the left sided pulmonary veins completely.      Isuprel IV was started to check for induction of any other arrhythmia. Burst pacing up to 230 ms and programmed stimulation with 500/230/250 did not induce any sustained arrhythmia. Pacing with high output over the ablation lines, antrum of pulmonary veins were performed and areas that showed atrial capture were further ablated till no further capture noted. Exit block was checked with high amp pacing inside and over the ablation lines. Both entrance and exit block were confirmed and pacing maneuvers. After ablation EP study and programmed stimulation was performed to rule out any other arrhythmia. The ablation catheter were moved from the left atrium to the left ventricular apex and His bundle position. Patient has dual chamber pacemaker and is being paced. No AH interval noted. Pacing from right atrium, there was no 1:1 conduction over AV node with cycle length of 600 ms  Pacing from LV apex, no retrograde conduction over AV node noted. Procedure was performed with intracardiac ultrasound and 3D mapping system without using fluoroscopy. At the end of the procedure, using ICE we confirmed the lack of any pericardial effusion. Sheaths were removed using and Perclose device was used for closure of the access sites. The patient tolerated the procedure well and there were no complications. Patient was extubated and transferred to the floor in stable condition. EBL less than 20 ml. Conclusion:     - Pulmonary vein isolations using wide area circumferential radiofrequency ablation     Plan:   The patient will be observed, receives usual post ablation care and discharged if remains stable. Resume OAC, and PPI for 30 days.      Francine Nick MD, MPH  [facility]   Office: [phone]

## 2022-05-19 NOTE — H&P
[address]   Electrophysiology Follow up   Date: 5/19/2022  I had the privilege of visiting Mario Méndez in the office. CC: Atrial fibrillation    HPI: Mario Méndez is a 73 y.o. female  with PMH of HTN is being referred by PCP, Dr. Tammy Palma, for evaluation of Afib. She was originally diagnosed with Afib in 7/2017, followed by cardiologist at [address] [place] [address].       Afib diagnosed in 7/2017. She was later diagnosed with uterine cancer and underwent surgery, chemotherapy.     She underwent DCCVs on 8/2017 and 9/2017.       Diagnosed with FIGO Stage IA (pT1a(2), N0, M0) serous carcinoma of the endometrium, status post TLH/BSO and five of a planned six cycles of adjuvant chemotherapy completed 08/28/2018. Her final treatment for radiation is today and completed with chemotherapy. She had episode of syncope while receiving chemo in 7/2018.        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 She received 4 units RBCs and discharged home without anticoagulation. Xarelto was stopped.      S/p DCCV 9/19/19     Xarelto 20 mg restarted 12/10/19      Presented to [facility] ER on 6/2021 with complaints of CP and dizziness. Show wore monitor which showed symptomatic pauses, also with bifascicular block. S/p dual chamber PPM 9/29/2021    Bertin Kemp presents today in follow up. She is doing well. She is following with oncology every 6 months. She works part time at [employer]. She has had episodes of palpitations. She is symptomatic with atrial fibrillation and would like to proceed with atrial fibrillation ablation. Assessment and plan:     Paroxysmal Atrial Fibrillation    EKG today Atrial paced    Afib [place] on device interrogation today : 32% worsening, also has episodes of RVR.       Increase Toprol  mg    daily, Cardizem 180 mg daily  ( started 12/02/2021 for rate control )     S/p DCCV 8/2017, 9/2017, and 9/2019  - [id number] score: 4 (age, gender, HTN., DM); [id number] Vasc score and anticoagulation discussed. High risk for stroke and thromboembolism. Anticoagulation is recommended.   ~ On Xarelto 20 mg daily,  Denies s/s of bleeding  ~ Creatinine Clearance of 113 ml/min based on creatinine of 0.7 10/12/2021      - Afib risk factors including age, HTN, obesity, inactivity and SATISH were discussed with patient. Risk factor modification recommended. - TSH 02/09/2021 < 0.01- on tapazole 15 mg daily    - she is not a candidate for amiodarone due to her thyroid issues. We discussed treatment options including antiarrhythmics, cardioversion, rate control with anticoagulation, and ablation in detail with patient. We discussed progressive nature of atrial fibrillation     Atrial fibrillation ablation procedure has been discussed in detail with patient. Risk, benefit, alternative, and rationale of the procedure has been discussed with the patient. I have explained that ablation therapy is symptoms driven and alternative such as anticoagulation and rate control can be considered. We discussed the need for repeat procedure. On average patients need more than one ablation procedure        Risks associated with procedure include but not limited to allergic reaction to the medications, pain, bleeding, infection, nerve injury, injury to diaphragm(breathing muscle), pulmonary embolus(blood clot in lungs), deep vein blood clot, pneumothorax, hemothorax, acute renal failure, cardiac perforation,  tamponade, need for emergent surgery (open heart), permanent pacemaker, pulmonary vein stenosis, left atrial to esophageal fistula, stroke, myocardial infarction and death. Difference between atrial fibrillation and atrial flutter discussed and treatment discussed.      A detailed educational information about ablation has been provided and patient has been encouraged to review information and call back with any questions about risks, benefits and alternative of procedure. Patient verbalized understanding of procedure, risks and benefits and wishes to proceed with ablation. SSS/ Implantable device    - s/p dual chamber pacemaker 09/29/2021   - The CIED was interrogated and programmed and I supervised and reviewed all the data. All findings and changes are in device interrogation sheet and reflect my personal interpretation and changes and is scanned to Epic.   12.7 years remaining, AP  53.7% ,  34.7 % 32% atrial fibrillation burden, last on 03/06/2022, longest 64 hours, NSVT episode noted on 01/02/2022 lasting 6-7 seconds , 20 beats     HTN  - borderline 135/80   -BP goal <130/80  -Home BP monitoring encouraged, printed information provided on how to accurately measure BP at home.  -Counseled to follow a low salt diet to assure blood pressure remains controlled for cardiovascular risk factor modification.   -The patient is counseled to get regular exercise 3-5 times per week and maintain a healthy weight reduce cardiovascular risk factors.    - continue Cardizem, Toprol,  Hydrodiuril         Syncope   No recurrence    S/p pacemaker      Graves Disease              - She has been treated with me methimazole since 3/2019 and she recently had to increase dose, her follow up blood work 10/2021  showed TSH < 0.01   - Tapazole 15 mg               - Follows with Dr. 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    GIB (gastrointestinal bleeding) 03/26/2019    Near syncope 07/17/2018    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[facility] 06/19/2018    Partial small bowel obstruction ([facility] 06/08/2018    On antineoplastic chemotherapy 06/07/2018    Abdominal pain, generalized 06/07/2018    End[facility]rial cancer [facility] 03/27/2018    Alkaline phosphatase elevation 03/08/2018    Prediabetes 03/08/2018    Chronic anticoagulation 10/13/2017    Anemia 07/20/2017    Abnormal liver enzymes 07/20/2017    Essential hypertension 07/06/2016     Diagnostic studies:   ECG 3/8/22  Atrial paced   465  QTcH,  136 QRS     [facility] 6/22-7/6/21  NSR [address]  AF burden 14%, long pauses up to 5 sec  PAC's 4%     ECHO 6/19/21  Normal LV systolic function with an estimated EF of 55-60%.   No regional wall motion abnormalities are seen.   Grade II diastolic dysfunction with elevated LV filling pressures.   The right ventricle is mildly enlarged.   Right ventricular systolic function is normal.   There is bi-atrial enlargement.   The mitral valve leaflets are slightly thickened with normal leaflet mobility.   Moderate mitral regurgitation.   Trivial aortic regurgitation.   Mild tricuspid regurgitation with a PASP of 38 mmHg.   Mild pulmonic regurgitation.       Echo: 7/18/18 ([facility])  Summary:  Overall left ventricular ejection fraction is estimated to be 50-55%. The left ventricular function is low normal.  The left ventricular wall motion is normal.  The left atrium is mildly dilated. There is mild to moderate mitral regurgitation. Mild pulmonic valvular regurgitation. Moderate tricuspid regurgitation is present. The right atrium is mildly dilated. Trace aortic regurgitation. The estimated right ventricular systolic pressure is consistent with  moderate pulmonary hypertension.       I independently reviewed the cardiac diagnostic studies, ECG and relevant imaging studies.      Lab Results   Component Value Date    LVEF 58 06/19/2021     Lab Results   Component Value Date    TSH 1.35 05/13/2022         Physical Examination:  Vitals:    05/19/22 0701   BP: (!) 174/93   Pulse: 63   Resp: 18   Temp: 98 °F (36.7 °C)      Wt Readings from Last 3 Encounters:   05/19/22 204 lb (92.5 kg)   04/01/22 204 lb (92.5 kg)   03/29/22 204 lb 12.8 oz (92.9 kg)       · Constitutional: Oriented. No distress. · Head: Normocephalic and atraumatic. · Mouth/Throat: Oropharynx is clear and moist.   · Eyes: Conjunctivae normal. EOM are normal.   · Neck: Neck supple. No JVD present. · Cardiovascular: Normal rate, regular rhythm, Q5&N3.  + systolic murmur   · Pulmonary/Chest: Bilateral respiratory sounds. No rhonchi. · Abdominal: Soft. No tenderness. · Musculoskeletal: No tenderness. No edema    · Lymphadenopathy: Has no cervical adenopathy. · Neurological: Alert and oriented. Follows command, No Gross deficit   · Skin: Skin is warm, No rash noted. · Psychiatric: Has a normal behavior          Review of System:  [x] Full ROS obtained and negative except as mentioned in HPI    Prior to Admission medications    Medication Sig Start Date End Date Taking?  Authorizing Provider   rivaroxaban (XARELTO) 20 MG TABS tablet TAKE ONE TABLET BY MOUTH DAILY WITH BREAKFAST 3/29/22   Luz Levin MD   metoprolol succinate (TOPROL XL) 50 MG extended release tablet Take 2 tablets by mouth 2 times daily 3/29/22   Luz Levin MD   iron polysaccharides (NIFEREX) 150 MG capsule Take 1 capsule by mouth 2 times daily 3/29/22 6/27/22  Luz Levin MD   dilTIAZem (DILTIAZEM CD) 180 MG extended release capsule Take 1 capsule by mouth daily 3/29/22   Luz Levin MD   hydroCHLOROthiazide (HYDRODIURIL) 25 MG tablet Take 1 tablet by mouth daily 3/29/22 4/28/22  Luz Levin MD   methIMAzole (TAPAZOLE) 5 MG tablet Take 3 tablets by mouth daily 3/29/22   Luz Levin MD       Allergies   Allergen Reactions    Chlorthalidone Other (See Comments)     Causes increase in b/p and severe headache    Naproxen Swelling    Lisinopril Rash       Social History:  Reviewed. reports that she has never smoked. She has never used smokeless tobacco. She reports current alcohol use. She reports that she does not use drugs. Family History:  Reviewed. Reviewed. No family history of SCD. Relevant and available labs, and cardiovascular diagnostics reviewed. Reviewed. I independently reviewed relevant and available cardiac diagnostic tests ECG, CXR, Echo, Stress test, Device interrogation, Holter, CT scan. Complex medical condition with multiple medical problems affecting prognosis and outcome of EP interventions    All questions and concerns were addressed to the patient/family. Alternatives to my treatment were discussed. I have discussed the above stated plan and the patient verbalized understanding and agreed with the plan. NOTE: This report was transcribed using voice recognition software. Every effort was made to ensure accuracy, however, inadvertent computerized transcription errors may be present. Dustin Leslie MD, MPH  [facility]   Office: [phone]  Fax: [phone]      H&P Update    I have reviewed the history and physical and examined the patient and updated with relevant changes. Consent: I have discussed with the patient and/or the patient representative the indication, alternatives, and the possible risks and/or complications of the planned procedure and the anesthesia methods. The patient and/or patient representative appear to understand and agree to proceed. Vitals:    05/19/22 0701   BP: (!) 174/93   Pulse: 63   Resp: 18   Temp: 98 °F (36.7 °C)     Prior to Admission medications    Medication Sig Start Date End Date Taking?  Authorizing Provider   rivaroxaban (XARELTO) 20 MG TABS tablet TAKE ONE TABLET BY MOUTH DAILY WITH BREAKFAST 3/29/22   Robbie Bermeo MD   metoprolol succinate (TOPROL XL) 50 MG extended release tablet Take 2 tablets by mouth 2 times daily 3/29/22   Johanny Castro MD   iron polysaccharides (NIFEREX) 150 MG capsule Take 1 capsule by mouth 2 times daily 3/29/22 6/27/22  Johanny Castro MD   dilTIAZem (DILTIAZEM CD) 180 MG extended release capsule Take 1 capsule by mouth daily 3/29/22   Johanny Castro MD   hydroCHLOROthiazide (HYDRODIURIL) 25 MG tablet Take 1 tablet by mouth daily 3/29/22 4/28/22  Johanny Castro MD   methIMAzole (TAPAZOLE) 5 MG tablet Take 3 tablets by mouth daily 3/29/22   Johanny Castro MD     Past Medical History:   Diagnosis Date    Anal bleeding 2019    CT scan clear     Anemia     Arthritis     bilateral knee    Atrial fibrillation ([address]     cleared by cardiologist 2018, was related to cancer     Colon polyps     Diabetes mellitus ([address]     pre diabetic diet controlled    Endometrial cancer ([address]     History of blood transfusion     Hypertension     IFG (impaired fasting glucose)     SATISH (obstructive sleep apnea)     3/28/22-currently being fitted for CPAP    Small bowel obstruction ([address]     resolved without surgery    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[address]     Thyroid nodule      Past Surgical History:   Procedure Laterality Date    CARDIOVERSION      CARDIOVERSION  09/19/2019    COLONOSCOPY N/A 3/14/2019    COLONOSCOPY POLYPECTOMY SNARE/COLD BIOPSY performed by Roseline Dempsey MD at [address]  3/14/2019    COLONOSCOPY CONTROL HEMORRHAGE performed by Roseline Dempsey MD at [address] N/A 3/26/2019    COLONOSCOPY CONTROL HEMORRHAGE performed by Roseline Dempsey MD at [address] N/A 4/1/2022    COLONOSCOPY POLYPECTOMY SNARE/COLD BIOPSY performed by Roseline Dempsey MD at [place] [address]  09/29/2021    SIGMOIDOSCOPY N/A 9/10/2019    SIGMOIDOSCOPY DIAGNOSTIC FLEXIBLE performed by Roseline Dempsey MD at [place] [address] TUNNELED CENTRAL VENOUS CATHETER W/ SUBCUTANEOUS PORT Right      Allergies   Allergen Reactions    Chlorthalidone Other (See Comments)     Causes increase in b/p and severe headache    Naproxen Swelling    Lisinopril Rash       Documentation and Exam:   I have personally completed a history, physical exam & review of systems for this patient (see notes).     Sedation will be provided by anesthesia team.     Electronically signed by Pancho Maurer MD on 5/19/2022 at 7:34 AM

## 2022-05-19 NOTE — ANESTHESIA POSTPROCEDURE EVALUATION
Department of Anesthesiology  Postprocedure Note    Patient: Haris Pan  MRN: [id number]  YOB: 1950  Date of evaluation: 5/19/2022  Time:  10:35 AM     Procedure Summary     Date: 05/19/22 Room / Location: [facility] Cath Lab; [facility] Echocardiography    Anesthesia Start: 0736 Anesthesia Stop: 6941    Procedure: ECHO/ABLATION WITH ANESTHESIA Diagnosis:       Paroxysmal atrial fibrillation      Paroxysmal atrial fibrillation    Scheduled Providers:  Responsible Provider: Jose Yi MD    Anesthesia Type: general ASA Status: 3          Anesthesia Type: No value filed. Yelitza Phase I: Yelitza Score: 9    Yelitza Phase II:      Last vitals: Reviewed and per EMR flowsheets.        Anesthesia Post Evaluation    Patient location during evaluation: PACU  Patient participation: complete - patient participated  Level of consciousness: awake and alert  Pain score: 2  Airway patency: patent  Nausea & Vomiting: no vomiting  Complications: no  Cardiovascular status: blood pressure returned to baseline  Respiratory status: acceptable  Hydration status: euvolemic  Multimodal analgesia pain management approach

## 2022-05-20 ENCOUNTER — TELEPHONE (OUTPATIENT)
Dept: CARDIOLOGY CLINIC | Age: 72
End: 2022-05-20

## 2022-05-20 ENCOUNTER — NURSE ONLY (OUTPATIENT)
Dept: CARDIOLOGY CLINIC | Age: 72
End: 2022-05-20

## 2022-05-20 LAB
EKG ATRIAL RATE: 63 BPM
EKG DIAGNOSIS: NORMAL
EKG P AXIS: 22 DEGREES
EKG P-R INTERVAL: 206 MS
EKG Q-T INTERVAL: 452 MS
EKG QRS DURATION: 148 MS
EKG QTC CALCULATION (BAZETT): 462 MS
EKG R AXIS: -40 DEGREES
EKG T AXIS: 7 DEGREES
EKG VENTRICULAR RATE: 63 BPM

## 2022-05-20 PROCEDURE — 93010 ELECTROCARDIOGRAM REPORT: CPT | Performed by: INTERNAL MEDICINE

## 2022-05-20 NOTE — PROGRESS NOTES
Patient in for groin check from ablation. She removed outer bandage and was concerned about something sticking out of the area. There was a small piece of gauze in that area that was completely in tact. The area was examed and appear free from s/s of infection. Advised to apply ice  Off and on to the area if she has complaints of discomfort.

## 2022-05-20 NOTE — TELEPHONE ENCOUNTER
PT called and would like to know if she can remove the covering over her surgical sight. Please call to advise.

## 2022-05-31 ENCOUNTER — OFFICE VISIT (OUTPATIENT)
Dept: PULMONOLOGY | Age: 72
End: 2022-05-31
Payer: MEDICARE

## 2022-05-31 VITALS
BODY MASS INDEX: 30.21 KG/M2 | OXYGEN SATURATION: 98 % | TEMPERATURE: 97.3 F | WEIGHT: 204 LBS | DIASTOLIC BLOOD PRESSURE: 70 MMHG | SYSTOLIC BLOOD PRESSURE: 118 MMHG | HEIGHT: 69 IN | HEART RATE: 78 BPM

## 2022-05-31 DIAGNOSIS — E66.9 NON MORBID OBESITY, UNSPECIFIED OBESITY TYPE: Chronic | ICD-10-CM

## 2022-05-31 DIAGNOSIS — I48.0 PAROXYSMAL ATRIAL FIBRILLATION (HCC): Chronic | ICD-10-CM

## 2022-05-31 DIAGNOSIS — G47.33 OBSTRUCTIVE SLEEP APNEA SYNDROME: Primary | ICD-10-CM

## 2022-05-31 DIAGNOSIS — I10 ESSENTIAL HYPERTENSION: Chronic | ICD-10-CM

## 2022-05-31 PROCEDURE — G8399 PT W/DXA RESULTS DOCUMENT: HCPCS | Performed by: INTERNAL MEDICINE

## 2022-05-31 PROCEDURE — G8417 CALC BMI ABV UP PARAM F/U: HCPCS | Performed by: INTERNAL MEDICINE

## 2022-05-31 PROCEDURE — 1036F TOBACCO NON-USER: CPT | Performed by: INTERNAL MEDICINE

## 2022-05-31 PROCEDURE — 99214 OFFICE O/P EST MOD 30 MIN: CPT | Performed by: INTERNAL MEDICINE

## 2022-05-31 PROCEDURE — 1090F PRES/ABSN URINE INCON ASSESS: CPT | Performed by: INTERNAL MEDICINE

## 2022-05-31 PROCEDURE — 1123F ACP DISCUSS/DSCN MKR DOCD: CPT | Performed by: INTERNAL MEDICINE

## 2022-05-31 PROCEDURE — 3017F COLORECTAL CA SCREEN DOC REV: CPT | Performed by: INTERNAL MEDICINE

## 2022-05-31 PROCEDURE — G8427 DOCREV CUR MEDS BY ELIG CLIN: HCPCS | Performed by: INTERNAL MEDICINE

## 2022-05-31 ASSESSMENT — SLEEP AND FATIGUE QUESTIONNAIRES
HOW LIKELY ARE YOU TO NOD OFF OR FALL ASLEEP WHILE SITTING INACTIVE IN A PUBLIC PLACE: 0
HOW LIKELY ARE YOU TO NOD OFF OR FALL ASLEEP WHEN YOU ARE A PASSENGER IN A CAR FOR AN HOUR WITHOUT A BREAK: 1
HOW LIKELY ARE YOU TO NOD OFF OR FALL ASLEEP WHILE SITTING AND READING: 0
HOW LIKELY ARE YOU TO NOD OFF OR FALL ASLEEP WHILE SITTING AND TALKING TO SOMEONE: 0
ESS TOTAL SCORE: 1
HOW LIKELY ARE YOU TO NOD OFF OR FALL ASLEEP IN A CAR, WHILE STOPPED FOR A FEW MINUTES IN TRAFFIC: 0
HOW LIKELY ARE YOU TO NOD OFF OR FALL ASLEEP WHILE WATCHING TV: 0
HOW LIKELY ARE YOU TO NOD OFF OR FALL ASLEEP WHILE LYING DOWN TO REST IN THE AFTERNOON WHEN CIRCUMSTANCES PERMIT: 0
HOW LIKELY ARE YOU TO NOD OFF OR FALL ASLEEP WHILE SITTING QUIETLY AFTER LUNCH WITHOUT ALCOHOL: 0

## 2022-05-31 NOTE — ASSESSMENT & PLAN NOTE
New Problem - On Tx. Reviewed sleep study and download compliance data with patient. Supplies and parts as needed for her machine. These are medically necessary. Limit caffeine use after 3pm.  Needs to work with DME on mask fit.

## 2022-05-31 NOTE — LETTER
[facility]  [address] [place] [address]  Phone: [phone]  Fax: [phone]    Pascual George MD    May 31, 2022     [place], [address] [facility] [address]    Patient: Stefano Forrest   MR Number: [id number]   YOB: 1950   Date of Visit: 5/31/2022       Dear Ольга [place]:    Thank you for referring Stefano Forrest to me for evaluation/treatment. Below are the relevant portions of my assessment and plan of care. 1. Obstructive sleep apnea syndrome  Assessment & Plan:  New Problem - On Tx. Reviewed sleep study and download compliance data with patient. Supplies and parts as needed for her machine. These are medically necessary. Limit caffeine use after 3pm.  Needs to work with DME on mask fit. 2. Paroxysmal atrial fibrillation (HCC)  Assessment & Plan:  Chronic- Stable. Discussed the importance of treating sleep apnea as part of the management of this disorder. Cont any meds per PCP and other physicians. 3. Essential hypertension  Assessment & Plan:  Chronic- Stable. Discussed the importance of treating sleep apnea as part of the management of this disorder. Cont any meds per PCP and other physicians. 4. Non morbid obesity, unspecified obesity type  Assessment & Plan:  Chronic-not stable:  Discussed importance of treating obstructive sleep apnea and getting sufficient sleep to assist with weight control. Encouraged her to work on weight loss through diet and exercise. Recommended DASH or Mediterranean diets. Reviewed, analyzed, and documented physiologic data from patient's PAP machine. This information was analyzed to assess complexity and medical decision making in regards to further testing and management. The primary encounter diagnosis was Obstructive sleep apnea syndrome.  Diagnoses of Paroxysmal atrial fibrillation ([facility] Essential hypertension, and Non morbid obesity, unspecified obesity type were also pertinent to this visit. The chronic medical conditions listed are directly related to the primary diagnosis listed above. The management of the primary diagnosis affects the secondary diagnosis and vice versa. If you have questions, please do not hesitate to call me. I look forward to following Virgen Bledsoe along with you.     Sincerely,      Angelica Thompson MD

## 2022-05-31 NOTE — PROGRESS NOTES
Gertrudis Peña [facility]  [id number] [place] [address], [address] [facility]- [phone]   [facility] Yocasta Patton. [address] [place] [address]. Fanta Cortes [phone] [address] [facility] SLEEP MEDICINE  [address] [place], [place] [id number] [phone]      Assessment/Plan:      1. Obstructive sleep apnea syndrome  Assessment & Plan:  New Problem - On Tx. Reviewed sleep study and download compliance data with patient. Supplies and parts as needed for her machine. These are medically necessary. Limit caffeine use after 3pm.  Needs to work with DME on mask fit. 2. Paroxysmal atrial fibrillation (HCC)  Assessment & Plan:  Chronic- Stable. Discussed the importance of treating sleep apnea as part of the management of this disorder. Cont any meds per PCP and other physicians. 3. Essential hypertension  Assessment & Plan:  Chronic- Stable. Discussed the importance of treating sleep apnea as part of the management of this disorder. Cont any meds per PCP and other physicians. 4. Non morbid obesity, unspecified obesity type  Assessment & Plan:  Chronic-not stable:  Discussed importance of treating obstructive sleep apnea and getting sufficient sleep to assist with weight control. Encouraged her to work on weight loss through diet and exercise. Recommended DASH or Mediterranean diets. Reviewed, analyzed, and documented physiologic data from patient's PAP machine. This information was analyzed to assess complexity and medical decision making in regards to further testing and management. The primary encounter diagnosis was Obstructive sleep apnea syndrome. Diagnoses of Paroxysmal atrial fibrillation ([address] Essential hypertension, and Non morbid obesity, unspecified obesity type were also pertinent to this visit. The chronic medical conditions listed are directly related to the primary diagnosis listed above. The management of the primary diagnosis affects the secondary diagnosis and vice versa. Subjective:   Subjective   Patient ID: Segundo Love is a 73 y.o. female. Chief Complaint   Patient presents with    Sleep Apnea       HPI:  Machine Modem/Download Info:  Compliance (hours/night): 3.75 hrs/night  % of nights >= 4 hrs: 60 %  Download AHI (/hour): 2.4 /HR  Average CPAP Pressure : 11 cmH2O APAP - Settings  Pressure Min: 6 cmH2O  Pressure Max: 16 cmH2O                 Comfort Settings  Flex/EPR (0-3): 3       Had insurance mandated HST on 11/18/21 which showed and GERALD-7.9/hr (using 4% rule) and low sat-90%. Pressure feels good on her machine not having issues with headaches or dryness. Struggling with some mask leak that wakes her up at the nighttime and feels she is not getting good fit but has not checked with her DME company on a different mask. When she wears her machine longer her daughter notices that she has more energy and she does better during the daytime. [address]    Keavy - Total score: 1    Social History     Socioeconomic History    Marital status:      Spouse name: Not on file    Number of children: Not on file    Years of education: Not on file    Highest education level: Not on file   Occupational History    Occupation: cash checking   Tobacco Use    Smoking status: Never Smoker    Smokeless tobacco: Never Used   Vaping Use    Vaping Use: Never used   Substance and Sexual Activity    Alcohol use: Yes     Comment: occ wine    Drug use: No    Sexual activity: Never   Other Topics Concern    Not on file   Social History Narrative    Lives home with her 3 children. Doing well 8 grandchildren.         Social Determinants of Health     Financial Resource Strain: Low Risk     Difficulty of Paying Living Expenses: Not hard at all   Food Insecurity: No Food Insecurity    Worried About Running Out of Food in the Last Year: Never true   Zannie Cowden Ran Out of Food in the Last Year: Never true   Transportation Needs:     Lack of Transportation (Medical): Not on file    Lack of Transportation (Non-Medical): Not on file   Physical Activity:     Days of Exercise per Week: Not on file    Minutes of Exercise per Session: Not on file   Stress:     Feeling of Stress : Not on file   Social Connections:     Frequency of Communication with Friends and Family: Not on file    Frequency of Social Gatherings with Friends and Family: Not on file    Attends Rastafarian Services: Not on file    Active Member of [address] [place] [address] [employer] or Organizations: Not on file    Attends Club or Organization Meetings: Not on file    Marital Status: Not on file   Intimate Partner Violence:     Fear of Current or Ex-Partner: Not on file    Emotionally Abused: Not on file    Physically Abused: Not on file    Sexually Abused: Not on file   Housing Stability:     Unable to Pay for Housing in the Last Year: Not on file    Number of Places Lived in the Last Year: Not on file    Unstable Housing in the Last Year: Not on file       Current Outpatient Medications   Medication Instructions    dilTIAZem (DILTIAZEM CD) 180 mg, Oral, DAILY    hydroCHLOROthiazide (HYDRODIURIL) 25 mg, Oral, DAILY    iron polysaccharides (NIFEREX) 150 mg, Oral, 2 TIMES DAILY    methIMAzole (TAPAZOLE) 15 mg, Oral, DAILY    metoprolol succinate (TOPROL XL) 100 mg, Oral, 2 TIMES DAILY    pantoprazole (PROTONIX) 40 mg, Oral, DAILY BEFORE BREAKFAST    rivaroxaban (XARELTO) 20 MG TABS tablet TAKE ONE TABLET BY MOUTH DAILY WITH BREAKFAST          Vitals:  Weight BMI   Wt Readings from Last 3 Encounters:   05/31/22 204 lb (92.5 kg)   05/19/22 204 lb (92.5 kg)   04/01/22 204 lb (92.5 kg)    Body mass index is 30.13 kg/m².      BP HR SaO2   BP Readings from Last 3 Encounters:   05/31/22 118/70   05/19/22 129/72   04/05/22 134/88    Pulse Readings from Last 3 Encounters:   05/31/22 78   05/19/22 62   04/01/22 60    SpO2 Readings

## 2022-06-03 DIAGNOSIS — I10 ESSENTIAL HYPERTENSION: Chronic | ICD-10-CM

## 2022-06-03 DIAGNOSIS — I48.19 PERSISTENT ATRIAL FIBRILLATION (HCC): ICD-10-CM

## 2022-06-03 NOTE — TELEPHONE ENCOUNTER
Recent Visits  Date Type Provider Dept   03/29/22 Office Visit Jie Galvan MD [facility]   01/13/22 Office Visit Jie Galvan MD [facility]   09/22/21 Office Visit Jie Galvan MD [facility]   07/09/21 Office Visit Jie Galvan MD [facility]   01/08/21 Office Visit Jie Galvan MD [facility]   Showing recent visits within past 540 days with a meds authorizing provider and meeting all other requirements  Future Appointments  Date Type Provider Dept   10/11/22 Appointment Jie Galvan MD [facility]   Showing future appointments within next 150 days with a meds authorizing provider and meeting all other requirements     3/29/2022

## 2022-06-09 DIAGNOSIS — E05.00 GRAVES DISEASE: ICD-10-CM

## 2022-06-09 RX ORDER — METOPROLOL SUCCINATE 50 MG/1
TABLET, EXTENDED RELEASE ORAL
Qty: 90 TABLET | Refills: 1 | Status: SHIPPED | OUTPATIENT
Start: 2022-06-09

## 2022-06-10 RX ORDER — METHIMAZOLE 5 MG/1
TABLET ORAL
Qty: 270 TABLET | Refills: 0 | Status: SHIPPED | OUTPATIENT
Start: 2022-06-10 | End: 2022-06-21 | Stop reason: SDUPTHER

## 2022-06-20 ENCOUNTER — HOSPITAL ENCOUNTER (OUTPATIENT)
Age: 72
Discharge: HOME | End: 2022-06-20
Payer: MEDICARE

## 2022-06-20 ENCOUNTER — NURSE ONLY (OUTPATIENT)
Dept: CARDIOLOGY CLINIC | Age: 72
End: 2022-06-20
Payer: MEDICARE

## 2022-06-20 DIAGNOSIS — R00.1 BRADYCARDIA: ICD-10-CM

## 2022-06-20 DIAGNOSIS — E05.00 GRAVES DISEASE: ICD-10-CM

## 2022-06-20 DIAGNOSIS — I48.19 PERSISTENT ATRIAL FIBRILLATION (HCC): ICD-10-CM

## 2022-06-20 DIAGNOSIS — Z95.0 CARDIAC PACEMAKER IN SITU: ICD-10-CM

## 2022-06-20 DIAGNOSIS — I49.5 TACHY-BRADY SYNDROME (HCC): ICD-10-CM

## 2022-06-20 DIAGNOSIS — I48.0 PAF (PAROXYSMAL ATRIAL FIBRILLATION) (HCC): ICD-10-CM

## 2022-06-20 LAB
T3 TOTAL: 1.1 NG/ML (ref 0.8–2)
T4 FREE: 0.6 NG/DL (ref 0.9–1.8)
TSH SERPL DL<=0.05 MIU/L-ACNC: 6.02 UIU/ML (ref 0.27–4.2)

## 2022-06-20 PROCEDURE — 93296 REM INTERROG EVL PM/IDS: CPT | Performed by: INTERNAL MEDICINE

## 2022-06-20 PROCEDURE — 84480 ASSAY TRIIODOTHYRONINE (T3): CPT

## 2022-06-20 PROCEDURE — 84443 ASSAY THYROID STIM HORMONE: CPT

## 2022-06-20 PROCEDURE — 93294 REM INTERROG EVL PM/LDLS PM: CPT | Performed by: INTERNAL MEDICINE

## 2022-06-20 PROCEDURE — 84439 ASSAY OF FREE THYROXINE: CPT

## 2022-06-20 PROCEDURE — 36415 COLL VENOUS BLD VENIPUNCTURE: CPT

## 2022-06-20 NOTE — PROGRESS NOTES
We received remote transmission from patient's monitor at home. Transmission shows normal sensing and pacing function. Pt has known AF and remains on Xarelto. EP physician will review. See interrogation under cardiology tab in the [address] [place] [address] [address] field for more details. End of 91-day monitoring period 6-20-22.

## 2022-06-21 ENCOUNTER — OFFICE VISIT (OUTPATIENT)
Dept: ENDOCRINOLOGY | Age: 72
End: 2022-06-21
Payer: MEDICARE

## 2022-06-21 VITALS
SYSTOLIC BLOOD PRESSURE: 134 MMHG | WEIGHT: 215 LBS | DIASTOLIC BLOOD PRESSURE: 83 MMHG | BODY MASS INDEX: 33.74 KG/M2 | HEART RATE: 61 BPM | RESPIRATION RATE: 16 BRPM | HEIGHT: 67 IN

## 2022-06-21 DIAGNOSIS — I48.0 PAROXYSMAL ATRIAL FIBRILLATION (HCC): Chronic | ICD-10-CM

## 2022-06-21 DIAGNOSIS — E66.9 NON MORBID OBESITY, UNSPECIFIED OBESITY TYPE: Chronic | ICD-10-CM

## 2022-06-21 DIAGNOSIS — E05.00 GRAVES DISEASE: Primary | ICD-10-CM

## 2022-06-21 DIAGNOSIS — I10 ESSENTIAL HYPERTENSION: Chronic | ICD-10-CM

## 2022-06-21 PROCEDURE — 1090F PRES/ABSN URINE INCON ASSESS: CPT | Performed by: INTERNAL MEDICINE

## 2022-06-21 PROCEDURE — 1036F TOBACCO NON-USER: CPT | Performed by: INTERNAL MEDICINE

## 2022-06-21 PROCEDURE — 99214 OFFICE O/P EST MOD 30 MIN: CPT | Performed by: INTERNAL MEDICINE

## 2022-06-21 PROCEDURE — G8399 PT W/DXA RESULTS DOCUMENT: HCPCS | Performed by: INTERNAL MEDICINE

## 2022-06-21 PROCEDURE — 1123F ACP DISCUSS/DSCN MKR DOCD: CPT | Performed by: INTERNAL MEDICINE

## 2022-06-21 PROCEDURE — 3017F COLORECTAL CA SCREEN DOC REV: CPT | Performed by: INTERNAL MEDICINE

## 2022-06-21 PROCEDURE — G8427 DOCREV CUR MEDS BY ELIG CLIN: HCPCS | Performed by: INTERNAL MEDICINE

## 2022-06-21 PROCEDURE — G8417 CALC BMI ABV UP PARAM F/U: HCPCS | Performed by: INTERNAL MEDICINE

## 2022-06-21 RX ORDER — METHIMAZOLE 5 MG/1
10 TABLET ORAL DAILY
Qty: 60 TABLET | Refills: 5
Start: 2022-06-21

## 2022-06-21 NOTE — PROGRESS NOTES
SUBJECTIVE:  Bebe Fragoso is a 73 y.o. female  seen in my clinic for Graves' disease and multinodular goiter  Patient was initially referred for thyroid nodule  which was identified on a CT scan done in April 2018 as a workup for her ovarian cancer  . Patient Current complaints: denies fatigue, weight changes, heat/cold intolerance, bowel/skin changes or CVS symptoms  History of obstructive symptoms: difficulty swallowing No, changes in voice/hoarseness No.    History of radiation to patient's neck: NO  Recent iodine exposure: No  Family history includes no thyroid abnormalities. Family history of thyroid cancer: No    Ovarian cancer is treated with surgery , chemo and RT ---she was diagnosed in jan 2018   Patient also has hypertension and is on Toprol and hydrochlorothiazide along with Lotrel  Patient also has atrial fibrillation and is on anticoagulation    INTERIM     She has been taking 15  Tapazole daily she denies any significant palpitation, heat intolerance or insomnia.   She had cardiac ablation pacemaker put in May 2022         Past Medical History:   Diagnosis Date    Anal bleeding 2019    CT scan clear     Anemia     Arthritis     bilateral knee    Atrial fibrillation ([address]     cleared by cardiologist 2018, was related to cancer     Colon polyps     Diabetes mellitus ([address]     pre diabetic diet controlled    Endometrial cancer ([address]     History of blood transfusion     Hypertension     IFG (impaired fasting glucose)     SATISH (obstructive sleep apnea)     3/28/22-currently being fitted for CPAP    Small bowel obstruction ([address]     resolved without surgery    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[address]     Thyroid nodule      Patient Active Problem List    Diagnosis Date Noted    Obstructive sleep apnea syndrome 05/31/2022    Non morbid obesity, unspecified obesity type 10/19/2021    Cardiac pacemaker in situ 09/30/2021    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HCC)     Bradycardia     Dizziness     Paroxysmal atrial fibrillation ([facility]     Chest pain 06/18/2021    Graves disease 05/07/2019    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    GIB (gastrointestinal bleeding) 03/26/2019    Near syncope 07/17/2018    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HCC) 06/19/2018    Partial small bowel obstruction (HCC) 06/08/2018    On antineoplastic chemotherapy 06/07/2018    Abdominal pain, generalized 06/07/2018    Endometrial cancer ([facility] 03/27/2018    Alkaline phosphatase elevation 03/08/2018    Prediabetes 03/08/2018    Chronic anticoagulation 10/13/2017    Anemia 07/20/2017    Abnormal liver enzymes 07/20/2017    Essential hypertension 07/06/2016     Past Surgical History:   Procedure Laterality Date    CARDIOVERSION      CARDIOVERSION  09/19/2019    COLONOSCOPY N/A 3/14/2019    COLONOSCOPY POLYPECTOMY SNARE/COLD BIOPSY performed by Aarti Rodríguez MD at [address]  3/14/2019    COLONOSCOPY CONTROL HEMORRHAGE performed by Aarti Rodríguez MD at [address] N/A 3/26/2019    COLONOSCOPY CONTROL HEMORRHAGE performed by Aarti Rodríguez MD at [facility] COLONOSCOPY N/A 4/1/2022    COLONOSCOPY POLYPECTOMY SNARE/COLD BIOPSY performed by Aarti Rodríguez MD at [facility] 3  09/29/2021    SIGMOIDOSCOPY N/A 9/10/2019    SIGMOIDOSCOPY DIAGNOSTIC FLEXIBLE performed by Aarti Rodríguez MD at [address] (CERVIX REMOVED)      TUBAL LIGATION      TUNNELED CENTRAL VENOUS CATHETER W/ SUBCUTANEOUS PORT Right      Family History   Problem Relation Age of Onset    Hypertension Mother     Colon Cancer Maternal Grandmother      Social History     Socioeconomic History    Marital status:      Spouse name: None    Number of children: None    Years of education: None    Highest education level: None   Occupational History    Occupation: cash checking   Tobacco Use    Smoking status: Never Smoker    Smokeless tobacco: Never Used   Vaping Use    Vaping Use: Never used   Substance and Sexual Activity    Alcohol use: Yes     Comment: occ wine    Drug use: No    Sexual activity: Never   Other Topics Concern    None   Social History Narrative    Lives home with her 3 children. Doing well 8 grandchildren. Social Determinants of Health     Financial Resource Strain: Low Risk     Difficulty of Paying Living Expenses: Not hard at all   Food Insecurity: No Food Insecurity    Worried About Running Out of Food in the Last Year: Never true    Zully of Food in the Last Year: Never true   Transportation Needs:     Lack of Transportation (Medical): Not on file    Lack of Transportation (Non-Medical):  Not on file   Physical Activity:     Days of Exercise per Week: Not on file    Minutes of Exercise per Session: Not on file   Stress:     Feeling of Stress : Not on file   Social Connections:     Frequency of Communication with Friends and Family: Not on file    Frequency of Social Gatherings with Friends and Family: Not on file    Attends Yazidism Services: Not on file    Active Member of [address] [place] [address] or Organizations: Not on file    Attends Club or Organization Meetings: Not on file    Marital Status: Not on file   Intimate Partner Violence:     Fear of Current or Ex-Partner: Not on file    Emotionally Abused: Not on file    Physically Abused: Not on file    Sexually Abused: Not on file   Housing Stability:     Unable to Pay for Housing in the Last Year: Not on file    Number of [place] in the Last Year: Not on file    Unstable Housing in the Last Year: Not on file     Current Outpatient Medications   Medication Sig Dispense Refill    methIMAzole (TAPAZOLE) 5 MG tablet TAKE THREE TABLETS BY MOUTH DAILY 270 tablet 0    metoprolol succinate (TOPROL XL) 50 MG extended release tablet TAKE ONE TABLET BY MOUTH DAILY 90 tablet 1    pantoprazole (PROTONIX) 40 MG tablet Take 1 tablet by mouth every morning (before breakfast) 30 tablet 5    rivaroxaban (XARELTO) 20 MG TABS tablet TAKE ONE TABLET BY MOUTH DAILY WITH BREAKFAST 90 tablet 2    iron polysaccharides (NIFEREX) 150 MG capsule Take 1 capsule by mouth 2 times daily 180 capsule 1    dilTIAZem (DILTIAZEM CD) 180 MG extended release capsule Take 1 capsule by mouth daily 90 capsule 1    hydroCHLOROthiazide (HYDRODIURIL) 25 MG tablet Take 1 tablet by mouth daily 30 tablet 0     No current facility-administered medications for this visit. Allergies   Allergen Reactions    Chlorthalidone Other (See Comments)     Causes increase in b/p and severe headache    Naproxen Swelling    Lisinopril Rash         Review of Systems:  I have reviewed the review of system questionnaire filled by the patient .   Patient was advised to contact PCP for non endocrine signs and symptoms       OBJECTIVE:   BP (!) 159/92   Pulse 75   Resp 16   Ht 5' 7\" (1.702 m)   Wt 215 lb (97.5 kg)   LMP  (LMP Unknown)   BMI 33.67 kg/m²   Wt Readings from Last 3 Encounters:   06/21/22 215 lb (97.5 kg)   05/31/22 204 lb (92.5 kg)   05/19/22 204 lb (92.5 kg)       Physical Exam:  Constitutional: no acute distress, well appearing, well nourished  Psychiatric: oriented to person, place and time, judgement, insight and normal, recent and remote memory and intact and mood, affect are normal  Skin: skin and subcutaneous tissue is normal without mass,   Head and Face: examination of head and face revealed no abnormalities  Eyes: no lid or conjunctival swelling, no erythema or discharge, pupils are normal,   Ears/Nose: external inspection of ears and nose revealed no abnormalities, hearing is grossly normal  Oropharynx/Mouth/Face: lips, tongue and gums are normal with no lesions, the voice quality was normal  Neck: neck is supple and symmetric, with midline trachea and no masses, thyroid is normal    Pulmonary: no increased work of breathing or signs of respiratory distress, lungs are clear to to let us know in case if any of these symptoms develop The patient verbalized understanding and agreed to start taking the medicine.     ----Thyroid nodule  Repeat thyroid in July 2021 stable no nodules   June 2020 showed subcentimeter nodules in Rt lobe,   ---uls done in 7/2019 shows stable size of the thyroid nodule. Left lobe nodules not visible any more   Patient has 2 nodules in the right lobe which meet the criteria for fine-needle aspiration biopsy which was offered to the patient. Patient was advised that the right lobe thyroid nodule which measures 1.9 cm should be biopsied, patient does not want to get a fine-needle biopsy done at this time    ---Persistent atrial fibrillation   Patient is on anticoagulation and beta blockers  She had cardioversion x2   Follows with cardiology    ---Partial small bowel obstruction   Stable    --Ovarian Cancer ---S/p complete hysterectomy and chemo   Stable   She follows with Oncology at [address] [place] [address]     . Essential hypertension  Control is good   She takes Amlodipine and HCTZ and metoprolol       Reviewed and/or ordered clinical lab results Yes  Reviewed and/or ordered radiology tests Yes   Reviewed and/or ordered other diagnostic tests Yes  Made a decision to obtain old records Yes  Reviewed and summarized old records Yes      Pili Fritz was counseled regarding symptoms of current diagnosis, course and complications of disease if inadequately treated, side effects of medications, diagnosis, treatment options, and prognosis, risks, benefits, complications, and alternatives of treatment, labs, imaging and other studies and treatment targets and goals. She understands instructions and counseling. No follow-ups on file. Please note that some or all of this report was generated using voice recognition software. Please notify me in case of any questions about the content of this document, as some errors in transcription may have occurred .

## 2022-06-24 ENCOUNTER — TELEPHONE (OUTPATIENT)
Dept: CARDIOLOGY CLINIC | Age: 72
End: 2022-06-24

## 2022-06-24 NOTE — TELEPHONE ENCOUNTER
Called pt and relayed message per DW RN with verbal understanding and encouraged to call office with any other questions or concerns.

## 2022-06-24 NOTE — TELEPHONE ENCOUNTER
Results from remote transmission are in chart. Have not yet been reviewed by Physician. Please advise.  Thanks

## 2022-07-11 DIAGNOSIS — I10 ESSENTIAL HYPERTENSION: Chronic | ICD-10-CM

## 2022-07-11 NOTE — TELEPHONE ENCOUNTER
Requested Prescriptions     Pending Prescriptions Disp Refills    hydroCHLOROthiazide (HYDRODIURIL) 25 MG tablet [Pharmacy Med Name: hydroCHLOROthiazide 25 MG TABLET] 90 tablet      Sig: TAKE ONE TABLET BY MOUTH DAILY     Recent Visits  Date Type Provider Dept   03/29/22 Office Visit Justin Ramos MD [facility]   01/13/22 Office Visit Justin Ramos MD [facility]   09/22/21 Office Visit Justin Ramos MD [facility]   07/09/21 Office Visit Justin Ramos MD [facility]   Showing recent visits within past 540 days with a meds authorizing provider and meeting all other requirements  Future Appointments  Date Type Provider Dept   10/11/22 Appointment Justin Ramos MD [facility]   Showing future appointments within next 150 days with a meds authorizing provider and meeting all other requirements       LAST APPOINTMENT  3/29/2022       LAST REFILL ON MEDICATION  hydroCHLOROthiazide (HYDRODIURIL) 25 MG tablet [[id number]]  ENDED    Order Details  Dose: 25 mg Route: Oral Frequency: DAILY   Dispense Quantity: 30 tablet Refills: 0    Note to Pharmacy: D/c chlorthalidone patient call and reported allergy will discuss with patient next week         Sig: Take 1 tablet by mouth daily         Start Date: 03/29/22

## 2022-07-14 RX ORDER — HYDROCHLOROTHIAZIDE 25 MG/1
TABLET ORAL
Qty: 90 TABLET | Refills: 0 | Status: SHIPPED | OUTPATIENT
Start: 2022-07-14

## 2022-07-28 ENCOUNTER — HOSPITAL ENCOUNTER (OUTPATIENT)
Age: 72
Discharge: HOME | End: 2022-07-28
Payer: MEDICARE

## 2022-07-28 DIAGNOSIS — E05.00 GRAVES DISEASE: ICD-10-CM

## 2022-07-28 LAB
T4 FREE: 0.7 NG/DL (ref 0.9–1.8)
TSH SERPL DL<=0.05 MIU/L-ACNC: 5.53 UIU/ML (ref 0.27–4.2)

## 2022-07-28 PROCEDURE — 84480 ASSAY TRIIODOTHYRONINE (T3): CPT

## 2022-07-28 PROCEDURE — 84439 ASSAY OF FREE THYROXINE: CPT

## 2022-07-28 PROCEDURE — 84443 ASSAY THYROID STIM HORMONE: CPT

## 2022-07-28 PROCEDURE — 36415 COLL VENOUS BLD VENIPUNCTURE: CPT

## 2022-07-29 LAB — T3 TOTAL: 1.14 NG/ML (ref 0.8–2)

## 2022-08-09 DIAGNOSIS — I10 ESSENTIAL HYPERTENSION: Chronic | ICD-10-CM

## 2022-08-09 RX ORDER — HYDROCHLOROTHIAZIDE 25 MG/1
TABLET ORAL
Qty: 30 TABLET | Refills: 2 | OUTPATIENT
Start: 2022-08-09

## 2022-08-10 NOTE — TELEPHONE ENCOUNTER
Medication was sent to pharmacy on 7/14 as a 90 day supply. Patient states that she has never had an issue with that medication and will pick it up from the pharmacy.

## 2022-08-29 ENCOUNTER — HOSPITAL ENCOUNTER (OUTPATIENT)
Age: 72
Discharge: HOME | End: 2022-08-29
Payer: MEDICARE

## 2022-08-29 DIAGNOSIS — E05.00 GRAVES DISEASE: ICD-10-CM

## 2022-08-29 LAB
T3 TOTAL: 1.18 NG/ML (ref 0.8–2)
T4 FREE: 0.8 NG/DL (ref 0.9–1.8)
TSH SERPL DL<=0.05 MIU/L-ACNC: 2.42 UIU/ML (ref 0.27–4.2)

## 2022-08-29 PROCEDURE — 36415 COLL VENOUS BLD VENIPUNCTURE: CPT

## 2022-08-29 PROCEDURE — 84439 ASSAY OF FREE THYROXINE: CPT

## 2022-08-29 PROCEDURE — 84443 ASSAY THYROID STIM HORMONE: CPT

## 2022-08-29 PROCEDURE — 84480 ASSAY TRIIODOTHYRONINE (T3): CPT

## 2022-09-09 NOTE — PROGRESS NOTES
[address]   Electrophysiology Follow up   Date: 9/13/2022  I had the privilege of visiting Marlyn Méndez in the office. CC: PAF  HPI: Marlyn Méndez is a 81 y.o. female  with PMH of HTN is being referred by PCP, Dr. Neetu Madrigal, for evaluation of Afib. She was originally diagnosed with Afib in 7/2017, followed by cardiologist at [address] [place] [address]. Afib diagnosed in 7/2017. She was later diagnosed with uterine cancer and underwent surgery, chemotherapy. She underwent DCCVs on 8/2017 and 9/2017. Diagnosed with FIGO Stage IA (pT1a(2), N0, M0) serous carcinoma of the endometrium, status post TLH/BSO and five of a planned six cycles of adjuvant chemotherapy completed 08/28/2018. Her final treatment for radiation is today and completed with chemotherapy. She had episode of syncope while receiving chemo in 7/2018. 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 She received 4 units RBCs and discharged home without anticoagulation. Xarelto was stopped. S/p DCCV 9/19/19     Xarelto 20 mg restarted 12/10/19      Presented to [facility] ER on 6/2021 with complaints of CP and dizziness. Show wore monitor which showed symptomatic pauses, also with bifascicular block. S/p dual chamber PPM 9/29/2021     S/p 5/19/2022 Pulmonary vein isolations using wide area circumferential radiofrequency ablation    Ledy Hall presents to the office in follow up. She has complaints of pain at his device site and his left arm. Her device site looks clean dry and intact. She appears to be susceptible to scarring. Had her port removed recently also. No recent episodes of atrial fibrillation and she is feeling better. Continues to work part time. Assessment and plan:     -Paroxysmal Atrial Fibrillation    ECG today shows sinus rhythm    <0.1% AT/AF burden per device interrogation today.   Last on 6/26/2022 longest 1 hour and 19 minutes    S/p 5/19/2022 Pulmonary vein isolations using wide area circumferential radiofrequency ablation    Continue Toprol XL 50 mg daily, Cardizem 180 mg daily  ( started 12/02/2021 for rate control )   S/p DCCV 8/2017, 9/2017, and 9/2019  [id number] score: 4 (age, gender, HTN., DM); [id number] Vasc score and anticoagulation discussed. High risk for stroke and thromboembolism. Anticoagulation is recommended. On Xarelto 20 mg daily,  Monitor for s/s of bleeding or excessive bruising Creatinine Clearance of 113 ml/min based on creatinine of 0.7 10/12/2021   She is not a candidate for amiodarone due to her thyroid issues. -SSS/ Implantable device    s/p dual chamber pacemaker 09/29/2021   The CIED was interrogated and programmed and I supervised and reviewed all the data. All findings and changes are in device interrogation sheet and reflect my personal interpretation and changes and is scanned to Epic.   12.7 years remaining, AP 96.7% ,  0.2%  Underlying SB  <0.1% AT/AF burden per device interrogation today. Last on 6/26/2022 longest 1 hour and 19 minutes   4 NSVT episodes noted last on 9/9/2022, Longest 2 seconds     -HTN  Controlled: 120/82  BP goal <130/80  Home BP monitoring encouraged, printed information provided on how to accurately measure BP at home. Counseled to follow a low salt diet to assure blood pressure remains controlled for cardiovascular risk factor modification. The patient is counseled to get regular exercise 3-5 times per week and maintain a healthy weight reduce cardiovascular risk factors.     continue Cardizem, Toprol,  Hydrodiuril       -Syncope   No recurrence    S/p pacemaker      -Graves Disease              She has been treated with me methimazole since 3/2019 and she recently had to increase dose, her follow up blood work 10/2021  showed TSH < 0.01   Remains on Tapazole 15 mg               Follows with Dr. 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    GIB (gastrointestinal bleeding) 03/26/2019    Near syncope 07/17/2018    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[facility] [address] 06/19/2018    Partial small bowel obstruction ([address] 06/08/2018    On antineoplastic chemotherapy 06/07/2018    Abdominal pain, generalized 06/07/2018    Endometrial cancer ([facility] [address] 03/27/2018    Alkaline phosphatase elevation 03/08/2018    Prediabetes 03/08/2018    Chronic anticoagulation 10/13/2017    Anemia 07/20/2017    Abnormal liver enzymes 07/20/2017    Essential hypertension 07/06/2016     Diagnostic studies:   ECG 9/13/22  SR  462 QTcH, 140 QRS      MCOT 6/22-7/6/21  NSR avg hr 61  AF burden 14%, long pauses up to 5 sec  PAC's 4%     ECHO 6/19/2021  Normal LV systolic function with an estimated EF of 55-60%. No regional wall motion abnormalities are seen. Grade II diastolic dysfunction with elevated LV filling pressures. The right ventricle is mildly enlarged. Right ventricular systolic function is normal.   There is bi-atrial enlargement. The mitral valve leaflets are slightly thickened with normal leaflet mobility. Moderate mitral regurgitation. Trivial aortic regurgitation. Mild tricuspid regurgitation with a PASP of 38 mmHg. Mild pulmonic regurgitation. Echo: 7/18/2018 ([facility])  Summary:  Overall left ventricular ejection fraction is estimated to be 50-55%. The left ventricular function is low normal.  The left ventricular wall motion is normal.  The left atrium is mildly dilated.   There is mild to moderate mitral regurgitation. Mild pulmonic valvular regurgitation. Moderate tricuspid regurgitation is present. The right atrium is mildly dilated. Trace aortic regurgitation. The estimated right ventricular systolic pressure is consistent with  moderate pulmonary hypertension. I independently reviewed the cardiac diagnostic studies, ECG and relevant imaging studies. Lab Results   Component Value Date    LVEF 53 05/19/2022     Lab Results   Component Value Date    TSH 2.42 08/29/2022         Physical Examination:  Vitals:    09/13/22 1054   BP: 120/82   Pulse: 62   SpO2: 99%        Wt Readings from Last 3 Encounters:   09/13/22 212 lb (96.2 kg)   06/21/22 215 lb (97.5 kg)   05/31/22 204 lb (92.5 kg)       Constitutional: Oriented. No distress. Head: Normocephalic and atraumatic. Mouth/Throat: Oropharynx is clear and moist.   Eyes: Conjunctivae normal. EOM are normal.   Neck: Neck supple. No JVD present. Cardiovascular: Normal rate, regular rhythm, C0&L9.  + systolic murmur   Pulmonary/Chest: Bilateral respiratory sounds. No rhonchi. Abdominal: Soft. No tenderness. Musculoskeletal: No tenderness. No edema    Lymphadenopathy: Has no cervical adenopathy. Neurological: Alert and oriented. Follows command, No Gross deficit   Skin: Skin is warm, No rash noted. Psychiatric: Has a normal behavior          Review of System:  [x] Full ROS obtained and negative except as mentioned in HPI    Prior to Admission medications    Medication Sig Start Date End Date Taking?  Authorizing Provider   hydroCHLOROthiazide (HYDRODIURIL) 25 MG tablet TAKE ONE TABLET BY MOUTH DAILY 7/14/22  Yes Rhenda Goldmann, MD   methIMAzole (TAPAZOLE) 5 MG tablet Take 2 tablets by mouth daily 6/21/22  Yes Deepika Wilhelm MD   metoprolol succinate (TOPROL XL) 50 MG extended release tablet TAKE ONE TABLET BY MOUTH DAILY 6/9/22  Yes Rhenda Goldmann, MD   rivaroxaban (XARELTO) 20 MG TABS tablet TAKE ONE TABLET BY MOUTH DAILY WITH [phone]  Fax: [phone]

## 2022-09-13 ENCOUNTER — NURSE ONLY (OUTPATIENT)
Dept: CARDIOLOGY CLINIC | Age: 72
End: 2022-09-13
Payer: MEDICARE

## 2022-09-13 ENCOUNTER — OFFICE VISIT (OUTPATIENT)
Dept: CARDIOLOGY CLINIC | Age: 72
End: 2022-09-13
Payer: MEDICARE

## 2022-09-13 VITALS
DIASTOLIC BLOOD PRESSURE: 82 MMHG | HEART RATE: 62 BPM | OXYGEN SATURATION: 99 % | WEIGHT: 212 LBS | HEIGHT: 69 IN | BODY MASS INDEX: 31.4 KG/M2 | SYSTOLIC BLOOD PRESSURE: 120 MMHG

## 2022-09-13 DIAGNOSIS — Z95.0 CARDIAC PACEMAKER IN SITU: ICD-10-CM

## 2022-09-13 DIAGNOSIS — R00.1 BRADYCARDIA: ICD-10-CM

## 2022-09-13 DIAGNOSIS — I48.0 PAF (PAROXYSMAL ATRIAL FIBRILLATION) (HCC): ICD-10-CM

## 2022-09-13 DIAGNOSIS — I48.19 PERSISTENT ATRIAL FIBRILLATION (HCC): ICD-10-CM

## 2022-09-13 DIAGNOSIS — I10 ESSENTIAL HYPERTENSION: Chronic | ICD-10-CM

## 2022-09-13 DIAGNOSIS — I48.0 PAROXYSMAL ATRIAL FIBRILLATION (HCC): Primary | Chronic | ICD-10-CM

## 2022-09-13 DIAGNOSIS — K62.5 GASTROINTESTINAL HEMORRHAGE ASSOCIATED WITH ANORECTAL SOURCE: ICD-10-CM

## 2022-09-13 DIAGNOSIS — I49.5 TACHY-BRADY SYNDROME (HCC): ICD-10-CM

## 2022-09-13 DIAGNOSIS — E05.00 GRAVES DISEASE: ICD-10-CM

## 2022-09-13 DIAGNOSIS — G47.33 OBSTRUCTIVE SLEEP APNEA SYNDROME: ICD-10-CM

## 2022-09-13 DIAGNOSIS — R55 NEAR SYNCOPE: ICD-10-CM

## 2022-09-13 PROCEDURE — 1036F TOBACCO NON-USER: CPT | Performed by: INTERNAL MEDICINE

## 2022-09-13 PROCEDURE — G8399 PT W/DXA RESULTS DOCUMENT: HCPCS | Performed by: INTERNAL MEDICINE

## 2022-09-13 PROCEDURE — 93280 PM DEVICE PROGR EVAL DUAL: CPT | Performed by: INTERNAL MEDICINE

## 2022-09-13 PROCEDURE — 3017F COLORECTAL CA SCREEN DOC REV: CPT | Performed by: INTERNAL MEDICINE

## 2022-09-13 PROCEDURE — 99214 OFFICE O/P EST MOD 30 MIN: CPT | Performed by: INTERNAL MEDICINE

## 2022-09-13 PROCEDURE — 1123F ACP DISCUSS/DSCN MKR DOCD: CPT | Performed by: INTERNAL MEDICINE

## 2022-09-13 PROCEDURE — G8427 DOCREV CUR MEDS BY ELIG CLIN: HCPCS | Performed by: INTERNAL MEDICINE

## 2022-09-13 PROCEDURE — 1090F PRES/ABSN URINE INCON ASSESS: CPT | Performed by: INTERNAL MEDICINE

## 2022-09-13 PROCEDURE — G8417 CALC BMI ABV UP PARAM F/U: HCPCS | Performed by: INTERNAL MEDICINE

## 2022-09-13 NOTE — PROGRESS NOTES
Patient comes in for programming evaluation for her pacemaker . All sensing and pacing parameters are within normal range. Battery life 12.7 years   AP 96.7%.  0.2%. 4 NSVT episode noted. Last on 9/9/2022, longest 2 seconds. 3 AT/AF with a <0.1% burden. Last on 6/26/2022, longest 1 1/2 hours. Patient remains on Xarelto and metoprolol. Today we turned on the [employer] to the [address] settings. Please see interrogation for more detail. Patient will see Dr. Arias Degree today and follow up in 3 months in office or remotely.

## 2022-09-19 ENCOUNTER — NURSE ONLY (OUTPATIENT)
Dept: CARDIOLOGY CLINIC | Age: 72
End: 2022-09-19
Payer: MEDICARE

## 2022-09-19 DIAGNOSIS — R00.1 BRADYCARDIA: ICD-10-CM

## 2022-09-19 DIAGNOSIS — I48.0 PAF (PAROXYSMAL ATRIAL FIBRILLATION) (HCC): ICD-10-CM

## 2022-09-19 DIAGNOSIS — I48.19 PERSISTENT ATRIAL FIBRILLATION (HCC): ICD-10-CM

## 2022-09-19 DIAGNOSIS — Z95.0 CARDIAC PACEMAKER IN SITU: ICD-10-CM

## 2022-09-19 DIAGNOSIS — I49.5 TACHY-BRADY SYNDROME (HCC): ICD-10-CM

## 2022-09-19 PROCEDURE — 93294 REM INTERROG EVL PM/LDLS PM: CPT | Performed by: INTERNAL MEDICINE

## 2022-09-19 PROCEDURE — 93296 REM INTERROG EVL PM/IDS: CPT | Performed by: INTERNAL MEDICINE

## 2022-09-19 NOTE — PROGRESS NOTES
We received remote transmission from patient's monitor at home. Transmission shows normal sensing and pacing function. EP physician will review. See interrogation under cardiology tab in the [address] [place] [address] [address] field for more details.  < 0.1% (MVP On)  AP 94.6%    End of 91-day monitoring period 9-19-22.

## 2022-09-20 ENCOUNTER — HOSPITAL ENCOUNTER (OUTPATIENT)
Age: 72
Discharge: HOME | End: 2022-09-20
Payer: MEDICARE

## 2022-09-20 DIAGNOSIS — E05.00 GRAVES DISEASE: ICD-10-CM

## 2022-09-20 LAB
T3 TOTAL: 1.33 NG/ML (ref 0.8–2)
T4 FREE: 1.1 NG/DL (ref 0.9–1.8)
TSH SERPL DL<=0.05 MIU/L-ACNC: 1.28 UIU/ML (ref 0.27–4.2)

## 2022-09-20 PROCEDURE — 84443 ASSAY THYROID STIM HORMONE: CPT

## 2022-09-20 PROCEDURE — 36415 COLL VENOUS BLD VENIPUNCTURE: CPT

## 2022-09-20 PROCEDURE — 84480 ASSAY TRIIODOTHYRONINE (T3): CPT

## 2022-09-20 PROCEDURE — 84439 ASSAY OF FREE THYROXINE: CPT

## 2022-09-27 ENCOUNTER — TELEPHONE (OUTPATIENT)
Dept: ENDOCRINOLOGY | Age: 72
End: 2022-09-27

## 2022-09-29 DIAGNOSIS — D50.9 IRON DEFICIENCY ANEMIA, UNSPECIFIED IRON DEFICIENCY ANEMIA TYPE: ICD-10-CM

## 2022-09-29 DIAGNOSIS — I48.19 PERSISTENT ATRIAL FIBRILLATION (HCC): ICD-10-CM

## 2022-09-30 NOTE — TELEPHONE ENCOUNTER
Recent Visits  Date Type Provider Dept   03/29/22 Office Visit Onur Bansal MD [facility]   01/13/22 Office Visit Onur Bansal MD [facility]   09/22/21 Office Visit Onur Bansal MD [facility]   07/09/21 Office Visit Onur Bansal MD [facility]   Showing recent visits within past 540 days with a meds authorizing provider and meeting all other requirements  Future Appointments  Date Type Provider Dept   10/11/22 Appointment Onur Bansal MD [facility]   01/17/23 Appointment Onur Bansal MD [facility]   Showing future appointments within next 150 days with a meds authorizing provider and meeting all other requirements     3/29/2022

## 2022-10-11 ENCOUNTER — OFFICE VISIT (OUTPATIENT)
Dept: INTERNAL MEDICINE CLINIC | Age: 72
End: 2022-10-11
Payer: MEDICARE

## 2022-10-11 VITALS
SYSTOLIC BLOOD PRESSURE: 120 MMHG | OXYGEN SATURATION: 98 % | BODY MASS INDEX: 31.43 KG/M2 | DIASTOLIC BLOOD PRESSURE: 82 MMHG | HEART RATE: 83 BPM | TEMPERATURE: 96.3 F | WEIGHT: 212.8 LBS

## 2022-10-11 DIAGNOSIS — R14.1 FLATULENCE, ERUCTATION, AND GAS PAIN: ICD-10-CM

## 2022-10-11 DIAGNOSIS — I48.0 PAROXYSMAL ATRIAL FIBRILLATION (HCC): Chronic | ICD-10-CM

## 2022-10-11 DIAGNOSIS — G47.33 OBSTRUCTIVE SLEEP APNEA SYNDROME: ICD-10-CM

## 2022-10-11 DIAGNOSIS — R14.2 FLATULENCE, ERUCTATION, AND GAS PAIN: ICD-10-CM

## 2022-10-11 DIAGNOSIS — R14.3 FLATULENCE, ERUCTATION, AND GAS PAIN: ICD-10-CM

## 2022-10-11 DIAGNOSIS — R79.9 ABNORMAL FINDING OF BLOOD CHEMISTRY, UNSPECIFIED: ICD-10-CM

## 2022-10-11 DIAGNOSIS — Z23 NEED FOR INFLUENZA VACCINATION: Primary | ICD-10-CM

## 2022-10-11 DIAGNOSIS — Z13.1 SCREENING FOR DIABETES MELLITUS: ICD-10-CM

## 2022-10-11 DIAGNOSIS — Z12.31 BREAST CANCER SCREENING BY MAMMOGRAM: ICD-10-CM

## 2022-10-11 DIAGNOSIS — C54.1 ENDOMETRIAL CANCER (HCC): ICD-10-CM

## 2022-10-11 PROCEDURE — G8417 CALC BMI ABV UP PARAM F/U: HCPCS | Performed by: INTERNAL MEDICINE

## 2022-10-11 PROCEDURE — 90694 VACC AIIV4 NO PRSRV 0.5ML IM: CPT | Performed by: INTERNAL MEDICINE

## 2022-10-11 PROCEDURE — 99214 OFFICE O/P EST MOD 30 MIN: CPT | Performed by: INTERNAL MEDICINE

## 2022-10-11 PROCEDURE — G8484 FLU IMMUNIZE NO ADMIN: HCPCS | Performed by: INTERNAL MEDICINE

## 2022-10-11 PROCEDURE — 1036F TOBACCO NON-USER: CPT | Performed by: INTERNAL MEDICINE

## 2022-10-11 PROCEDURE — 3017F COLORECTAL CA SCREEN DOC REV: CPT | Performed by: INTERNAL MEDICINE

## 2022-10-11 PROCEDURE — 3074F SYST BP LT 130 MM HG: CPT | Performed by: INTERNAL MEDICINE

## 2022-10-11 PROCEDURE — 1090F PRES/ABSN URINE INCON ASSESS: CPT | Performed by: INTERNAL MEDICINE

## 2022-10-11 PROCEDURE — G8399 PT W/DXA RESULTS DOCUMENT: HCPCS | Performed by: INTERNAL MEDICINE

## 2022-10-11 PROCEDURE — G0008 ADMIN INFLUENZA VIRUS VAC: HCPCS | Performed by: INTERNAL MEDICINE

## 2022-10-11 PROCEDURE — 3078F DIAST BP <80 MM HG: CPT | Performed by: INTERNAL MEDICINE

## 2022-10-11 PROCEDURE — G8427 DOCREV CUR MEDS BY ELIG CLIN: HCPCS | Performed by: INTERNAL MEDICINE

## 2022-10-11 PROCEDURE — 1123F ACP DISCUSS/DSCN MKR DOCD: CPT | Performed by: INTERNAL MEDICINE

## 2022-10-11 RX ORDER — BACLOFEN 10 MG/1
10 TABLET ORAL NIGHTLY
Qty: 90 TABLET | Refills: 0 | Status: SHIPPED | OUTPATIENT
Start: 2022-10-11 | End: 2023-01-09

## 2022-10-11 SDOH — ECONOMIC STABILITY: FOOD INSECURITY: WITHIN THE PAST 12 MONTHS, YOU WORRIED THAT YOUR FOOD WOULD RUN OUT BEFORE YOU GOT MONEY TO BUY MORE.: SOMETIMES TRUE

## 2022-10-11 SDOH — ECONOMIC STABILITY: FOOD INSECURITY: WITHIN THE PAST 12 MONTHS, THE FOOD YOU BOUGHT JUST DIDN'T LAST AND YOU DIDN'T HAVE MONEY TO GET MORE.: SOMETIMES TRUE

## 2022-10-11 ASSESSMENT — ANXIETY QUESTIONNAIRES
5. BEING SO RESTLESS THAT IT IS HARD TO SIT STILL: 0
3. WORRYING TOO MUCH ABOUT DIFFERENT THINGS: 0
IF YOU CHECKED OFF ANY PROBLEMS ON THIS QUESTIONNAIRE, HOW DIFFICULT HAVE THESE PROBLEMS MADE IT FOR YOU TO DO YOUR WORK, TAKE CARE OF THINGS AT HOME, OR GET ALONG WITH OTHER PEOPLE: NOT DIFFICULT AT ALL
2. NOT BEING ABLE TO STOP OR CONTROL WORRYING: 0
7. FEELING AFRAID AS IF SOMETHING AWFUL MIGHT HAPPEN: 0
6. BECOMING EASILY ANNOYED OR IRRITABLE: 0
GAD7 TOTAL SCORE: 0
4. TROUBLE RELAXING: 0
1. FEELING NERVOUS, ANXIOUS, OR ON EDGE: 0

## 2022-10-11 ASSESSMENT — PATIENT HEALTH QUESTIONNAIRE - PHQ9
4. FEELING TIRED OR HAVING LITTLE ENERGY: 0
9. THOUGHTS THAT YOU WOULD BE BETTER OFF DEAD, OR OF HURTING YOURSELF: 0
5. POOR APPETITE OR OVEREATING: 0
8. MOVING OR SPEAKING SO SLOWLY THAT OTHER PEOPLE COULD HAVE NOTICED. OR THE OPPOSITE, BEING SO FIGETY OR RESTLESS THAT YOU HAVE BEEN MOVING AROUND A LOT MORE THAN USUAL: 0
2. FEELING DOWN, DEPRESSED OR HOPELESS: 0
3. TROUBLE FALLING OR STAYING ASLEEP: 0
6. FEELING BAD ABOUT YOURSELF - OR THAT YOU ARE A FAILURE OR HAVE LET YOURSELF OR YOUR FAMILY DOWN: 0
1. LITTLE INTEREST OR PLEASURE IN DOING THINGS: 0
SUM OF ALL RESPONSES TO PHQ9 QUESTIONS 1 & 2: 0
7. TROUBLE CONCENTRATING ON THINGS, SUCH AS READING THE NEWSPAPER OR WATCHING TELEVISION: 0
SUM OF ALL RESPONSES TO PHQ QUESTIONS 1-9: 0
10. IF YOU CHECKED OFF ANY PROBLEMS, HOW DIFFICULT HAVE THESE PROBLEMS MADE IT FOR YOU TO DO YOUR WORK, TAKE CARE OF THINGS AT HOME, OR GET ALONG WITH OTHER PEOPLE: 0
SUM OF ALL RESPONSES TO PHQ QUESTIONS 1-9: 0

## 2022-10-11 ASSESSMENT — SOCIAL DETERMINANTS OF HEALTH (SDOH): HOW HARD IS IT FOR YOU TO PAY FOR THE VERY BASICS LIKE FOOD, HOUSING, MEDICAL CARE, AND HEATING?: SOMEWHAT HARD

## 2022-10-11 NOTE — PROGRESS NOTES
Subjective:      Patient ID: Chana Decker is a 81 y.o. female. Chief Complaint   Patient presents with    Hypertension      GERD: contact sleep medicine belchingg likely due to cpap- try baclofen  Paitent complains of BURPING ALL NIGHT > 20 TIMES PER HOUR. This has been associated with abdominal bloating, belching, and belching and eructation. She denies chest pain, choking on food, cough, deep pressure at base of neck, difficulty swallowing, dysphagia, early satiety, and fullness after meals. Symptoms have been present for 6 months. She  HAS A REMOTE HISTORY OF HEARTBUrN BUT IT WORSE SINCE CPAP . She has not lost weight. She denies melena, hematochezia, hematemesis, and coffee ground emesis. Medical therapy in the past has included proton pump inhibitors9 FOR 5 MONTHS,PRESCRIBED BY CARDIOLOGY. Anemia:  Resolved  Patient presents for evaluation of anemia. Anemia was found by routine CBC. It has been present for several months. Associated signs & symptoms: none. Patient with polyp on colonoscopy in 4/2022. Polyp removed and she is doing well. Hypertension:  Home blood pressure monitoring: No.  She is adherent to a low sodium diet. Patient denies shortness of breath, headache, blurred vision and palpitations. Antihypertensive medication side effects: no medication side effects noted. Use of agents associated with hypertension: none. Sodium (mmol/L)   Date Value   05/19/2022 139    BUN (mg/dL)   Date Value   05/19/2022 15    Glucose (mg/dL)   Date Value   05/19/2022 97      Potassium (mmol/L)   Date Value   05/19/2022 3.3 (L)     Potassium reflex Magnesium (mmol/L)   Date Value   03/26/2019 3.5    Creatinine (mg/dL)   Date Value   05/19/2022 0.9         Patient presents in follow-up for hospital admission lasting 2 days. She was having chest pain and dizziness. She states that she is feeling much better today, but continues to have a small amount of fatigue member. She continues to take her medications as instructed. Her blood pressure is well controlled she denies side effects of her medications    Review of Systems   Constitutional:  Positive for fatigue. Review of systems as per indicated in HPI. Psychiatric/Behavioral:  The patient is nervous/anxious. All other systems reviewed and are negative. Allergies   Allergen Reactions    Chlorthalidone Other (See Comments)     Causes increase in b/p and severe headache    Naproxen Swelling    Lisinopril Rash       Current Outpatient Medications   Medication Sig Dispense Refill    rivaroxaban (XARELTO) 20 MG TABS tablet TAKE ONE TABLET BY MOUTH DAILY WITH BREAKFAST 90 tablet 0    hydroCHLOROthiazide (HYDRODIURIL) 25 MG tablet TAKE ONE TABLET BY MOUTH DAILY 90 tablet 0    methIMAzole (TAPAZOLE) 5 MG tablet Take 2 tablets by mouth daily 60 tablet 5    metoprolol succinate (TOPROL XL) 50 MG extended release tablet TAKE ONE TABLET BY MOUTH DAILY 90 tablet 1    dilTIAZem (DILTIAZEM CD) 180 MG extended release capsule Take 1 capsule by mouth daily 90 capsule 1    iron polysaccharides (NIFEREX) 150 MG capsule Take 1 capsule by mouth 2 times daily 180 capsule 1     No current facility-administered medications for this visit. Vitals:    10/11/22 1002 10/11/22 1007   BP: (!) 150/82 120/82   Site: Right Upper Arm Right Upper Arm   Position: Sitting Sitting   Cuff Size: Large Adult Large Adult   Pulse: 83    Temp: (!) 96.3 °F (35.7 °C)    SpO2: 98%    Weight: 212 lb 12.8 oz (96.5 kg)      Body mass index is 31.43 kg/m². Wt Readings from Last 3 Encounters:   10/11/22 212 lb 12.8 oz (96.5 kg)   09/13/22 212 lb (96.2 kg)   06/21/22 215 lb (97.5 kg)     BP Readings from Last 3 Encounters:   10/11/22 120/82   09/13/22 120/82   06/21/22 134/83       Objective:   Physical Exam  Vitals and nursing note reviewed. Constitutional:       General: She is not in acute distress. Appearance: She is well-developed.  She is obese.   HENT:      Head: Normocephalic. Right Ear: Tympanic membrane normal.      Left Ear: Tympanic membrane normal.   Eyes:      Pupils: Pupils are equal, round, and reactive to light. Cardiovascular:      Rate and Rhythm: Normal rate and regular rhythm. Pulses: Normal pulses. Heart sounds: Normal heart sounds. Pulmonary:      Effort: Pulmonary effort is normal.      Breath sounds: Normal breath sounds. Musculoskeletal:         General: Normal range of motion. Cervical back: Normal range of motion. Deformity present. Lumbar back: No swelling or bony tenderness. Right hip: Normal. Normal range of motion. Normal strength. Left hip: Normal. Normal range of motion. Normal strength. Skin:     General: Skin is warm. Capillary Refill: Capillary refill takes less than 2 seconds. Neurological:      General: No focal deficit present. Mental Status: She is alert and oriented to person, place, and time. Cranial Nerves: No cranial nerve deficit. Coordination: Coordination normal.   Psychiatric:         Mood and Affect: Mood normal.         Behavior: Behavior normal.         Thought Content: Thought content normal.         Judgment: Judgment normal.     chewing, smoking, drinking carbonated beverages, and gulping foods and liquids    Assessment/Plan:  Juana Teague was seen today for hypertension. Diagnoses and all orders for this visit:    Need for influenza vaccination  -     Influenza, FLUAD, (age 72 y+), IM, Preservative Free, 0.5 mL    Endometrial cancer ([facility] [address]  - has her annual follow up s/p hysterectomy  Flatulence, eructation, and gas pain  -     baclofen (LIORESAL) 10 MG tablet; Take 1 tablet by mouth at bedtime    Paroxysmal atrial fibrillation (HCC)  - stabpe  Obstructive sleep apnea syndrome  - encourage use of cpap  Breast cancer screening by mammogram  -     Cancel: [facility] DIGITAL SCREEN W OR WO CAD BILATERAL;  Future  -     [facility] BEBO DIGITAL SCREEN BILATERAL; Future    Screening for diabetes mellitus  -     Hemoglobin A1C; Future    Abnormal finding of blood chemistry, unspecified   -     Hemoglobin A1C; Future    Return for BP CHECK- .       Fox Boston MD

## 2022-10-13 ENCOUNTER — HOSPITAL ENCOUNTER (OUTPATIENT)
Dept: MAMMOGRAPHY | Age: 72
Discharge: HOME | End: 2022-10-13
Payer: MEDICARE

## 2022-10-13 VITALS — BODY MASS INDEX: 31.4 KG/M2 | HEIGHT: 69 IN | WEIGHT: 212 LBS

## 2022-10-13 DIAGNOSIS — Z12.31 BREAST CANCER SCREENING BY MAMMOGRAM: ICD-10-CM

## 2022-10-13 PROCEDURE — 77063 BREAST TOMOSYNTHESIS BI: CPT

## 2022-10-18 ENCOUNTER — TELEPHONE (OUTPATIENT)
Dept: WOMENS IMAGING | Age: 72
End: 2022-10-18

## 2022-10-25 ENCOUNTER — HOSPITAL ENCOUNTER (OUTPATIENT)
Age: 72
Discharge: HOME | End: 2022-10-25
Payer: MEDICARE

## 2022-10-25 ENCOUNTER — OFFICE VISIT (OUTPATIENT)
Dept: INTERNAL MEDICINE CLINIC | Age: 72
End: 2022-10-25
Payer: MEDICARE

## 2022-10-25 VITALS
BODY MASS INDEX: 31.31 KG/M2 | WEIGHT: 212 LBS | SYSTOLIC BLOOD PRESSURE: 150 MMHG | DIASTOLIC BLOOD PRESSURE: 90 MMHG | HEART RATE: 71 BPM | OXYGEN SATURATION: 98 %

## 2022-10-25 DIAGNOSIS — R92.2 INCONCLUSIVE MAMMOGRAM: ICD-10-CM

## 2022-10-25 DIAGNOSIS — E05.00 GRAVES DISEASE: ICD-10-CM

## 2022-10-25 DIAGNOSIS — I10 ESSENTIAL HYPERTENSION: ICD-10-CM

## 2022-10-25 DIAGNOSIS — Z12.39 ENCOUNTER FOR BREAST CANCER SCREENING OTHER THAN MAMMOGRAM: Primary | ICD-10-CM

## 2022-10-25 LAB
T3 TOTAL: 1.28 NG/ML (ref 0.8–2)
T4 FREE: 1.2 NG/DL (ref 0.9–1.8)
TSH SERPL DL<=0.05 MIU/L-ACNC: 0.4 UIU/ML (ref 0.27–4.2)

## 2022-10-25 PROCEDURE — 84443 ASSAY THYROID STIM HORMONE: CPT

## 2022-10-25 PROCEDURE — 99211 OFF/OP EST MAY X REQ PHY/QHP: CPT | Performed by: INTERNAL MEDICINE

## 2022-10-25 PROCEDURE — 84480 ASSAY TRIIODOTHYRONINE (T3): CPT

## 2022-10-25 PROCEDURE — 84439 ASSAY OF FREE THYROXINE: CPT

## 2022-10-25 PROCEDURE — 36415 COLL VENOUS BLD VENIPUNCTURE: CPT

## 2022-10-25 PROCEDURE — G8427 DOCREV CUR MEDS BY ELIG CLIN: HCPCS | Performed by: INTERNAL MEDICINE

## 2022-10-25 PROCEDURE — G8417 CALC BMI ABV UP PARAM F/U: HCPCS | Performed by: INTERNAL MEDICINE

## 2022-10-25 ASSESSMENT — PATIENT HEALTH QUESTIONNAIRE - PHQ9
SUM OF ALL RESPONSES TO PHQ9 QUESTIONS 1 & 2: 0
SUM OF ALL RESPONSES TO PHQ QUESTIONS 1-9: 0
1. LITTLE INTEREST OR PLEASURE IN DOING THINGS: 0
SUM OF ALL RESPONSES TO PHQ QUESTIONS 1-9: 0
2. FEELING DOWN, DEPRESSED OR HOPELESS: 0
SUM OF ALL RESPONSES TO PHQ QUESTIONS 1-9: 0
SUM OF ALL RESPONSES TO PHQ QUESTIONS 1-9: 0

## 2022-10-25 ASSESSMENT — ANXIETY QUESTIONNAIRES
1. FEELING NERVOUS, ANXIOUS, OR ON EDGE: 0
GAD7 TOTAL SCORE: 2
7. FEELING AFRAID AS IF SOMETHING AWFUL MIGHT HAPPEN: 0
2. NOT BEING ABLE TO STOP OR CONTROL WORRYING: 1
5. BEING SO RESTLESS THAT IT IS HARD TO SIT STILL: 0
3. WORRYING TOO MUCH ABOUT DIFFERENT THINGS: 1
4. TROUBLE RELAXING: 0
IF YOU CHECKED OFF ANY PROBLEMS ON THIS QUESTIONNAIRE, HOW DIFFICULT HAVE THESE PROBLEMS MADE IT FOR YOU TO DO YOUR WORK, TAKE CARE OF THINGS AT HOME, OR GET ALONG WITH OTHER PEOPLE: NOT DIFFICULT AT ALL
6. BECOMING EASILY ANNOYED OR IRRITABLE: 0

## 2022-10-25 NOTE — PROGRESS NOTES
Camelia Scott (:  1950) is a 81 y.o. female,Established patient, here for evaluation of the following chief complaint(s):  Hypertension (F/U-Hypertension) and Gastroesophageal Reflux (Pt complaints of gerd)  Visit inappropriately schedule       ASSESSMENT/PLAN:  1. Encounter for breast cancer screening other than mammogram  2. Essential hypertension  - BP check for nurse visit    No follow-ups on file. Subjective   SUBJECTIVE/OBJECTIVE:  HPI  HTN    Review of Systems         Allergies   Allergen Reactions    Chlorthalidone Other (See Comments)     Causes increase in b/p and severe headache    Naproxen Swelling    Lisinopril Rash       Current Outpatient Medications   Medication Sig Dispense Refill    baclofen (LIORESAL) 10 MG tablet Take 1 tablet by mouth at bedtime 90 tablet 0    rivaroxaban (XARELTO) 20 MG TABS tablet TAKE ONE TABLET BY MOUTH DAILY WITH BREAKFAST 90 tablet 0    hydroCHLOROthiazide (HYDRODIURIL) 25 MG tablet TAKE ONE TABLET BY MOUTH DAILY 90 tablet 0    methIMAzole (TAPAZOLE) 5 MG tablet Take 2 tablets by mouth daily 60 tablet 5    metoprolol succinate (TOPROL XL) 50 MG extended release tablet TAKE ONE TABLET BY MOUTH DAILY 90 tablet 1    dilTIAZem (DILTIAZEM CD) 180 MG extended release capsule Take 1 capsule by mouth daily 90 capsule 1    iron polysaccharides (NIFEREX) 150 MG capsule Take 1 capsule by mouth 2 times daily 180 capsule 1     No current facility-administered medications for this visit. Vitals:    10/25/22 1137   BP: (!) 152/90   Pulse: 71   SpO2: 98%   Weight: 212 lb (96.2 kg)     Body mass index is 31.31 kg/m². Wt Readings from Last 3 Encounters:   10/25/22 212 lb (96.2 kg)   10/13/22 212 lb (96.2 kg)   10/11/22 212 lb 12.8 oz (96.5 kg)     BP Readings from Last 3 Encounters:   10/25/22 (!) 152/90   10/11/22 120/82   22 120/82       Objective   Physical Exam             An electronic signature was used to authenticate this note.     --Quinn Chase Alysia Hines MD

## 2022-11-06 DIAGNOSIS — I10 ESSENTIAL HYPERTENSION: Chronic | ICD-10-CM

## 2022-11-07 NOTE — TELEPHONE ENCOUNTER
Medication:   Requested Prescriptions     Pending Prescriptions Disp Refills    hydroCHLOROthiazide (HYDRODIURIL) 25 MG tablet [Pharmacy Med Name: hydroCHLOROthiazide 25 MG TABLET] 90 tablet 0     Sig: TAKE ONE TABLET BY MOUTH DAILY     Last Filled:  7/14/22    Last appt: 10/25/2022   Next appt: 1/17/2023    Last Lipid:   Lab Results   Component Value Date/Time    CHOL 162 10/12/2021 12:19 PM    TRIG 28 10/12/2021 12:19 PM    HDL 71 10/12/2021 12:19 PM    LDLCALC see below 10/12/2021 12:19 PM

## 2022-11-08 ENCOUNTER — OFFICE VISIT (OUTPATIENT)
Dept: ENDOCRINOLOGY | Age: 72
End: 2022-11-08
Payer: MEDICARE

## 2022-11-08 VITALS
BODY MASS INDEX: 32.43 KG/M2 | DIASTOLIC BLOOD PRESSURE: 87 MMHG | WEIGHT: 214 LBS | RESPIRATION RATE: 16 BRPM | SYSTOLIC BLOOD PRESSURE: 130 MMHG | HEIGHT: 68 IN | HEART RATE: 70 BPM

## 2022-11-08 DIAGNOSIS — I10 ESSENTIAL HYPERTENSION: Chronic | ICD-10-CM

## 2022-11-08 DIAGNOSIS — I48.0 PAROXYSMAL ATRIAL FIBRILLATION (HCC): ICD-10-CM

## 2022-11-08 DIAGNOSIS — E05.00 GRAVES DISEASE: Primary | ICD-10-CM

## 2022-11-08 DIAGNOSIS — C54.1 ENDOMETRIAL CANCER (HCC): ICD-10-CM

## 2022-11-08 PROCEDURE — 3017F COLORECTAL CA SCREEN DOC REV: CPT | Performed by: INTERNAL MEDICINE

## 2022-11-08 PROCEDURE — 1090F PRES/ABSN URINE INCON ASSESS: CPT | Performed by: INTERNAL MEDICINE

## 2022-11-08 PROCEDURE — 3074F SYST BP LT 130 MM HG: CPT | Performed by: INTERNAL MEDICINE

## 2022-11-08 PROCEDURE — G8399 PT W/DXA RESULTS DOCUMENT: HCPCS | Performed by: INTERNAL MEDICINE

## 2022-11-08 PROCEDURE — G8417 CALC BMI ABV UP PARAM F/U: HCPCS | Performed by: INTERNAL MEDICINE

## 2022-11-08 PROCEDURE — 1123F ACP DISCUSS/DSCN MKR DOCD: CPT | Performed by: INTERNAL MEDICINE

## 2022-11-08 PROCEDURE — 99213 OFFICE O/P EST LOW 20 MIN: CPT | Performed by: INTERNAL MEDICINE

## 2022-11-08 PROCEDURE — G8484 FLU IMMUNIZE NO ADMIN: HCPCS | Performed by: INTERNAL MEDICINE

## 2022-11-08 PROCEDURE — G8427 DOCREV CUR MEDS BY ELIG CLIN: HCPCS | Performed by: INTERNAL MEDICINE

## 2022-11-08 PROCEDURE — 3078F DIAST BP <80 MM HG: CPT | Performed by: INTERNAL MEDICINE

## 2022-11-08 PROCEDURE — 1036F TOBACCO NON-USER: CPT | Performed by: INTERNAL MEDICINE

## 2022-11-08 NOTE — PROGRESS NOTES
SUBJECTIVE:  Baltazar Peralta is a 81 y.o. female  seen in my clinic for Graves' disease and multinodular goiter  Patient was initially referred for thyroid nodule  which was identified on a CT scan done in April 2018 as a workup for her ovarian cancer  . Patient Current complaints: denies fatigue, weight changes, heat/cold intolerance, bowel/skin changes or CVS symptoms  History of obstructive symptoms: difficulty swallowing No, changes in voice/hoarseness No.    History of radiation to patient's neck: NO  Recent iodine exposure: No  Family history includes no thyroid abnormalities. Family history of thyroid cancer: No    Ovarian cancer is treated with surgery , chemo and RT ---she was diagnosed in jan 2018   Patient also has hypertension and is on Toprol and hydrochlorothiazide along with Lotrel  Patient also has atrial fibrillation and has been cardioverted in the past.  Rate controlled    INTERIM     She has been taking 5 mg Tapazole daily she denies any significant palpitation, heat intolerance or insomnia.   She had cardiac ablation pacemaker put in May 2022         Past Medical History:   Diagnosis Date    Anal bleeding 2019    CT scan clear     Anemia     Arthritis     bilateral knee    Atrial fibrillation ([address]     cleared by cardiologist 2018, was related to cancer     Colon polyps     Diabetes mellitus ([address]     pre diabetic diet controlled    Endometrial cancer ([address]     History of blood transfusion     Hypertension     IFG (impaired fasting glucose)     SATISH (obstructive sleep apnea)     3/28/22-currently being fitted for CPAP    Small bowel obstruction ([address]     resolved without surgery    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HCC)     Thyroid nodule      Patient Active Problem List    Diagnosis Date Noted    Obstructive sleep apnea syndrome 05/31/2022    Non morbid obesity, unspecified obesity type 10/19/2021    Cardiac pacemaker in situ 09/30/2021    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HCC)     Bradycardia     Dizziness Paroxysmal atrial fibrillation (HCC)     Chest pain 06/18/2021    Graves disease 05/07/2019    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    GIB (gastrointestinal bleeding) 03/26/2019    Near syncope 07/17/2018    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[facility] 06/19/2018    Partial small bowel obstruction ([facility] 06/08/2018    On antineoplastic chemotherapy 06/07/2018    Abdominal pain, generalized 06/07/2018    Endometrial cancer ([facility] 03/27/2018    Alkaline phosphatase elevation 03/08/2018    Prediabetes 03/08/2018    Chronic anticoagulation 10/13/2017    Anemia 07/20/2017    Abnormal liver enzymes 07/20/2017    Essential hypertension 07/06/2016     Past Surgical History:   Procedure Laterality Date    CARDIOVERSION      CARDIOVERSION  09/19/2019    COLONOSCOPY N/A 03/14/2019    COLONOSCOPY POLYPECTOMY SNARE/COLD BIOPSY performed by Estefany Cárdenas MD at [address] [place] [address]    COLONOSCOPY  03/14/2019    COLONOSCOPY CONTROL HEMORRHAGE performed by Estefany Cárdenas MD at [address] [place] [address] N/A 03/26/2019    COLONOSCOPY CONTROL HEMORRHAGE performed by Estefany Cárdenas MD at [address] [place] [address] N/A 04/01/2022    COLONOSCOPY POLYPECTOMY SNARE/COLD BIOPSY performed by Estefany Cárdenas MD at [facility] (CERVIX STATUS UNKNOWN)      PACEMAKER INSERTION  09/29/2021    SIGMOIDOSCOPY N/A 09/10/2019    SIGMOIDOSCOPY DIAGNOSTIC FLEXIBLE performed by Estefany Cárdenas MD at [facility] (CERVIX REMOVED)      TUBAL LIGATION      TUNNELED CENTRAL VENOUS CATHETER W/ SUBCUTANEOUS PORT Right      Family History   Problem Relation Age of Onset    Hypertension Mother     Colon Cancer Maternal Grandmother      Social History     Socioeconomic History    Marital status:      Spouse name: None    Number of children: None    Years of education: None    Highest education level: None   Occupational History    Occupation: cash checking   Tobacco Use    Smoking status: Never Smokeless tobacco: Never   Vaping Use    Vaping Use: Never used   Substance and Sexual Activity    Alcohol use: Yes     Comment: occ wine    Drug use: No    Sexual activity: Never   Social History Narrative    Lives home with her 3 children. Doing well 8 grandchildren. Social Determinants of Health     Financial Resource Strain: Medium Risk    Difficulty of Paying Living Expenses: Somewhat hard   Food Insecurity: Food Insecurity Present    Worried About Running Out of Food in the Last Year: Sometimes true    Ran Out of Food in the Last Year: Sometimes true     Current Outpatient Medications   Medication Sig Dispense Refill    baclofen (LIORESAL) 10 MG tablet Take 1 tablet by mouth at bedtime 90 tablet 0    rivaroxaban (XARELTO) 20 MG TABS tablet TAKE ONE TABLET BY MOUTH DAILY WITH BREAKFAST 90 tablet 0    hydroCHLOROthiazide (HYDRODIURIL) 25 MG tablet TAKE ONE TABLET BY MOUTH DAILY 90 tablet 0    methIMAzole (TAPAZOLE) 5 MG tablet Take 2 tablets by mouth daily (Patient taking differently: Take 10 mg by mouth daily 1 tablet daily) 60 tablet 5    metoprolol succinate (TOPROL XL) 50 MG extended release tablet TAKE ONE TABLET BY MOUTH DAILY 90 tablet 1    iron polysaccharides (NIFEREX) 150 MG capsule Take 1 capsule by mouth 2 times daily 180 capsule 1    dilTIAZem (DILTIAZEM CD) 180 MG extended release capsule Take 1 capsule by mouth daily 90 capsule 1     No current facility-administered medications for this visit. Allergies   Allergen Reactions    Chlorthalidone Other (See Comments)     Causes increase in b/p and severe headache    Naproxen Swelling    Lisinopril Rash         Review of Systems:  I have reviewed the review of system questionnaire filled by the patient .   Patient was advised to contact PCP for non endocrine signs and symptoms       OBJECTIVE:   /87   Pulse 70   Resp 16   Ht 5' 8\" (1.727 m)   Wt 214 lb (97.1 kg)   LMP  (LMP Unknown)   BMI 32.54 kg/m²   Wt Readings from Last 3 Encounters:   11/08/22 214 lb (97.1 kg)   10/25/22 212 lb (96.2 kg)   10/13/22 212 lb (96.2 kg)       Physical Exam:  Constitutional: no acute distress, well appearing, well nourished  Psychiatric: oriented to person, place and time, judgement, insight and normal, recent and remote memory and intact and mood, affect are normal  Skin: skin and subcutaneous tissue is normal without mass,   Head and Face: examination of head and face revealed no abnormalities  Eyes: no lid or conjunctival swelling, no erythema or discharge, pupils are normal,   Ears/Nose: external inspection of ears and nose revealed no abnormalities, hearing is grossly normal  Oropharynx/Mouth/Face: lips, tongue and gums are normal with no lesions, the voice quality was normal  Neck: neck is supple and symmetric, with midline trachea and no masses, thyroid is normal    Pulmonary: no increased work of breathing or signs of respiratory distress, lungs are clear to auscultation  Cardiovascular: normal heart rate and rhythm, normal S1 and S2,   Musculoskeletal: normal gait and station,   Neurological: normal coordination, normal general cortical function      Lab Review:  Lab Results   Component Value Date/Time    TSH 0.40 10/25/2022 01:31 PM    TSH 1.28 09/20/2022 03:51 PM    TSH 2.42 08/29/2022 03:14 PM     Lab Results   Component Value Date/Time    T4FREE 1.2 10/25/2022 01:31 PM        7/23/2019       COMPARISON:   02/26/2019       HISTORY:   ORDERING SYSTEM PROVIDED HISTORY: Thyroid nodule       FINDINGS:   Right thyroid lobe:  5.7 x 2.7 x 2.3 cm       Left thyroid lobe:  4.6 x 2.2 x 1.6 cm       Isthmus:  5 mm       Thyroid Gland:  Thyroid gland is heterogeneous and mildly hypervascular. The   thyroid gland is mildly enlarged. Nodules: No suspicious solid nodules are identified. Mixed solid and cystic   subcentimeter right thyroid lobe nodule is redemonstrated, 8 mm, not   significantly changed.        Cervical lymphadenopathy: No abnormal lymph nodes in the imaged portions of   the neck. Impression     Lab Results   Component Value Date    TSH 0.40 10/25/2022        ASSESSMENT/PLAN:    ---Graves disease trab and TSI positive. She has been taking tapazole  daily since march 2019   Currently she is taking 5 mg daily   She will stay on current dose   TSH 0.4 in oct 2022   Repeat labs in 6 months or sooner if symptoms     dx with Graves on bloodwork and she was not symptomatic after she started taking Tapazole she noted improvement in her palpitation    she  was told that Methimazole can cause allergic reaction including skin rash, arthralgias and hepatic damage. Costa Shankar  was told about the rare side effect of agranulocytosis and warning symptoms of sore throat, mouth sores, fever and chills to to let us know in case if any of these symptoms develop The patient verbalized understanding and agreed to start taking the medicine.     ----Thyroid nodule  Repeat thyroid in July 2021 stable no nodules   June 2020 showed subcentimeter nodules in Rt lobe,   ---uls done in 7/2019 shows stable size of the thyroid nodule. Left lobe nodules not visible any more     ---Persistent atrial fibrillation   Patient is on anticoagulation and beta blockers  She had cardioversion x2   Follows with cardiology    ---Partial small bowel obstruction   Stable    --Ovarian Cancer ---S/p complete hysterectomy and chemo   Stable   She follows with Oncology at [address] [place] [address]     .  Essential hypertension  Control is good   She takes Amlodipine and HCTZ and metoprolol       Reviewed and/or ordered clinical lab results Yes  Reviewed and/or ordered radiology tests Yes   Reviewed and/or ordered other diagnostic tests Yes  Made a decision to obtain old records Yes  Reviewed and summarized old records Yes      Costa Shankar was counseled regarding symptoms of current diagnosis, course and complications of disease if inadequately treated, side effects of medications, diagnosis, treatment options, and prognosis, risks, benefits, complications, and alternatives of treatment, labs, imaging and other studies and treatment targets and goals. She understands instructions and counseling. Return in about 6 months (around 5/8/2023). Please note that some or all of this report was generated using voice recognition software. Please notify me in case of any questions about the content of this document, as some errors in transcription may have occurred .

## 2022-11-09 RX ORDER — HYDROCHLOROTHIAZIDE 25 MG/1
TABLET ORAL
Qty: 90 TABLET | Refills: 0 | Status: SHIPPED | OUTPATIENT
Start: 2022-11-09

## 2022-11-15 ENCOUNTER — HOSPITAL ENCOUNTER (OUTPATIENT)
Dept: ULTRASOUND IMAGING | Age: 72
Discharge: HOME | End: 2022-11-15
Payer: MEDICARE

## 2022-11-15 ENCOUNTER — HOSPITAL ENCOUNTER (OUTPATIENT)
Dept: WOMENS IMAGING | Age: 72
Discharge: HOME | End: 2022-11-15
Payer: MEDICARE

## 2022-11-15 DIAGNOSIS — R92.8 ABNORMAL MAMMOGRAM: ICD-10-CM

## 2022-11-15 DIAGNOSIS — Z12.39 ENCOUNTER FOR BREAST CANCER SCREENING OTHER THAN MAMMOGRAM: ICD-10-CM

## 2022-11-15 DIAGNOSIS — R92.2 INCONCLUSIVE MAMMOGRAM: ICD-10-CM

## 2022-11-15 PROCEDURE — 77065 DX MAMMO INCL CAD UNI: CPT

## 2022-11-29 ENCOUNTER — OFFICE VISIT (OUTPATIENT)
Dept: PULMONOLOGY | Age: 72
End: 2022-11-29
Payer: MEDICARE

## 2022-11-29 VITALS
WEIGHT: 214 LBS | HEIGHT: 68 IN | OXYGEN SATURATION: 99 % | HEART RATE: 73 BPM | DIASTOLIC BLOOD PRESSURE: 100 MMHG | BODY MASS INDEX: 32.43 KG/M2 | SYSTOLIC BLOOD PRESSURE: 140 MMHG | TEMPERATURE: 98.3 F

## 2022-11-29 DIAGNOSIS — G47.33 OBSTRUCTIVE SLEEP APNEA SYNDROME: Chronic | ICD-10-CM

## 2022-11-29 DIAGNOSIS — I48.0 PAROXYSMAL ATRIAL FIBRILLATION (HCC): Chronic | ICD-10-CM

## 2022-11-29 DIAGNOSIS — E66.9 NON MORBID OBESITY, UNSPECIFIED OBESITY TYPE: Chronic | ICD-10-CM

## 2022-11-29 DIAGNOSIS — I10 ESSENTIAL HYPERTENSION: Chronic | ICD-10-CM

## 2022-11-29 PROCEDURE — 3074F SYST BP LT 130 MM HG: CPT | Performed by: INTERNAL MEDICINE

## 2022-11-29 PROCEDURE — 3017F COLORECTAL CA SCREEN DOC REV: CPT | Performed by: INTERNAL MEDICINE

## 2022-11-29 PROCEDURE — 1090F PRES/ABSN URINE INCON ASSESS: CPT | Performed by: INTERNAL MEDICINE

## 2022-11-29 PROCEDURE — 3078F DIAST BP <80 MM HG: CPT | Performed by: INTERNAL MEDICINE

## 2022-11-29 PROCEDURE — 1036F TOBACCO NON-USER: CPT | Performed by: INTERNAL MEDICINE

## 2022-11-29 PROCEDURE — G8427 DOCREV CUR MEDS BY ELIG CLIN: HCPCS | Performed by: INTERNAL MEDICINE

## 2022-11-29 PROCEDURE — 1123F ACP DISCUSS/DSCN MKR DOCD: CPT | Performed by: INTERNAL MEDICINE

## 2022-11-29 PROCEDURE — 99214 OFFICE O/P EST MOD 30 MIN: CPT | Performed by: INTERNAL MEDICINE

## 2022-11-29 PROCEDURE — G8417 CALC BMI ABV UP PARAM F/U: HCPCS | Performed by: INTERNAL MEDICINE

## 2022-11-29 PROCEDURE — G8484 FLU IMMUNIZE NO ADMIN: HCPCS | Performed by: INTERNAL MEDICINE

## 2022-11-29 PROCEDURE — G8399 PT W/DXA RESULTS DOCUMENT: HCPCS | Performed by: INTERNAL MEDICINE

## 2022-11-29 ASSESSMENT — SLEEP AND FATIGUE QUESTIONNAIRES
HOW LIKELY ARE YOU TO NOD OFF OR FALL ASLEEP WHEN YOU ARE A PASSENGER IN A CAR FOR AN HOUR WITHOUT A BREAK: 0
HOW LIKELY ARE YOU TO NOD OFF OR FALL ASLEEP WHILE SITTING AND TALKING TO SOMEONE: 0
HOW LIKELY ARE YOU TO NOD OFF OR FALL ASLEEP WHILE SITTING QUIETLY AFTER LUNCH WITHOUT ALCOHOL: 0
HOW LIKELY ARE YOU TO NOD OFF OR FALL ASLEEP WHILE SITTING INACTIVE IN A PUBLIC PLACE: 0
HOW LIKELY ARE YOU TO NOD OFF OR FALL ASLEEP IN A CAR, WHILE STOPPED FOR A FEW MINUTES IN TRAFFIC: 0
HOW LIKELY ARE YOU TO NOD OFF OR FALL ASLEEP WHILE WATCHING TV: 0
HOW LIKELY ARE YOU TO NOD OFF OR FALL ASLEEP WHILE LYING DOWN TO REST IN THE AFTERNOON WHEN CIRCUMSTANCES PERMIT: 0
HOW LIKELY ARE YOU TO NOD OFF OR FALL ASLEEP WHILE SITTING AND READING: 0
ESS TOTAL SCORE: 0

## 2022-11-29 NOTE — PROGRESS NOTES
Roman Cano [facility]  [address]  [facility], [address]- [phone]   [facility] [address]. [facility]. [address] [phone]     [address] [facility]  [address] [place] [id number] [phone]      Assessment/Plan:      1. Obstructive sleep apnea syndrome  Assessment & Plan:  Chronic-with progression/exacerbation:  Continue medications per her PCP and other physicians. Instructed not to drive unless had 4 hrs of effective therapy for her SATISH the night before. Did review the risks of under or untreated SATISH including, but not limited to, higher risks of motor vehicle accidents, stroke, heart attacks, and death. She understands and accepts all these risks. Patient is failed CPAP and auto CPAP, needs changed to bilevel. 2. Paroxysmal atrial fibrillation (HCC)  Assessment & Plan:  Chronic- Stable. Discussed the importance of treating sleep apnea as part of the management of this disorder. Cont any meds per PCP and other physicians. 3. Essential hypertension  Assessment & Plan:  Chronic- Stable. Discussed the importance of treating sleep apnea as part of the management of this disorder. Cont any meds per PCP and other physicians. 4. Non morbid obesity, unspecified obesity type  Assessment & Plan:  Chronic-not stable:  Discussed importance of treating obstructive sleep apnea and getting sufficient sleep to assist with weight control. Encouraged her to work on weight loss through diet and exercise. Recommended DASH or Mediterranean diets. Reviewed, analyzed, and documented physiologic data from patient's PAP machine. This information was analyzed to assess complexity and medical decision making in regards to further testing and management.     Diagnoses of Obstructive sleep apnea syndrome, Paroxysmal atrial fibrillation ([facility] Essential hypertension, and Non morbid obesity, unspecified obesity type were pertinent to this visit. The chronic medical conditions listed are directly related to the primary diagnosis listed above. The management of the primary diagnosis affects the secondary diagnosis and vice versa. Subjective:   Subjective   Patient ID: See Urbina is a 81 y.o. female. Chief Complaint   Patient presents with    Sleep Apnea       HPI:  Machine Modem/Download Info:  Compliance (hours/night): 0.7 hrs/night  % of nights >= 4 hrs: 11 %  Download AHI (/hour): 4.6 /HR  Average CPAP Pressure : 11.7 cmH2O   APAP - Settings  Pressure Min: 6 cmH2O  Pressure Max: 16 cmH2O                 Comfort Settings  Flex/EPR (0-3): 3       Has essentially stopped using machine because she is getting severe aerophagia which she could not tolerate. She also is getting some burning in her nose despite increasing humidity. She does feel better when she can use her machine but the aerophagia has limited her usage. Notice was sent to our office that the patient needed to return her machine due to noncompliance. [address] [facility]    [place] Sleepiness Score: 0    Social History     Socioeconomic History    Marital status:      Spouse name: Not on file    Number of children: Not on file    Years of education: Not on file    Highest education level: Not on file   Occupational History    Occupation: cash checking   Tobacco Use    Smoking status: Never    Smokeless tobacco: Never   Vaping Use    Vaping Use: Never used   Substance and Sexual Activity    Alcohol use: Yes     Comment: occ wine    Drug use: No    Sexual activity: Never   Other Topics Concern    Not on file   Social History Narrative    Lives home with her 3 children. Doing well 8 grandchildren.         Social Determinants of Health     Financial Resource Strain: Medium Risk    Difficulty of Paying Living Expenses: Somewhat hard   Food Insecurity: Food Insecurity Present    Worried About Running Out of Food in the Last Year: Sometimes true    Ran Out of Food in the Last Year: Sometimes true   Transportation Needs: Not on file   Physical Activity: Not on file   Stress: Not on file   Social Connections: Not on file   Intimate Partner Violence: Not on file   Housing Stability: Not on file       Current Outpatient Medications   Medication Instructions    baclofen (LIORESAL) 10 mg, Oral, Nightly    dilTIAZem (DILTIAZEM CD) 180 mg, Oral, DAILY    hydroCHLOROthiazide (HYDRODIURIL) 25 MG tablet TAKE ONE TABLET BY MOUTH DAILY    iron polysaccharides (NIFEREX) 150 mg, Oral, 2 TIMES DAILY    methIMAzole (TAPAZOLE) 10 mg, Oral, DAILY    metoprolol succinate (TOPROL XL) 50 MG extended release tablet TAKE ONE TABLET BY MOUTH DAILY    rivaroxaban (XARELTO) 20 MG TABS tablet TAKE ONE TABLET BY MOUTH DAILY WITH BREAKFAST          Vitals:  Weight BMI   Wt Readings from Last 3 Encounters:   11/29/22 214 lb (97.1 kg)   11/08/22 214 lb (97.1 kg)   10/25/22 212 lb (96.2 kg)    Body mass index is 32.54 kg/m².      BP HR SaO2   BP Readings from Last 3 Encounters:   11/29/22 (!) 140/100   11/08/22 130/87   10/25/22 (!) 150/90    Pulse Readings from Last 3 Encounters:   11/29/22 73   11/08/22 70   10/25/22 71    SpO2 Readings from Last 3 Encounters:   11/29/22 99%   10/25/22 98%   10/11/22 98%        Electronically signed by Yeni Castro MD on 11/29/2022 at 2:26 PM

## 2022-11-29 NOTE — LETTER
[facility]  [address]  [address] [address]  Phone: [phone]  Fax: [phone]    Renetta Ramires MD    November 29, 2022     Breana Bryan, [address] [address] [facility]    Patient: Mary Simmons   MR Number: [id number]   YOB: 1950   Date of Visit: 11/29/2022       Dear Breana Bryan:    Thank you for referring Mary Simmons to me for evaluation/treatment. Below are the relevant portions of my assessment and plan of care. 1. Obstructive sleep apnea syndrome  Assessment & Plan:  Chronic-with progression/exacerbation:  Continue medications per her PCP and other physicians. Instructed not to drive unless had 4 hrs of effective therapy for her SATISH the night before. Did review the risks of under or untreated SATISH including, but not limited to, higher risks of motor vehicle accidents, stroke, heart attacks, and death. She understands and accepts all these risks. Patient is failed CPAP and auto CPAP, needs changed to bilevel. 2. Paroxysmal atrial fibrillation (HCC)  Assessment & Plan:  Chronic- Stable. Discussed the importance of treating sleep apnea as part of the management of this disorder. Cont any meds per PCP and other physicians. 3. Essential hypertension  Assessment & Plan:  Chronic- Stable. Discussed the importance of treating sleep apnea as part of the management of this disorder. Cont any meds per PCP and other physicians. 4. Non morbid obesity, unspecified obesity type  Assessment & Plan:  Chronic-not stable:  Discussed importance of treating obstructive sleep apnea and getting sufficient sleep to assist with weight control. Encouraged her to work on weight loss through diet and exercise. Recommended DASH or Mediterranean diets. Reviewed, analyzed, and documented physiologic data from patient's PAP machine.     This information was analyzed to assess complexity and medical decision making in regards to further testing and management. Diagnoses of Obstructive sleep apnea syndrome, Paroxysmal atrial fibrillation ([address] Essential hypertension, and Non morbid obesity, unspecified obesity type were pertinent to this visit. The chronic medical conditions listed are directly related to the primary diagnosis listed above. The management of the primary diagnosis affects the secondary diagnosis and vice versa. If you have questions, please do not hesitate to call me. I look forward to following Cristal Freeze along with you.     Sincerely,      Gus Thomason MD

## 2022-11-29 NOTE — PROGRESS NOTES
Milo Velásquez         : 1950    Diagnosis: [x] SATISH (G47.33) [] CSA (G47.31) [] Apnea (G47.30)   Length of Need: [x] 18 Months [] 99 Months [] Other:    Machine (YADIRA!): [] Respironics Dream Station   2   Auto [x] ResMed AirSense     Auto S11 [] Other:     []  CPAP () [x] Bilevel ()   Mode: [] Auto [] Spontaneous    Mode: [x] Auto [] Spontaneous       IPAPmax-25  EPAPmin-7  PS-3     Comfort Settings:   - Ramp Pressure: 16htI2P                                        - Ramp time: 15 min                                     -  Flex/EPR - 3 full time                                    - For ResMed Bilevel (TiMax-4 sec   TiMin- 0.2 sec)     Humidifier: [x] Heated ()        [x] Water chamber replacement ()/ 1 per 6 months        Mask:   [x] Nasal () /1 per 3 months [] Full Face () /1 per 3 months   [x] Patient choice -Size and fit mask [] Patient Choice - Size and fit mask   [] Dispense:  [] Dispense:    [x] Headgear () / 1 per 3 months [] Headgear () / 1 per 3 months   [x] Replacement Nasal Cushion ()/2 per month [] Interface Replacement ()/1 per month   [x] Replacement Nasal Pillows ()/2 per month         Tubing: [x] Heated ()/1 per 3 months    [] Standard ()/1 per 3 months [] Other:           Filters: [x] Non-disposable ()/1 per 6 months     [x] Ultra-Fine, Disposable ()/2 per month        Miscellaneous: [] Chin Strap ()/ 1 per 6 months [] O2 bleed-in:       LPM   [] Oximetry on CPAP/Bilevel []  Other:    [x] Modem: ()         Start Order Date: 22    MEDICAL JUSTIFICATION:  I, the undersigned, certify that the above prescribed supplies are medically necessary for this patients wellbeing. In my opinion, the supplies are both reasonable and necessary in reference to accepted standards of medicalpractice in treatment of this patients condition.     Blayne Figueroa MD      NPI: [id number]       Order Signed Date: 22    Electronically signed by Blayne Figueroa MD on 2022 at 2:26 PM    Milo Velásquez  1950  [address] [place] [address]  [phone] (home)   [phone] (mobile)      Insurance Info (confirm with patient if correct):  Payer/Plan Subscr  Sex Relation Sub.  Ins. ID Effective Group Num

## 2022-12-06 ENCOUNTER — OFFICE VISIT (OUTPATIENT)
Dept: INTERNAL MEDICINE CLINIC | Age: 72
End: 2022-12-06
Payer: MEDICARE

## 2022-12-06 VITALS
OXYGEN SATURATION: 97 % | BODY MASS INDEX: 32.23 KG/M2 | WEIGHT: 212 LBS | SYSTOLIC BLOOD PRESSURE: 138 MMHG | DIASTOLIC BLOOD PRESSURE: 82 MMHG | HEART RATE: 76 BPM

## 2022-12-06 DIAGNOSIS — H04.123 DRY EYE SYNDROME OF BOTH EYES: Primary | ICD-10-CM

## 2022-12-06 PROCEDURE — G8399 PT W/DXA RESULTS DOCUMENT: HCPCS | Performed by: NURSE PRACTITIONER

## 2022-12-06 PROCEDURE — 3017F COLORECTAL CA SCREEN DOC REV: CPT | Performed by: NURSE PRACTITIONER

## 2022-12-06 PROCEDURE — 3074F SYST BP LT 130 MM HG: CPT | Performed by: NURSE PRACTITIONER

## 2022-12-06 PROCEDURE — 1036F TOBACCO NON-USER: CPT | Performed by: NURSE PRACTITIONER

## 2022-12-06 PROCEDURE — 3078F DIAST BP <80 MM HG: CPT | Performed by: NURSE PRACTITIONER

## 2022-12-06 PROCEDURE — 1090F PRES/ABSN URINE INCON ASSESS: CPT | Performed by: NURSE PRACTITIONER

## 2022-12-06 PROCEDURE — 1123F ACP DISCUSS/DSCN MKR DOCD: CPT | Performed by: NURSE PRACTITIONER

## 2022-12-06 PROCEDURE — G8417 CALC BMI ABV UP PARAM F/U: HCPCS | Performed by: NURSE PRACTITIONER

## 2022-12-06 PROCEDURE — 99213 OFFICE O/P EST LOW 20 MIN: CPT | Performed by: NURSE PRACTITIONER

## 2022-12-06 PROCEDURE — G8427 DOCREV CUR MEDS BY ELIG CLIN: HCPCS | Performed by: NURSE PRACTITIONER

## 2022-12-06 PROCEDURE — G8484 FLU IMMUNIZE NO ADMIN: HCPCS | Performed by: NURSE PRACTITIONER

## 2022-12-06 RX ORDER — POLYVINYL ALCOHOL, POVIDONE 14; 6 MG/ML; MG/ML
1 SOLUTION/ DROPS OPHTHALMIC 2 TIMES DAILY
Qty: 50 EACH | Refills: 1 | Status: SHIPPED | OUTPATIENT
Start: 2022-12-06 | End: 2023-01-05

## 2022-12-06 RX ORDER — CARBOXYMETHYLCELLULOSE SODIUM AND GLYCERIN 10; 9 MG/ML; MG/ML
1 SOLUTION/ DROPS OPHTHALMIC NIGHTLY
Qty: 10 ML | Refills: 0 | Status: SHIPPED | OUTPATIENT
Start: 2022-12-06

## 2022-12-06 ASSESSMENT — ENCOUNTER SYMPTOMS
EYE DISCHARGE: 1
GASTROINTESTINAL NEGATIVE: 1
EYE REDNESS: 1
EYE PAIN: 1
ALLERGIC/IMMUNOLOGIC NEGATIVE: 1
RESPIRATORY NEGATIVE: 1

## 2022-12-06 ASSESSMENT — PATIENT HEALTH QUESTIONNAIRE - PHQ9
SUM OF ALL RESPONSES TO PHQ QUESTIONS 1-9: 0
SUM OF ALL RESPONSES TO PHQ9 QUESTIONS 1 & 2: 0
1. LITTLE INTEREST OR PLEASURE IN DOING THINGS: 0
2. FEELING DOWN, DEPRESSED OR HOPELESS: 0

## 2022-12-06 ASSESSMENT — VISUAL ACUITY: OU: 1

## 2022-12-06 NOTE — PROGRESS NOTES
Andre Lo (:  1950) is a 81 y.o. female,Established patient, here for evaluation of the following chief complaint(s):  Eye Problem (Right side/ painful and light sensitivity)         ASSESSMENT/PLAN:  Joya Kauffman was seen today for eye problem. Diagnoses and all orders for this visit:    Dry eye syndrome of both eyes  -     Polyvinyl Alcohol-Povidone PF (REFRESH) 1.4-0.6 % SOLN ophthalmic solution; Place 1 drop into both eyes in the morning and 1 drop in the evening. Other orders  -     Carboxymethylcellul-Glycerin (REFRESH OPTIVE) 1-0.9 % GEL; Apply 1 application to eye nightly       Need to increase water intake  Do not rub eyes  Avoid fans close to your eye  Use eye drops in each eye twice a day   Use gel every night         Subjective   SUBJECTIVE/OBJECTIVE:  HPI Presents today with right eye pain, watering and pain since . Tried visine without being effective    Review of Systems   Constitutional: Negative. HENT: Negative. Eyes:  Positive for pain, discharge and redness. Respiratory: Negative. Gastrointestinal: Negative. Endocrine: Negative. Genitourinary: Negative. Musculoskeletal: Negative. Skin: Negative. Allergic/Immunologic: Negative. Neurological: Negative. Hematological: Negative. Psychiatric/Behavioral: Negative. Vitals:    22 1127   BP: 138/82   Pulse: 76   SpO2: 97%      BP Readings from Last 3 Encounters:   22 138/82   22 (!) 140/100   22 130/87      Wt Readings from Last 3 Encounters:   22 212 lb (96.2 kg)   22 214 lb (97.1 kg)   22 214 lb (97.1 kg)        Objective   Physical Exam  Constitutional:       Appearance: Normal appearance. She is obese. Eyes:      General: Lids are normal. Lids are everted, no foreign bodies appreciated. Vision grossly intact. Gaze aligned appropriately. Right eye: Discharge present. Extraocular Movements: Extraocular movements intact.       Comments: Right sclera erythema noted, tearing noted   Cardiovascular:      Rate and Rhythm: Normal rate and regular rhythm. Heart sounds: Normal heart sounds. Pulmonary:      Effort: Pulmonary effort is normal.      Breath sounds: Normal breath sounds and air entry. Lymphadenopathy:      Head:      Right side of head: No submental, submandibular, tonsillar, preauricular, posterior auricular or occipital adenopathy. Left side of head: No submental, submandibular, tonsillar, preauricular, posterior auricular or occipital adenopathy. Neurological:      Mental Status: She is alert. An electronic signature was used to authenticate this note.     --Latia Elena, APRN - CNP

## 2022-12-06 NOTE — PATIENT INSTRUCTIONS
Need to increase water intake  Do not rub eyes  Avoid fans close to your eye  Use eye drops in each eye twice a day   Use gel every night

## 2022-12-19 ENCOUNTER — NURSE ONLY (OUTPATIENT)
Dept: CARDIOLOGY CLINIC | Age: 72
End: 2022-12-19
Payer: MEDICARE

## 2022-12-19 DIAGNOSIS — Z95.0 CARDIAC PACEMAKER IN SITU: ICD-10-CM

## 2022-12-19 DIAGNOSIS — I48.0 PAF (PAROXYSMAL ATRIAL FIBRILLATION) (HCC): ICD-10-CM

## 2022-12-19 DIAGNOSIS — I49.5 TACHY-BRADY SYNDROME (HCC): ICD-10-CM

## 2022-12-19 DIAGNOSIS — R00.1 BRADYCARDIA: ICD-10-CM

## 2022-12-19 DIAGNOSIS — I48.19 PERSISTENT ATRIAL FIBRILLATION (HCC): ICD-10-CM

## 2022-12-19 PROCEDURE — 93294 REM INTERROG EVL PM/LDLS PM: CPT | Performed by: INTERNAL MEDICINE

## 2022-12-19 PROCEDURE — 93296 REM INTERROG EVL PM/IDS: CPT | Performed by: INTERNAL MEDICINE

## 2022-12-27 DIAGNOSIS — I10 ESSENTIAL HYPERTENSION: Chronic | ICD-10-CM

## 2022-12-27 DIAGNOSIS — I48.19 PERSISTENT ATRIAL FIBRILLATION (HCC): ICD-10-CM

## 2022-12-27 RX ORDER — METOPROLOL SUCCINATE 50 MG/1
TABLET, EXTENDED RELEASE ORAL
Qty: 90 TABLET | Refills: 1 | Status: SHIPPED | OUTPATIENT
Start: 2022-12-27

## 2022-12-27 NOTE — TELEPHONE ENCOUNTER
Recent Visits  Date Type Provider Dept   12/06/22 Office Visit MARLENE Vazquez CNP [facility]   10/25/22 Office Visit Mandi Dubois MD [facility]   10/11/22 Office Visit Mandi Dubois MD [facility]   03/29/22 Office Visit Mandi Dubois MD [facility]   01/13/22 Office Visit Mandi Dubois MD [facility]   09/22/21 Office Visit Mandi Dubois MD [facility]   07/09/21 Office Visit Mandi Dubois MD [facility]   Showing recent visits within past 540 days with a meds authorizing provider and meeting all other requirements  Future Appointments  Date Type Provider Dept   01/17/23 Appointment Mandi Dubois MD [facility]   Showing future appointments within next 150 days with a meds authorizing provider and meeting all other requirements     12/6/2022

## 2022-12-29 DIAGNOSIS — I48.19 PERSISTENT ATRIAL FIBRILLATION (HCC): ICD-10-CM

## 2022-12-29 DIAGNOSIS — D50.9 IRON DEFICIENCY ANEMIA, UNSPECIFIED IRON DEFICIENCY ANEMIA TYPE: ICD-10-CM

## 2022-12-29 NOTE — TELEPHONE ENCOUNTER
Recent Visits  Date Type Provider Dept   12/06/22 Office Visit Slick Monday, APRN - CNP [facility]   10/25/22 Office Visit Brandi Valle MD [facility]   10/11/22 Office Visit Brandi Valle, MD [facility]   03/29/22 Office Visit Brandi Valle, MD [facility]   01/13/22 Office Visit Brandi Valle MD [facility]   09/22/21 Office Visit Brandi Valle, MD [facility]   07/09/21 Office Visit Brandi Valle MD [facility]   Showing recent visits within past 540 days with a meds authorizing provider and meeting all other requirements  Future Appointments  Date Type Provider Dept   01/17/23 Appointment Brandi Valle MD [facility]   Showing future appointments within next 150 days with a meds authorizing provider and meeting all other requirements     12/6/2022

## 2023-01-07 DIAGNOSIS — R14.2 FLATULENCE, ERUCTATION, AND GAS PAIN: ICD-10-CM

## 2023-01-07 DIAGNOSIS — R14.1 FLATULENCE, ERUCTATION, AND GAS PAIN: ICD-10-CM

## 2023-01-07 DIAGNOSIS — R14.3 FLATULENCE, ERUCTATION, AND GAS PAIN: ICD-10-CM

## 2023-01-09 NOTE — TELEPHONE ENCOUNTER
Requested Prescriptions     Pending Prescriptions Disp Refills    baclofen (LIORESAL) 10 MG tablet [Pharmacy Med Name: BACLOFEN 10 MG TABLET] 90 tablet 0     Sig: TAKE ONE TABLET BY MOUTH AT BEDTIME     Recent Visits  Date Type Provider Dept   12/06/22 Office Visit Festus Caro APRN - CNP [facility]   10/25/22 Office Visit Artur Mcgraw MD [facility]   10/11/22 Office Visit Artur Mcgraw MD [facility]   03/29/22 Office Visit Artur Mcgraw MD [facility]   01/13/22 Office Visit Artur Mcgraw MD [facility]   09/22/21 Office Visit Artur Mcgraw MD [facility]   Showing recent visits within past 540 days with a meds authorizing provider and meeting all other requirements  Future Appointments  Date Type Provider Dept   01/17/23 Appointment Artur Mcgraw MD [facility]   Showing future appointments within next 150 days with a meds authorizing provider and meeting all other requirements     12/6/2022 Spoke with pt to confirm appt

## 2023-01-10 RX ORDER — BACLOFEN 10 MG/1
TABLET ORAL
Qty: 90 TABLET | Refills: 0 | Status: SHIPPED | OUTPATIENT
Start: 2023-01-10

## 2023-01-17 ENCOUNTER — OFFICE VISIT (OUTPATIENT)
Dept: INTERNAL MEDICINE CLINIC | Age: 73
End: 2023-01-17
Payer: MEDICARE

## 2023-01-17 VITALS
HEIGHT: 68 IN | SYSTOLIC BLOOD PRESSURE: 144 MMHG | DIASTOLIC BLOOD PRESSURE: 82 MMHG | WEIGHT: 213 LBS | OXYGEN SATURATION: 98 % | HEART RATE: 85 BPM | BODY MASS INDEX: 32.28 KG/M2

## 2023-01-17 DIAGNOSIS — T45.1X5A CHEMOTHERAPY-INDUCED NEUROPATHY (HCC): ICD-10-CM

## 2023-01-17 DIAGNOSIS — G62.0 CHEMOTHERAPY-INDUCED NEUROPATHY (HCC): ICD-10-CM

## 2023-01-17 DIAGNOSIS — C54.1 ENDOMETRIAL CANCER (HCC): ICD-10-CM

## 2023-01-17 DIAGNOSIS — I10 ESSENTIAL HYPERTENSION: ICD-10-CM

## 2023-01-17 DIAGNOSIS — I48.0 PAROXYSMAL ATRIAL FIBRILLATION (HCC): ICD-10-CM

## 2023-01-17 DIAGNOSIS — Z00.00 MEDICARE ANNUAL WELLNESS VISIT, SUBSEQUENT: Primary | ICD-10-CM

## 2023-01-17 DIAGNOSIS — E05.00 GRAVES DISEASE: ICD-10-CM

## 2023-01-17 PROCEDURE — G0439 PPPS, SUBSEQ VISIT: HCPCS | Performed by: INTERNAL MEDICINE

## 2023-01-17 PROCEDURE — 1123F ACP DISCUSS/DSCN MKR DOCD: CPT | Performed by: INTERNAL MEDICINE

## 2023-01-17 PROCEDURE — 3079F DIAST BP 80-89 MM HG: CPT | Performed by: INTERNAL MEDICINE

## 2023-01-17 PROCEDURE — 3075F SYST BP GE 130 - 139MM HG: CPT | Performed by: INTERNAL MEDICINE

## 2023-01-17 RX ORDER — METHIMAZOLE 5 MG/1
10 TABLET ORAL DAILY
Qty: 180 TABLET | Refills: 0 | COMMUNITY
Start: 2023-01-17

## 2023-01-17 RX ORDER — SPIRONOLACTONE 25 MG/1
12.5 TABLET ORAL DAILY
Qty: 45 TABLET | Refills: 1 | Status: SHIPPED | OUTPATIENT
Start: 2023-01-17 | End: 2023-02-14 | Stop reason: SDUPTHER

## 2023-01-17 ASSESSMENT — ANXIETY QUESTIONNAIRES
1. FEELING NERVOUS, ANXIOUS, OR ON EDGE: 1
6. BECOMING EASILY ANNOYED OR IRRITABLE: 0
7. FEELING AFRAID AS IF SOMETHING AWFUL MIGHT HAPPEN: 0
4. TROUBLE RELAXING: 0
GAD7 TOTAL SCORE: 3
5. BEING SO RESTLESS THAT IT IS HARD TO SIT STILL: 0
3. WORRYING TOO MUCH ABOUT DIFFERENT THINGS: 1
2. NOT BEING ABLE TO STOP OR CONTROL WORRYING: 1
IF YOU CHECKED OFF ANY PROBLEMS ON THIS QUESTIONNAIRE, HOW DIFFICULT HAVE THESE PROBLEMS MADE IT FOR YOU TO DO YOUR WORK, TAKE CARE OF THINGS AT HOME, OR GET ALONG WITH OTHER PEOPLE: NOT DIFFICULT AT ALL

## 2023-01-17 ASSESSMENT — PATIENT HEALTH QUESTIONNAIRE - PHQ9
7. TROUBLE CONCENTRATING ON THINGS, SUCH AS READING THE NEWSPAPER OR WATCHING TELEVISION: 0
8. MOVING OR SPEAKING SO SLOWLY THAT OTHER PEOPLE COULD HAVE NOTICED. OR THE OPPOSITE, BEING SO FIGETY OR RESTLESS THAT YOU HAVE BEEN MOVING AROUND A LOT MORE THAN USUAL: 0
10. IF YOU CHECKED OFF ANY PROBLEMS, HOW DIFFICULT HAVE THESE PROBLEMS MADE IT FOR YOU TO DO YOUR WORK, TAKE CARE OF THINGS AT HOME, OR GET ALONG WITH OTHER PEOPLE: 0
SUM OF ALL RESPONSES TO PHQ QUESTIONS 1-9: 4
SUM OF ALL RESPONSES TO PHQ9 QUESTIONS 1 & 2: 0
3. TROUBLE FALLING OR STAYING ASLEEP: 3
SUM OF ALL RESPONSES TO PHQ QUESTIONS 1-9: 4
9. THOUGHTS THAT YOU WOULD BE BETTER OFF DEAD, OR OF HURTING YOURSELF: 0
6. FEELING BAD ABOUT YOURSELF - OR THAT YOU ARE A FAILURE OR HAVE LET YOURSELF OR YOUR FAMILY DOWN: 0
1. LITTLE INTEREST OR PLEASURE IN DOING THINGS: 0
4. FEELING TIRED OR HAVING LITTLE ENERGY: 1
2. FEELING DOWN, DEPRESSED OR HOPELESS: 0
SUM OF ALL RESPONSES TO PHQ QUESTIONS 1-9: 4
5. POOR APPETITE OR OVEREATING: 0
SUM OF ALL RESPONSES TO PHQ QUESTIONS 1-9: 4
DEPRESSION UNABLE TO ASSESS: FUNCTIONAL CAPACITY MOTIVATION LIMITS ACCURACY

## 2023-01-17 ASSESSMENT — LIFESTYLE VARIABLES
HOW MANY STANDARD DRINKS CONTAINING ALCOHOL DO YOU HAVE ON A TYPICAL DAY: PATIENT DOES NOT DRINK
HOW OFTEN DO YOU HAVE A DRINK CONTAINING ALCOHOL: NEVER

## 2023-01-17 NOTE — PATIENT INSTRUCTIONS
Fatigue: Care Instructions  Your Care Instructions     Fatigue is a feeling of tiredness, exhaustion, or lack of energy. You may feel fatigue because of too much or not enough activity. It can also come from stress, lack of sleep, boredom, and poor diet. Many medical problems, such as viral infections, can cause fatigue. Emotional problems, especially depression, are often the cause of fatigue. Fatigue is most often a symptom of another problem. Treatment for fatigue depends on the cause. For example, if you have fatigue because you have a certain health problem, treating this problem also treats your fatigue. If depression or anxiety is the cause, treatment may help. Follow-up care is a key part of your treatment and safety. Be sure to make and go to all appointments, and call your doctor if you are having problems. It's also a good idea to know your test results and keep a list of the medicines you take. How can you care for yourself at home? Get regular exercise. But don't overdo it. Go back and forth between rest and exercise. Get plenty of rest.  Eat a healthy diet. Do not skip meals, especially breakfast.  Reduce your use of caffeine, tobacco, and alcohol. Caffeine is most often found in coffee, tea, cola drinks, and chocolate. Limit medicines that can cause fatigue. This includes tranquilizers and cold and allergy medicines. When should you call for help? Watch closely for changes in your health, and be sure to contact your doctor if:    You have new symptoms such as fever or a rash.     Your fatigue gets worse.     You have been feeling down, depressed, or hopeless. Or you may have lost interest in things that you usually enjoy.     You are not getting better as expected. Where can you learn more? Go to http://www.woods.com/ and enter D294 to learn more about \"Fatigue: Care Instructions. \"  Current as of: February 9, 2022               Content Version: 13.5  © 1842-4825 Healthwise, Incorporated. Care instructions adapted under license by [facility] ([facility]). If you have questions about a medical condition or this instruction, always ask your healthcare professional. [address] any warranty or liability for your use of this information. For more information on your local Area Agency on Aging or [employer] on Aging please visit the appropriate web site below:    [place]: MobileCycles.pl    [facility]: https://aging. [place].gov/    [facility]: https://aging.sc.gov/    [place]: InsuranceSquad.es           Learning About Being Active as an Older Adult  Why is being active important as you get older? Being active is one of the best things you can do for your health. And it's never too late to start. Being active--or getting active, if you aren't already--has definite benefits. It can:  Give you more energy,  Keep your mind sharp. Improve balance to reduce your risk of falls. Help you manage chronic illness with fewer medicines. No matter how old you are, how fit you are, or what health problems you have, there is a form of activity that will work for you. And the more physical activity you can do, the better your overall health will be. What kinds of activity can help you stay healthy? Being more active will make your daily activities easier. Physical activity includes planned exercise and things you do in daily life. There are four types of activity:  Aerobic. Doing aerobic activity makes your heart and lungs strong. Includes walking, dancing, and gardening. Aim for at least 2½ hours spread throughout the week. It improves your energy and can help you sleep better. Muscle-strengthening. This type of activity can help maintain muscle and strengthen bones. Includes climbing stairs, using resistance bands, and lifting or carrying heavy loads. Aim for at least twice a week.   It can help protect the knees and other joints. Stretching. Stretching gives you better range of motion in joints and muscles. Includes upper arm stretches, calf stretches, and gentle yoga. Aim for at least twice a week, preferably after your muscles are warmed up from other activities. It can help you function better in daily life. Balancing. This helps you stay coordinated and have good posture. Includes heel-to-toe walking, manuel chi, and certain types of yoga. Aim for at least 3 days a week. It can reduce your risk of falling. Even if you have a hard time meeting the recommendations, it's better to be more active than less active. All activity done in each category counts toward your weekly total. You'd be surprised how daily things like carrying groceries, keeping up with grandchildren, and taking the stairs can add up. What keeps you from being active? If you've had a hard time being more active, you're not alone. Maybe you remember being able to do more. Or maybe you've never thought of yourself as being active. It's frustrating when you can't do the things you want. Being more active can help. What's holding you back? Getting started. Have a goal, but break it into easy tasks. Small steps build into big accomplishments. Staying motivated. If you feel like skipping your activity, remember your goal. Maybe you want to move better and stay independent. Every activity gets you one step closer. Not feeling your best.  Start with 5 minutes of an activity you enjoy. Prove to yourself you can do it. As you get comfortable, increase your time. You may not be where you want to be. But you're in the process of getting there. Everyone starts somewhere. How can you find safe ways to stay active? Talk with your doctor about any physical challenges you're facing. Make a plan with your doctor if you have a health problem or aren't sure how to get started with activity.   If you're already active, ask your doctor if there is anything you should change to stay safe as your body and health change. If you tend to feel dizzy after you take medicine, avoid activity at that time. Try being active before you take your medicine. This will reduce your risk of falls. If you plan to be active at home, make sure to clear your space before you get started. Remove things like TV cords, coffee tables, and throw rugs. It's safest to have plenty of space to move freely. The key to getting more active is to take it slow and steady. Try to improve only a little bit at a time. Pick just one area to improve on at first. And if an activity hurts, stop and talk to your doctor. Where can you learn more? Go to http://www.moran.com/ and enter P600 to learn more about \"Learning About Being Active as an Older Adult. \"  Current as of: October 10, 2022               Content Version: 13.5  © 2006-2022 [employer]. Care instructions adapted under license by [facility] ([facility]). If you have questions about a medical condition or this instruction, always ask your healthcare professional. [place] [address] any warranty or liability for your use of this information. Advance Directives: Care Instructions  Overview  An advance directive is a legal way to state your wishes at the end of your life. It tells your family and your doctor what to do if you can't say what you want. There are two main types of advance directives. You can change them any time your wishes change. Living will. This form tells your family and your doctor your wishes about life support and other treatment. The form is also called a declaration. Medical power of . This form lets you name a person to make treatment decisions for you when you can't speak for yourself. This person is called a health care agent (health care proxy, health care surrogate). The form is also called a durable power of  for health care.   If you do not have an advance directive, decisions about your medical care may be made by a family member, or by a doctor or a  who doesn't know you. It may help to think of an advance directive as a gift to the people who care for you. If you have one, they won't have to make tough decisions by themselves. For more information, including forms for your state, see the [address] website (www.caringinfo.org/planning/advance-directives/). Follow-up care is a key part of your treatment and safety. Be sure to make and go to all appointments, and call your doctor if you are having problems. It's also a good idea to know your test results and keep a list of the medicines you take. What should you include in an advance directive? Many states have a unique advance directive form. (It may ask you to address specific issues.) Or you might use a universal form that's approved by many states. If your form doesn't tell you what to address, it may be hard to know what to include in your advance directive. Use the questions below to help you get started. Who do you want to make decisions about your medical care if you are not able to? What life-support measures do you want if you have a serious illness that gets worse over time or can't be cured? What are you most afraid of that might happen? (Maybe you're afraid of having pain, losing your independence, or being kept alive by machines.)  Where would you prefer to die? (Your home? A hospital? A nursing home?)  Do you want to donate your organs when you die? Do you want certain Adventism practices performed before you die? When should you call for help? Be sure to contact your doctor if you have any questions. Where can you learn more? Go to http://www.moran.com/ and enter R264 to learn more about \"Advance Directives: Care Instructions. \"  Current as of: June 16, 2022               Content Version: 13.5  © 6913-0348 Healthwise, Incorporated.    Care instructions adapted under license by [facility] ([facility]). If you have questions about a medical condition or this instruction, always ask your healthcare professional. [address] any warranty or liability for your use of this information. Learning About Healthy Weight  What is a healthy weight? A healthy weight is the weight at which you feel good about yourself and have energy for work and play. It's also one that lowers your risk for health problems. What can you do to stay at a healthy weight? It can be hard to stay at a healthy weight, especially when fast food, vending-machine snacks, and processed foods are so easy to find. And with your busy lifestyle, activity may be low on your list of things to do. But staying at a healthy weight may be easier than you think. Here are some dos and don'ts for staying at a healthy weight. Do eat healthy foods  The kinds of foods you eat have a big impact on both your weight and your health. Reaching and staying at a healthy weight is not about going on a diet. It's about making healthier food choices every day and changing your diet for good. Healthy eating means eating a variety of foods so that you get all the nutrients you need. Your body needs protein, carbohydrate, and fats for energy. They keep your heart beating, your brain active, and your muscles working. On most days, try to eat from each food group. This means eating a variety of: Whole grains, such as whole wheat breads and pastas. Fruits and vegetables. Dairy products, such as low-fat milk, yogurt, and cheese. Lean proteins, such as all types of fish, chicken without the skin, and beans. Don't have too much or too little of one thing. All foods, if eaten in moderation, can be part of healthy eating. Even sweets can be okay. If your favorite foods are high in fat, salt, sugar, or calories, limit how often you eat them. Eat smaller servings, or look for healthy substitutes.   Do watch what you eat  Many people eat more than their bodies need. Part of staying at a healthy weight means learning how much food you really need from day to day and not eating more than that. Even with healthy foods, eating too much can make you gain weight. Having a well-balanced diet means that you eat enough, but not too much, and that your food gives you the nutrients you need to stay healthy. So listen to your body. Eat when you're hungry. Stop when you feel satisfied. It's a good idea to have healthy snacks ready for when you get hungry. Keep healthy snacks with you at work, in your car, and at home. If you have a healthy snack easily available, you'll be less likely to pick a candy bar or bag of chips from a vending machine instead. Some healthy snacks you might want to keep on hand are fruit, low-fat yogurt, string cheese, low-fat microwave popcorn, raisins and other dried fruit, nuts, whole wheat crackers, pretzels, carrots, celery sticks, and broccoli. Do some physical activity  A big part of reaching and staying at a healthy weight is being active. When you're active, you burn calories. This makes it easier to reach and stay at a healthy weight. When you're active on a regular basis, your body burns more calories, even when you're at rest. Being active helps you lose fat and build lean muscle. Try to be active for at least 1 hour every day. This may sound like a lot, but it's okay to be active in smaller blocks of time that add up to 1 hour a day. Any activity that makes your heart beat faster and keeps it there for a while counts. A brisk walk, run, or swim will get your heart beating faster. So will climbing stairs, shooting baskets, or cycling. Even some household chores like vacuuming and mowing the lawn will get your heart rate up. Pick activities that you enjoy--ones that make your heart beat faster, your muscles stronger, and your muscles and joints more flexible.  If you find more than one thing you like doing, do them all. You don't have to do the same thing every day. Don't diet  Diets don't work. Diets are temporary. Because you give up so much when you diet, you may be hungry and think about food all the time. And after you stop dieting, you also may overeat to make up for what you missed. Most people who diet end up gaining back the pounds they lost--and more. Remember that healthy bodies come in lots of shapes and sizes. Everyone can get healthier by eating better and being more active. Where can you learn more? Go to http://www.woods.com/ and enter B909 to learn more about \"Learning About Healthy Weight. \"  Current as of: August 25, 2022               Content Version: 13.5  © 4951-3953 Healthwise, Incorporated. Care instructions adapted under license by [facility] ([facility]). If you have questions about a medical condition or this instruction, always ask your healthcare professional. [place] [address] any warranty or liability for your use of this information. Personalized Preventive Plan for Licha Wen - 1/17/2023  Medicare offers a range of preventive health benefits. Some of the tests and screenings are paid in full while other may be subject to a deductible, co-insurance, and/or copay. Some of these benefits include a comprehensive review of your medical history including lifestyle, illnesses that may run in your family, and various assessments and screenings as appropriate. After reviewing your medical record and screening and assessments performed today your provider may have ordered immunizations, labs, imaging, and/or referrals for you. A list of these orders (if applicable) as well as your Preventive Care list are included within your After Visit Summary for your review.     Other Preventive Recommendations:    A preventive eye exam performed by an eye specialist is recommended every 1-2 years to screen for glaucoma; cataracts, macular degeneration, and other eye disorders. A preventive dental visit is recommended every 6 months. Try to get at least 150 minutes of exercise per week or 10,000 steps per day on a pedometer . Order or download the FREE \"Exercise & Physical Activity: Your Everyday Guide\" from [employer]. Call 5-656.491.2214 or search [employer] online. You need 3135-7310 mg of calcium and 2535-4216 IU of vitamin D per day. It is possible to meet your calcium requirement with diet alone, but a vitamin D supplement is usually necessary to meet this goal.  When exposed to the sun, use a sunscreen that protects against both UVA and UVB radiation with an SPF of 30 or greater. Reapply every 2 to 3 hours or after sweating, drying off with a towel, or swimming. Always wear a seat belt when traveling in a car. Always wear a helmet when riding a bicycle or motorcycle.

## 2023-01-17 NOTE — PROGRESS NOTES
Medicare Annual Wellness Visit    Milo Velásquez is here for Medicare AWV    Assessment & Plan   Medicare annual wellness visit, subsequent  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[facility]  Paroxysmal atrial fibrillation ([facility]  - stable, followed by cardiology  Endometrial cancer ([facility]  - stable  Graves disease  0stable  Essential hypertension  -     Renal Function Panel; Future      Recommendations for Preventive Services Due: see orders and patient instructions/AVS.  Recommended screening schedule for the next 5-10 years is provided to the patient in written form: see Patient Instructions/AVS.     Return for Medicare Annual Wellness Visit in 1 year. Subjective   The following acute and/or chronic problems were also addressed today:    Subjective:   Milo Velásquez is a 81 y.o. female with hypertension whichh has been persistently uncontrolled. We will add spironolactone because she does not tolerate ace's. Current Outpatient Medications   Medication Sig Dispense Refill    methIMAzole (TAPAZOLE) 5 MG tablet Take 2 tablets by mouth daily 1 tablet daily 180 tablet 0    spironolactone (ALDACTONE) 25 MG tablet Take 0.5 tablets by mouth daily 45 tablet 1    rivaroxaban (XARELTO) 20 MG TABS tablet TAKE ONE TABLET BY MOUTH DAILY WITH BREAKFAST 90 tablet 0    metoprolol succinate (TOPROL XL) 50 MG extended release tablet TAKE ONE TABLET BY MOUTH DAILY 90 tablet 1    hydroCHLOROthiazide (HYDRODIURIL) 25 MG tablet TAKE ONE TABLET BY MOUTH DAILY 90 tablet 0    iron polysaccharides (NIFEREX) 150 MG capsule Take 1 capsule by mouth 2 times daily 180 capsule 1    dilTIAZem (DILTIAZEM CD) 180 MG extended release capsule Take 1 capsule by mouth daily 90 capsule 1     No current facility-administered medications for this visit.       Hypertension ROS: taking medications as instructed, no medication side effects noted, patient does not perform home BP monitoring, no TIA's, no chest pain on exertion, no dyspnea on exertion, no swelling of ankles, no palpitations. Positive Risk Factor Screenings with Interventions:        Depression:  Depression Unable to Assess: Functional capacity motivation limits accuracy  PHQ-2 Score: 0  PHQ-9 Total Score: 4    Interpretation:   1-4 = minimal  5-9 = mild  10-14 = moderate  15-19 = moderately severe  20-27 = severe          General HRA Questions:  Select all that apply: (!) New or Increased Fatigue    Fatigue Interventions:  See above      Social and Emotional Support:  Do you get the social and emotional support that you need?: (!) No  Interventions:  Patient advised to follow up in the office for further evaluation and treatment    Weight and Activity:  Physical Activity: Inactive    Days of Exercise per Week: 0 days    Minutes of Exercise per Session: 0 min     On average, how many days per week do you engage in moderate to strenuous exercise (like a brisk walk)?: 0 days  Have you lost any weight without trying in the past 3 months?: No  Body mass index: (!) 32.38      Inactivity Interventions:  Recommendations: patient agrees to exercise for at least 150 minutes/week  Obesity Interventions:  low carbohydrate diet, exercise for at least 150 minutes/week             CV Risk Counseling:  Patient was asked about her current diet and exercise habits, and personalized advice was provided regarding recommended lifestyle changes. Patient's individual cardiovascular disease risk factors, including advanced age (> 54 for men, > 72 for women), hypertension, male gender, microalbuminuria/CKD, obesity (BMI >= 30), and sedentary lifestyle, were discussed, as well as the likely benefits of lifestyle changes. Based upon patient's motivation to change her behavior, the following plan was agreed upon to work toward lowering cardiovascular disease risk: DASH diet. Aspirin use for primary prevention of cardiovascular disease for men 45-79 and women 55-79:  on xarelto .  Educational materials for lifestyle changes were provided. Objective   Vitals:    01/17/23 1208   BP: (!) 144/82   Pulse: 85   SpO2: 98%   Weight: 213 lb (96.6 kg)   Height: 5' 8\" (1.727 m)      Body mass index is 32.39 kg/m². General Appearance: alert and oriented to person, place and time, well developed and well- nourished, in no acute distress  Skin: warm and dry, no rash or erythema  Head: normocephalic and atraumatic  Eyes: pupils equal, round, and reactive to light, extraocular eye movements intact, conjunctivae normal  ENT: tympanic membrane, external ear and ear canal normal bilaterally, nose without deformity, nasal mucosa and turbinates normal without polyps  Neck: supple and non-tender without mass, no thyromegaly or thyroid nodules, no cervical lymphadenopathy  Pulmonary/Chest: clear to auscultation bilaterally- no wheezes, rales or rhonchi, normal air movement, no respiratory distress  Cardiovascular: normal rate, regular rhythm, normal S1 and S2, no murmurs, rubs, clicks, or gallops, distal pulses intact, no carotid bruits  Abdomen: soft, non-tender, non-distended, normal bowel sounds, no masses or organomegaly  Extremities: no cyanosis, clubbing or edema  Musculoskeletal: normal range of motion, no joint swelling, deformity or tenderness  Neurologic: reflexes normal and symmetric, no cranial nerve deficit, gait, coordination and speech normal       Allergies   Allergen Reactions    Chlorthalidone Other (See Comments)     Causes increase in b/p and severe headache    Naproxen Swelling    Lisinopril Rash     Prior to Visit Medications    Medication Sig Taking?  Authorizing Provider   rivaroxaban (XARELTO) 20 MG TABS tablet TAKE ONE TABLET BY MOUTH DAILY WITH BREAKFAST Yes Niki Hyatt MD   metoprolol succinate (TOPROL XL) 50 MG extended release tablet TAKE ONE TABLET BY MOUTH DAILY Yes Niki Hyatt MD   hydroCHLOROthiazide (HYDRODIURIL) 25 MG tablet TAKE ONE TABLET BY MOUTH DAILY Yes Niki Hyatt MD methIMAzole (TAPAZOLE) 5 MG tablet Take 2 tablets by mouth daily  Patient taking differently: Take 10 mg by mouth daily 1 tablet daily Yes Larry Bernabe MD   dilTIAZem (DILTIAZEM CD) 180 MG extended release capsule Take 1 capsule by mouth daily Yes Derrell Mix MD   baclofen (LIORESAL) 10 MG tablet TAKE ONE TABLET BY MOUTH AT BEDTIME  Patient not taking: Reported on 1/17/2023  Derrell Mix MD   Carboxymethylcellul-Glycerin (REFRESH OPTIVE) 1-0.9 % GEL Apply 1 application to eye nightly  Patient not taking: Reported on 1/17/2023  Prashanth Rodríguez APRN - CNP   iron polysaccharides (NIFEREX) 150 MG capsule Take 1 capsule by mouth 2 times daily  Patient not taking: Reported on 1/17/2023  Derrell Mix MD       [facility] (Including outside providers/suppliers regularly involved in providing care):   Patient Care Team:  Derrell Mix MD as PCP - Ronald Villarreal MD as PCP - [facility] Empaneled Provider     Reviewed and updated this visit:  Tobacco  Allergies  Meds  Problems  Med Hx  Surg Hx  Soc Hx  Fam Hx

## 2023-02-08 DIAGNOSIS — I10 ESSENTIAL HYPERTENSION: Chronic | ICD-10-CM

## 2023-02-08 NOTE — TELEPHONE ENCOUNTER
Recent Visits  Date Type Provider Dept   01/17/23 Office Visit Ghada Sylvester MD [facility]   12/06/22 Office Visit Berl Gowers, APRN - CNP [facility]   10/25/22 Office Visit Ghada Sylvester MD [facility]   10/11/22 Office Visit Ghada Sylvester MD [facility]   03/29/22 Office Visit Ghada Sylvester MD [facility]   01/13/22 Office Visit Ghada Sylvester MD [facility]   09/22/21 Office Visit Ghada Sylvester MD [facility]   Showing recent visits within past 540 days with a meds authorizing provider and meeting all other requirements  Future Appointments  Date Type Provider Dept   02/14/23 Appointment Ghada Sylvester MD [facility]   Showing future appointments within next 150 days with a meds authorizing provider and meeting all other requirements     1/17/2023

## 2023-02-09 RX ORDER — HYDROCHLOROTHIAZIDE 25 MG/1
TABLET ORAL
Qty: 90 TABLET | Refills: 0 | Status: SHIPPED | OUTPATIENT
Start: 2023-02-09

## 2023-02-10 ENCOUNTER — HOSPITAL ENCOUNTER (OUTPATIENT)
Age: 73
Discharge: HOME | End: 2023-02-10
Payer: MEDICARE

## 2023-02-10 DIAGNOSIS — I48.0 PAROXYSMAL ATRIAL FIBRILLATION (HCC): ICD-10-CM

## 2023-02-10 DIAGNOSIS — E05.00 GRAVES DISEASE: ICD-10-CM

## 2023-02-10 DIAGNOSIS — I48.19 PERSISTENT ATRIAL FIBRILLATION (HCC): ICD-10-CM

## 2023-02-10 LAB
ALBUMIN SERPL-MCNC: 4 G/DL (ref 3.4–5)
ANION GAP SERPL CALCULATED.3IONS-SCNC: 15 MMOL/L (ref 3–16)
BUN BLDV-MCNC: 12 MG/DL (ref 7–20)
CALCIUM SERPL-MCNC: 9.5 MG/DL (ref 8.3–10.6)
CHLORIDE BLD-SCNC: 100 MMOL/L (ref 99–110)
CO2: 23 MMOL/L (ref 21–32)
CREAT SERPL-MCNC: 0.7 MG/DL (ref 0.6–1.2)
GFR SERPL CREATININE-BSD FRML MDRD: >60 ML/MIN/{1.73_M2}
GLUCOSE BLD-MCNC: 105 MG/DL (ref 70–99)
PHOSPHORUS: 3.2 MG/DL (ref 2.5–4.9)
POTASSIUM SERPL-SCNC: 3.8 MMOL/L (ref 3.5–5.1)
SODIUM BLD-SCNC: 138 MMOL/L (ref 136–145)
T3 TOTAL: 1.42 NG/ML (ref 0.8–2)
T4 FREE: 0.9 NG/DL (ref 0.9–1.8)
TSH SERPL DL<=0.05 MIU/L-ACNC: 2.7 UIU/ML (ref 0.27–4.2)

## 2023-02-10 PROCEDURE — 84443 ASSAY THYROID STIM HORMONE: CPT

## 2023-02-10 PROCEDURE — 84439 ASSAY OF FREE THYROXINE: CPT

## 2023-02-10 PROCEDURE — 83036 HEMOGLOBIN GLYCOSYLATED A1C: CPT

## 2023-02-10 PROCEDURE — 84480 ASSAY TRIIODOTHYRONINE (T3): CPT

## 2023-02-10 PROCEDURE — 80069 RENAL FUNCTION PANEL: CPT

## 2023-02-10 PROCEDURE — 36415 COLL VENOUS BLD VENIPUNCTURE: CPT

## 2023-02-10 RX ORDER — DILTIAZEM HYDROCHLORIDE 180 MG/1
CAPSULE, COATED, EXTENDED RELEASE ORAL
Qty: 90 CAPSULE | Refills: 1 | Status: SHIPPED | OUTPATIENT
Start: 2023-02-10

## 2023-02-10 NOTE — TELEPHONE ENCOUNTER
Recent Visits  Date Type Provider Dept   01/17/23 Office Visit Nila Christianson MD [facility]   12/06/22 Office Visit MARLENE Ferrari - CNP [facility]   10/25/22 Office Visit Nila Christianson MD [facility]   10/11/22 Office Visit Nila Christianson MD [facility]   03/29/22 Office Visit Nila Christianson MD [facility]   01/13/22 Office Visit Nila Christianson MD [facility]   09/22/21 Office Visit Nila Christianson MD [facility]   Showing recent visits within past 540 days with a meds authorizing provider and meeting all other requirements  Future Appointments  Date Type Provider Dept   02/14/23 Appointment Nila Christianson MD [facility]   Showing future appointments within next 150 days with a meds authorizing provider and meeting all other requirements     1/17/2023

## 2023-02-11 LAB
ESTIMATED AVERAGE GLUCOSE: 111.2 MG/DL
HBA1C MFR BLD: 5.5 %

## 2023-02-14 ENCOUNTER — OFFICE VISIT (OUTPATIENT)
Dept: INTERNAL MEDICINE CLINIC | Age: 73
End: 2023-02-14

## 2023-02-14 VITALS
SYSTOLIC BLOOD PRESSURE: 122 MMHG | TEMPERATURE: 97.9 F | WEIGHT: 221.4 LBS | BODY MASS INDEX: 33.66 KG/M2 | DIASTOLIC BLOOD PRESSURE: 78 MMHG

## 2023-02-14 DIAGNOSIS — I10 ESSENTIAL HYPERTENSION: ICD-10-CM

## 2023-02-14 DIAGNOSIS — D50.9 IRON DEFICIENCY ANEMIA, UNSPECIFIED IRON DEFICIENCY ANEMIA TYPE: ICD-10-CM

## 2023-02-14 DIAGNOSIS — I48.19 PERSISTENT ATRIAL FIBRILLATION (HCC): ICD-10-CM

## 2023-02-14 RX ORDER — HYDROCHLOROTHIAZIDE 25 MG/1
TABLET ORAL
Qty: 90 TABLET | Refills: 1 | Status: SHIPPED | OUTPATIENT
Start: 2023-02-14

## 2023-02-14 RX ORDER — METOPROLOL SUCCINATE 50 MG/1
TABLET, EXTENDED RELEASE ORAL
Qty: 90 TABLET | Refills: 1 | Status: SHIPPED | OUTPATIENT
Start: 2023-02-14

## 2023-02-14 RX ORDER — SPIRONOLACTONE 25 MG/1
12.5 TABLET ORAL DAILY
Qty: 45 TABLET | Refills: 1 | Status: SHIPPED | OUTPATIENT
Start: 2023-02-14

## 2023-02-14 RX ORDER — DILTIAZEM HYDROCHLORIDE 180 MG/1
CAPSULE, COATED, EXTENDED RELEASE ORAL
Qty: 90 CAPSULE | Refills: 1 | Status: SHIPPED | OUTPATIENT
Start: 2023-02-14

## 2023-02-14 SDOH — ECONOMIC STABILITY: FOOD INSECURITY: WITHIN THE PAST 12 MONTHS, THE FOOD YOU BOUGHT JUST DIDN'T LAST AND YOU DIDN'T HAVE MONEY TO GET MORE.: NEVER TRUE

## 2023-02-14 SDOH — ECONOMIC STABILITY: FOOD INSECURITY: WITHIN THE PAST 12 MONTHS, YOU WORRIED THAT YOUR FOOD WOULD RUN OUT BEFORE YOU GOT MONEY TO BUY MORE.: NEVER TRUE

## 2023-02-14 SDOH — ECONOMIC STABILITY: INCOME INSECURITY: HOW HARD IS IT FOR YOU TO PAY FOR THE VERY BASICS LIKE FOOD, HOUSING, MEDICAL CARE, AND HEATING?: SOMEWHAT HARD

## 2023-02-14 SDOH — ECONOMIC STABILITY: HOUSING INSECURITY
IN THE LAST 12 MONTHS, WAS THERE A TIME WHEN YOU DID NOT HAVE A STEADY PLACE TO SLEEP OR SLEPT IN A SHELTER (INCLUDING NOW)?: NO

## 2023-02-14 NOTE — PROGRESS NOTES
Subjective:      Patient ID: Jaimee Levy is a 81 y.o. female. Chief Complaint   Patient presents with    Hypertension    Rectal Bleeding     C/O bloody stools x 5 days. Denies constipation. Hypertension  This is a chronic problem. The current episode started 1 to 4 weeks ago. The problem is unchanged. The problem is controlled. Pertinent negatives include no anxiety, blurred vision, chest pain, headaches, malaise/fatigue, neck pain, orthopnea, palpitations, peripheral edema or PND. There are no associated agents to hypertension. Risk factors for coronary artery disease include sedentary lifestyle, obesity and dyslipidemia. Past treatments include diuretics and beta blockers. Discussed hemorrhoids. Blood on outside of stool. No dizziness. She does have hard stool. Review of Systems   Constitutional:  Negative for malaise/fatigue. Eyes:  Negative for blurred vision. Cardiovascular:  Negative for chest pain, palpitations, orthopnea and PND. Musculoskeletal:  Negative for neck pain. Neurological:  Negative for headaches.    .vtm  @DOS@    Allergies   Allergen Reactions    Chlorthalidone Other (See Comments)     Causes increase in b/p and severe headache    Naproxen Swelling    Lisinopril Rash       Current Outpatient Medications   Medication Sig Dispense Refill    spironolactone (ALDACTONE) 25 MG tablet Take 0.5 tablets by mouth daily 45 tablet 1    rivaroxaban (XARELTO) 20 MG TABS tablet TAKE ONE TABLET BY MOUTH DAILY WITH BREAKFAST 90 tablet 0    metoprolol succinate (TOPROL XL) 50 MG extended release tablet TAKE ONE TABLET BY MOUTH DAILY 90 tablet 1    hydroCHLOROthiazide (HYDRODIURIL) 25 MG tablet TAKE ONE TABLET BY MOUTH DAILY 90 tablet 1    dilTIAZem (CARDIZEM CD) 180 MG extended release capsule TAKE ONE CAPSULE BY MOUTH DAILY 90 capsule 1    methIMAzole (TAPAZOLE) 5 MG tablet Take 2 tablets by mouth daily 1 tablet daily 180 tablet 0     No current facility-administered medications for this visit. Vitals:    02/14/23 1233   BP: 122/78   Site: Right Lower Arm   Position: Sitting   Temp: 97.9 °F (36.6 °C)   Weight: 221 lb 6.4 oz (100.4 kg)     Body mass index is 33.66 kg/m². Wt Readings from Last 3 Encounters:   02/20/23 212 lb 9.6 oz (96.4 kg)   02/14/23 221 lb 6.4 oz (100.4 kg)   01/17/23 213 lb (96.6 kg)     BP Readings from Last 3 Encounters:   02/20/23 138/82   02/14/23 122/78   01/17/23 (!) 144/82       Objective:   Physical Exam  Vitals and nursing note reviewed. Constitutional:       Appearance: She is well-developed. HENT:      Head: Normocephalic and atraumatic. Nose: Nose normal.      Mouth/Throat:      Mouth: Mucous membranes are moist.   Eyes:      Conjunctiva/sclera: Conjunctivae normal.      Pupils: Pupils are equal, round, and reactive to light. Cardiovascular:      Rate and Rhythm: Normal rate and regular rhythm. Heart sounds: Normal heart sounds. Pulmonary:      Effort: Pulmonary effort is normal. No respiratory distress. Breath sounds: Normal breath sounds. Musculoskeletal:         General: Normal range of motion. Cervical back: Normal range of motion and neck supple. Skin:     General: Skin is warm. Neurological:      Mental Status: She is alert and oriented to person, place, and time. Psychiatric:         Behavior: Behavior normal.         Thought Content: Thought content normal.         Judgment: Judgment normal.       Assessment/Plan:  Ca Grant was seen today for hypertension and rectal bleeding. Diagnoses and all orders for this visit:    Essential hypertension  -     spironolactone (ALDACTONE) 25 MG tablet;  Take 0.5 tablets by mouth daily  -     metoprolol succinate (TOPROL XL) 50 MG extended release tablet; TAKE ONE TABLET BY MOUTH DAILY  -     hydroCHLOROthiazide (HYDRODIURIL) 25 MG tablet; TAKE ONE TABLET BY MOUTH DAILY    Persistent atrial fibrillation (HCC)  -     rivaroxaban (XARELTO) 20 MG TABS tablet; TAKE ONE TABLET BY MOUTH DAILY WITH BREAKFAST  -     metoprolol succinate (TOPROL XL) 50 MG extended release tablet; TAKE ONE TABLET BY MOUTH DAILY  -     dilTIAZem (CARDIZEM CD) 180 MG extended release capsule; TAKE ONE CAPSULE BY MOUTH DAILY    Iron deficiency anemia, unspecified iron deficiency anemia type  -     rivaroxaban (XARELTO) 20 MG TABS tablet; TAKE ONE TABLET BY MOUTH DAILY WITH BREAKFAST      No follow-ups on file.       Deven Robertson MD

## 2023-02-17 ENCOUNTER — TELEPHONE (OUTPATIENT)
Dept: PULMONOLOGY | Age: 73
End: 2023-02-17

## 2023-02-17 NOTE — TELEPHONE ENCOUNTER
Patient no longer has CPAP machine, states she returned it in late December. Patient states it was not working for her, she was still not able to sleep at all. Patient would like to discuss any alternatives to CPAP to help treat her sleep issues.

## 2023-02-20 ENCOUNTER — OFFICE VISIT (OUTPATIENT)
Dept: PULMONOLOGY | Age: 73
End: 2023-02-20
Payer: MEDICARE

## 2023-02-20 VITALS
OXYGEN SATURATION: 99 % | BODY MASS INDEX: 32.22 KG/M2 | DIASTOLIC BLOOD PRESSURE: 82 MMHG | HEART RATE: 87 BPM | HEIGHT: 68 IN | SYSTOLIC BLOOD PRESSURE: 138 MMHG | WEIGHT: 212.6 LBS

## 2023-02-20 DIAGNOSIS — G47.33 OBSTRUCTIVE SLEEP APNEA SYNDROME: Primary | Chronic | ICD-10-CM

## 2023-02-20 DIAGNOSIS — E66.9 NON MORBID OBESITY, UNSPECIFIED OBESITY TYPE: Chronic | ICD-10-CM

## 2023-02-20 DIAGNOSIS — I48.0 PAROXYSMAL ATRIAL FIBRILLATION (HCC): Chronic | ICD-10-CM

## 2023-02-20 DIAGNOSIS — I10 ESSENTIAL HYPERTENSION: Chronic | ICD-10-CM

## 2023-02-20 PROCEDURE — 99214 OFFICE O/P EST MOD 30 MIN: CPT | Performed by: NURSE PRACTITIONER

## 2023-02-20 PROCEDURE — 1123F ACP DISCUSS/DSCN MKR DOCD: CPT | Performed by: NURSE PRACTITIONER

## 2023-02-20 PROCEDURE — 3075F SYST BP GE 130 - 139MM HG: CPT | Performed by: NURSE PRACTITIONER

## 2023-02-20 PROCEDURE — 3079F DIAST BP 80-89 MM HG: CPT | Performed by: NURSE PRACTITIONER

## 2023-02-20 ASSESSMENT — SLEEP AND FATIGUE QUESTIONNAIRES
HOW LIKELY ARE YOU TO NOD OFF OR FALL ASLEEP WHILE SITTING AND READING: 0
HOW LIKELY ARE YOU TO NOD OFF OR FALL ASLEEP WHILE LYING DOWN TO REST IN THE AFTERNOON WHEN CIRCUMSTANCES PERMIT: 0
HOW LIKELY ARE YOU TO NOD OFF OR FALL ASLEEP IN A CAR, WHILE STOPPED FOR A FEW MINUTES IN TRAFFIC: 0
HOW LIKELY ARE YOU TO NOD OFF OR FALL ASLEEP WHEN YOU ARE A PASSENGER IN A CAR FOR AN HOUR WITHOUT A BREAK: 0
HOW LIKELY ARE YOU TO NOD OFF OR FALL ASLEEP WHILE WATCHING TV: 0
HOW LIKELY ARE YOU TO NOD OFF OR FALL ASLEEP WHILE SITTING QUIETLY AFTER LUNCH WITHOUT ALCOHOL: 0
HOW LIKELY ARE YOU TO NOD OFF OR FALL ASLEEP WHILE SITTING INACTIVE IN A PUBLIC PLACE: 0
HOW LIKELY ARE YOU TO NOD OFF OR FALL ASLEEP WHILE SITTING AND TALKING TO SOMEONE: 0
ESS TOTAL SCORE: 0

## 2023-02-20 NOTE — PROGRESS NOTES
Ramesh Astudillo CNP  Arianne Tolentino [address], [address] [facility] [phone]   Read Joon  [id number]  [place] [address] [phone] [address] [address] [phone]      Assessment/Plan:      1. Obstructive sleep apnea syndrome  Assessment & Plan:  Chronic-with progression/exacerbation: Needs Treatment: Discussed PAP therapy is gold standard treatment for any severity of Obstructive sleep apnea and given her cardiac history would recommend an in lab bilevel titration. Since she has returned her machine and does not want to pursue further treatment with PAP therapy will have her complete an in lab PSG to determine if she would be a candidate for hypoglossal nerve stimulation. Discussed if mild sleep apnea could potentially consider an oral appliance. Will call her with results of PSG as they become available and schedule follow up at that time. Did review the risks of under or untreated SATISH including, but not limited to, higher risks of motor vehicle accidents, stroke, heart attacks, and death. Instructed not to drive unless had 4 hrs of effective therapy for her SATISH the night before. She understands and accepts all these risks. Orders:  -     Baseline Diagnostic Sleep Study; Future  2. Paroxysmal atrial fibrillation (HCC)  Assessment & Plan:   Chronic- Stable. Discussed the importance of treating obstructive sleep apnea as part of the management of this disorder. Cont any meds per PCP and other physicians. 3. Essential hypertension  Assessment & Plan:  Chronic- Stable. Discussed the importance of treating obstructive sleep apnea as part of the management of this disorder. Cont any meds per PCP and other physicians.     4. Non morbid obesity, unspecified obesity type  Assessment & Plan: Chronic-not stable:  Discussed importance of treating obstructive sleep apnea and getting sufficient sleep to assist with weight control. Encouraged her to work on weight loss through diet and exercise. Recommended DASH or Mediterranean diets. Reviewed, analyzed, and documented physiologic data from patient's PAP machine. This information was analyzed to assess complexity and medical decision making in regards to further testing and management. The primary encounter diagnosis was Obstructive sleep apnea syndrome. Diagnoses of Paroxysmal atrial fibrillation ([address] Essential hypertension, and Non morbid obesity, unspecified obesity type were also pertinent to this visit. The chronic medical conditions listed are directly related to the primary diagnosis listed above. The management of the primary diagnosis affects the secondary diagnosis and vice versa. Subjective:   Subjective   Patient ID: Kong Staples is a 81 y.o. female. Chief Complaint   Patient presents with    Sleep Apnea     Patient does not have a machine, said she sent it back. HPI:  Machine Modem/Download Info:  Compliance (hours/night): 0 hrs/night  % of nights >= 4 hrs: 0 %                AUTO BILEVEL - Settings (ResMed)  IPAP Max: 25 cmH2O  EPAP Min: 7 cmH2O  Pressure Support: 3               Kong Staples presents today for follow-up for sleep apnea. She does not have a machine and sent it back due to difficulty acclimating. She felt the air was burning her nose and she was having aerophagia. She felt the bilevel machine did not help with the aerophagia. She did not contact the office to have the settings adjusted or seek further advice before returning her machine. She states she cannot tolerate the machine and want an alternative treatment for her sleep apnea.     [address]    [place] Sleepiness Score: 0    Social History     Socioeconomic History    Marital status:  Spouse name: Not on file    Number of children: Not on file    Years of education: Not on file    Highest education level: Not on file   Occupational History    Occupation: cash checking   Tobacco Use    Smoking status: Never    Smokeless tobacco: Never   Vaping Use    Vaping Use: Never used   Substance and Sexual Activity    Alcohol use: Yes     Comment: occ wine    Drug use: No    Sexual activity: Never   Other Topics Concern    Not on file   Social History Narrative    Lives home with her 3 children. Doing well 8 grandchildren. Social Determinants of Health     Financial Resource Strain: Medium Risk    Difficulty of Paying Living Expenses: Somewhat hard   Food Insecurity: No Food Insecurity    Worried About Running Out of Food in the Last Year: Never true    Ran Out of Food in the Last Year: Never true   Transportation Needs: Unknown    Lack of Transportation (Medical): Not on file    Lack of Transportation (Non-Medical):  No   Physical Activity: Inactive    Days of Exercise per Week: 0 days    Minutes of Exercise per Session: 0 min   Stress: Not on file   Social Connections: Not on file   Intimate Partner Violence: Not on file   Housing Stability: Unknown    Unable to Pay for Housing in the Last Year: Not on file    Number of Places Lived in the Last Year: Not on file    Unstable Housing in the Last Year: No       Current Outpatient Medications   Medication Instructions    dilTIAZem (CARDIZEM CD) 180 MG extended release capsule TAKE ONE CAPSULE BY MOUTH DAILY    hydroCHLOROthiazide (HYDRODIURIL) 25 MG tablet TAKE ONE TABLET BY MOUTH DAILY    methIMAzole (TAPAZOLE) 10 mg, Oral, DAILY, 1 tablet daily    metoprolol succinate (TOPROL XL) 50 MG extended release tablet TAKE ONE TABLET BY MOUTH DAILY    rivaroxaban (XARELTO) 20 MG TABS tablet TAKE ONE TABLET BY MOUTH DAILY WITH BREAKFAST    spironolactone (ALDACTONE) 12.5 mg, Oral, DAILY          Vitals:  Weight BMI   Wt Readings from Last 3 Encounters: 02/20/23 212 lb 9.6 oz (96.4 kg)   02/14/23 221 lb 6.4 oz (100.4 kg)   01/17/23 213 lb (96.6 kg)    Body mass index is 32.33 kg/m².      BP HR SaO2   BP Readings from Last 3 Encounters:   02/20/23 138/82   02/14/23 122/78   01/17/23 (!) 144/82    Pulse Readings from Last 3 Encounters:   02/20/23 87   01/17/23 85   12/06/22 76    SpO2 Readings from Last 3 Encounters:   02/20/23 99%   01/17/23 98%   12/06/22 97%        Electronically signed by Rexie Bosworth, APRN on 2/20/2023 at 2:24 PM

## 2023-02-20 NOTE — LETTER
[facility]  [address] [facility]  [address] [address]  Phone: [phone]  Fax: [phone]    February 20, 2023       Patient: Ambrose Funes   MR Number: [id number]   YOB: 1950   Date of Visit: 2/20/2023       Ambrose Funes was seen for a follow up visit today. Here is my assessment and plan as well as an attached copy of her visit today:    Paroxysmal atrial fibrillation ([address]   Chronic- Stable. Discussed the importance of treating obstructive sleep apnea as part of the management of this disorder. Cont any meds per PCP and other physicians. Essential hypertension  Chronic- Stable. Discussed the importance of treating obstructive sleep apnea as part of the management of this disorder. Cont any meds per PCP and other physicians. Non morbid obesity, unspecified obesity type   Chronic-not stable:  Discussed importance of treating obstructive sleep apnea and getting sufficient sleep to assist with weight control. Encouraged her to work on weight loss through diet and exercise. Recommended DASH or Mediterranean diets. Obstructive sleep apnea syndrome  Chronic-with progression/exacerbation: Needs Treatment: Discussed PAP therapy is gold standard treatment for any severity of Obstructive sleep apnea and given her cardiac history would recommend an in lab bilevel titration. Since she has returned her machine and does not want to pursue further treatment with PAP therapy will have her complete an in lab PSG to determine if she would be a candidate for hypoglossal nerve stimulation. Discussed if mild sleep apnea could potentially consider an oral appliance. Will call her with results of PSG as they become available and schedule follow up at that time. Did review the risks of under or untreated SATISH including, but not limited to, higher risks of motor vehicle accidents, stroke, heart attacks, and death.  Instructed not to drive unless had 4 hrs of effective therapy for her SATISH the night before. She understands and accepts all these risks. If you have questions or concerns, please do not hesitate to call me. I look forward to following Susan Castano along with you.     Sincerely,    Geofm Favre, APRN CC providers:  Joe Kyle  [facility] [place]

## 2023-02-21 ENCOUNTER — TELEPHONE (OUTPATIENT)
Dept: SLEEP CENTER | Age: 73
End: 2023-02-21

## 2023-03-05 ASSESSMENT — ENCOUNTER SYMPTOMS
BLURRED VISION: 0
ORTHOPNEA: 0

## 2023-03-20 NOTE — PROGRESS NOTES
We received remote transmission from patient's monitor at home. Transmission shows normal sensing and pacing function. Pt has known AF and remains on Xarelto. Noted NSVT. Pt is on Toprol. EP physician will review. See interrogation under cardiology tab in the [address] [place] [address] [address] field for more details.  0.8% (MVP On)  AP 97.0%    End of 91-day monitoring period 3-21-23.

## 2023-03-21 ENCOUNTER — NURSE ONLY (OUTPATIENT)
Dept: CARDIOLOGY CLINIC | Age: 73
End: 2023-03-21

## 2023-03-21 ENCOUNTER — HOSPITAL ENCOUNTER (OUTPATIENT)
Age: 73
Discharge: HOME | End: 2023-03-21
Payer: MEDICARE

## 2023-03-21 DIAGNOSIS — I48.19 PERSISTENT ATRIAL FIBRILLATION (HCC): ICD-10-CM

## 2023-03-21 DIAGNOSIS — R00.1 BRADYCARDIA: ICD-10-CM

## 2023-03-21 DIAGNOSIS — I49.5 TACHY-BRADY SYNDROME (HCC): ICD-10-CM

## 2023-03-21 DIAGNOSIS — Z95.0 CARDIAC PACEMAKER IN SITU: ICD-10-CM

## 2023-03-21 DIAGNOSIS — I48.0 PAF (PAROXYSMAL ATRIAL FIBRILLATION) (HCC): ICD-10-CM

## 2023-03-21 DIAGNOSIS — E05.00 GRAVES DISEASE: ICD-10-CM

## 2023-03-21 LAB
T3 SERPL-MCNC: 1.26 NG/ML (ref 0.8–2)
T4 FREE SERPL-MCNC: 1 NG/DL (ref 0.9–1.8)
TSH SERPL DL<=0.005 MIU/L-ACNC: 1.72 UIU/ML (ref 0.27–4.2)

## 2023-03-21 PROCEDURE — 84443 ASSAY THYROID STIM HORMONE: CPT

## 2023-03-21 PROCEDURE — 36415 COLL VENOUS BLD VENIPUNCTURE: CPT

## 2023-03-21 PROCEDURE — 84439 ASSAY OF FREE THYROXINE: CPT

## 2023-03-21 PROCEDURE — 84480 ASSAY TRIIODOTHYRONINE (T3): CPT

## 2023-03-30 DIAGNOSIS — D50.9 IRON DEFICIENCY ANEMIA, UNSPECIFIED IRON DEFICIENCY ANEMIA TYPE: ICD-10-CM

## 2023-03-30 DIAGNOSIS — I48.19 PERSISTENT ATRIAL FIBRILLATION (HCC): ICD-10-CM

## 2023-03-30 NOTE — TELEPHONE ENCOUNTER
Recent Visits  Date Type Provider Dept   02/14/23 Office Visit Bina Nicolas MD [facility]   01/17/23 Office Visit Bina Nicolas MD [facility]   12/06/22 Office Visit MARLENE Givens - CNP [facility]   10/25/22 Office Visit Bina Nicolas MD [facility]   10/11/22 Office Visit Bina Nicolas MD [facility]   03/29/22 Office Visit Bina Nicolas MD [facility]   01/13/22 Office Visit Bina Nicolas MD [facility]   Showing recent visits within past 540 days with a meds authorizing provider and meeting all other requirements  Future Appointments  Date Type Provider Dept   08/15/23 Appointment Bina Nicolas MD [facility]   Showing future appointments within next 150 days with a meds authorizing provider and meeting all other requirements     2/14/2023

## 2023-04-04 DIAGNOSIS — E05.00 GRAVES DISEASE: ICD-10-CM

## 2023-04-04 RX ORDER — METHIMAZOLE 5 MG/1
TABLET ORAL
Qty: 90 TABLET | Refills: 1 | Status: SHIPPED | OUTPATIENT
Start: 2023-04-04

## 2023-04-18 ENCOUNTER — HOSPITAL ENCOUNTER (OUTPATIENT)
Dept: SLEEP CENTER | Age: 73
Discharge: HOME | End: 2023-04-18
Payer: MEDICARE

## 2023-04-18 DIAGNOSIS — G47.33 OBSTRUCTIVE SLEEP APNEA SYNDROME: Chronic | ICD-10-CM

## 2023-04-18 PROCEDURE — 95810 POLYSOM 6/> YRS 4/> PARAM: CPT

## 2023-04-20 ENCOUNTER — TELEPHONE (OUTPATIENT)
Dept: PULMONOLOGY | Age: 73
End: 2023-04-20

## 2023-04-20 NOTE — TELEPHONE ENCOUNTER
Spoke with pt. Pt states that she is not interested in trying cpap. Pt states that she tried it previously and it did not work Spoke with pt to let her know Dr. Wilberto Garg can send a referral to one of the doctors that are doing the [place] implants. Pt does not want the implant and decided to try the cpap therapy again.

## 2023-04-20 NOTE — TELEPHONE ENCOUNTER
LM for pt that Dr. Ghassan De can refer her to a physician for [facility]

## 2023-04-24 ENCOUNTER — TELEPHONE (OUTPATIENT)
Dept: PULMONOLOGY | Age: 73
End: 2023-04-24

## 2023-04-24 DIAGNOSIS — G47.33 OBSTRUCTIVE SLEEP APNEA SYNDROME: Primary | ICD-10-CM

## 2023-04-24 NOTE — PROGRESS NOTES
Hernan Mijares         : 1950  [address] [facility]    Diagnosis: [x] SATISH (G47.33) [] CSA (G47.31) [] Apnea (G47.30)   Length of Need: [x] 18 Months [] 99 Months [] Other:    Machine (YADIRA!): [] Respironics Dream Station   2   Auto [x] ResMed AirCurve    Auto S10 [] Other:     []  CPAP () [x] Bilevel ()   Mode: [] Auto [] Spontaneous    Mode: [x] Auto [] Spontaneous       IPAP max 25 cmH2O  EPAP min 6 cmH2O  PS 3 cmH2O     Comfort Settings:   - Ramp Pressure: 5 cmH2O                                        - Ramp time: 15 min                                     -  Flex/EPR - 3 full time                                    - For ResMed Bilevel (TiMax-4 sec   TiMin- 0.2 sec)     Humidifier: [x] Heated ()        [x] Water chamber replacement ()/ 1 per 6 months        Mask:   [x] Nasal () /1 per 3 months [] Full Face () /1 per 3 months   [x] Patient choice -Size and fit mask [] Patient Choice - Size and fit mask   [] Dispense:  [] Dispense:    [x] Headgear () / 1 per 3 months [] Headgear () / 1 per 3 months   [x] Replacement Nasal Cushion ()/2 per month [] Interface Replacement ()/1 per month   [] Replacement Nasal Pillows ()/2 per month         Tubing: [x] Heated ()/1 per 3 months    [] Standard ()/1 per 3 months [] Other:           Filters: [x] Non-disposable ()/1 per 6 months     [x] Ultra-Fine, Disposable ()/2 per month        Miscellaneous: [] Chin Strap ()/ 1 per 6 months [] O2 bleed-in:       LPM   [] Oximetry on CPAP/Bilevel []  Other:    [x] Modem: ()         Start Order Date: 23    MEDICAL JUSTIFICATION:  I, the undersigned, certify that the above prescribed supplies are medically necessary for this patients wellbeing. In my opinion, the supplies are both reasonable and necessary in reference to accepted standards of medicalpractice in treatment of this patients condition.     Yael Bro MD      NPI: [id number]       Order

## 2023-04-24 NOTE — TELEPHONE ENCOUNTER
Spoke with pt to let her know a referral was sent for an [facility] consults. Pt prefers to try pap therapy again and order will be sent to [facility] .  Pt to schedule f/u

## 2023-05-08 ENCOUNTER — HOSPITAL ENCOUNTER (OUTPATIENT)
Age: 73
Discharge: HOME | End: 2023-05-08
Payer: MEDICARE

## 2023-05-08 DIAGNOSIS — E05.00 GRAVES DISEASE: ICD-10-CM

## 2023-05-08 LAB
T3 SERPL-MCNC: 1.15 NG/ML (ref 0.8–2)
T4 FREE SERPL-MCNC: 0.9 NG/DL (ref 0.9–1.8)
TSH SERPL DL<=0.005 MIU/L-ACNC: 2.86 UIU/ML (ref 0.27–4.2)

## 2023-05-08 PROCEDURE — 36415 COLL VENOUS BLD VENIPUNCTURE: CPT

## 2023-05-08 PROCEDURE — 84480 ASSAY TRIIODOTHYRONINE (T3): CPT

## 2023-05-08 PROCEDURE — 84443 ASSAY THYROID STIM HORMONE: CPT

## 2023-05-08 PROCEDURE — 84439 ASSAY OF FREE THYROXINE: CPT

## 2023-05-16 ENCOUNTER — OFFICE VISIT (OUTPATIENT)
Dept: ENDOCRINOLOGY | Age: 73
End: 2023-05-16
Payer: MEDICARE

## 2023-05-16 VITALS
SYSTOLIC BLOOD PRESSURE: 151 MMHG | WEIGHT: 216 LBS | RESPIRATION RATE: 16 BRPM | DIASTOLIC BLOOD PRESSURE: 77 MMHG | HEART RATE: 70 BPM | BODY MASS INDEX: 32.74 KG/M2 | HEIGHT: 68 IN

## 2023-05-16 DIAGNOSIS — G47.33 OBSTRUCTIVE SLEEP APNEA SYNDROME: Chronic | ICD-10-CM

## 2023-05-16 DIAGNOSIS — I48.0 PAROXYSMAL ATRIAL FIBRILLATION (HCC): Chronic | ICD-10-CM

## 2023-05-16 DIAGNOSIS — D68.69 SECONDARY HYPERCOAGULABLE STATE (HCC): ICD-10-CM

## 2023-05-16 DIAGNOSIS — E05.00 GRAVES DISEASE: Primary | ICD-10-CM

## 2023-05-16 DIAGNOSIS — I10 ESSENTIAL HYPERTENSION: Chronic | ICD-10-CM

## 2023-05-16 PROCEDURE — 3077F SYST BP >= 140 MM HG: CPT | Performed by: INTERNAL MEDICINE

## 2023-05-16 PROCEDURE — 99213 OFFICE O/P EST LOW 20 MIN: CPT | Performed by: INTERNAL MEDICINE

## 2023-05-16 PROCEDURE — 3078F DIAST BP <80 MM HG: CPT | Performed by: INTERNAL MEDICINE

## 2023-05-16 PROCEDURE — 1123F ACP DISCUSS/DSCN MKR DOCD: CPT | Performed by: INTERNAL MEDICINE

## 2023-05-16 NOTE — PROGRESS NOTES
SUBJECTIVE:  Milo Velásquez is a 81 y.o. female  seen in my clinic for Graves' disease and multinodular goiter  Patient was initially referred for thyroid nodule  which was identified on a CT scan done in April 2018 as a workup for her ovarian cancer  . Patient Current complaints: denies fatigue, weight changes, heat/cold intolerance, bowel/skin changes or CVS symptoms  History of obstructive symptoms: difficulty swallowing No, changes in voice/hoarseness No.    History of radiation to patient's neck: NO  Recent iodine exposure: No  Family history includes no thyroid abnormalities. Family history of thyroid cancer: No    Ovarian cancer is treated with surgery , chemo and RT ---she was diagnosed in jan 2018   Patient also has hypertension and is on Toprol and hydrochlorothiazide along with Lotrel  Patient also has atrial fibrillation and has been cardioverted in the past.  Rate controlled    INTERIM     She has been taking 5 mg Tapazole daily she denies any significant palpitation, heat intolerance or insomnia.   She had cardiac ablation pacemaker put in May 2022         Past Medical History:   Diagnosis Date    Anal bleeding 2019    CT scan clear     Anemia     Arthritis     bilateral knee    Atrial fibrillation ([address]     cleared by cardiologist 2018, was related to cancer     Colon polyps     Diabetes mellitus ([address]     pre diabetic diet controlled    Endometrial cancer ([address]     History of blood transfusion     Hypertension     IFG (impaired fasting glucose)     SATISH (obstructive sleep apnea)     3/28/22-currently being fitted for CPAP    Small bowel obstruction ([address]     resolved without surgery    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

## 2023-06-19 NOTE — PROGRESS NOTES
We received remote transmission from patient's monitor at home. Transmission shows normal sensing and pacing function. Noted NSVT. Pt is on Toprol. EP physician will review. See interrogation under cardiology tab in the [address] [place] [address] [address] field for more details.  0.5% (MVP On)  AP 95.8%    End of 91-day monitoring period 6-20-23.

## 2023-06-20 ENCOUNTER — NURSE ONLY (OUTPATIENT)
Dept: CARDIOLOGY CLINIC | Age: 73
End: 2023-06-20
Payer: MEDICARE

## 2023-06-20 DIAGNOSIS — I48.0 PAF (PAROXYSMAL ATRIAL FIBRILLATION) (HCC): ICD-10-CM

## 2023-06-20 DIAGNOSIS — R00.1 BRADYCARDIA: ICD-10-CM

## 2023-06-20 DIAGNOSIS — I49.5 TACHY-BRADY SYNDROME (HCC): ICD-10-CM

## 2023-06-20 DIAGNOSIS — I48.19 PERSISTENT ATRIAL FIBRILLATION (HCC): ICD-10-CM

## 2023-06-20 DIAGNOSIS — Z95.0 CARDIAC PACEMAKER IN SITU: ICD-10-CM

## 2023-06-20 PROCEDURE — 93294 REM INTERROG EVL PM/LDLS PM: CPT | Performed by: INTERNAL MEDICINE

## 2023-06-20 PROCEDURE — 93296 REM INTERROG EVL PM/IDS: CPT | Performed by: INTERNAL MEDICINE

## 2023-06-29 DIAGNOSIS — I48.19 PERSISTENT ATRIAL FIBRILLATION (HCC): ICD-10-CM

## 2023-06-29 DIAGNOSIS — D50.9 IRON DEFICIENCY ANEMIA, UNSPECIFIED IRON DEFICIENCY ANEMIA TYPE: ICD-10-CM

## 2023-06-30 DIAGNOSIS — I48.19 PERSISTENT ATRIAL FIBRILLATION (HCC): ICD-10-CM

## 2023-06-30 DIAGNOSIS — I10 ESSENTIAL HYPERTENSION: ICD-10-CM

## 2023-07-02 RX ORDER — METOPROLOL SUCCINATE 50 MG/1
TABLET, EXTENDED RELEASE ORAL
Qty: 90 TABLET | Refills: 0 | Status: SHIPPED | OUTPATIENT
Start: 2023-07-02

## 2023-07-31 ENCOUNTER — OFFICE VISIT (OUTPATIENT)
Dept: PULMONOLOGY | Age: 73
End: 2023-07-31
Payer: MEDICARE

## 2023-07-31 VITALS
DIASTOLIC BLOOD PRESSURE: 82 MMHG | HEART RATE: 86 BPM | WEIGHT: 213 LBS | BODY MASS INDEX: 32.28 KG/M2 | HEIGHT: 68 IN | SYSTOLIC BLOOD PRESSURE: 118 MMHG | OXYGEN SATURATION: 96 %

## 2023-07-31 DIAGNOSIS — I48.0 PAROXYSMAL ATRIAL FIBRILLATION (HCC): ICD-10-CM

## 2023-07-31 DIAGNOSIS — E66.9 NON MORBID OBESITY, UNSPECIFIED OBESITY TYPE: ICD-10-CM

## 2023-07-31 DIAGNOSIS — I10 ESSENTIAL HYPERTENSION: ICD-10-CM

## 2023-07-31 DIAGNOSIS — G47.33 OBSTRUCTIVE SLEEP APNEA SYNDROME: Primary | ICD-10-CM

## 2023-07-31 PROCEDURE — 3074F SYST BP LT 130 MM HG: CPT | Performed by: NURSE PRACTITIONER

## 2023-07-31 PROCEDURE — 3079F DIAST BP 80-89 MM HG: CPT | Performed by: NURSE PRACTITIONER

## 2023-07-31 PROCEDURE — 1123F ACP DISCUSS/DSCN MKR DOCD: CPT | Performed by: NURSE PRACTITIONER

## 2023-07-31 PROCEDURE — 99214 OFFICE O/P EST MOD 30 MIN: CPT | Performed by: NURSE PRACTITIONER

## 2023-07-31 ASSESSMENT — SLEEP AND FATIGUE QUESTIONNAIRES
HOW LIKELY ARE YOU TO NOD OFF OR FALL ASLEEP IN A CAR, WHILE STOPPED FOR A FEW MINUTES IN TRAFFIC: 0
ESS TOTAL SCORE: 0
HOW LIKELY ARE YOU TO NOD OFF OR FALL ASLEEP WHILE SITTING QUIETLY AFTER LUNCH WITHOUT ALCOHOL: 0
HOW LIKELY ARE YOU TO NOD OFF OR FALL ASLEEP WHEN YOU ARE A PASSENGER IN A CAR FOR AN HOUR WITHOUT A BREAK: 0
HOW LIKELY ARE YOU TO NOD OFF OR FALL ASLEEP WHILE WATCHING TV: 0
HOW LIKELY ARE YOU TO NOD OFF OR FALL ASLEEP WHILE LYING DOWN TO REST IN THE AFTERNOON WHEN CIRCUMSTANCES PERMIT: 0
HOW LIKELY ARE YOU TO NOD OFF OR FALL ASLEEP WHILE SITTING AND TALKING TO SOMEONE: 0
HOW LIKELY ARE YOU TO NOD OFF OR FALL ASLEEP WHILE SITTING INACTIVE IN A PUBLIC PLACE: 0
HOW LIKELY ARE YOU TO NOD OFF OR FALL ASLEEP WHILE SITTING AND READING: 0

## 2023-07-31 NOTE — PROGRESS NOTES
Tennille Tolentino CNP [facility] [address] [facility] [address], [address] [facility] [phone]   3 Michel SHELTON  [address] [phone] [facility] [place] [address]  Dept: [phone]  Dept Fax: [phone]  Loc: [phone]      Assessment/Plan:      1. Obstructive sleep apnea syndrome  Assessment & Plan:  Chronic - With exacerbation/progression: Reviewed and analyzed results of physiologic download from patient's machine and reviewed with patients. Supplies and parts as needed for the machine. These are medically necessary. Limit caffeine use after 3 PM. Based on the analyzed data, we will make the following changes: EPAP min increased to 8, Ti Max 4 sec, Ti Min 0.2 sec. Will make these changes to see if it helps with sleep maintenance. Will take conservative approach due to aerophagia. Will consider in lab titration study if symptoms remain or worsening of symptoms despite of pressure adjustments. Discussed trial of other types of masks/headgear for comfort and mask fit. Reviewed several different types of masks/headger and resources were provided to the patient. Discussed in office mask fitting to ensure the appropriate fit of the mask/headgear. She will return in 3 mo for follow up. Discussed symptoms to monitor and she was advised to return sooner if new symptoms develops or aerophagia/worsening of other symptoms. Encouraged her to continue to use her machine each night, all night. Encouraged the patient to contact the office with any questions or concerns. Discussed the importance of consistence use of the machine and encouraged consistent use of the machine each night. Also discussed the importance of treating Obstructive Sleep Apnea from a physiological standpoint.  Instructed not to drive unless had 4 hours of

## 2023-07-31 NOTE — ASSESSMENT & PLAN NOTE
Chronic - With exacerbation/progression: Reviewed and analyzed results of physiologic download from patient's machine and reviewed with patients. Supplies and parts as needed for the machine. These are medically necessary. Limit caffeine use after 3 PM. Based on the analyzed data, we will make the following changes: EPAP min increased to 8, Ti Max 4 sec, Ti Min 0.2 sec. Will make these changes to see if it helps with sleep maintenance. Will take conservative approach due to aerophagia. Will consider in lab titration study if symptoms remain or worsening of symptoms despite of pressure adjustments. Discussed trial of other types of masks/headgear for comfort and mask fit. Reviewed several different types of masks/headger and resources were provided to the patient. Discussed in office mask fitting to ensure the appropriate fit of the mask/headgear. She will return in 3 mo for follow up. Discussed symptoms to monitor and she was advised to return sooner if new symptoms develops or aerophagia/worsening of other symptoms. Encouraged her to continue to use her machine each night, all night. Encouraged the patient to contact the office with any questions or concerns. Discussed the importance of consistence use of the machine and encouraged consistent use of the machine each night. Also discussed the importance of treating Obstructive Sleep Apnea from a physiological standpoint. Instructed not to drive unless had 4 hours of effective therapy for SATISH the night before. Did review the risks of under or untreated SATISH including, but not limited to, higher risks of motor vehicle accidents, stroke, heart attacks, and death. Patients verbalized understanding and accepts all these risks.

## 2023-08-23 DIAGNOSIS — I48.19 PERSISTENT ATRIAL FIBRILLATION (HCC): ICD-10-CM

## 2023-08-23 RX ORDER — DILTIAZEM HYDROCHLORIDE 180 MG/1
CAPSULE, COATED, EXTENDED RELEASE ORAL
Qty: 90 CAPSULE | Refills: 1 | Status: SHIPPED | OUTPATIENT
Start: 2023-08-23

## 2023-09-06 ENCOUNTER — OFFICE VISIT (OUTPATIENT)
Dept: INTERNAL MEDICINE CLINIC | Age: 73
End: 2023-09-06
Payer: MEDICARE

## 2023-09-06 VITALS
DIASTOLIC BLOOD PRESSURE: 84 MMHG | BODY MASS INDEX: 33.15 KG/M2 | OXYGEN SATURATION: 97 % | SYSTOLIC BLOOD PRESSURE: 148 MMHG | WEIGHT: 218 LBS | HEART RATE: 67 BPM

## 2023-09-06 DIAGNOSIS — Z79.899 HIGH RISK MEDICATIONS (NOT ANTICOAGULANTS) LONG-TERM USE: ICD-10-CM

## 2023-09-06 DIAGNOSIS — D50.9 IRON DEFICIENCY ANEMIA, UNSPECIFIED IRON DEFICIENCY ANEMIA TYPE: ICD-10-CM

## 2023-09-06 DIAGNOSIS — M79.672 FOOT PAIN, BILATERAL: Primary | ICD-10-CM

## 2023-09-06 DIAGNOSIS — I48.19 PERSISTENT ATRIAL FIBRILLATION (HCC): ICD-10-CM

## 2023-09-06 DIAGNOSIS — I10 ESSENTIAL HYPERTENSION: ICD-10-CM

## 2023-09-06 DIAGNOSIS — M79.671 FOOT PAIN, BILATERAL: Primary | ICD-10-CM

## 2023-09-06 LAB
ALBUMIN SERPL-MCNC: 4.6 G/DL (ref 3.4–5)
ALBUMIN/GLOB SERPL: 1.5 {RATIO} (ref 1.1–2.2)
ALP SERPL-CCNC: 89 U/L (ref 40–129)
ALT SERPL-CCNC: 18 U/L (ref 10–40)
ANION GAP SERPL CALCULATED.3IONS-SCNC: 13 MMOL/L (ref 3–16)
AST SERPL-CCNC: 25 U/L (ref 15–37)
BILIRUB SERPL-MCNC: 0.4 MG/DL (ref 0–1)
BUN SERPL-MCNC: 12 MG/DL (ref 7–20)
CALCIUM SERPL-MCNC: 10.2 MG/DL (ref 8.3–10.6)
CHLORIDE SERPL-SCNC: 101 MMOL/L (ref 99–110)
CO2 SERPL-SCNC: 27 MMOL/L (ref 21–32)
CREAT SERPL-MCNC: 0.8 MG/DL (ref 0.6–1.2)
DEPRECATED RDW RBC AUTO: 14.1 % (ref 12.4–15.4)
GFR SERPLBLD CREATININE-BSD FMLA CKD-EPI: >60 ML/MIN/{1.73_M2}
GLUCOSE SERPL-MCNC: 82 MG/DL (ref 70–99)
HCT VFR BLD AUTO: 39.5 % (ref 36–48)
HGB BLD-MCNC: 13.4 G/DL (ref 12–16)
MCH RBC QN AUTO: 31.9 PG (ref 26–34)
MCHC RBC AUTO-ENTMCNC: 33.9 G/DL (ref 31–36)
MCV RBC AUTO: 93.9 FL (ref 80–100)
PHOSPHATE SERPL-MCNC: 3.4 MG/DL (ref 2.5–4.9)
PLATELET # BLD AUTO: 131 K/UL (ref 135–450)
PLATELET BLD QL SMEAR: ABNORMAL
PMV BLD AUTO: 11.2 FL (ref 5–10.5)
POTASSIUM SERPL-SCNC: 3.6 MMOL/L (ref 3.5–5.1)
PROT SERPL-MCNC: 7.7 G/DL (ref 6.4–8.2)
RBC # BLD AUTO: 4.21 M/UL (ref 4–5.2)
SLIDE REVIEW: ABNORMAL
SODIUM SERPL-SCNC: 141 MMOL/L (ref 136–145)
WBC # BLD AUTO: 3.3 K/UL (ref 4–11)

## 2023-09-06 PROCEDURE — 1123F ACP DISCUSS/DSCN MKR DOCD: CPT | Performed by: INTERNAL MEDICINE

## 2023-09-06 PROCEDURE — 3078F DIAST BP <80 MM HG: CPT | Performed by: INTERNAL MEDICINE

## 2023-09-06 PROCEDURE — 99214 OFFICE O/P EST MOD 30 MIN: CPT | Performed by: INTERNAL MEDICINE

## 2023-09-06 PROCEDURE — 3077F SYST BP >= 140 MM HG: CPT | Performed by: INTERNAL MEDICINE

## 2023-09-06 RX ORDER — DILTIAZEM HYDROCHLORIDE 180 MG/1
180 CAPSULE, COATED, EXTENDED RELEASE ORAL DAILY
Qty: 90 CAPSULE | Refills: 1 | Status: SHIPPED | OUTPATIENT
Start: 2023-09-06

## 2023-09-06 RX ORDER — SPIRONOLACTONE 25 MG/1
12.5 TABLET ORAL DAILY
Qty: 45 TABLET | Refills: 1 | Status: SHIPPED | OUTPATIENT
Start: 2023-09-06

## 2023-09-06 RX ORDER — HYDROCHLOROTHIAZIDE 25 MG/1
TABLET ORAL
Qty: 90 TABLET | Refills: 1 | Status: SHIPPED | OUTPATIENT
Start: 2023-09-06

## 2023-09-06 RX ORDER — METOPROLOL SUCCINATE 50 MG/1
50 TABLET, EXTENDED RELEASE ORAL DAILY
Qty: 90 TABLET | Refills: 0 | Status: SHIPPED | OUTPATIENT
Start: 2023-09-06

## 2023-09-06 ASSESSMENT — ANXIETY QUESTIONNAIRES
4. TROUBLE RELAXING: 0
7. FEELING AFRAID AS IF SOMETHING AWFUL MIGHT HAPPEN: 0
GAD7 TOTAL SCORE: 0
2. NOT BEING ABLE TO STOP OR CONTROL WORRYING: 0
6. BECOMING EASILY ANNOYED OR IRRITABLE: 0
IF YOU CHECKED OFF ANY PROBLEMS ON THIS QUESTIONNAIRE, HOW DIFFICULT HAVE THESE PROBLEMS MADE IT FOR YOU TO DO YOUR WORK, TAKE CARE OF THINGS AT HOME, OR GET ALONG WITH OTHER PEOPLE: NOT DIFFICULT AT ALL
1. FEELING NERVOUS, ANXIOUS, OR ON EDGE: 0
3. WORRYING TOO MUCH ABOUT DIFFERENT THINGS: 0
5. BEING SO RESTLESS THAT IT IS HARD TO SIT STILL: 0

## 2023-09-06 ASSESSMENT — PATIENT HEALTH QUESTIONNAIRE - PHQ9
1. LITTLE INTEREST OR PLEASURE IN DOING THINGS: 0
2. FEELING DOWN, DEPRESSED OR HOPELESS: 0
SUM OF ALL RESPONSES TO PHQ QUESTIONS 1-9: 0
SUM OF ALL RESPONSES TO PHQ9 QUESTIONS 1 & 2: 0

## 2023-09-06 ASSESSMENT — ENCOUNTER SYMPTOMS
ORTHOPNEA: 0
BLURRED VISION: 0
SHORTNESS OF BREATH: 0

## 2023-09-06 NOTE — PROGRESS NOTES
Ann Ariza (:  1950) is a 81 y.o. female,Established patient, here for evaluation of the following chief complaint(s):  Hypertension         ASSESSMENT/PLAN:  1. Foot pain, bilateral  - Powerstep    2. Essential hypertension--- uncontrolled  The following orders have not been finalized:  -     spironolactone (ALDACTONE) 25 MG tablet- has not been taking  -     hydroCHLOROthiazide (HYDRODIURIL) 25 MG tablet  -     metoprolol succinate (TOPROL XL) 50 MG extended release tablet    3. Persistent atrial fibrillation ([address])  The following orders have not been finalized:  -     rivaroxaban (XARELTO) 20 MG TABS tablet  -     dilTIAZem (CARDIZEM CD) 180 MG extended release capsule  -     metoprolol succinate (TOPROL XL) 50 MG extended release tablet    4. Iron deficiency anemia, unspecified iron deficiency anemia type  The following orders have not been finalized:  -     rivaroxaban (XARELTO) 20 MG TABS tablet    5. BMI 33.0-33.9,adult  - discussed diet and lifestyle    Return for nurse visit bp check in 3 weeks - December - hypertension. Subjective   SUBJECTIVE/OBJECTIVE:  Hypertension  This is a chronic problem. The current episode started more than 1 year ago. The problem is unchanged. The problem is controlled. Pertinent negatives include no anxiety, blurred vision, chest pain, malaise/fatigue, neck pain, orthopnea, palpitations, peripheral edema, PND, shortness of breath or sweats. There are no associated agents to hypertension. Risk factors for coronary artery disease include obesity. Past treatments include nothing. The current treatment provides no improvement. There is no history of angina, kidney disease, CAD/MI, CVA, heart failure, left ventricular hypertrophy, PVD or retinopathy. There is no history of chronic renal disease, coarctation of the aorta, hyperaldosteronism, hyperparathyroidism, a hypertension causing med, pheochromocytoma, renovascular disease or sleep apnea.      Foot pain: pain

## 2023-09-19 ENCOUNTER — TELEPHONE (OUTPATIENT)
Dept: CARDIOLOGY CLINIC | Age: 73
End: 2023-09-19

## 2023-09-19 NOTE — TELEPHONE ENCOUNTER
Device shows normal function. NSVT episodes noted. Seeing Dr. Bullard Headings on Thursday. Next remote transmission set for 12/18/2023. Please advise.  Thanks

## 2023-09-19 NOTE — TELEPHONE ENCOUNTER
Pt called asking when their remote device ck is scheduled, and would like to know the results?     Pls advise thank you

## 2023-09-22 NOTE — PROGRESS NOTES
[facility]   Electrophysiology Follow up   Date: 10/3/2023  I had the privilege of visiting Deven Mooney in the office. CC: PAF  HPI: Deven Mooney is a 72 y.o. female  with PMH of HTN is being referred by PCP, Dr. Vero Angulo, for evaluation of Afib. She was originally diagnosed with Afib in 7/2017, followed by cardiologist at [facility]. Afib diagnosed in 7/2017. She was later diagnosed with uterine cancer and underwent surgery, chemotherapy. She underwent DCCVs on 8/2017 and 9/2017. Diagnosed with FIGO Stage IA (pT1a(2), N0, M0) serous carcinoma of the endometrium, status post TLH/BSO and five of a planned six cycles of adjuvant chemotherapy completed 08/28/2018. Her final treatment for radiation is today and completed with chemotherapy. She had episode of syncope while receiving chemo in 7/2018. 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 She received 4 units RBCs and discharged home without anticoagulation. Xarelto was stopped. S/p DCCV 9/19/19     Xarelto 20 mg restarted 12/10/19      Presented to [facility] ER on 6/2021 with complaints of CP and dizziness. Show wore monitor which showed symptomatic pauses, also with bifascicular block. S/p dual chamber PPM 9/29/2021     S/p 5/19/2022 Pulmonary vein isolations using wide area circumferential radiofrequency ablation    Today, she is here for regular follow up. Overall, she feels well and has no complaints. She denies exertional chest pain, IVY/PND, palpitations, light-headedness, edema. She is a  at [employer] Assessment and plan:     -Paroxysmal Atrial Fibrillation    ECG today shows sinus rhythm    S/p 5/19/2022 Pulmonary vein isolations using wide area circumferential radiofrequency ablation    No recurrence since ablation. <0.1% AT/AF burden per device interrogation today.      Continue Toprol XL 50 mg daily, Cardizem 180 mg daily  ( started

## 2023-09-27 ENCOUNTER — NURSE ONLY (OUTPATIENT)
Dept: INTERNAL MEDICINE CLINIC | Age: 73
End: 2023-09-27

## 2023-09-27 VITALS — SYSTOLIC BLOOD PRESSURE: 134 MMHG | DIASTOLIC BLOOD PRESSURE: 82 MMHG

## 2023-09-27 DIAGNOSIS — I10 ESSENTIAL HYPERTENSION: Primary | ICD-10-CM

## 2023-09-29 PROCEDURE — 93296 REM INTERROG EVL PM/IDS: CPT | Performed by: INTERNAL MEDICINE

## 2023-09-29 PROCEDURE — 93294 REM INTERROG EVL PM/LDLS PM: CPT | Performed by: INTERNAL MEDICINE

## 2023-10-03 ENCOUNTER — OFFICE VISIT (OUTPATIENT)
Dept: CARDIOLOGY CLINIC | Age: 73
End: 2023-10-03

## 2023-10-03 ENCOUNTER — NURSE ONLY (OUTPATIENT)
Dept: CARDIOLOGY CLINIC | Age: 73
End: 2023-10-03

## 2023-10-03 VITALS
OXYGEN SATURATION: 98 % | HEIGHT: 68 IN | HEART RATE: 72 BPM | DIASTOLIC BLOOD PRESSURE: 80 MMHG | WEIGHT: 209 LBS | BODY MASS INDEX: 31.67 KG/M2 | SYSTOLIC BLOOD PRESSURE: 122 MMHG

## 2023-10-03 DIAGNOSIS — R00.1 BRADYCARDIA: ICD-10-CM

## 2023-10-03 DIAGNOSIS — I48.0 PAROXYSMAL ATRIAL FIBRILLATION (HCC): Primary | Chronic | ICD-10-CM

## 2023-10-03 DIAGNOSIS — I48.0 PAROXYSMAL ATRIAL FIBRILLATION (HCC): Chronic | ICD-10-CM

## 2023-10-03 DIAGNOSIS — Z95.0 CARDIAC PACEMAKER IN SITU: Primary | ICD-10-CM

## 2023-10-09 DIAGNOSIS — E05.00 GRAVES DISEASE: ICD-10-CM

## 2023-10-09 RX ORDER — METHIMAZOLE 5 MG/1
TABLET ORAL
Qty: 90 TABLET | Refills: 2 | Status: SHIPPED | OUTPATIENT
Start: 2023-10-09

## 2023-10-17 ENCOUNTER — HOSPITAL ENCOUNTER (OUTPATIENT)
Age: 73
Discharge: HOME | End: 2023-10-17
Payer: MEDICARE

## 2023-10-17 ENCOUNTER — OFFICE VISIT (OUTPATIENT)
Dept: PULMONOLOGY | Age: 73
End: 2023-10-17
Payer: MEDICARE

## 2023-10-17 VITALS
WEIGHT: 211 LBS | HEIGHT: 68 IN | OXYGEN SATURATION: 95 % | SYSTOLIC BLOOD PRESSURE: 134 MMHG | BODY MASS INDEX: 31.98 KG/M2 | HEART RATE: 88 BPM | DIASTOLIC BLOOD PRESSURE: 88 MMHG

## 2023-10-17 DIAGNOSIS — I48.0 PAROXYSMAL ATRIAL FIBRILLATION (HCC): ICD-10-CM

## 2023-10-17 DIAGNOSIS — I10 ESSENTIAL HYPERTENSION: ICD-10-CM

## 2023-10-17 DIAGNOSIS — E05.00 GRAVES DISEASE: ICD-10-CM

## 2023-10-17 DIAGNOSIS — E66.9 NON MORBID OBESITY, UNSPECIFIED OBESITY TYPE: ICD-10-CM

## 2023-10-17 DIAGNOSIS — G47.33 OBSTRUCTIVE SLEEP APNEA SYNDROME: Primary | ICD-10-CM

## 2023-10-17 LAB
ALBUMIN SERPL-MCNC: 4.5 G/DL (ref 3.4–5)
ANION GAP SERPL CALCULATED.3IONS-SCNC: 9 MMOL/L (ref 3–16)
BUN SERPL-MCNC: 15 MG/DL (ref 7–20)
CALCIUM SERPL-MCNC: 9.4 MG/DL (ref 8.3–10.6)
CHLORIDE SERPL-SCNC: 100 MMOL/L (ref 99–110)
CO2 SERPL-SCNC: 28 MMOL/L (ref 21–32)
CREAT SERPL-MCNC: 1.1 MG/DL (ref 0.6–1.2)
GFR SERPLBLD CREATININE-BSD FMLA CKD-EPI: 54 ML/MIN/{1.73_M2}
GLUCOSE SERPL-MCNC: 88 MG/DL (ref 70–99)
PHOSPHATE SERPL-MCNC: 2.9 MG/DL (ref 2.5–4.9)
POTASSIUM SERPL-SCNC: 4.1 MMOL/L (ref 3.5–5.1)
SODIUM SERPL-SCNC: 137 MMOL/L (ref 136–145)
T3 SERPL-MCNC: 1.13 NG/ML (ref 0.8–2)
T4 FREE SERPL-MCNC: 1 NG/DL (ref 0.9–1.8)
TSH SERPL DL<=0.005 MIU/L-ACNC: 3.31 UIU/ML (ref 0.27–4.2)

## 2023-10-17 PROCEDURE — 3079F DIAST BP 80-89 MM HG: CPT | Performed by: NURSE PRACTITIONER

## 2023-10-17 PROCEDURE — 36415 COLL VENOUS BLD VENIPUNCTURE: CPT

## 2023-10-17 PROCEDURE — 84443 ASSAY THYROID STIM HORMONE: CPT

## 2023-10-17 PROCEDURE — 80069 RENAL FUNCTION PANEL: CPT

## 2023-10-17 PROCEDURE — 84439 ASSAY OF FREE THYROXINE: CPT

## 2023-10-17 PROCEDURE — 3075F SYST BP GE 130 - 139MM HG: CPT | Performed by: NURSE PRACTITIONER

## 2023-10-17 PROCEDURE — 1123F ACP DISCUSS/DSCN MKR DOCD: CPT | Performed by: NURSE PRACTITIONER

## 2023-10-17 PROCEDURE — 99214 OFFICE O/P EST MOD 30 MIN: CPT | Performed by: NURSE PRACTITIONER

## 2023-10-17 PROCEDURE — 84480 ASSAY TRIIODOTHYRONINE (T3): CPT

## 2023-10-17 ASSESSMENT — SLEEP AND FATIGUE QUESTIONNAIRES
HOW LIKELY ARE YOU TO NOD OFF OR FALL ASLEEP WHILE LYING DOWN TO REST IN THE AFTERNOON WHEN CIRCUMSTANCES PERMIT: 1
HOW LIKELY ARE YOU TO NOD OFF OR FALL ASLEEP WHILE SITTING AND READING: 0
HOW LIKELY ARE YOU TO NOD OFF OR FALL ASLEEP WHILE WATCHING TV: 0
HOW LIKELY ARE YOU TO NOD OFF OR FALL ASLEEP WHILE SITTING AND TALKING TO SOMEONE: 0
HOW LIKELY ARE YOU TO NOD OFF OR FALL ASLEEP WHILE SITTING QUIETLY AFTER LUNCH WITHOUT ALCOHOL: 0
ESS TOTAL SCORE: 1
HOW LIKELY ARE YOU TO NOD OFF OR FALL ASLEEP IN A CAR, WHILE STOPPED FOR A FEW MINUTES IN TRAFFIC: 0
HOW LIKELY ARE YOU TO NOD OFF OR FALL ASLEEP WHILE SITTING INACTIVE IN A PUBLIC PLACE: 0
HOW LIKELY ARE YOU TO NOD OFF OR FALL ASLEEP WHEN YOU ARE A PASSENGER IN A CAR FOR AN HOUR WITHOUT A BREAK: 0

## 2023-10-17 NOTE — PROGRESS NOTES
Mariely Dillard MD  [facility] [place] [facility]  Ariannelori Tolentino CNP [facility] [address] [facility] [address], [address] [facility] [phone]   0 Michel Casiano [address] [phone] [place] [address]  Dept: [phone]  Dept Fax: [phone]  Loc: [phone]      Assessment/Plan:      1. Obstructive sleep apnea syndrome  Assessment & Plan:  Chronic - With progression/exacerbation: Reviewed and analyzed results of physiologic download from patient's machine and reviewed with patients. Supplies and parts as needed for the machine. These are medically necessary. Limit caffeine use after 3 PM. Based on the analyzed data, we will make the following change: EPAP min decreased to 7 for aerophagia, humidity level increased to 8 for comfort/dryness. May need more pressure to control her sleep apnea/nocturia but not able to tolerate higher pressure due to aerophagia. May need to consider in lab titration study if no improvement after pressure adjustments. Discussed adjusting humidity settings on the machine for comfort. Also discussed trial of a nasal steroid spray for congestion/dranage. Advised the patient to use the nasal spray consistently for maximum results. Also discussed trial of a different mask/full face mask for oral dryness. Reviewed a few options and resources were provided. She may try a chin strap with a nasal mask if unable to tolerate a full face mask. Encouraged her to continue to use her machine each night, all night. She will return in 3 mo for follow up but was instructed to contact the office if new or worsening of symptoms. Encouraged the patient to contact the office with any questions or concerns. Discussed the importance of consistence use of the machine and encouraged consistent use of the machine each night.  Also discussed the importance

## 2023-10-17 NOTE — ASSESSMENT & PLAN NOTE
Chronic - With progression/exacerbation: Reviewed and analyzed results of physiologic download from patient's machine and reviewed with patients. Supplies and parts as needed for the machine. These are medically necessary. Limit caffeine use after 3 PM. Based on the analyzed data, we will make the following change: EPAP min decreased to 7 for aerophagia, humidity level increased to 8 for comfort/dryness. May need more pressure to control her sleep apnea/nocturia but not able to tolerate higher pressure due to aerophagia. May need to consider in lab titration study if no improvement after pressure adjustments. Discussed adjusting humidity settings on the machine for comfort. Also discussed trial of a nasal steroid spray for congestion/dranage. Advised the patient to use the nasal spray consistently for maximum results. Also discussed trial of a different mask/full face mask for oral dryness. Reviewed a few options and resources were provided. She may try a chin strap with a nasal mask if unable to tolerate a full face mask. Encouraged her to continue to use her machine each night, all night. She will return in 3 mo for follow up but was instructed to contact the office if new or worsening of symptoms. Encouraged the patient to contact the office with any questions or concerns. Discussed the importance of consistence use of the machine and encouraged consistent use of the machine each night. Also discussed the importance of treating Obstructive Sleep Apnea from a physiological standpoint. Instructed not to drive unless had 4 hours of effective therapy for SATISH the night before. Did review the risks of under or untreated SATISH including, but not limited to, higher risks of motor vehicle accidents, stroke, heart attacks, and death. Patients verbalized understanding and accepts all these risks.

## 2023-12-04 NOTE — TELEPHONE ENCOUNTER
Problem: Discharge Planning  Goal: Discharge to home or other facility with appropriate resources  12/3/2023 2237 by Krystal Jay RN  Outcome: Progressing  12/3/2023 1647 by Yoselin Jesus RN  Outcome: Progressing  12/3/2023 1500 by Yoselin Jesus RN  Outcome: Progressing  Flowsheets (Taken 12/3/2023 1031)  Discharge to home or other facility with appropriate resources: Identify barriers to discharge with patient and caregiver     Problem: Pain  Goal: Verbalizes/displays adequate comfort level or baseline comfort level  12/3/2023 2237 by Krystal Jay RN  Outcome: Progressing  12/3/2023 1647 by Yoselin Jesus RN  Outcome: Progressing  12/3/2023 1500 by Yoselin Jesus RN  Outcome: Progressing  Flowsheets (Taken 12/3/2023 1327)  Verbalizes/displays adequate comfort level or baseline comfort level: Encourage patient to monitor pain and request assistance     Problem: Safety - Adult  Goal: Free from fall injury  12/3/2023 2237 by Krystal Jay RN  Outcome: Progressing  12/3/2023 1647 by Yoselin Jesus RN  Outcome: Progressing  12/3/2023 1500 by Yoselin Jesus RN  Outcome: Progressing     Problem: ABCDS Injury Assessment  Goal: Absence of physical injury  12/3/2023 2237 by Krystal Jay RN  Outcome: Progressing  12/3/2023 1647 by Yoselin Jesus RN  Outcome: Progressing     Problem: Skin/Tissue Integrity  Goal: Absence of new skin breakdown  Description: 1. Monitor for areas of redness and/or skin breakdown  2. Assess vascular access sites hourly  3. Every 4-6 hours minimum:  Change oxygen saturation probe site  4. Every 4-6 hours:  If on nasal continuous positive airway pressure, respiratory therapy assess nares and determine need for appliance change or resting period.   12/3/2023 2237 by Krystal Jay RN  Outcome: Progressing  12/3/2023 1647 by Yoselin Jesus RN  Outcome: Progressing     Problem: Chronic Conditions and Pt states she had to take event monitor off because it was irritating her too badly.  Please call to advise

## 2023-12-05 ENCOUNTER — PATIENT MESSAGE (OUTPATIENT)
Dept: ENDOCRINOLOGY | Age: 73
End: 2023-12-05

## 2023-12-05 ENCOUNTER — OFFICE VISIT (OUTPATIENT)
Dept: INTERNAL MEDICINE CLINIC | Age: 73
End: 2023-12-05

## 2023-12-05 VITALS
SYSTOLIC BLOOD PRESSURE: 142 MMHG | HEART RATE: 84 BPM | WEIGHT: 216 LBS | DIASTOLIC BLOOD PRESSURE: 80 MMHG | BODY MASS INDEX: 32.84 KG/M2 | OXYGEN SATURATION: 98 %

## 2023-12-05 DIAGNOSIS — I10 ESSENTIAL HYPERTENSION: ICD-10-CM

## 2023-12-05 DIAGNOSIS — I48.19 PERSISTENT ATRIAL FIBRILLATION (HCC): ICD-10-CM

## 2023-12-05 DIAGNOSIS — D50.9 IRON DEFICIENCY ANEMIA, UNSPECIFIED IRON DEFICIENCY ANEMIA TYPE: ICD-10-CM

## 2023-12-05 RX ORDER — DILTIAZEM HYDROCHLORIDE 180 MG/1
180 CAPSULE, COATED, EXTENDED RELEASE ORAL DAILY
Qty: 90 CAPSULE | Refills: 1 | Status: SHIPPED | OUTPATIENT
Start: 2023-12-05

## 2023-12-05 RX ORDER — HYDROCHLOROTHIAZIDE 25 MG/1
TABLET ORAL
Qty: 90 TABLET | Refills: 1 | Status: SHIPPED | OUTPATIENT
Start: 2023-12-05

## 2023-12-05 RX ORDER — METOPROLOL SUCCINATE 50 MG/1
50 TABLET, EXTENDED RELEASE ORAL DAILY
Qty: 90 TABLET | Refills: 0 | Status: SHIPPED | OUTPATIENT
Start: 2023-12-05

## 2023-12-05 RX ORDER — SPIRONOLACTONE 25 MG/1
25 TABLET ORAL DAILY
Qty: 45 TABLET | Refills: 1 | Status: SHIPPED | OUTPATIENT
Start: 2023-12-05

## 2023-12-05 NOTE — TELEPHONE ENCOUNTER
Patient's next follow up is in May. Labs done in October and patient would like results. Please advise.

## 2023-12-14 ENCOUNTER — HOSPITAL ENCOUNTER (OUTPATIENT)
Dept: WOMENS IMAGING | Age: 73
Discharge: HOME | End: 2023-12-14
Payer: MEDICARE

## 2023-12-14 DIAGNOSIS — Z12.31 BREAST CANCER SCREENING BY MAMMOGRAM: ICD-10-CM

## 2023-12-14 PROCEDURE — 77063 BREAST TOMOSYNTHESIS BI: CPT

## 2023-12-29 PROCEDURE — 93294 REM INTERROG EVL PM/LDLS PM: CPT | Performed by: INTERNAL MEDICINE

## 2023-12-29 PROCEDURE — 93296 REM INTERROG EVL PM/IDS: CPT | Performed by: INTERNAL MEDICINE

## 2024-01-23 ENCOUNTER — OFFICE VISIT (OUTPATIENT)
Dept: INTERNAL MEDICINE CLINIC | Age: 74
End: 2024-01-23
Payer: MEDICARE

## 2024-01-23 VITALS
WEIGHT: 213 LBS | DIASTOLIC BLOOD PRESSURE: 72 MMHG | HEART RATE: 66 BPM | HEIGHT: 68 IN | SYSTOLIC BLOOD PRESSURE: 132 MMHG | BODY MASS INDEX: 32.28 KG/M2 | OXYGEN SATURATION: 97 %

## 2024-01-23 DIAGNOSIS — R94.4 DECREASED CALCULATED GFR: ICD-10-CM

## 2024-01-23 DIAGNOSIS — D68.69 SECONDARY HYPERCOAGULABLE STATE (HCC): ICD-10-CM

## 2024-01-23 DIAGNOSIS — R79.9 ABNORMAL FINDING OF BLOOD CHEMISTRY, UNSPECIFIED: ICD-10-CM

## 2024-01-23 DIAGNOSIS — I10 ESSENTIAL HYPERTENSION: ICD-10-CM

## 2024-01-23 DIAGNOSIS — Z12.31 ENCOUNTER FOR SCREENING MAMMOGRAM FOR BREAST CANCER: ICD-10-CM

## 2024-01-23 DIAGNOSIS — C54.1 ENDOMETRIAL CANCER (HCC): ICD-10-CM

## 2024-01-23 DIAGNOSIS — Z00.00 MEDICARE ANNUAL WELLNESS VISIT, SUBSEQUENT: Primary | ICD-10-CM

## 2024-01-23 DIAGNOSIS — T45.1X5A CHEMOTHERAPY-INDUCED NEUROPATHY (HCC): ICD-10-CM

## 2024-01-23 DIAGNOSIS — D50.9 IRON DEFICIENCY ANEMIA, UNSPECIFIED IRON DEFICIENCY ANEMIA TYPE: ICD-10-CM

## 2024-01-23 DIAGNOSIS — G62.0 CHEMOTHERAPY-INDUCED NEUROPATHY (HCC): ICD-10-CM

## 2024-01-23 DIAGNOSIS — I48.19 PERSISTENT ATRIAL FIBRILLATION (HCC): ICD-10-CM

## 2024-01-23 LAB
CREAT UR-MCNC: 30.9 MG/DL (ref 28–259)
MICROALBUMIN UR DL<=1MG/L-MCNC: <1.2 MG/DL
MICROALBUMIN/CREAT UR: NORMAL MG/G (ref 0–30)

## 2024-01-23 PROCEDURE — 3075F SYST BP GE 130 - 139MM HG: CPT | Performed by: INTERNAL MEDICINE

## 2024-01-23 PROCEDURE — 1123F ACP DISCUSS/DSCN MKR DOCD: CPT | Performed by: INTERNAL MEDICINE

## 2024-01-23 PROCEDURE — G0439 PPPS, SUBSEQ VISIT: HCPCS | Performed by: INTERNAL MEDICINE

## 2024-01-23 PROCEDURE — 3078F DIAST BP <80 MM HG: CPT | Performed by: INTERNAL MEDICINE

## 2024-01-23 RX ORDER — DILTIAZEM HYDROCHLORIDE 180 MG/1
180 CAPSULE, COATED, EXTENDED RELEASE ORAL DAILY
Qty: 90 CAPSULE | Refills: 1 | Status: SHIPPED | OUTPATIENT
Start: 2024-01-23

## 2024-01-23 RX ORDER — METOPROLOL SUCCINATE 50 MG/1
50 TABLET, EXTENDED RELEASE ORAL DAILY
Qty: 90 TABLET | Refills: 1 | Status: SHIPPED | OUTPATIENT
Start: 2024-01-23

## 2024-01-23 RX ORDER — HYDROCHLOROTHIAZIDE 25 MG/1
TABLET ORAL
Qty: 90 TABLET | Refills: 1 | Status: SHIPPED | OUTPATIENT
Start: 2024-01-23

## 2024-01-23 RX ORDER — SPIRONOLACTONE 25 MG/1
25 TABLET ORAL DAILY
Qty: 45 TABLET | Refills: 1 | Status: SHIPPED | OUTPATIENT
Start: 2024-01-23

## 2024-01-23 ASSESSMENT — PATIENT HEALTH QUESTIONNAIRE - PHQ9
1. LITTLE INTEREST OR PLEASURE IN DOING THINGS: 0
SUM OF ALL RESPONSES TO PHQ QUESTIONS 1-9: 0
SUM OF ALL RESPONSES TO PHQ9 QUESTIONS 1 & 2: 0
SUM OF ALL RESPONSES TO PHQ QUESTIONS 1-9: 0
SUM OF ALL RESPONSES TO PHQ QUESTIONS 1-9: 0
2. FEELING DOWN, DEPRESSED OR HOPELESS: 0
SUM OF ALL RESPONSES TO PHQ QUESTIONS 1-9: 0

## 2024-01-23 ASSESSMENT — LIFESTYLE VARIABLES
HOW OFTEN DO YOU HAVE A DRINK CONTAINING ALCOHOL: MONTHLY OR LESS
HOW MANY STANDARD DRINKS CONTAINING ALCOHOL DO YOU HAVE ON A TYPICAL DAY: 1 OR 2

## 2024-01-23 NOTE — PROGRESS NOTES
Medicare Annual Wellness Visit    Belén Acevedo is here for Medicare AWV    Assessment & Plan   Medicare annual wellness visit, subsequent  -     REBECCA DIGITAL SCREEN W OR WO CAD BILATERAL; Future  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HCC)  -     Microalbumin / Creatinine Urine Ratio  -     REBECCA DIGITAL SCREEN W OR WO CAD BILATERAL; Future  Persistent atrial fibrillation (HCC)  -     REBECCA DIGITAL SCREEN W OR WO CAD BILATERAL; Future  -     dilTIAZem (CARDIZEM CD) 180 MG extended release capsule; Take 1 capsule by mouth daily, Disp-90 capsule, R-1Normal  -     metoprolol succinate (TOPROL XL) 50 MG extended release tablet; Take 1 tablet by mouth daily, Disp-90 tablet, R-1Normal  -     rivaroxaban (XARELTO) 20 MG TABS tablet; TAKE ONE TABLET BY MOUTH DAILY WITH BREAKFAST, Disp-90 tablet, R-1Normal  Secondary hypercoagulable state (HCC)  -     REBECCA DIGITAL SCREEN W OR WO CAD BILATERAL; Future  Endometrial cancer (HCC)  -     REBECCA DIGITAL SCREEN W OR WO CAD BILATERAL; Future  Decreased calculated GFR  -     Microalbumin / Creatinine Urine Ratio  -     REBECCA DIGITAL SCREEN W OR WO CAD BILATERAL; Future  Essential hypertension  -     Microalbumin / Creatinine Urine Ratio  -     REBECCA DIGITAL SCREEN W OR WO CAD BILATERAL; Future  -     hydroCHLOROthiazide (HYDRODIURIL) 25 MG tablet; TAKE ONE TABLET BY MOUTH DAILY, Disp-90 tablet, R-1Normal  -     metoprolol succinate (TOPROL XL) 50 MG extended release tablet; Take 1 tablet by mouth daily, Disp-90 tablet, R-1Normal  -     spironolactone (ALDACTONE) 25 MG tablet; Take 1 tablet by mouth daily, Disp-45 tablet, R-1Normal  Abnormal finding of blood chemistry, unspecified  -     REBECCA DIGITAL SCREEN W OR WO CAD BILATERAL; Future  Encounter for screening mammogram for breast cancer  -     REBECCA DIGITAL SCREEN W OR WO CAD BILATERAL; Future  Iron deficiency anemia, unspecified iron deficiency anemia type  -     rivaroxaban (XARELTO) 20 MG TABS tablet; TAKE ONE TABLET BY MOUTH DAILY WITH BREAKFAST,

## 2024-01-23 NOTE — PATIENT INSTRUCTIONS
be sure to contact your doctor if you have any problems.  Where can you learn more?  Go to [email or web address]-nario.net/patientEd and enter F075 to learn more about \"A Healthy Heart: Care Instructions.\"  Current as of: June 25, 2023               Content Version: 13.9  © 1294-7133 [employer].   Care instructions adapted under license by [employer]. If you have questions about a medical condition or this instruction, always ask your healthcare professional. [employer] disclaims any warranty or liability for your use of this information.      Personalized Preventive Plan for Belén Acevedo - 1/23/2024  Medicare offers a range of preventive health benefits. Some of the tests and screenings are paid in full while other may be subject to a deductible, co-insurance, and/or copay.    Some of these benefits include a comprehensive review of your medical history including lifestyle, illnesses that may run in your family, and various assessments and screenings as appropriate.    After reviewing your medical record and screening and assessments performed today your provider may have ordered immunizations, labs, imaging, and/or referrals for you.  A list of these orders (if applicable) as well as your Preventive Care list are included within your After Visit Summary for your review.    Other Preventive Recommendations:    A preventive eye exam performed by an eye specialist is recommended every 1-2 years to screen for glaucoma; cataracts, macular degeneration, and other eye disorders.  A preventive dental visit is recommended every 6 months.  Try to get at least 150 minutes of exercise per week or 10,000 steps per day on a pedometer .  Order or download the FREE \"Exercise & Physical Activity: Your Everyday Guide\" from [employer] [place] [employer]. Call 1-416.433.3093 or search [employer] [place] [employer] online.  You need 7158-8891 mg of calcium and 1428-2147 IU of vitamin D per day. It is

## 2024-01-25 DIAGNOSIS — I10 ESSENTIAL HYPERTENSION: Primary | ICD-10-CM

## 2024-01-25 DIAGNOSIS — Z13.1 SCREENING FOR DIABETES MELLITUS: ICD-10-CM

## 2024-01-25 DIAGNOSIS — R79.9 ABNORMAL FINDING OF BLOOD CHEMISTRY, UNSPECIFIED: ICD-10-CM

## 2024-01-25 DIAGNOSIS — R73.03 PRE-DIABETES: ICD-10-CM

## 2024-01-31 ENCOUNTER — HOSPITAL ENCOUNTER (OUTPATIENT)
Age: 74
Discharge: HOME | End: 2024-01-31
Payer: MEDICARE

## 2024-01-31 DIAGNOSIS — E05.00 GRAVES DISEASE: ICD-10-CM

## 2024-01-31 LAB
ALBUMIN SERPL-MCNC: 4.4 G/DL (ref 3.4–5)
ANION GAP SERPL CALCULATED.3IONS-SCNC: 11 MMOL/L (ref 3–16)
BUN SERPL-MCNC: 18 MG/DL (ref 7–20)
CALCIUM SERPL-MCNC: 10.2 MG/DL (ref 8.3–10.6)
CHLORIDE SERPL-SCNC: 96 MMOL/L (ref 99–110)
CO2 SERPL-SCNC: 29 MMOL/L (ref 21–32)
CREAT SERPL-MCNC: 1 MG/DL (ref 0.6–1.2)
GFR SERPLBLD CREATININE-BSD FMLA CKD-EPI: >60 ML/MIN/{1.73_M2}
GLUCOSE SERPL-MCNC: 89 MG/DL (ref 70–99)
PHOSPHATE SERPL-MCNC: 3.3 MG/DL (ref 2.5–4.9)
POTASSIUM SERPL-SCNC: 4.8 MMOL/L (ref 3.5–5.1)
SODIUM SERPL-SCNC: 136 MMOL/L (ref 136–145)
T3 SERPL-MCNC: 1.15 NG/ML (ref 0.8–2)
T4 FREE SERPL-MCNC: 0.9 NG/DL (ref 0.9–1.8)
TSH SERPL DL<=0.005 MIU/L-ACNC: 2.96 UIU/ML (ref 0.27–4.2)

## 2024-01-31 PROCEDURE — 83036 HEMOGLOBIN GLYCOSYLATED A1C: CPT

## 2024-01-31 PROCEDURE — 80069 RENAL FUNCTION PANEL: CPT

## 2024-01-31 PROCEDURE — 84480 ASSAY TRIIODOTHYRONINE (T3): CPT

## 2024-01-31 PROCEDURE — 84443 ASSAY THYROID STIM HORMONE: CPT

## 2024-01-31 PROCEDURE — 84439 ASSAY OF FREE THYROXINE: CPT

## 2024-01-31 PROCEDURE — 36415 COLL VENOUS BLD VENIPUNCTURE: CPT

## 2024-02-01 LAB
EST. AVERAGE GLUCOSE BLD GHB EST-MCNC: 114 MG/DL
HBA1C MFR BLD: 5.6 %

## 2024-04-09 ENCOUNTER — OFFICE VISIT (OUTPATIENT)
Dept: CARDIOLOGY CLINIC | Age: 74
End: 2024-04-09
Payer: MEDICARE

## 2024-04-09 ENCOUNTER — NURSE ONLY (OUTPATIENT)
Dept: CARDIOLOGY CLINIC | Age: 74
End: 2024-04-09
Payer: MEDICARE

## 2024-04-09 VITALS
BODY MASS INDEX: 31.64 KG/M2 | DIASTOLIC BLOOD PRESSURE: 88 MMHG | HEART RATE: 90 BPM | OXYGEN SATURATION: 94 % | HEIGHT: 68 IN | WEIGHT: 208.8 LBS | SYSTOLIC BLOOD PRESSURE: 124 MMHG

## 2024-04-09 DIAGNOSIS — I49.5 TACHY-BRADY SYNDROME (HCC): ICD-10-CM

## 2024-04-09 DIAGNOSIS — E05.00 GRAVES DISEASE: ICD-10-CM

## 2024-04-09 DIAGNOSIS — R00.1 BRADYCARDIA: ICD-10-CM

## 2024-04-09 DIAGNOSIS — Z95.0 CARDIAC PACEMAKER IN SITU: Primary | ICD-10-CM

## 2024-04-09 DIAGNOSIS — K92.2 GASTROINTESTINAL HEMORRHAGE, UNSPECIFIED GASTROINTESTINAL HEMORRHAGE TYPE: ICD-10-CM

## 2024-04-09 DIAGNOSIS — I10 ESSENTIAL HYPERTENSION: Chronic | ICD-10-CM

## 2024-04-09 DIAGNOSIS — I48.0 PAROXYSMAL ATRIAL FIBRILLATION (HCC): Chronic | ICD-10-CM

## 2024-04-09 PROCEDURE — 93280 PM DEVICE PROGR EVAL DUAL: CPT | Performed by: INTERNAL MEDICINE

## 2024-04-09 PROCEDURE — 3079F DIAST BP 80-89 MM HG: CPT | Performed by: INTERNAL MEDICINE

## 2024-04-09 PROCEDURE — 93000 ELECTROCARDIOGRAM COMPLETE: CPT | Performed by: INTERNAL MEDICINE

## 2024-04-09 PROCEDURE — 3074F SYST BP LT 130 MM HG: CPT | Performed by: INTERNAL MEDICINE

## 2024-04-09 PROCEDURE — 1123F ACP DISCUSS/DSCN MKR DOCD: CPT | Performed by: INTERNAL MEDICINE

## 2024-04-09 PROCEDURE — 99214 OFFICE O/P EST MOD 30 MIN: CPT | Performed by: INTERNAL MEDICINE

## 2024-04-09 RX ORDER — ASPIRIN 81 MG/1
81 TABLET, CHEWABLE ORAL DAILY
COMMUNITY

## 2024-04-09 NOTE — PROGRESS NOTES
[facility]   Electrophysiology Follow up   Date: 4/9/2024  I had the privilege of visiting Belén Acevedo in the office.     CC: PAF   HPI: Belén Acevedo is a 73 y.o. female  with PMH of HTN is being referred by PCP, Dr. Almanza, for evaluation of Afib.  She was originally diagnosed with Afib in 7/2017, followed by cardiologist at [facility].       Afib diagnosed in 7/2017. She was later diagnosed with uterine cancer and underwent surgery, chemotherapy.     She underwent DCCVs on 8/2017 and 9/2017.       Diagnosed with FIGO Stage IA (pT1a(2), N0, M0) serous carcinoma of the endometrium, status post TLH/BSO and five of a planned six cycles of adjuvant chemotherapy completed 08/28/2018.  Her final treatment for radiation is today and completed with chemotherapy.  She had episode of syncope while receiving chemo in 7/2018.        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 She received 4 units RBCs and discharged home without anticoagulation. Xarelto was stopped.      S/p DCCV 9/19/19     Xarelto 20 mg restarted 12/10/19      Presented to [facility] on 6/2021 with complaints of CP and dizziness.  Show wore monitor which showed symptomatic pauses, also with bifascicular block. S/p dual chamber PPM 9/29/2021     S/p 5/19/2022 Pulmonary vein isolations using wide area circumferential radiofrequency ablation     Belén presents today in  6 months follow up.   She states on 12/23/2023 she was in [place] and may have had a strong Shaila Drink.  She has stopped xarelto  due to cost.  She states she occasionally feels a \" little palpitations\" but  states she is not aware of her afib.     Assessment and plan:   -Paroxysmal Atrial Fibrillation    ECG today: SR with RBBB    S/p 5/19/2022 Pulmonary vein isolations using wide area circumferential radiofrequency ablation     One 3 hour episode.   12/23/2024 patient states that she was on vacation in [place]

## 2024-05-09 ENCOUNTER — TELEPHONE (OUTPATIENT)
Dept: ENDOCRINOLOGY | Age: 74
End: 2024-05-09

## 2024-05-09 DIAGNOSIS — E05.00 GRAVES DISEASE: Primary | ICD-10-CM

## 2024-05-09 NOTE — TELEPHONE ENCOUNTER
Pt has appt next week  Her last labs were done 1/31/24  She may need current lab orders placed>?    Route back to Helen CORONADO

## 2024-05-10 ENCOUNTER — HOSPITAL ENCOUNTER (OUTPATIENT)
Age: 74
Discharge: HOME | End: 2024-05-10
Payer: MEDICARE

## 2024-05-10 DIAGNOSIS — E05.00 GRAVES DISEASE: ICD-10-CM

## 2024-05-10 LAB
T3 SERPL-MCNC: 1.16 NG/ML (ref 0.8–2)
T4 FREE SERPL-MCNC: 0.9 NG/DL (ref 0.9–1.8)
TSH SERPL DL<=0.005 MIU/L-ACNC: 4.6 UIU/ML (ref 0.27–4.2)

## 2024-05-10 PROCEDURE — 84443 ASSAY THYROID STIM HORMONE: CPT

## 2024-05-10 PROCEDURE — 84439 ASSAY OF FREE THYROXINE: CPT

## 2024-05-10 PROCEDURE — 84480 ASSAY TRIIODOTHYRONINE (T3): CPT

## 2024-05-10 PROCEDURE — 36415 COLL VENOUS BLD VENIPUNCTURE: CPT

## 2024-05-14 ENCOUNTER — OFFICE VISIT (OUTPATIENT)
Dept: ENDOCRINOLOGY | Age: 74
End: 2024-05-14
Payer: MEDICARE

## 2024-05-14 VITALS
BODY MASS INDEX: 31.52 KG/M2 | SYSTOLIC BLOOD PRESSURE: 137 MMHG | HEART RATE: 77 BPM | RESPIRATION RATE: 16 BRPM | WEIGHT: 208 LBS | DIASTOLIC BLOOD PRESSURE: 86 MMHG | HEIGHT: 68 IN

## 2024-05-14 DIAGNOSIS — E05.00 GRAVES DISEASE: Primary | ICD-10-CM

## 2024-05-14 DIAGNOSIS — G47.33 OBSTRUCTIVE SLEEP APNEA SYNDROME: ICD-10-CM

## 2024-05-14 DIAGNOSIS — I10 ESSENTIAL HYPERTENSION: ICD-10-CM

## 2024-05-14 PROCEDURE — 3079F DIAST BP 80-89 MM HG: CPT | Performed by: INTERNAL MEDICINE

## 2024-05-14 PROCEDURE — 1123F ACP DISCUSS/DSCN MKR DOCD: CPT | Performed by: INTERNAL MEDICINE

## 2024-05-14 PROCEDURE — 3075F SYST BP GE 130 - 139MM HG: CPT | Performed by: INTERNAL MEDICINE

## 2024-05-14 PROCEDURE — G2211 COMPLEX E/M VISIT ADD ON: HCPCS | Performed by: INTERNAL MEDICINE

## 2024-05-14 PROCEDURE — 99214 OFFICE O/P EST MOD 30 MIN: CPT | Performed by: INTERNAL MEDICINE

## 2024-05-14 RX ORDER — METHIMAZOLE 5 MG/1
TABLET ORAL
Qty: 90 TABLET | Refills: 1 | Status: SHIPPED | OUTPATIENT
Start: 2024-05-14

## 2024-05-14 NOTE — PROGRESS NOTES
SUBJECTIVE:  Belén Acevedo is a 73 y.o. female  seen in my clinic for Graves' disease and multinodular goiter  Patient was initially referred for thyroid nodule  which was identified on a CT scan done in April 2018 as a workup for her ovarian cancer  .    Patient Current complaints: denies fatigue, weight changes, heat/cold intolerance, bowel/skin changes or CVS symptoms  History of obstructive symptoms: difficulty swallowing No, changes in voice/hoarseness No.    History of radiation to patient's neck: NO  Recent iodine exposure: No  Family history includes no thyroid abnormalities.  Family history of thyroid cancer: No    Ovarian cancer is treated with surgery , chemo and RT ---she was diagnosed in jan 2018   Patient also has hypertension and is on Toprol and hydrochlorothiazide along with Lotrel  Patient also has atrial fibrillation and has been cardioverted in the past.  Rate controlled    INTERIM     She has been taking 5 mg Tapazole daily she denies any significant palpitation, heat intolerance or insomnia.  She had cardiac ablation pacemaker put in May 2022         Past Medical History:   Diagnosis Date    Anal bleeding 2019    CT scan clear     Anemia     Arthritis     bilateral knee    Atrial fibrillation (HCC)     cleared by cardiologist 2018, was related to cancer     Colon polyps     Diabetes mellitus (HCC)     pre diabetic diet controlled    Endometrial cancer (HCC)     History of blood transfusion     Hypertension     IFG (impaired fasting glucose)     SATISH (obstructive sleep apnea)     3/28/22-currently being fitted for CPAP    Small bowel obstruction (HCC)     resolved without surgery    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HCC)     Thyroid nodule      Patient Active Problem List    Diagnosis Date Noted    Obstructive sleep apnea syndrome 05/31/2022    Secondary hypercoagulable state (HCC) 05/16/2023    Non morbid obesity, unspecified obesity type 10/19/2021    Cardiac pacemaker in situ 09/30/2021    Tachy-alem

## 2024-05-31 ENCOUNTER — OFFICE VISIT (OUTPATIENT)
Dept: INTERNAL MEDICINE CLINIC | Age: 74
End: 2024-05-31
Payer: MEDICARE

## 2024-05-31 VITALS
HEART RATE: 72 BPM | BODY MASS INDEX: 31.47 KG/M2 | DIASTOLIC BLOOD PRESSURE: 78 MMHG | WEIGHT: 207 LBS | OXYGEN SATURATION: 98 % | SYSTOLIC BLOOD PRESSURE: 126 MMHG

## 2024-05-31 DIAGNOSIS — I48.19 PERSISTENT ATRIAL FIBRILLATION (HCC): Primary | ICD-10-CM

## 2024-05-31 DIAGNOSIS — I10 ESSENTIAL HYPERTENSION: ICD-10-CM

## 2024-05-31 DIAGNOSIS — Z13.9 ENCOUNTER FOR SCREENING INVOLVING SOCIAL DETERMINANTS OF HEALTH (SDOH): ICD-10-CM

## 2024-05-31 DIAGNOSIS — L60.0 INGROWING TOENAIL WITHOUT INFECTION: ICD-10-CM

## 2024-05-31 PROCEDURE — 3074F SYST BP LT 130 MM HG: CPT | Performed by: INTERNAL MEDICINE

## 2024-05-31 PROCEDURE — 99214 OFFICE O/P EST MOD 30 MIN: CPT | Performed by: INTERNAL MEDICINE

## 2024-05-31 PROCEDURE — 3078F DIAST BP <80 MM HG: CPT | Performed by: INTERNAL MEDICINE

## 2024-05-31 PROCEDURE — 1123F ACP DISCUSS/DSCN MKR DOCD: CPT | Performed by: INTERNAL MEDICINE

## 2024-05-31 RX ORDER — HYDROCHLOROTHIAZIDE 25 MG/1
TABLET ORAL
Qty: 90 TABLET | Refills: 1 | Status: SHIPPED | OUTPATIENT
Start: 2024-05-31

## 2024-05-31 RX ORDER — METOPROLOL SUCCINATE 50 MG/1
50 TABLET, EXTENDED RELEASE ORAL DAILY
Qty: 90 TABLET | Refills: 1 | Status: SHIPPED | OUTPATIENT
Start: 2024-05-31

## 2024-05-31 RX ORDER — DILTIAZEM HYDROCHLORIDE 180 MG/1
180 CAPSULE, COATED, EXTENDED RELEASE ORAL DAILY
Qty: 90 CAPSULE | Refills: 1 | Status: SHIPPED | OUTPATIENT
Start: 2024-05-31

## 2024-05-31 RX ORDER — SPIRONOLACTONE 25 MG/1
25 TABLET ORAL DAILY
Qty: 45 TABLET | Refills: 1 | Status: SHIPPED | OUTPATIENT
Start: 2024-05-31

## 2024-05-31 SDOH — ECONOMIC STABILITY: FOOD INSECURITY: WITHIN THE PAST 12 MONTHS, YOU WORRIED THAT YOUR FOOD WOULD RUN OUT BEFORE YOU GOT MONEY TO BUY MORE.: NEVER TRUE

## 2024-05-31 SDOH — ECONOMIC STABILITY: FOOD INSECURITY: WITHIN THE PAST 12 MONTHS, THE FOOD YOU BOUGHT JUST DIDN'T LAST AND YOU DIDN'T HAVE MONEY TO GET MORE.: NEVER TRUE

## 2024-05-31 SDOH — ECONOMIC STABILITY: INCOME INSECURITY: HOW HARD IS IT FOR YOU TO PAY FOR THE VERY BASICS LIKE FOOD, HOUSING, MEDICAL CARE, AND HEATING?: NOT HARD AT ALL

## 2024-05-31 ASSESSMENT — ANXIETY QUESTIONNAIRES
5. BEING SO RESTLESS THAT IT IS HARD TO SIT STILL: NOT AT ALL
7. FEELING AFRAID AS IF SOMETHING AWFUL MIGHT HAPPEN: NOT AT ALL
6. BECOMING EASILY ANNOYED OR IRRITABLE: NOT AT ALL
2. NOT BEING ABLE TO STOP OR CONTROL WORRYING: NOT AT ALL
3. WORRYING TOO MUCH ABOUT DIFFERENT THINGS: NOT AT ALL
1. FEELING NERVOUS, ANXIOUS, OR ON EDGE: NOT AT ALL
4. TROUBLE RELAXING: NOT AT ALL
IF YOU CHECKED OFF ANY PROBLEMS ON THIS QUESTIONNAIRE, HOW DIFFICULT HAVE THESE PROBLEMS MADE IT FOR YOU TO DO YOUR WORK, TAKE CARE OF THINGS AT HOME, OR GET ALONG WITH OTHER PEOPLE: NOT DIFFICULT AT ALL
GAD7 TOTAL SCORE: 0

## 2024-05-31 ASSESSMENT — ENCOUNTER SYMPTOMS
BLURRED VISION: 0
ORTHOPNEA: 0

## 2024-05-31 ASSESSMENT — PATIENT HEALTH QUESTIONNAIRE - PHQ9
SUM OF ALL RESPONSES TO PHQ QUESTIONS 1-9: 0
SUM OF ALL RESPONSES TO PHQ9 QUESTIONS 1 & 2: 0
SUM OF ALL RESPONSES TO PHQ QUESTIONS 1-9: 0
1. LITTLE INTEREST OR PLEASURE IN DOING THINGS: NOT AT ALL
2. FEELING DOWN, DEPRESSED OR HOPELESS: NOT AT ALL

## 2024-05-31 NOTE — PROGRESS NOTES
Belén Acevedo (:  1950) is a 73 y.o. female,Established patient, here for evaluation of the following chief complaint(s):  Hypertension      Assessment & Plan   1. Persistent atrial fibrillation (HCC)  -     apixaban (ELIQUIS) 5 MG TABS tablet; Take 1 tablet by mouth 2 times daily, Disp-60 tablet, R-0Normal  -     Ambulatory referral to Social Work  -     dilTIAZem (CARDIZEM CD) 180 MG extended release capsule; Take 1 capsule by mouth daily, Disp-90 capsule, R-1Normal  -     metoprolol succinate (TOPROL XL) 50 MG extended release tablet; Take 1 tablet by mouth daily, Disp-90 tablet, R-1Normal  2. Encounter for screening involving social determinants of health (SDoH)  3. Essential hypertension  -     hydroCHLOROthiazide (HYDRODIURIL) 25 MG tablet; TAKE ONE TABLET BY MOUTH DAILY, Disp-90 tablet, R-1Normal  -     metoprolol succinate (TOPROL XL) 50 MG extended release tablet; Take 1 tablet by mouth daily, Disp-90 tablet, R-1Normal  -     spironolactone (ALDACTONE) 25 MG tablet; Take 1 tablet by mouth daily, Disp-45 tablet, R-1Normal  4. Ingrowing toenail without infection  -     AFL - Mirza Sharma, JEANNIE, Podiatry, [facility]      Return in about 6 months (around 2024) for AWV 1 day or more after last AWV- - .       Subjective   Hypertension  This is a chronic problem. The current episode started more than 1 year ago. The problem is unchanged. Pertinent negatives include no blurred vision, chest pain, neck pain, orthopnea or PND. Risk factors for coronary artery disease include obesity, dyslipidemia, post-menopausal state and sedentary lifestyle. Past treatments include beta blockers and calcium channel blockers. The current treatment provides moderate improvement. There are no compliance problems.  Hypertensive end-organ damage includes left ventricular hypertrophy. There is no history of angina, kidney disease or CAD/MI.       Review of Systems   Eyes:  Negative for blurred vision.

## 2024-06-03 ENCOUNTER — TELEPHONE (OUTPATIENT)
Dept: FAMILY MEDICINE CLINIC | Age: 74
End: 2024-06-03

## 2024-06-03 NOTE — TELEPHONE ENCOUNTER
[facility] Social Work Note    Reason for Referral   Medication Assistance / Medication Packs / Med Sets     Plan:    Pt will go and obtain Eliquis [facility].  Pt will contact SW in the future if she needs additional assistance.      Assessment/Summary:    BREONNA contacted pt to follow-up on PCF referral to assist pt in obtaining Eliquis medication.  BREONNA informed pt of reason for call and asked pt if she had contacted her pharmacy to see how much her Eliquis would be and if can afford it.  Pt related she had not even contacted her pharmacy to find out the cost of her medication.  SW explained how could assist her with this matter to see if she can afford it.  BREONNA placed conference call [facility] with [facility] and learned Eliquis would cost her $67.00.  Pt related she could afford to pay for the Eliquis at this since was paying much more than this last year for her medications.  SW explored if pt would be eligible for [employer] P[employer] to obtain Eliquis.  Pt provided income information and also related her daughter is living with her as well but she does not claim her on her taxes.  SW informed pt how her income meets the income qualifications but would need to have spent 3% of income on over-the-counter medications.  Pt related she had last year but not this year.  BREONNA also assisted in contacting pt's insurance company pharmacy line and found out Eliquis is not on a specialty tier so pt could not receive a Tier exception.  Pt was informed Eliquis is on a Tier 3 and is not eligible for a Tier exception.  Pt related she would be able to afford the $67 a month for he Eliquis medication.  BREONNA informed pt that if she finds it difficult to pay for her medication to contact BREONNA so can look into other resources to assist with her medications.      Interventions:    SW assisted pt in contacting her [facility] pharmacy to see if pt can afford her Eliquis medication cost.    SW assisted pt in

## 2024-06-30 DIAGNOSIS — I48.19 PERSISTENT ATRIAL FIBRILLATION (HCC): ICD-10-CM

## 2024-06-30 RX ORDER — APIXABAN 5 MG/1
5 TABLET, FILM COATED ORAL 2 TIMES DAILY
Qty: 60 TABLET | Refills: 0 | Status: SHIPPED | OUTPATIENT
Start: 2024-06-30

## 2024-07-30 ENCOUNTER — HOSPITAL ENCOUNTER (OUTPATIENT)
Age: 74
Discharge: HOME | End: 2024-07-30
Payer: MEDICARE

## 2024-07-30 DIAGNOSIS — E05.00 GRAVES DISEASE: ICD-10-CM

## 2024-07-30 LAB
ALBUMIN SERPL-MCNC: 4.2 G/DL (ref 3.4–5)
ALBUMIN/GLOB SERPL: 1.1 {RATIO} (ref 1.1–2.2)
ALP SERPL-CCNC: 97 U/L (ref 40–129)
ALT SERPL-CCNC: 21 U/L (ref 10–40)
ANION GAP SERPL CALCULATED.3IONS-SCNC: 16 MMOL/L (ref 3–16)
AST SERPL-CCNC: 22 U/L (ref 15–37)
BILIRUB SERPL-MCNC: 0.3 MG/DL (ref 0–1)
BUN SERPL-MCNC: 16 MG/DL (ref 7–20)
CALCIUM SERPL-MCNC: 9.9 MG/DL (ref 8.3–10.6)
CHLORIDE SERPL-SCNC: 97 MMOL/L (ref 99–110)
CO2 SERPL-SCNC: 21 MMOL/L (ref 21–32)
CREAT SERPL-MCNC: 0.8 MG/DL (ref 0.6–1.2)
GFR SERPLBLD CREATININE-BSD FMLA CKD-EPI: 78 ML/MIN/{1.73_M2}
GLUCOSE SERPL-MCNC: 89 MG/DL (ref 70–99)
POTASSIUM SERPL-SCNC: 4.1 MMOL/L (ref 3.5–5.1)
PROT SERPL-MCNC: 8.2 G/DL (ref 6.4–8.2)
SODIUM SERPL-SCNC: 134 MMOL/L (ref 136–145)
T3 SERPL-MCNC: 1.52 NG/ML (ref 0.8–2)
T4 FREE SERPL-MCNC: 1.4 NG/DL (ref 0.9–1.8)
TSH SERPL DL<=0.005 MIU/L-ACNC: 0.09 UIU/ML (ref 0.27–4.2)

## 2024-07-30 PROCEDURE — 84439 ASSAY OF FREE THYROXINE: CPT

## 2024-07-30 PROCEDURE — 84443 ASSAY THYROID STIM HORMONE: CPT

## 2024-07-30 PROCEDURE — 36415 COLL VENOUS BLD VENIPUNCTURE: CPT

## 2024-07-30 PROCEDURE — 84480 ASSAY TRIIODOTHYRONINE (T3): CPT

## 2024-07-30 PROCEDURE — 80053 COMPREHEN METABOLIC PANEL: CPT

## 2024-07-31 ENCOUNTER — TELEPHONE (OUTPATIENT)
Dept: ENDOCRINOLOGY | Age: 74
End: 2024-07-31

## 2024-07-31 DIAGNOSIS — E05.00 GRAVES DISEASE: ICD-10-CM

## 2024-07-31 RX ORDER — METHIMAZOLE 5 MG/1
TABLET ORAL
Qty: 180 TABLET | Refills: 1 | Status: SHIPPED | OUTPATIENT
Start: 2024-07-31

## 2024-07-31 NOTE — TELEPHONE ENCOUNTER
AMY......Patient called back and stated that she is only taking Methimazole 5 mg every other day and not every day. Advised her to take it every day now. Patient verbalized understanding.

## 2024-08-21 DIAGNOSIS — I48.19 PERSISTENT ATRIAL FIBRILLATION (HCC): ICD-10-CM

## 2024-08-21 NOTE — TELEPHONE ENCOUNTER
Recent Visits  Date Type Provider Dept   05/31/24 Office Visit Adilene Almanza MD [facility]   01/23/24 Office Visit Adilene Almanza MD [facility]   12/19/23 Office Visit Adilene Almanza MD [facility]   12/05/23 Office Visit Adilene Almanza MD [facility]   09/06/23 Office Visit Adilene Almanza MD [facility]   Showing recent visits within past 540 days with a meds authorizing provider and meeting all other requirements  Future Appointments  No visits were found meeting these conditions.  Showing future appointments within next 150 days with a meds authorizing provider and meeting all other requirements     5/31/2024

## 2024-08-22 RX ORDER — APIXABAN 5 MG/1
5 TABLET, FILM COATED ORAL 2 TIMES DAILY
Qty: 60 TABLET | Refills: 3 | Status: SHIPPED | OUTPATIENT
Start: 2024-08-22

## 2024-08-29 DIAGNOSIS — I10 ESSENTIAL HYPERTENSION: ICD-10-CM

## 2024-08-29 RX ORDER — SPIRONOLACTONE 25 MG/1
25 TABLET ORAL DAILY
Qty: 90 TABLET | Refills: 1 | Status: SHIPPED | OUTPATIENT
Start: 2024-08-29

## 2024-08-29 NOTE — TELEPHONE ENCOUNTER
Recent Visits  Date Type Provider Dept   05/31/24 Office Visit Adilene Almanza MD [facility]   01/23/24 Office Visit Adilene Almanza MD [facility]   12/19/23 Office Visit Adilene Almanza MD [facility]   12/05/23 Office Visit Adilene Almanza MD [facility]   09/06/23 Office Visit Adilene Almanza MD [facility]   Showing recent visits within past 540 days with a meds authorizing provider and meeting all other requirements  Future Appointments  No visits were found meeting these conditions.  Showing future appointments within next 150 days with a meds authorizing provider and meeting all other requirements     5/31/2024

## 2024-09-26 NOTE — PROGRESS NOTES
by Sarwat Hernandez MD at [facility] ENDOSCOPY    COLONOSCOPY  03/14/2019    COLONOSCOPY CONTROL HEMORRHAGE performed by Sarwat Hernandez MD at [facility] ENDOSCOPY    COLONOSCOPY N/A 03/26/2019    COLONOSCOPY CONTROL HEMORRHAGE performed by Sarwat Hernandez MD at [facility] ENDOSCOPY    COLONOSCOPY N/A 04/01/2022    COLONOSCOPY POLYPECTOMY SNARE/COLD BIOPSY performed by Sarwat Hernandez MD at [facility] ENDOSCOPY    HYSTERECTOMY (CERVIX STATUS UNKNOWN)      PACEMAKER INSERTION  09/29/2021    SIGMOIDOSCOPY N/A 09/10/2019    SIGMOIDOSCOPY DIAGNOSTIC FLEXIBLE performed by Sarwat Hernandez MD at [facility] ENDOSCOPY    KEKE AND BSO (CERVIX REMOVED)      TUBAL LIGATION      TUNNELED CENTRAL VENOUS CATHETER W/ SUBCUTANEOUS PORT Right      Allergies   Allergen Reactions    Chlorthalidone Other (See Comments)     Causes increase in b/p and severe headache    Naproxen Swelling    Lisinopril Rash       Social History:  Reviewed.  reports that she has never smoked. She has been exposed to tobacco smoke. She has never used smokeless tobacco. She reports current alcohol use. She reports that she does not use drugs.     Family History:  Reviewed. Reviewed. No family history of SCD.        All questions and concerns were addressed to the patient/family. Alternatives to my treatment were discussed. I have discussed the above stated plan and the patient verbalized understanding and agreed with the plan.    Scribe attestation: This note was scribed in the presence of Nico Antunez MD by Micheline George RN    Physician Attestation: I, Dr. Nico antunez, confirm that the scribe's documentation has been prepared under my direction and personally reviewed by me in its entirety.  I also confirm that the note above accurately reflects all work, treatment, procedures, and medical decision making performed by me.     NOTE: This report was transcribed using voice recognition software. Every effort was made to ensure accuracy, however, inadvertent

## 2024-10-01 ENCOUNTER — HOSPITAL ENCOUNTER (OUTPATIENT)
Age: 74
Discharge: HOME | End: 2024-10-01

## 2024-10-01 ENCOUNTER — NURSE ONLY (OUTPATIENT)
Dept: CARDIOLOGY CLINIC | Age: 74
End: 2024-10-01
Payer: MEDICARE

## 2024-10-01 ENCOUNTER — OFFICE VISIT (OUTPATIENT)
Dept: CARDIOLOGY CLINIC | Age: 74
End: 2024-10-01
Payer: MEDICARE

## 2024-10-01 VITALS
DIASTOLIC BLOOD PRESSURE: 72 MMHG | HEART RATE: 73 BPM | HEIGHT: 68 IN | OXYGEN SATURATION: 99 % | WEIGHT: 207 LBS | BODY MASS INDEX: 31.37 KG/M2 | SYSTOLIC BLOOD PRESSURE: 126 MMHG

## 2024-10-01 DIAGNOSIS — R00.1 BRADYCARDIA: ICD-10-CM

## 2024-10-01 DIAGNOSIS — I10 ESSENTIAL HYPERTENSION: Chronic | ICD-10-CM

## 2024-10-01 DIAGNOSIS — I49.5 TACHY-BRADY SYNDROME (HCC): ICD-10-CM

## 2024-10-01 DIAGNOSIS — Z95.0 CARDIAC PACEMAKER IN SITU: ICD-10-CM

## 2024-10-01 DIAGNOSIS — I48.0 PAROXYSMAL ATRIAL FIBRILLATION (HCC): Primary | Chronic | ICD-10-CM

## 2024-10-01 DIAGNOSIS — I48.0 PAROXYSMAL ATRIAL FIBRILLATION (HCC): Chronic | ICD-10-CM

## 2024-10-01 DIAGNOSIS — Z95.0 CARDIAC PACEMAKER IN SITU: Primary | ICD-10-CM

## 2024-10-01 PROCEDURE — 3078F DIAST BP <80 MM HG: CPT | Performed by: INTERNAL MEDICINE

## 2024-10-01 PROCEDURE — 1123F ACP DISCUSS/DSCN MKR DOCD: CPT | Performed by: INTERNAL MEDICINE

## 2024-10-01 PROCEDURE — 3074F SYST BP LT 130 MM HG: CPT | Performed by: INTERNAL MEDICINE

## 2024-10-01 PROCEDURE — 99214 OFFICE O/P EST MOD 30 MIN: CPT | Performed by: INTERNAL MEDICINE

## 2024-10-01 PROCEDURE — 93280 PM DEVICE PROGR EVAL DUAL: CPT | Performed by: INTERNAL MEDICINE

## 2024-10-01 PROCEDURE — 93000 ELECTROCARDIOGRAM COMPLETE: CPT | Performed by: INTERNAL MEDICINE

## 2024-10-23 ENCOUNTER — TELEPHONE (OUTPATIENT)
Dept: INTERNAL MEDICINE CLINIC | Age: 74
End: 2024-10-23

## 2024-10-23 NOTE — TELEPHONE ENCOUNTER
Appointment Request From: Belén Acevedo     With Provider: Dr. Adilene Almanza MD [[facility]]     Preferred Date Range: 10/23/2024 - 10/24/2024     Preferred Times: Thursday Morning     Reason for visit: Request an Appointment     Comments:  I have been having pain and swelling in the lower part of my right foot.       *Spoke w/patient, appt rescheduled from 10/30/24 to 10/24/24 based on symptoms.

## 2024-10-24 ENCOUNTER — OFFICE VISIT (OUTPATIENT)
Dept: INTERNAL MEDICINE CLINIC | Age: 74
End: 2024-10-24

## 2024-10-24 VITALS
HEART RATE: 69 BPM | DIASTOLIC BLOOD PRESSURE: 86 MMHG | WEIGHT: 209.4 LBS | BODY MASS INDEX: 31.85 KG/M2 | SYSTOLIC BLOOD PRESSURE: 128 MMHG

## 2024-10-24 DIAGNOSIS — M25.571 ARTHRALGIA OF TOE OF RIGHT FOOT: ICD-10-CM

## 2024-10-24 DIAGNOSIS — M25.571 ARTHRALGIA OF TOE OF RIGHT FOOT: Primary | ICD-10-CM

## 2024-10-24 DIAGNOSIS — I10 ESSENTIAL HYPERTENSION: ICD-10-CM

## 2024-10-24 DIAGNOSIS — I48.19 PERSISTENT ATRIAL FIBRILLATION (HCC): ICD-10-CM

## 2024-10-24 LAB
BASOPHILS # BLD: 0 K/UL (ref 0–0.2)
BASOPHILS NFR BLD: 1 %
DEPRECATED RDW RBC AUTO: 14 % (ref 12.4–15.4)
EOSINOPHIL # BLD: 0 K/UL (ref 0–0.6)
EOSINOPHIL NFR BLD: 1 %
HCT VFR BLD AUTO: 40.9 % (ref 36–48)
HGB BLD-MCNC: 13.8 G/DL (ref 12–16)
LYMPHOCYTES # BLD: 1.5 K/UL (ref 1–5.1)
LYMPHOCYTES NFR BLD: 33 %
MCH RBC QN AUTO: 31.7 PG (ref 26–34)
MCHC RBC AUTO-ENTMCNC: 33.8 G/DL (ref 31–36)
MCV RBC AUTO: 93.7 FL (ref 80–100)
MONOCYTES # BLD: 0.4 K/UL (ref 0–1.3)
MONOCYTES NFR BLD: 8 %
NEUTROPHILS # BLD: 2.5 K/UL (ref 1.7–7.7)
NEUTROPHILS NFR BLD: 57 %
PLATELET # BLD AUTO: 171 K/UL (ref 135–450)
PMV BLD AUTO: 11.2 FL (ref 5–10.5)
RBC # BLD AUTO: 4.37 M/UL (ref 4–5.2)
RBC MORPH BLD: NORMAL
SLIDE REVIEW: ABNORMAL
URATE SERPL-MCNC: 8.9 MG/DL (ref 2.6–6)
WBC # BLD AUTO: 4.4 K/UL (ref 4–11)

## 2024-10-24 RX ORDER — PREDNISONE 20 MG/1
40 TABLET ORAL DAILY
Qty: 10 TABLET | Refills: 0 | Status: SHIPPED | OUTPATIENT
Start: 2024-10-24 | End: 2024-10-29

## 2024-10-24 RX ORDER — PREDNISONE 20 MG/1
20 TABLET ORAL DAILY
Qty: 5 TABLET | Refills: 0 | Status: SHIPPED | OUTPATIENT
Start: 2024-10-24 | End: 2024-10-24

## 2024-10-24 RX ORDER — METOPROLOL SUCCINATE 50 MG/1
50 TABLET, EXTENDED RELEASE ORAL DAILY
Qty: 90 TABLET | Refills: 1 | OUTPATIENT
Start: 2024-10-24

## 2024-10-24 SDOH — ECONOMIC STABILITY: FOOD INSECURITY: WITHIN THE PAST 12 MONTHS, YOU WORRIED THAT YOUR FOOD WOULD RUN OUT BEFORE YOU GOT MONEY TO BUY MORE.: SOMETIMES TRUE

## 2024-10-24 SDOH — ECONOMIC STABILITY: INCOME INSECURITY: HOW HARD IS IT FOR YOU TO PAY FOR THE VERY BASICS LIKE FOOD, HOUSING, MEDICAL CARE, AND HEATING?: SOMEWHAT HARD

## 2024-10-24 SDOH — ECONOMIC STABILITY: INCOME INSECURITY: IN THE LAST 12 MONTHS, WAS THERE A TIME WHEN YOU WERE NOT ABLE TO PAY THE MORTGAGE OR RENT ON TIME?: YES

## 2024-10-24 SDOH — ECONOMIC STABILITY: FOOD INSECURITY: WITHIN THE PAST 12 MONTHS, THE FOOD YOU BOUGHT JUST DIDN'T LAST AND YOU DIDN'T HAVE MONEY TO GET MORE.: SOMETIMES TRUE

## 2024-10-24 ASSESSMENT — PATIENT HEALTH QUESTIONNAIRE - PHQ9
2. FEELING DOWN, DEPRESSED OR HOPELESS: NOT AT ALL
SUM OF ALL RESPONSES TO PHQ QUESTIONS 1-9: 0
SUM OF ALL RESPONSES TO PHQ QUESTIONS 1-9: 0
1. LITTLE INTEREST OR PLEASURE IN DOING THINGS: NOT AT ALL
SUM OF ALL RESPONSES TO PHQ QUESTIONS 1-9: 0
SUM OF ALL RESPONSES TO PHQ9 QUESTIONS 1 & 2: 0
SUM OF ALL RESPONSES TO PHQ QUESTIONS 1-9: 0

## 2024-10-24 ASSESSMENT — SOCIAL DETERMINANTS OF HEALTH (SDOH)
IN A TYPICAL WEEK, HOW MANY TIMES DO YOU TALK ON THE PHONE WITH FAMILY, FRIENDS, OR NEIGHBORS?: MORE THAN THREE TIMES A WEEK
HOW OFTEN DO YOU ATTEND CHURCH OR RELIGIOUS SERVICES?: MORE THAN 4 TIMES PER YEAR
WITHIN THE LAST YEAR, HAVE YOU BEEN AFRAID OF YOUR PARTNER OR EX-PARTNER?: NO
WITHIN THE LAST YEAR, HAVE YOU BEEN HUMILIATED OR EMOTIONALLY ABUSED IN OTHER WAYS BY YOUR PARTNER OR EX-PARTNER?: NO
DO YOU BELONG TO ANY CLUBS OR ORGANIZATIONS SUCH AS CHURCH GROUPS UNIONS, FRATERNAL OR ATHLETIC GROUPS, OR SCHOOL GROUPS?: NO
WITHIN THE LAST YEAR, HAVE TO BEEN RAPED OR FORCED TO HAVE ANY KIND OF SEXUAL ACTIVITY BY YOUR PARTNER OR EX-PARTNER?: NO
HOW OFTEN DO YOU GET TOGETHER WITH FRIENDS OR RELATIVES?: ONCE A WEEK
WITHIN THE LAST YEAR, HAVE YOU BEEN KICKED, HIT, SLAPPED, OR OTHERWISE PHYSICALLY HURT BY YOUR PARTNER OR EX-PARTNER?: NO
HOW OFTEN DO YOU ATTENT MEETINGS OF THE CLUB OR ORGANIZATION YOU BELONG TO?: NEVER

## 2024-10-24 ASSESSMENT — ANXIETY QUESTIONNAIRES
7. FEELING AFRAID AS IF SOMETHING AWFUL MIGHT HAPPEN: NOT AT ALL
3. WORRYING TOO MUCH ABOUT DIFFERENT THINGS: NOT AT ALL
4. TROUBLE RELAXING: NOT AT ALL
GAD7 TOTAL SCORE: 0
6. BECOMING EASILY ANNOYED OR IRRITABLE: NOT AT ALL
IF YOU CHECKED OFF ANY PROBLEMS ON THIS QUESTIONNAIRE, HOW DIFFICULT HAVE THESE PROBLEMS MADE IT FOR YOU TO DO YOUR WORK, TAKE CARE OF THINGS AT HOME, OR GET ALONG WITH OTHER PEOPLE: NOT DIFFICULT AT ALL
1. FEELING NERVOUS, ANXIOUS, OR ON EDGE: NOT AT ALL
5. BEING SO RESTLESS THAT IT IS HARD TO SIT STILL: NOT AT ALL
2. NOT BEING ABLE TO STOP OR CONTROL WORRYING: NOT AT ALL

## 2024-10-24 NOTE — PROGRESS NOTES
No swelling. No edema.      Right ankle: Normal.      Left ankle: Normal.      Right foot: Swelling, deformity and tenderness present. No bony tenderness or crepitus.      Left foot: Normal.        Feet:    Skin:     General: Skin is warm.      Capillary Refill: Capillary refill takes less than 2 seconds.      Findings: No erythema or rash.   Neurological:      General: No focal deficit present.      Mental Status: She is alert and oriented to person, place, and time. Mental status is at baseline.      Cranial Nerves: No cranial nerve deficit.      Coordination: Coordination normal.   Psychiatric:         Mood and Affect: Mood normal.         Behavior: Behavior normal.         Thought Content: Thought content normal.         Judgment: Judgment normal.                  An electronic signature was used to authenticate this note.    --JERZY BENNETT MD

## 2024-11-26 ENCOUNTER — HOSPITAL ENCOUNTER (OUTPATIENT)
Age: 74
Discharge: HOME | End: 2024-11-26
Payer: MEDICARE

## 2024-11-26 DIAGNOSIS — E05.00 GRAVES DISEASE: ICD-10-CM

## 2024-11-26 DIAGNOSIS — G47.33 OBSTRUCTIVE SLEEP APNEA SYNDROME: ICD-10-CM

## 2024-11-26 DIAGNOSIS — I10 ESSENTIAL HYPERTENSION: ICD-10-CM

## 2024-11-26 LAB
ALBUMIN SERPL-MCNC: 4.3 G/DL (ref 3.4–5)
ALBUMIN/GLOB SERPL: 1.3 {RATIO} (ref 1.1–2.2)
ALP SERPL-CCNC: 97 U/L (ref 40–129)
ALT SERPL-CCNC: 18 U/L (ref 10–40)
ANION GAP SERPL CALCULATED.3IONS-SCNC: 10 MMOL/L (ref 3–16)
AST SERPL-CCNC: 25 U/L (ref 15–37)
BILIRUB SERPL-MCNC: <0.2 MG/DL (ref 0–1)
BUN SERPL-MCNC: 18 MG/DL (ref 7–20)
CALCIUM SERPL-MCNC: 10.3 MG/DL (ref 8.3–10.6)
CHLORIDE SERPL-SCNC: 98 MMOL/L (ref 99–110)
CO2 SERPL-SCNC: 28 MMOL/L (ref 21–32)
CREAT SERPL-MCNC: 1 MG/DL (ref 0.6–1.2)
GFR SERPLBLD CREATININE-BSD FMLA CKD-EPI: 60 ML/MIN/{1.73_M2}
GLUCOSE SERPL-MCNC: 84 MG/DL (ref 70–99)
POTASSIUM SERPL-SCNC: 4.6 MMOL/L (ref 3.5–5.1)
PROT SERPL-MCNC: 7.5 G/DL (ref 6.4–8.2)
SODIUM SERPL-SCNC: 136 MMOL/L (ref 136–145)
T3 SERPL-MCNC: 1.17 NG/ML (ref 0.8–2)
T4 FREE SERPL-MCNC: 0.8 NG/DL (ref 0.9–1.8)
TSH SERPL DL<=0.005 MIU/L-ACNC: 1.79 UIU/ML (ref 0.27–4.2)

## 2024-11-26 PROCEDURE — 84443 ASSAY THYROID STIM HORMONE: CPT

## 2024-11-26 PROCEDURE — 84480 ASSAY TRIIODOTHYRONINE (T3): CPT

## 2024-11-26 PROCEDURE — 84439 ASSAY OF FREE THYROXINE: CPT

## 2024-11-26 PROCEDURE — 80053 COMPREHEN METABOLIC PANEL: CPT

## 2024-11-26 PROCEDURE — 36415 COLL VENOUS BLD VENIPUNCTURE: CPT

## 2024-12-03 ENCOUNTER — OFFICE VISIT (OUTPATIENT)
Dept: ENDOCRINOLOGY | Age: 74
End: 2024-12-03

## 2024-12-03 VITALS
TEMPERATURE: 98 F | BODY MASS INDEX: 31.8 KG/M2 | RESPIRATION RATE: 14 BRPM | DIASTOLIC BLOOD PRESSURE: 71 MMHG | SYSTOLIC BLOOD PRESSURE: 129 MMHG | HEIGHT: 68 IN | HEART RATE: 84 BPM | WEIGHT: 209.8 LBS

## 2024-12-03 DIAGNOSIS — I10 ESSENTIAL HYPERTENSION: Chronic | ICD-10-CM

## 2024-12-03 DIAGNOSIS — I48.0 PAROXYSMAL ATRIAL FIBRILLATION (HCC): Chronic | ICD-10-CM

## 2024-12-03 DIAGNOSIS — E05.00 GRAVES DISEASE: Primary | ICD-10-CM

## 2024-12-03 NOTE — PROGRESS NOTES
SUBJECTIVE:  Belén Acevedo is a 74 y.o. female  seen in my clinic for Graves' disease and multinodular goiter  Patient was initially referred for thyroid nodule  which was identified on a CT scan done in April 2018 as a workup for her ovarian cancer  .    Patient Current complaints: denies fatigue, weight changes, heat/cold intolerance, bowel/skin changes or CVS symptoms  History of obstructive symptoms: difficulty swallowing No, changes in voice/hoarseness No.    History of radiation to patient's neck: NO  Recent iodine exposure: No  Family history includes no thyroid abnormalities.  Family history of thyroid cancer: No    Ovarian cancer is treated with surgery , chemo and RT ---she was diagnosed in jan 2018   Patient also has hypertension and is on Toprol and hydrochlorothiazide along with Lotrel  Patient also has atrial fibrillation and has been cardioverted in the past.  Rate controlled    INTERIM     She has been taking 5 mg Tapazole daily she denies any significant palpitation, heat intolerance or insomnia.  She had cardiac ablation pacemaker put in May 2022         Past Medical History:   Diagnosis Date    Anal bleeding 2019    CT scan clear     Anemia     Arthritis     bilateral knee    Atrial fibrillation (HCC)     cleared by cardiologist 2018, was related to cancer     Colon polyps     Diabetes mellitus (HCC)     pre diabetic diet controlled    Endometrial cancer (HCC)     History of blood transfusion     Hypertension     IFG (impaired fasting glucose)     SATISH (obstructive sleep apnea)     3/28/22-currently being fitted for CPAP    Small bowel obstruction (HCC)     resolved without surgery    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HCC)     Thyroid nodule      Patient Active Problem List    Diagnosis Date Noted    Obstructive sleep apnea syndrome 05/31/2022    Secondary hypercoagulable state (HCC) 05/16/2023    Non morbid obesity, unspecified obesity type 10/19/2021    Cardiac pacemaker in situ 09/30/2021    Tachy-alem

## 2024-12-13 ENCOUNTER — PATIENT MESSAGE (OUTPATIENT)
Dept: INTERNAL MEDICINE CLINIC | Age: 74
End: 2024-12-13

## 2024-12-17 ENCOUNTER — HOSPITAL ENCOUNTER (OUTPATIENT)
Dept: WOMENS IMAGING | Age: 74
Discharge: HOME | End: 2024-12-17
Attending: INTERNAL MEDICINE
Payer: MEDICARE

## 2024-12-17 ENCOUNTER — TELEPHONE (OUTPATIENT)
Dept: WOMENS IMAGING | Age: 74
End: 2024-12-17

## 2024-12-17 ENCOUNTER — ANCILLARY ORDERS (OUTPATIENT)
Dept: INTERNAL MEDICINE CLINIC | Age: 74
End: 2024-12-17

## 2024-12-17 VITALS — BODY MASS INDEX: 31.67 KG/M2 | WEIGHT: 209 LBS | HEIGHT: 68 IN

## 2024-12-17 DIAGNOSIS — Z00.00 MEDICARE ANNUAL WELLNESS VISIT, SUBSEQUENT: Primary | ICD-10-CM

## 2024-12-17 DIAGNOSIS — T45.1X5A CHEMOTHERAPY-INDUCED NEUROPATHY (HCC): ICD-10-CM

## 2024-12-17 DIAGNOSIS — Z12.31 ENCOUNTER FOR SCREENING MAMMOGRAM FOR BREAST CANCER: ICD-10-CM

## 2024-12-17 DIAGNOSIS — R92.8 ABNORMAL MAMMOGRAM OF LEFT BREAST: ICD-10-CM

## 2024-12-17 DIAGNOSIS — R94.4 DECREASED CALCULATED GFR: ICD-10-CM

## 2024-12-17 DIAGNOSIS — C54.1 ENDOMETRIAL CANCER (HCC): ICD-10-CM

## 2024-12-17 DIAGNOSIS — D68.69 SECONDARY HYPERCOAGULABLE STATE (HCC): ICD-10-CM

## 2024-12-17 DIAGNOSIS — I10 ESSENTIAL HYPERTENSION: ICD-10-CM

## 2024-12-17 DIAGNOSIS — I48.19 PERSISTENT ATRIAL FIBRILLATION (HCC): ICD-10-CM

## 2024-12-17 DIAGNOSIS — R79.9 ABNORMAL FINDING OF BLOOD CHEMISTRY, UNSPECIFIED: ICD-10-CM

## 2024-12-17 DIAGNOSIS — Z00.00 MEDICARE ANNUAL WELLNESS VISIT, SUBSEQUENT: ICD-10-CM

## 2024-12-17 DIAGNOSIS — G62.0 CHEMOTHERAPY-INDUCED NEUROPATHY (HCC): ICD-10-CM

## 2024-12-17 DIAGNOSIS — D50.9 IRON DEFICIENCY ANEMIA, UNSPECIFIED IRON DEFICIENCY ANEMIA TYPE: ICD-10-CM

## 2024-12-17 PROCEDURE — 77063 BREAST TOMOSYNTHESIS BI: CPT

## 2024-12-17 NOTE — TELEPHONE ENCOUNTER
NN spoke to pt regarding screening mammogram results and follow up imaging recommendations. Pt scheduled for left diagnostic mammogram.

## 2025-01-18 DIAGNOSIS — I10 ESSENTIAL HYPERTENSION: ICD-10-CM

## 2025-01-20 ENCOUNTER — HOSPITAL ENCOUNTER (OUTPATIENT)
Dept: WOMENS IMAGING | Age: 75
Discharge: HOME | End: 2025-01-20
Payer: MEDICARE

## 2025-01-20 ENCOUNTER — TELEPHONE (OUTPATIENT)
Dept: CARDIOLOGY CLINIC | Age: 75
End: 2025-01-20

## 2025-01-20 ENCOUNTER — PRE-PROCEDURE TELEPHONE (OUTPATIENT)
Dept: WOMENS IMAGING | Age: 75
End: 2025-01-20

## 2025-01-20 ENCOUNTER — TELEPHONE (OUTPATIENT)
Dept: WOMENS IMAGING | Age: 75
End: 2025-01-20

## 2025-01-20 DIAGNOSIS — R92.8 ABNORMAL MAMMOGRAM: Primary | ICD-10-CM

## 2025-01-20 DIAGNOSIS — R92.8 ABNORMAL MAMMOGRAM OF LEFT BREAST: ICD-10-CM

## 2025-01-20 PROCEDURE — G0279 TOMOSYNTHESIS, MAMMO: HCPCS

## 2025-01-20 RX ORDER — HYDROCHLOROTHIAZIDE 25 MG/1
TABLET ORAL
Qty: 30 TABLET | Refills: 0 | Status: SHIPPED | OUTPATIENT
Start: 2025-01-20

## 2025-01-20 NOTE — TELEPHONE ENCOUNTER
CARDIAC CLEARANCE     What type of procedure are you having?  Breast biopsy / local  Which physician is performing your procedure?  Dr Mackenzie  When is your procedure scheduled for?  2/4  Where are you having this procedure?  [facility] [place]   Are you taking Blood Thinners?yes   If so what? Eliquis (Name/dose/frequesncy)     Does the surgeon want you to stop your blood thinner? yes  If so for how long? 24 hours    Phone Number and Contact Name for Physicians office:  [phone]  Fax number to send information:  [phone]

## 2025-01-20 NOTE — TELEPHONE ENCOUNTER
Nurse Navigator reviewed pre-procedure stereo breast biopsy patient education information in person and gave written instructions.  Reviewed medications and need to hold all blood thinners per instructions.  Patient to hold Eliquis for 24 hours.  Patient should take all other medications as prescribed.  Patient to eat and drink as normal prior to the procedure.  Wear a bra with good support and a two piece outfit for comfort.  Patient can bring someone with you but you can also drive yourself.  Plan on being at the breast center for 2-2 and a half hours.  Reviewed the process of a biopsy - sitting in a chair with your breast compressed similar to a mammogram with frequent images taken throughout the procedure.  The skin is cleaned and a local anesthetic is given to numb the area.  A small skin nick is made for the biopsy needle and once in place, samples are taken and then xrayed for confirmation.  A tiny tissue marker is then placed inside your breast at the site of the biopsy for future reference.    Then pressure is hold on the biopsy site to stop the bleeding and a bandage and waterproof dressing is applied.   A mammogram is then done to validate the tissue marker.  The tissue sample is sent to pathology. Results will come back in 2-3 business days and sent to your referring physician.  Either they or the nurse navigator will call you with the results and recommended  follow up needed  Patient verbalizes understanding.

## 2025-01-20 NOTE — TELEPHONE ENCOUNTER
Recent Visits  Date Type Provider Dept   10/24/24 Office Visit Adilene Almanza MD [facility]   05/31/24 Office Visit Adilene Almanza MD [facility]   01/23/24 Office Visit Adilene Almanza MD [facility]   12/19/23 Office Visit Adilene Almanza MD [facility]   12/05/23 Office Visit Adilene Almanza MD [facility]   09/06/23 Office Visit Adilene Almanza MD [facility]   Showing recent visits within past 540 days with a meds authorizing provider and meeting all other requirements  Future Appointments  Date Type Provider Dept   01/28/25 Appointment Adilene Almanza MD [facility]   Showing future appointments within next 150 days with a meds authorizing provider and meeting all other requirements     10/24/2024

## 2025-01-28 ENCOUNTER — OFFICE VISIT (OUTPATIENT)
Dept: INTERNAL MEDICINE CLINIC | Age: 75
End: 2025-01-28

## 2025-01-28 VITALS
HEIGHT: 68 IN | WEIGHT: 216 LBS | DIASTOLIC BLOOD PRESSURE: 76 MMHG | SYSTOLIC BLOOD PRESSURE: 132 MMHG | OXYGEN SATURATION: 98 % | HEART RATE: 75 BPM | BODY MASS INDEX: 32.74 KG/M2

## 2025-01-28 DIAGNOSIS — Z13.9 ENCOUNTER FOR HEALTH-RELATED SCREENING: ICD-10-CM

## 2025-01-28 DIAGNOSIS — I48.19 PERSISTENT ATRIAL FIBRILLATION (HCC): ICD-10-CM

## 2025-01-28 DIAGNOSIS — R94.4 DECREASED CALCULATED GFR: ICD-10-CM

## 2025-01-28 DIAGNOSIS — G47.33 OSA (OBSTRUCTIVE SLEEP APNEA): ICD-10-CM

## 2025-01-28 DIAGNOSIS — Z00.00 MEDICARE ANNUAL WELLNESS VISIT, SUBSEQUENT: Primary | ICD-10-CM

## 2025-01-28 DIAGNOSIS — D68.69 SECONDARY HYPERCOAGULABLE STATE (HCC): ICD-10-CM

## 2025-01-28 ASSESSMENT — PATIENT HEALTH QUESTIONNAIRE - PHQ9
10. IF YOU CHECKED OFF ANY PROBLEMS, HOW DIFFICULT HAVE THESE PROBLEMS MADE IT FOR YOU TO DO YOUR WORK, TAKE CARE OF THINGS AT HOME, OR GET ALONG WITH OTHER PEOPLE: NOT DIFFICULT AT ALL
4. FEELING TIRED OR HAVING LITTLE ENERGY: NOT AT ALL
SUM OF ALL RESPONSES TO PHQ QUESTIONS 1-9: 0
8. MOVING OR SPEAKING SO SLOWLY THAT OTHER PEOPLE COULD HAVE NOTICED. OR THE OPPOSITE, BEING SO FIGETY OR RESTLESS THAT YOU HAVE BEEN MOVING AROUND A LOT MORE THAN USUAL: NOT AT ALL
5. POOR APPETITE OR OVEREATING: NOT AT ALL
7. TROUBLE CONCENTRATING ON THINGS, SUCH AS READING THE NEWSPAPER OR WATCHING TELEVISION: NOT AT ALL
1. LITTLE INTEREST OR PLEASURE IN DOING THINGS: NOT AT ALL
SUM OF ALL RESPONSES TO PHQ9 QUESTIONS 1 & 2: 0
6. FEELING BAD ABOUT YOURSELF - OR THAT YOU ARE A FAILURE OR HAVE LET YOURSELF OR YOUR FAMILY DOWN: NOT AT ALL
SUM OF ALL RESPONSES TO PHQ QUESTIONS 1-9: 0
SUM OF ALL RESPONSES TO PHQ QUESTIONS 1-9: 0
2. FEELING DOWN, DEPRESSED OR HOPELESS: NOT AT ALL
3. TROUBLE FALLING OR STAYING ASLEEP: NOT AT ALL
SUM OF ALL RESPONSES TO PHQ QUESTIONS 1-9: 0
9. THOUGHTS THAT YOU WOULD BE BETTER OFF DEAD, OR OF HURTING YOURSELF: NOT AT ALL

## 2025-01-28 NOTE — PROGRESS NOTES
Worker (Licensed Clinical )     Recommendations for Preventive Services Due: see orders and patient instructions/AVS.  Recommended screening schedule for the next 5-10 years is provided to the patient in written form: see Patient Instructions/AVS.     Reviewed and updated this visit:  Tobacco  Allergies  Meds  Problems  Med Hx  Surg Hx  Soc Hx  Fam Hx

## 2025-01-28 NOTE — PATIENT INSTRUCTIONS
Learning About Being Active as an Older Adult  Why is being active important as you get older?     Being active is one of the best things you can do for your health. And it's never too late to start. Being active--or getting active, if you aren't already--has definite benefits. It can:  Give you more energy,  Keep your mind sharp.  Improve balance to reduce your risk of falls.  Help you manage chronic illness with fewer medicines.  No matter how old you are, how fit you are, or what health problems you have, there is a form of activity that will work for you. And the more physical activity you can do, the better your overall health will be.  What kinds of activity can help you stay healthy?  Being more active will make your daily activities easier. Physical activity includes planned exercise and things you do in daily life. There are four types of activity:  Aerobic.  Doing aerobic activity makes your heart and lungs strong.  Includes walking, dancing, and gardening.  Aim for at least 2½ hours spread throughout the week.  It improves your energy and can help you sleep better.  Muscle-strengthening.  This type of activity can help maintain muscle and strengthen bones.  Includes climbing stairs, using resistance bands, and lifting or carrying heavy loads.  Aim for at least twice a week.  It can help protect the knees and other joints.  Stretching.  Stretching gives you better range of motion in joints and muscles.  Includes upper arm stretches, calf stretches, and gentle yoga.  Aim for at least twice a week, preferably after your muscles are warmed up from other activities.  It can help you function better in daily life.  Balancing.  This helps you stay coordinated and have good posture.  Includes heel-to-toe walking, manuel chi, and certain types of yoga.  Aim for at least 3 days a week.  It can reduce your risk of falling.  Even if you have a hard time meeting the recommendations, it's better to be more active

## 2025-02-04 ENCOUNTER — HOSPITAL ENCOUNTER (OUTPATIENT)
Dept: WOMENS IMAGING | Age: 75
Discharge: HOME | End: 2025-02-04
Payer: MEDICARE

## 2025-02-04 DIAGNOSIS — R92.8 ABNORMAL MAMMOGRAM: ICD-10-CM

## 2025-02-04 PROCEDURE — 88341 IMHCHEM/IMCYTCHM EA ADD ANTB: CPT

## 2025-02-04 PROCEDURE — 6360000002 HC RX W HCPCS: Performed by: RADIOLOGY

## 2025-02-04 PROCEDURE — 88360 TUMOR IMMUNOHISTOCHEM/MANUAL: CPT

## 2025-02-04 PROCEDURE — 2709999900 MAM STEREO BREAST BX W LOC DEVICE 1ST LESION LEFT

## 2025-02-04 PROCEDURE — 88342 IMHCHEM/IMCYTCHM 1ST ANTB: CPT

## 2025-02-04 PROCEDURE — 76098 X-RAY EXAM SURGICAL SPECIMEN: CPT

## 2025-02-04 PROCEDURE — 77065 DX MAMMO INCL CAD UNI: CPT

## 2025-02-04 PROCEDURE — 88305 TISSUE EXAM BY PATHOLOGIST: CPT

## 2025-02-04 RX ORDER — LIDOCAINE HYDROCHLORIDE 10 MG/ML
5 INJECTION, SOLUTION INFILTRATION; PERINEURAL ONCE
Status: COMPLETED | OUTPATIENT
Start: 2025-02-04 | End: 2025-02-04

## 2025-02-04 RX ORDER — LIDOCAINE HYDROCHLORIDE AND EPINEPHRINE 10; 10 MG/ML; UG/ML
20 INJECTION, SOLUTION INFILTRATION; PERINEURAL ONCE
Status: COMPLETED | OUTPATIENT
Start: 2025-02-04 | End: 2025-02-04

## 2025-02-04 RX ADMIN — LIDOCAINE HYDROCHLORIDE ANHYDROUS 2 ML: 10 INJECTION, SOLUTION INFILTRATION at 11:58

## 2025-02-04 RX ADMIN — LIDOCAINE HYDROCHLORIDE,EPINEPHRINE BITARTRATE 19 ML: 10; .01 INJECTION, SOLUTION INFILTRATION; PERINEURAL at 11:59

## 2025-02-04 ASSESSMENT — PAIN SCALES - GENERAL
PAINLEVEL_OUTOF10: 0
PAINLEVEL_OUTOF10: 0

## 2025-02-04 NOTE — PROGRESS NOTES
Patient here for breast biopsy. NN reviewed the health history, allergies and medications. Pt drove herself.  Radiologist reviews procedure with patient, consent signed. Patient tolerates procedure well. Compression held. Site cleansed with chloraprep, steri strips and dry dressing applied. Ice pack in place. Reviewed discharge instructions with patient and signed copy. Patient verbalized understanding and agreed to contact Nurse Navigator with any questions. Patient A&Ox3, steady on feet and discharged home.

## 2025-02-05 ENCOUNTER — TELEPHONE (OUTPATIENT)
Dept: WOMENS IMAGING | Age: 75
End: 2025-02-05

## 2025-02-05 NOTE — TELEPHONE ENCOUNTER
Nurse Navigator reviewed breast biopsy results showing     Left breast, biopsy:      - Ductal carcinoma in situ, high nuclear grade- See Comment.     Biomarkers:     Estrogen receptor: Positive (>95%, strong intensity)  on the pathology report. NN explained the terminology and reviewed the results for ER/HI and the additional HER2 test. We discussed several questions and process for establishing a care team and possible additional testing.     Patient offered [facility] and patient prefers Dr. Rodgers.     NN offered additional website reading if interested.  Given ACS, [email or web address], NCCN pt, ASCO and [email or web address].  Pt/family given NN phone number for further assistance.     All questions answered at this time.

## 2025-02-06 ENCOUNTER — TELEPHONE (OUTPATIENT)
Dept: SURGERY | Age: 75
End: 2025-02-06

## 2025-02-06 NOTE — TELEPHONE ENCOUNTER
Spoke to patient to schedule cancer consult with Dr. Rodgers  and collect NP intake. Patient would like to wait for her PCP prior to scheduling. To verify who she would recommended. I did collect NP intake. When / If she calls back please send the patient to me.       Thanks, Nguyen

## 2025-02-07 NOTE — TELEPHONE ENCOUNTER
Belén called back to schedule with Dr. Rodgers. Unable to come in Monday due to work schedule. Will come in 2/13/25 @ 1100. Encouraged Belén to bring someone with her to this consult.       Thanks, Nguyen

## 2025-02-10 ASSESSMENT — ENCOUNTER SYMPTOMS
GASTROINTESTINAL NEGATIVE: 1
EYES NEGATIVE: 1
RESPIRATORY NEGATIVE: 1

## 2025-02-10 NOTE — PROGRESS NOTES
tissue shows appropriate staining.   ER and MI scoring includes the percentage of cells staining positive and   average intensity of expression.  Interpretation follows the ASCO/CAP   guidelines, with any staining of >1% considered positive.  ER results   between 1 and 10% are considered are considered low positive.   Her2/jorden scoring follows the ASCO/CAP guidelines: 0, 1+, 2+, 3+. Her2/jorden   \"positive\" (3+) requires circumferential membrane staining that is   complete, intense and in >10% of tumor cells. Equivocal (2+) cases are   defined as weak to moderate complete membrane staining in >10% of cells.   Negative cases (0 or 1+, respectively) display no staining or incomplete   membrane staining that is faint/barely perceptible and in >10% of tumor   cells.   Select cases, including all 2+/equivocal for Her2/jorden on routine   IHC staining, will be sent for Her2/jorden analysis by FISH.   Patients with breast cancers that are HER2 IHC 3+ or IHC 2+/SALVATORE amplified   may be eligible for several therapies that disrupt HER2 signaling   pathways. Invasive breast cancers that test 'HER2-negative' (IHC 0, 1+ or   2+/SALVATORE not-amplified) are more specifically considered 'HER2-negative for   protein overexpression/gene amplification' since non-overexpressed levels   of the HER2 protein may be present in these cases. Patients with breast   cancers that are HER2 IHC 1+ or IHC 2+/SALVATORE not-amplified may be eligible   for a treatment that targets non-amplified/non-overexpressed levels of   HER2 expression for cytotoxic drug delivery (IHC 0 results do not result   in eligibility currently.   References:   Nati CRAMER, et al. Estrogen and Progesterone Receptor Testing in Breast   Cancer: ASCO/CAP Guideline Update. J Clin Oncol. 2020 Apr 20;38(12):2645-6793.   Glen ABBOTT et al. Human Epidermal Growth Factor Receptor 2 Testing in   Breast Cancer: ASCO-College of American Pathologists Guideline Update. J   Clin Oncol. 2023 Aug

## 2025-02-10 NOTE — H&P (VIEW-ONLY)
[id number] STAGE: 0 left breast cancer      HPI:  Ms. Chilel is a 74 y.o. woman who presents today with a new diagnosis of grade 3,left sided DCIS, ER +   Prior to this she has never had breast related problems and has never required a breast biopsy.  She has no family history of breast or ovarian cancer. She has not noticed any new abnormalities such as masses, skin changes, color changes, nipple discharge, or changes to the nipple-areolar complex.     She has a personal medical history of endometrial cancer in 2018.  Per her report this was treated with a hysterectomy and chemotherapy as well as radiation.    She is here today in initial consultation to discuss her recent breast diagnosis. She was referred by [facility]ology.      INTERVAL HISTORY:    On 1/20/2025 she was recalled from screening mammogram for a 3 mm group of calcifications in the lower outer left breast.  The right breast was negative.  Upon diagnostic review there is a 5 mm group of calcifications in the lower outer left breast which corresponded to the original findings.  BI-RADS 4.    On 2/4/2025 she underwent core needle biopsy of the left breast.  Pathology identified high-grade, ER positive (>95%) DCIS.  Pathology is considered concordant.  [id number]       Review of Systems   Constitutional: Negative.    HENT: Negative.     Eyes: Negative.    Respiratory: Negative.     Cardiovascular: Negative.    Gastrointestinal: Negative.    Genitourinary: Negative.    Musculoskeletal: Negative.    Skin: Negative.    Neurological: Negative.    Psychiatric/Behavioral: Negative.     All other systems reviewed and are negative.  :    There is no new onset of persistent dry cough, abdominal pains, new onset of headache, change in bowel function, or bony tenderness to suggest metastatic disease at this time.      Pathology:    Department of Pathology   FINAL SURGICAL PATHOLOGY REPORT   Patient Name:  JESSICA CHILEL              Accession No:  [id number]   No Accessory muscle use.  Heart:              Regular  rhythm,  No  murmur   No edema  Abdomen:        Soft, non-tender. Not distended.  Bowel sounds normal  Extremities:     No cyanosis.  No clubbing,                            Skin turgor normal, Capillary refill normal  Skin:                Not pale.  Not Jaundiced  No rashes   Psych:             Not anxious or agitated.  Neurologic:      Alert, moves all extremities, answers questions appropriately and responds to commands       CHRONIC MEDICAL DIAGNOSES:      Greater than 60 minutes were spent with the patient on counseling and coordination of care    Signed:   Bro Shi MD  6/25/2024  12:58 PM       reviewed.    There are no apparent barriers to care.    We reviewed prehab/rehab opportunities.    Shared decision making was exercised where applicable.    We will plan a left breast partial mastectomy in the upcoming few weeks.      All of the patient's questions were answered at this time however, she was encouraged to call the office with any further inquiries. There were 70 minutes of time were spent in preparation, direct patient contact, record review, imaging interpretation, care coordination, documentation and/or activities otherwise related to this encounter.

## 2025-02-13 ENCOUNTER — INITIAL CONSULT (OUTPATIENT)
Dept: SURGERY | Age: 75
End: 2025-02-13
Payer: MEDICARE

## 2025-02-13 ENCOUNTER — OFFICE VISIT (OUTPATIENT)
Dept: INTERNAL MEDICINE CLINIC | Age: 75
End: 2025-02-13
Payer: MEDICARE

## 2025-02-13 ENCOUNTER — TELEPHONE (OUTPATIENT)
Dept: CARDIOLOGY CLINIC | Age: 75
End: 2025-02-13

## 2025-02-13 ENCOUNTER — TELEPHONE (OUTPATIENT)
Dept: INTERNAL MEDICINE CLINIC | Age: 75
End: 2025-02-13

## 2025-02-13 VITALS
BODY MASS INDEX: 32.54 KG/M2 | SYSTOLIC BLOOD PRESSURE: 114 MMHG | HEART RATE: 76 BPM | WEIGHT: 214 LBS | DIASTOLIC BLOOD PRESSURE: 74 MMHG | OXYGEN SATURATION: 97 %

## 2025-02-13 VITALS
RESPIRATION RATE: 16 BRPM | SYSTOLIC BLOOD PRESSURE: 139 MMHG | WEIGHT: 213 LBS | OXYGEN SATURATION: 100 % | BODY MASS INDEX: 32.28 KG/M2 | DIASTOLIC BLOOD PRESSURE: 80 MMHG | HEART RATE: 73 BPM | HEIGHT: 68 IN

## 2025-02-13 DIAGNOSIS — R79.9 ABNORMAL FINDING OF BLOOD CHEMISTRY, UNSPECIFIED: ICD-10-CM

## 2025-02-13 DIAGNOSIS — Z01.810 PREOPERATIVE CARDIOVASCULAR EXAMINATION: Primary | ICD-10-CM

## 2025-02-13 DIAGNOSIS — C50.912 PRIMARY BREAST MALIGNANCY, LEFT (HCC): Primary | ICD-10-CM

## 2025-02-13 DIAGNOSIS — M10.00 IDIOPATHIC GOUT, UNSPECIFIED CHRONICITY, UNSPECIFIED SITE: ICD-10-CM

## 2025-02-13 DIAGNOSIS — D05.12 DUCTAL CARCINOMA IN SITU (DCIS) OF LEFT BREAST: ICD-10-CM

## 2025-02-13 DIAGNOSIS — I10 ESSENTIAL HYPERTENSION: Chronic | ICD-10-CM

## 2025-02-13 PROCEDURE — 1123F ACP DISCUSS/DSCN MKR DOCD: CPT | Performed by: INTERNAL MEDICINE

## 2025-02-13 PROCEDURE — 3074F SYST BP LT 130 MM HG: CPT | Performed by: INTERNAL MEDICINE

## 2025-02-13 PROCEDURE — 99417 PROLNG OP E/M EACH 15 MIN: CPT | Performed by: SURGERY

## 2025-02-13 PROCEDURE — 99214 OFFICE O/P EST MOD 30 MIN: CPT | Performed by: INTERNAL MEDICINE

## 2025-02-13 PROCEDURE — 3078F DIAST BP <80 MM HG: CPT | Performed by: INTERNAL MEDICINE

## 2025-02-13 PROCEDURE — 3075F SYST BP GE 130 - 139MM HG: CPT | Performed by: SURGERY

## 2025-02-13 PROCEDURE — 93000 ELECTROCARDIOGRAM COMPLETE: CPT | Performed by: INTERNAL MEDICINE

## 2025-02-13 PROCEDURE — 1159F MED LIST DOCD IN RCRD: CPT | Performed by: INTERNAL MEDICINE

## 2025-02-13 PROCEDURE — 3079F DIAST BP 80-89 MM HG: CPT | Performed by: SURGERY

## 2025-02-13 PROCEDURE — 99205 OFFICE O/P NEW HI 60 MIN: CPT | Performed by: SURGERY

## 2025-02-13 PROCEDURE — 1160F RVW MEDS BY RX/DR IN RCRD: CPT | Performed by: INTERNAL MEDICINE

## 2025-02-13 RX ORDER — SODIUM CHLORIDE, SODIUM LACTATE, POTASSIUM CHLORIDE, CALCIUM CHLORIDE 600; 310; 30; 20 MG/100ML; MG/100ML; MG/100ML; MG/100ML
INJECTION, SOLUTION INTRAVENOUS CONTINUOUS
OUTPATIENT
Start: 2025-02-13

## 2025-02-13 RX ORDER — SODIUM CHLORIDE 0.9 % (FLUSH) 0.9 %
5-40 SYRINGE (ML) INJECTION PRN
OUTPATIENT
Start: 2025-02-13

## 2025-02-13 RX ORDER — SODIUM CHLORIDE 0.9 % (FLUSH) 0.9 %
5-40 SYRINGE (ML) INJECTION EVERY 12 HOURS SCHEDULED
OUTPATIENT
Start: 2025-02-13

## 2025-02-13 RX ORDER — SODIUM CHLORIDE 9 MG/ML
INJECTION, SOLUTION INTRAVENOUS PRN
OUTPATIENT
Start: 2025-02-13

## 2025-02-13 NOTE — TELEPHONE ENCOUNTER
CARDIAC CLEARANCE REQUEST    What type of procedure are you having: Lumpectomy, partial mastectomy, axillary lymph node surgery    Are you taking any blood thinners: yes    When is your procedure scheduled for:  3/4/25    What physician is performing your procedure:LEW Casillas    Phone Number:    Fax number to send the letter:  [phone]

## 2025-02-13 NOTE — TELEPHONE ENCOUNTER
Per RMM she can proceed with procedure and hold eliquis 2-3 days prior. Letter written. Will fax with LOV note once signed.

## 2025-02-13 NOTE — TELEPHONE ENCOUNTER
The paperwork labeled  PreAdmission Testing Form requires Adilene Almanza MD's signature. It has been scanned, placed in a yellow envelope, and is now in the Adilene Almanza MD's Bin at the .     Asked patient if she was supposed to schedule a Pre-Op appointment, patient says she was seen 2 weeks ago.

## 2025-02-13 NOTE — PROGRESS NOTES
Preoperative Consultation      Belén Acevedo  YOB: 1950    Date of Service:  2/13/2025    Vitals:    02/13/25 1519   BP: 114/74   Site: Right Upper Arm   Position: Sitting   Cuff Size: Large Adult   Pulse: 76   SpO2: 97%   Weight: 97.1 kg (214 lb)      Wt Readings from Last 2 Encounters:   02/13/25 97.1 kg (214 lb)   02/13/25 96.6 kg (213 lb)     BP Readings from Last 3 Encounters:   02/13/25 114/74   02/13/25 139/80   01/28/25 132/76        Chief Complaint   Patient presents with    Pre-op Exam     ar 4th with Dr. Rodgers     Allergies   Allergen Reactions    Chlorthalidone Other (See Comments)     Causes increase in b/p and severe headache    Naproxen Swelling    Lisinopril Rash     Outpatient Medications Marked as Taking for the 2/13/25 encounter (Office Visit) with Adilene Almanza MD   Medication Sig Dispense Refill    hydroCHLOROthiazide (HYDRODIURIL) 25 MG tablet TAKE 1 TABLET BY MOUTH DAILY 30 tablet 0    spironolactone (ALDACTONE) 25 MG tablet TAKE 1 TABLET BY MOUTH DAILY 90 tablet 1    apixaban (ELIQUIS) 5 MG TABS tablet TAKE 1 TABLET BY MOUTH 2 TIMES A DAY (Patient taking differently: Take 1 tablet by mouth daily) 60 tablet 3    methIMAzole (TAPAZOLE) 5 MG tablet TAKE TWO TABLETS BY MOUTH DAILY (Patient taking differently: Take 1 tablet by mouth daily TAKE TWO TABLETS BY MOUTH DAILY) 180 tablet 1    dilTIAZem (CARDIZEM CD) 180 MG extended release capsule Take 1 capsule by mouth daily 90 capsule 1    metoprolol succinate (TOPROL XL) 50 MG extended release tablet Take 1 tablet by mouth daily 90 tablet 1       This patient presents to the office today for a preoperative consultation at the request of surgeon, Dr. Rodgers, who plans on performing  left breast partial mastectomy  on March 4 at [facility].     Planned anesthesia: General   Known anesthesia problems: None   Bleeding risk: No recent or remote history of abnormal bleeding  Personal or FH of DVT/PE: No

## 2025-02-14 ENCOUNTER — TELEPHONE (OUTPATIENT)
Dept: WOMENS IMAGING | Age: 75
End: 2025-02-14

## 2025-02-14 ENCOUNTER — PREP FOR PROCEDURE (OUTPATIENT)
Dept: SURGERY | Age: 75
End: 2025-02-14

## 2025-02-14 ENCOUNTER — TELEPHONE (OUTPATIENT)
Dept: INTERNAL MEDICINE CLINIC | Age: 75
End: 2025-02-14

## 2025-02-14 DIAGNOSIS — C50.912 PRIMARY BREAST MALIGNANCY, LEFT (HCC): Primary | ICD-10-CM

## 2025-02-14 DIAGNOSIS — C50.912 PRIMARY BREAST MALIGNANCY, LEFT: ICD-10-CM

## 2025-02-14 NOTE — TELEPHONE ENCOUNTER
Nurse Navigator reviewed pre-procedure stereo breast radio frequency identification tag placement patient education information in person and gave written instructions.  Reviewed medications and need to hold all blood thinners per instructions.    Patient should take all other medications as prescribed.  Patient to eat and drink as normal prior to the procedure.  Wear a bra with good support and a two piece outfit for comfort.  Patient can bring someone with you but you can also drive yourself.  Plan on being at the breast center for 2-2 and a half hours.  Reviewed the process of a tag - sitting in a chair with your breast compressed similar to a mammogram with frequent images taken throughout the procedure.  The skin is cleaned and a local anesthetic is given to numb the area.  A small skin nick is made.  A tiny tissue tag is then placed inside your breast at the site of the biopsy for future reference.    Then pressure is held on the site to stop the bleeding and a bandage and waterproof dressing is applied.   A mammogram is then done to validate the tissue tag. Patient verbalizes understanding.

## 2025-02-14 NOTE — TELEPHONE ENCOUNTER
[facility] calling needing an order, they need to hold Eliquis for 24 hours prior to tag placement.  Will be done on 2/27.    Please put in Epic    Thank you

## 2025-02-17 DIAGNOSIS — C50.912 PRIMARY BREAST MALIGNANCY, LEFT (HCC): Primary | ICD-10-CM

## 2025-02-20 ENCOUNTER — HOSPITAL ENCOUNTER (OUTPATIENT)
Age: 75
Discharge: HOME | End: 2025-02-20
Payer: MEDICARE

## 2025-02-20 ENCOUNTER — TELEPHONE (OUTPATIENT)
Dept: WOMENS IMAGING | Age: 75
End: 2025-02-20

## 2025-02-20 DIAGNOSIS — R79.9 ABNORMAL FINDING OF BLOOD CHEMISTRY, UNSPECIFIED: ICD-10-CM

## 2025-02-20 DIAGNOSIS — M10.00 IDIOPATHIC GOUT, UNSPECIFIED CHRONICITY, UNSPECIFIED SITE: ICD-10-CM

## 2025-02-20 DIAGNOSIS — D05.12 DUCTAL CARCINOMA IN SITU (DCIS) OF LEFT BREAST: ICD-10-CM

## 2025-02-20 DIAGNOSIS — I10 ESSENTIAL HYPERTENSION: Chronic | ICD-10-CM

## 2025-02-20 LAB
CHOLEST SERPL-MCNC: 190 MG/DL (ref 0–199)
HDLC SERPL-MCNC: 77 MG/DL (ref 40–60)
LDLC SERPL CALC-MCNC: 101 MG/DL
TRIGL SERPL-MCNC: 58 MG/DL (ref 0–150)
URATE SERPL-MCNC: 9.1 MG/DL (ref 2.6–6)
VLDLC SERPL CALC-MCNC: 12 MG/DL

## 2025-02-20 PROCEDURE — 36415 COLL VENOUS BLD VENIPUNCTURE: CPT

## 2025-02-20 PROCEDURE — 84550 ASSAY OF BLOOD/URIC ACID: CPT

## 2025-02-20 PROCEDURE — 80061 LIPID PANEL: CPT

## 2025-02-20 PROCEDURE — 83036 HEMOGLOBIN GLYCOSYLATED A1C: CPT

## 2025-02-20 NOTE — TELEPHONE ENCOUNTER
NN spoke to pt regarding holding Eliquis for 24 hrs prior to tag placement. Pt verbalized understanding.

## 2025-02-21 DIAGNOSIS — E05.00 GRAVES DISEASE: ICD-10-CM

## 2025-02-21 LAB
EST. AVERAGE GLUCOSE BLD GHB EST-MCNC: 119.8 MG/DL
HBA1C MFR BLD: 5.8 %

## 2025-02-24 DIAGNOSIS — E05.00 GRAVES DISEASE: ICD-10-CM

## 2025-02-24 RX ORDER — METHIMAZOLE 5 MG/1
5 TABLET ORAL DAILY
Qty: 90 TABLET | Refills: 1 | Status: SHIPPED | OUTPATIENT
Start: 2025-02-24 | End: 2025-02-25 | Stop reason: SDUPTHER

## 2025-02-24 NOTE — TELEPHONE ENCOUNTER
Federica rubio and  methIMAzole (TAPAZOLE) 5 MG tablet [[id number]]    Has 2 different instructions please correct and resubmit    Sig: Take 1 tablet by mouth daily TAKE TWO TABLETS BY MOUTH DAILY

## 2025-02-24 NOTE — TELEPHONE ENCOUNTER
Medication:   Requested Prescriptions     Pending Prescriptions Disp Refills    methIMAzole (TAPAZOLE) 5 MG tablet [Pharmacy Med Name: METHIMAZOLE 5 MG TABLET] 90 tablet      Sig: TAKE 1 TABLET BY MOUTH DAILY     Last appt: 12/3/2024   Next appt: 7/8/2025    Last Labs DM:   Lab Results   Component Value Date/Time    LABA1C 5.8 02/20/2025 10:08 AM     Last Lipid:   Lab Results   Component Value Date/Time    CHOL 190 02/20/2025 10:08 AM    TRIG 58 02/20/2025 10:08 AM    HDL 77 02/20/2025 10:08 AM     Last PSA: No results found for: \"PSA\"  Last Thyroid:   Lab Results   Component Value Date/Time    TSH 1.79 11/26/2024 02:47 PM    FT3 23.0 03/14/2019 09:17 AM    G9LSCMT 1.17 11/26/2024 02:47 PM    T4FREE 0.8 11/26/2024 02:47 PM

## 2025-02-25 NOTE — TELEPHONE ENCOUNTER
Please find out what dose she has been taking leslie in Nov as the levels were good and we can represcribe the same

## 2025-02-26 RX ORDER — METHIMAZOLE 5 MG/1
5 TABLET ORAL DAILY
Qty: 90 TABLET | Refills: 1 | Status: SHIPPED | OUTPATIENT
Start: 2025-02-26

## 2025-02-27 ENCOUNTER — HOSPITAL ENCOUNTER (OUTPATIENT)
Dept: WOMENS IMAGING | Age: 75
Discharge: HOME | End: 2025-02-27
Payer: MEDICARE

## 2025-02-27 DIAGNOSIS — C50.912 PRIMARY BREAST MALIGNANCY, LEFT (HCC): ICD-10-CM

## 2025-02-27 PROCEDURE — 19282 PERQ DEVICE BREAST EA IMAG: CPT

## 2025-02-27 PROCEDURE — 19281 PERQ DEVICE BREAST 1ST IMAG: CPT

## 2025-02-27 PROCEDURE — 6360000002 HC RX W HCPCS: Performed by: RADIOLOGY

## 2025-02-27 RX ORDER — LIDOCAINE HYDROCHLORIDE 10 MG/ML
5 INJECTION, SOLUTION INFILTRATION; PERINEURAL ONCE
Status: COMPLETED | OUTPATIENT
Start: 2025-02-27 | End: 2025-02-27

## 2025-02-27 RX ORDER — LIDOCAINE HYDROCHLORIDE 10 MG/ML
30 INJECTION, SOLUTION INFILTRATION; PERINEURAL ONCE
Status: COMPLETED | OUTPATIENT
Start: 2025-02-27 | End: 2025-02-27

## 2025-02-27 RX ADMIN — LIDOCAINE HYDROCHLORIDE 7 ML: 10 INJECTION, SOLUTION EPIDURAL; INFILTRATION; INTRACAUDAL; PERINEURAL at 10:45

## 2025-02-27 RX ADMIN — LIDOCAINE HYDROCHLORIDE ANHYDROUS 5 ML: 10 INJECTION, SOLUTION INFILTRATION at 10:40

## 2025-02-27 ASSESSMENT — PAIN SCALES - GENERAL: PAINLEVEL_OUTOF10: 0

## 2025-02-27 NOTE — PROGRESS NOTES
Patient here for breast radio frequency identification tag placement. NN reviewed the health history, allergies and medications. Pt drove herself.  Radiologist reviews procedure with patient, consent signed. Patient tolerates procedure well. Compression held. Site cleansed with chloraprep, steri strips and dry dressing applied. Ice pack in place. Reviewed discharge instructions with patient and signed copy. Patient verbalized understanding and agreed to contact Nurse Navigator with any questions. Patient A&Ox3, steady on feet and discharged home

## 2025-03-03 DIAGNOSIS — I10 ESSENTIAL HYPERTENSION: ICD-10-CM

## 2025-03-03 NOTE — TELEPHONE ENCOUNTER
Recent Visits  Date Type Provider Dept   02/13/25 Office Visit Adilene Almanza MD [facility]   01/28/25 Office Visit Adilene Almanza MD [facility]   10/24/24 Office Visit Adilene Almanza MD [facility]   05/31/24 Office Visit Adilene Almanza MD [facility]   01/23/24 Office Visit Adilene Almanza MD [facility]   12/19/23 Office Visit Adilene Almanza MD [facility]   12/05/23 Office Visit Adilene Almanza MD [facility]   Showing recent visits within past 540 days with a meds authorizing provider and meeting all other requirements  Future Appointments  Date Type Provider Dept   07/29/25 Appointment Adilene Almanza MD [facility]   Showing future appointments within next 150 days with a meds authorizing provider and meeting all other requirements     2/13/2025

## 2025-03-04 ENCOUNTER — HOSPITAL ENCOUNTER (OUTPATIENT)
Dept: WOMENS IMAGING | Age: 75
Discharge: HOME | End: 2025-03-04
Payer: MEDICARE

## 2025-03-04 ENCOUNTER — ANESTHESIA EVENT (OUTPATIENT)
Dept: OPERATING ROOM | Age: 75
End: 2025-03-04
Payer: MEDICARE

## 2025-03-04 ENCOUNTER — HOSPITAL ENCOUNTER (OUTPATIENT)
Age: 75
Setting detail: OUTPATIENT SURGERY
Discharge: HOME | End: 2025-03-04
Attending: SURGERY | Admitting: SURGERY
Payer: MEDICARE

## 2025-03-04 ENCOUNTER — ANESTHESIA (OUTPATIENT)
Dept: OPERATING ROOM | Age: 75
End: 2025-03-04
Payer: MEDICARE

## 2025-03-04 VITALS
SYSTOLIC BLOOD PRESSURE: 104 MMHG | RESPIRATION RATE: 17 BRPM | DIASTOLIC BLOOD PRESSURE: 72 MMHG | OXYGEN SATURATION: 97 % | TEMPERATURE: 97.5 F | WEIGHT: 212 LBS | HEIGHT: 68 IN | BODY MASS INDEX: 32.13 KG/M2 | HEART RATE: 61 BPM

## 2025-03-04 DIAGNOSIS — C50.912 PRIMARY BREAST MALIGNANCY, LEFT (HCC): ICD-10-CM

## 2025-03-04 DIAGNOSIS — R92.8 ABNORMAL MAMMOGRAM: ICD-10-CM

## 2025-03-04 DIAGNOSIS — G89.18 POST-OP PAIN: Primary | ICD-10-CM

## 2025-03-04 LAB
ABO + RH BLD: NORMAL
BLD GP AB SCN SERPL QL: NORMAL
EKG ATRIAL RATE: 84 BPM
EKG DIAGNOSIS: NORMAL
EKG P-R INTERVAL: 178 MS
EKG Q-T INTERVAL: 434 MS
EKG QRS DURATION: 154 MS
EKG QTC CALCULATION (BAZETT): 512 MS
EKG R AXIS: 56 DEGREES
EKG T AXIS: -74 DEGREES
EKG VENTRICULAR RATE: 84 BPM
GLUCOSE BLD-MCNC: 84 MG/DL (ref 70–99)
GLUCOSE BLD-MCNC: 94 MG/DL (ref 70–99)
PERFORMED ON: NORMAL
PERFORMED ON: NORMAL

## 2025-03-04 PROCEDURE — 2580000003 HC RX 258: Performed by: NURSE ANESTHETIST, CERTIFIED REGISTERED

## 2025-03-04 PROCEDURE — 86850 RBC ANTIBODY SCREEN: CPT

## 2025-03-04 PROCEDURE — 76098 X-RAY EXAM SURGICAL SPECIMEN: CPT

## 2025-03-04 PROCEDURE — 88307 TISSUE EXAM BY PATHOLOGIST: CPT

## 2025-03-04 PROCEDURE — 2500000003 HC RX 250 WO HCPCS: Performed by: NURSE ANESTHETIST, CERTIFIED REGISTERED

## 2025-03-04 PROCEDURE — 2580000003 HC RX 258: Performed by: SURGERY

## 2025-03-04 PROCEDURE — 2720000010 HC SURG SUPPLY STERILE: Performed by: SURGERY

## 2025-03-04 PROCEDURE — 6360000002 HC RX W HCPCS: Performed by: NURSE ANESTHETIST, CERTIFIED REGISTERED

## 2025-03-04 PROCEDURE — 88305 TISSUE EXAM BY PATHOLOGIST: CPT

## 2025-03-04 PROCEDURE — 19301 PARTIAL MASTECTOMY: CPT | Performed by: SURGERY

## 2025-03-04 PROCEDURE — 14001 TIS TRNFR TRUNK 10.1-30SQCM: CPT | Performed by: SURGERY

## 2025-03-04 PROCEDURE — 2709999900 HC NON-CHARGEABLE SUPPLY: Performed by: SURGERY

## 2025-03-04 PROCEDURE — 3600000014 HC SURGERY LEVEL 4 ADDTL 15MIN: Performed by: SURGERY

## 2025-03-04 PROCEDURE — 3600000004 HC SURGERY LEVEL 4 BASE: Performed by: SURGERY

## 2025-03-04 PROCEDURE — 7100000000 HC PACU RECOVERY - FIRST 15 MIN: Performed by: SURGERY

## 2025-03-04 PROCEDURE — 7100000001 HC PACU RECOVERY - ADDTL 15 MIN: Performed by: SURGERY

## 2025-03-04 PROCEDURE — 2500000003 HC RX 250 WO HCPCS: Performed by: SURGERY

## 2025-03-04 PROCEDURE — 7100000010 HC PHASE II RECOVERY - FIRST 15 MIN: Performed by: SURGERY

## 2025-03-04 PROCEDURE — 3700000001 HC ADD 15 MINUTES (ANESTHESIA): Performed by: SURGERY

## 2025-03-04 PROCEDURE — 88342 IMHCHEM/IMCYTCHM 1ST ANTB: CPT

## 2025-03-04 PROCEDURE — 3700000000 HC ANESTHESIA ATTENDED CARE: Performed by: SURGERY

## 2025-03-04 PROCEDURE — 93005 ELECTROCARDIOGRAM TRACING: CPT

## 2025-03-04 PROCEDURE — 86900 BLOOD TYPING SEROLOGIC ABO: CPT

## 2025-03-04 PROCEDURE — 6360000002 HC RX W HCPCS: Performed by: SURGERY

## 2025-03-04 PROCEDURE — 7100000011 HC PHASE II RECOVERY - ADDTL 15 MIN: Performed by: SURGERY

## 2025-03-04 PROCEDURE — 86901 BLOOD TYPING SEROLOGIC RH(D): CPT

## 2025-03-04 RX ORDER — HYDROCODONE BITARTRATE AND ACETAMINOPHEN 5; 325 MG/1; MG/1
1 TABLET ORAL
Qty: 35 TABLET | Refills: 0 | Status: SHIPPED | OUTPATIENT
Start: 2025-03-04 | End: 2025-03-11

## 2025-03-04 RX ORDER — HYDROMORPHONE HYDROCHLORIDE 2 MG/ML
0.25 INJECTION, SOLUTION INTRAMUSCULAR; INTRAVENOUS; SUBCUTANEOUS EVERY 5 MIN PRN
Status: CANCELLED | OUTPATIENT
Start: 2025-03-04

## 2025-03-04 RX ORDER — MIDAZOLAM HYDROCHLORIDE 1 MG/ML
INJECTION, SOLUTION INTRAMUSCULAR; INTRAVENOUS
Status: DISCONTINUED | OUTPATIENT
Start: 2025-03-04 | End: 2025-03-04 | Stop reason: SDUPTHER

## 2025-03-04 RX ORDER — SODIUM CHLORIDE 0.9 % (FLUSH) 0.9 %
5-40 SYRINGE (ML) INJECTION EVERY 12 HOURS SCHEDULED
Status: CANCELLED | OUTPATIENT
Start: 2025-03-04

## 2025-03-04 RX ORDER — OXYCODONE HYDROCHLORIDE 5 MG/1
5 TABLET ORAL PRN
Status: CANCELLED | OUTPATIENT
Start: 2025-03-04 | End: 2025-03-04

## 2025-03-04 RX ORDER — FENTANYL CITRATE 50 UG/ML
50 INJECTION, SOLUTION INTRAMUSCULAR; INTRAVENOUS EVERY 5 MIN PRN
Status: CANCELLED | OUTPATIENT
Start: 2025-03-04

## 2025-03-04 RX ORDER — SODIUM CHLORIDE 9 MG/ML
INJECTION, SOLUTION INTRAVENOUS PRN
Status: DISCONTINUED | OUTPATIENT
Start: 2025-03-04 | End: 2025-03-04 | Stop reason: HOSPADM

## 2025-03-04 RX ORDER — SODIUM CHLORIDE 0.9 % (FLUSH) 0.9 %
5-40 SYRINGE (ML) INJECTION PRN
Status: DISCONTINUED | OUTPATIENT
Start: 2025-03-04 | End: 2025-03-04 | Stop reason: HOSPADM

## 2025-03-04 RX ORDER — GLYCOPYRROLATE 0.2 MG/ML
INJECTION INTRAMUSCULAR; INTRAVENOUS
Status: DISCONTINUED | OUTPATIENT
Start: 2025-03-04 | End: 2025-03-04 | Stop reason: SDUPTHER

## 2025-03-04 RX ORDER — NALOXONE HYDROCHLORIDE 0.4 MG/ML
INJECTION, SOLUTION INTRAMUSCULAR; INTRAVENOUS; SUBCUTANEOUS PRN
Status: CANCELLED | OUTPATIENT
Start: 2025-03-04

## 2025-03-04 RX ORDER — SODIUM CHLORIDE 0.9 % (FLUSH) 0.9 %
5-40 SYRINGE (ML) INJECTION PRN
Status: CANCELLED | OUTPATIENT
Start: 2025-03-04

## 2025-03-04 RX ORDER — SODIUM CHLORIDE, SODIUM LACTATE, POTASSIUM CHLORIDE, CALCIUM CHLORIDE 600; 310; 30; 20 MG/100ML; MG/100ML; MG/100ML; MG/100ML
INJECTION, SOLUTION INTRAVENOUS CONTINUOUS
Status: DISCONTINUED | OUTPATIENT
Start: 2025-03-04 | End: 2025-03-04 | Stop reason: HOSPADM

## 2025-03-04 RX ORDER — SODIUM CHLORIDE 9 MG/ML
INJECTION, SOLUTION INTRAVENOUS
Status: DISCONTINUED | OUTPATIENT
Start: 2025-03-04 | End: 2025-03-04 | Stop reason: SDUPTHER

## 2025-03-04 RX ORDER — SODIUM CHLORIDE 9 MG/ML
INJECTION, SOLUTION INTRAVENOUS PRN
Status: CANCELLED | OUTPATIENT
Start: 2025-03-04

## 2025-03-04 RX ORDER — PROPOFOL 10 MG/ML
INJECTION, EMULSION INTRAVENOUS
Status: DISCONTINUED | OUTPATIENT
Start: 2025-03-04 | End: 2025-03-04 | Stop reason: SDUPTHER

## 2025-03-04 RX ORDER — HYDROMORPHONE HYDROCHLORIDE 2 MG/ML
INJECTION, SOLUTION INTRAMUSCULAR; INTRAVENOUS; SUBCUTANEOUS
Status: DISCONTINUED | OUTPATIENT
Start: 2025-03-04 | End: 2025-03-04 | Stop reason: SDUPTHER

## 2025-03-04 RX ORDER — SODIUM CHLORIDE 0.9 % (FLUSH) 0.9 %
5-40 SYRINGE (ML) INJECTION EVERY 12 HOURS SCHEDULED
Status: DISCONTINUED | OUTPATIENT
Start: 2025-03-04 | End: 2025-03-04 | Stop reason: HOSPADM

## 2025-03-04 RX ORDER — M-VIT,TX,IRON,MINS/CALC/FOLIC 27MG-0.4MG
1 TABLET ORAL DAILY
COMMUNITY

## 2025-03-04 RX ORDER — BUPIVACAINE HYDROCHLORIDE AND EPINEPHRINE 2.5; 5 MG/ML; UG/ML
INJECTION, SOLUTION EPIDURAL; INFILTRATION; INTRACAUDAL; PERINEURAL
Status: COMPLETED | OUTPATIENT
Start: 2025-03-04 | End: 2025-03-04

## 2025-03-04 RX ORDER — ONDANSETRON 2 MG/ML
INJECTION INTRAMUSCULAR; INTRAVENOUS
Status: DISCONTINUED | OUTPATIENT
Start: 2025-03-04 | End: 2025-03-04 | Stop reason: SDUPTHER

## 2025-03-04 RX ORDER — DEXAMETHASONE SODIUM PHOSPHATE 4 MG/ML
INJECTION, SOLUTION INTRA-ARTICULAR; INTRALESIONAL; INTRAMUSCULAR; INTRAVENOUS; SOFT TISSUE
Status: DISCONTINUED | OUTPATIENT
Start: 2025-03-04 | End: 2025-03-04 | Stop reason: SDUPTHER

## 2025-03-04 RX ORDER — OXYCODONE HYDROCHLORIDE 5 MG/1
10 TABLET ORAL PRN
Status: CANCELLED | OUTPATIENT
Start: 2025-03-04 | End: 2025-03-04

## 2025-03-04 RX ORDER — LIDOCAINE HYDROCHLORIDE 20 MG/ML
INJECTION, SOLUTION INFILTRATION; PERINEURAL
Status: DISCONTINUED | OUTPATIENT
Start: 2025-03-04 | End: 2025-03-04 | Stop reason: SDUPTHER

## 2025-03-04 RX ORDER — ROCURONIUM BROMIDE 10 MG/ML
INJECTION, SOLUTION INTRAVENOUS
Status: DISCONTINUED | OUTPATIENT
Start: 2025-03-04 | End: 2025-03-04 | Stop reason: SDUPTHER

## 2025-03-04 RX ORDER — HYDROCHLOROTHIAZIDE 25 MG/1
TABLET ORAL
Qty: 30 TABLET | Refills: 0 | Status: SHIPPED | OUTPATIENT
Start: 2025-03-04

## 2025-03-04 RX ADMIN — PHENYLEPHRINE HYDROCHLORIDE 100 MCG: 10 INJECTION INTRAVENOUS at 12:21

## 2025-03-04 RX ADMIN — ONDANSETRON 4 MG: 2 INJECTION INTRAMUSCULAR; INTRAVENOUS at 12:01

## 2025-03-04 RX ADMIN — GLYCOPYRROLATE 0.2 MG: 0.2 INJECTION INTRAMUSCULAR; INTRAVENOUS at 12:01

## 2025-03-04 RX ADMIN — CEFAZOLIN 2000 MG: 2 INJECTION, POWDER, FOR SOLUTION INTRAMUSCULAR; INTRAVENOUS at 11:59

## 2025-03-04 RX ADMIN — LIDOCAINE HYDROCHLORIDE 100 MG: 20 INJECTION, SOLUTION INFILTRATION; PERINEURAL at 12:01

## 2025-03-04 RX ADMIN — HYDROMORPHONE HYDROCHLORIDE 0.6 MG: 2 INJECTION, SOLUTION INTRAMUSCULAR; INTRAVENOUS; SUBCUTANEOUS at 12:01

## 2025-03-04 RX ADMIN — SODIUM CHLORIDE: 9 INJECTION, SOLUTION INTRAVENOUS at 12:34

## 2025-03-04 RX ADMIN — PHENYLEPHRINE HYDROCHLORIDE 100 MCG: 10 INJECTION INTRAVENOUS at 12:46

## 2025-03-04 RX ADMIN — SODIUM CHLORIDE: 0.9 INJECTION, SOLUTION INTRAVENOUS at 10:44

## 2025-03-04 RX ADMIN — PHENYLEPHRINE HYDROCHLORIDE 200 MCG: 10 INJECTION INTRAVENOUS at 12:52

## 2025-03-04 RX ADMIN — MIDAZOLAM 2 MG: 1 INJECTION INTRAMUSCULAR; INTRAVENOUS at 12:01

## 2025-03-04 RX ADMIN — SODIUM CHLORIDE: 9 INJECTION, SOLUTION INTRAVENOUS at 11:56

## 2025-03-04 RX ADMIN — DEXAMETHASONE SODIUM PHOSPHATE 10 MG: 4 INJECTION, SOLUTION INTRAMUSCULAR; INTRAVENOUS at 12:01

## 2025-03-04 RX ADMIN — PHENYLEPHRINE HYDROCHLORIDE 200 MCG: 10 INJECTION INTRAVENOUS at 12:13

## 2025-03-04 RX ADMIN — PROPOFOL 200 MG: 10 INJECTION, EMULSION INTRAVENOUS at 12:01

## 2025-03-04 RX ADMIN — SUGAMMADEX 200 MG: 100 INJECTION, SOLUTION INTRAVENOUS at 13:38

## 2025-03-04 RX ADMIN — HYDROMORPHONE HYDROCHLORIDE 0.4 MG: 2 INJECTION, SOLUTION INTRAMUSCULAR; INTRAVENOUS; SUBCUTANEOUS at 12:16

## 2025-03-04 RX ADMIN — PHENYLEPHRINE HYDROCHLORIDE 100 MCG: 10 INJECTION INTRAVENOUS at 13:18

## 2025-03-04 RX ADMIN — ROCURONIUM BROMIDE 50 MG: 10 INJECTION, SOLUTION INTRAVENOUS at 12:01

## 2025-03-04 ASSESSMENT — PAIN SCALES - GENERAL
PAINLEVEL_OUTOF10: 0

## 2025-03-04 ASSESSMENT — LIFESTYLE VARIABLES: SMOKING_STATUS: 0

## 2025-03-04 ASSESSMENT — PAIN - FUNCTIONAL ASSESSMENT: PAIN_FUNCTIONAL_ASSESSMENT: 0-10

## 2025-03-04 NOTE — PROGRESS NOTES
Jones, the [employer] rep for [facility] informed via phone call that Dr Kennedy want him to check pacemaker settings postop & Jazmín RN PACU aware that he is going to be in area, will come when called.   Tone is normal, moving all extremities well, reflexes normal for age.

## 2025-03-04 NOTE — DISCHARGE INSTRUCTIONS
Postoperative Instructions for Breast Surgery  Emilia Rodgers MD [address], [address]  [place] [address]  ([phone])    These are general guidelines to help assist in recovery after breast surgery. Please keep in mind all patients recover differently, so please call the office with any questions ([phone]).    General guidelines   Rest when you feel tired  You will have a surgical bra on when you wake up. Please wear this day and night until your postoperative appointment. It can be removed for laundering and for showers. This helps immobilize the breast to alleviate discomfort as well as maintain pressure to avoid postoperative seroma.  If you had a sentinel lymph node procedure, it is common to have an interval of blue urine and/or stool and blue skin changes of the breast.   Final pathology will take 3-5 days to result. The breast surgery office will call you with the results when they are known.    Pain management  You may feel mild to moderate discomfort when anesthesia wears off  You will be given a prescription for a narcotic pain medication  Some patients experience very little discomfort and they prefer not to use the narcotic  You may take Tylenol or extra strength Tylenol instead of the narcotic  Many narcotics also contained Tylenol so you should not take both  AVOID aspirin, fish oil, Excedrin, Motrin, ibuprofen, and other NSAIDS immediately after surgery for at least 48-72 hours.  Anticoagulation such as warfarin can typically resume 48 hours after surgery.  This should be discussed with your surgeon and prescribing physician.  Narcotic medication may cause constipation. Make sure to keep well hydrated, ambulate, and you may eat high-fiber foods to help avoid constipation. You may use over-the-counter medications for constipation.  You should not consume alcohol while taking narcotics  You should not drive while taking narcotics  Pain should improve, not worsen as days pass. If  sleepiness following surgery.  Rest at home today- increase activity as tolerated.  Progress slowly to a regular diet unless your physician has instructed you otherwise.Drink plenty of water.  If nausea becomes a problem call your physician.  Coughing,sore throat,and muscle aches are other side effects of anesthesia,and should improve with time.  Do not drive,operate machinery while taking narcotics.

## 2025-03-04 NOTE — OP NOTE
Operative Note      Postoperative Note    Belén Acevedo  YOB: 1950  [id number]    Pre-operative Diagnosis: T is Nx left breast cancer  Primary breast malignancy, left (HCC) [C50.912]    Post-operative Diagnosis: Same    Procedure:  left localizer directed partial mastectomy,   left tissue rearrangement procedure for area of 24 sq. Cm.     Anesthesia: General    Surgeons/Assistants: Emilia Rodgers  Assistant: HAMILTON Conley    Estimated Blood Loss: less than 50     Drains: none    Complications: None apparent at conclusion of procedure    Specimens: left breast tissue (see below for details)    Findings: specimen radiograph shows capture of lesion in question    Post-Op Condition: Stable    Disposition: to recovery room    Description of Procedure:   Ms. Acevedo is a 74 y.o. woman with a clinical Tis Nx left breast cancer. She has elected to proceed with left breast conservation therapy.  The indications for the planned procedure, along with the potential benefits and risks which include but are not limited to the risk of anesthesia, bleeding, infection, possible failed operation, possible need for additional surgery pending final pathologic assessment, lymphedema, sensation changes, and unappealing cosmetics were reviewed. All questions were answered and she agrees to proceed.    Ms. Acevedo was met by me in the preoperative area.  The surgical site was identified. Consent was obtained. The appropriate breast imaging was reviewed.    She underwent an image guided localization procedure preoperatively which was performed by the on site radiologist. The left breast lesion was again noted with the biopsy clip. The localizer is in good position.    She was brought to the operating room and placed supine with her arms extended on boards. She was appropriately positioned and padded. Compression stockings were placed.  Appropriate antibiotics were administered within 60 minutes of the

## 2025-03-04 NOTE — PROGRESS NOTES
Micheline OR RN & Jazmín PACU charge RN informed that Dr Joseph wants [employer] representative to come in to check pacemaker settings after magnet removed.

## 2025-03-04 NOTE — ANESTHESIA PRE PROCEDURE
LOWER RATE: 60 MODE SWITCH: 171 TRACKING RATE: 130 SENSOR RATE: 130 PACED AV: 180 SENSED AV: 150 LVRV Delay:): pacemaker, dysrhythmias (paf, on bb and ac as outpt, s/p PPM 2/2 KHAN dysfcn/HB/tachybrady): atrial fibrillation    (-) past MI, CAD (neg stress test 2021), CABG/stent,  angina and  CHF (echo 2021 EF 55-60 no rwma mod mr gr 2 dd)    ECG reviewed  Rhythm: regular  Rate: normal  Echocardiogram reviewed  Stress test reviewed       Beta Blocker:  Dose within 24 Hrs         Neuro/Psych:   (+) neuromuscular disease (neuropathy):   (-) seizures, TIA and CVA           GI/Hepatic/Renal: Neg GI/Hepatic/Renal ROS       (-) GERD, liver disease and no renal disease       Endo/Other:    (+) hyperthyroidism (on methimazole, stable): arthritis: OA..    (-) diabetes mellitus               Abdominal:   (+) obese          Vascular:          Other Findings:         Anesthesia Plan      general     ASA 3     (Discussed with Dr Maya and given the position of the pacemaker and the surgery location there is high risk for interference. I will place the magnet and check EKG to insure the mode switch that occurred as listed of report, and will contact Cardiac center for post-op recommendations (ie if rep needs to come for reprogramming))  Induction: intravenous.    MIPS: Postoperative opioids intended and Prophylactic antiemetics administered.  Anesthetic plan and risks discussed with patient.      Plan discussed with CRNA.                  Glen Joseph DO   3/4/2025

## 2025-03-04 NOTE — ANESTHESIA POSTPROCEDURE EVALUATION
Department of Anesthesiology  Postprocedure Note    Patient: Belén Acevedo  MRN: [id number]  YOB: 1950  Date of evaluation: 3/4/2025    Procedure Summary       Date: 03/04/25 Room / Location: [address]    Anesthesia Start: 1156 Anesthesia Stop: 1350    Procedure: LOCALIZED LEFT BREAST PARTIAL MASTECTOMY. (Left: Breast) Diagnosis:       Primary breast malignancy, left (HCC)      (Primary breast malignancy, left (HCC) [C50.912])    Surgeons: Emilia Rodgers MD Responsible Provider: Jose Raul Ohara DO    Anesthesia Type: general ASA Status: 3            Anesthesia Type: No value filed.    Yelitza Phase I: Yelitza Score: 10    Yelitza Phase II:      Anesthesia Post Evaluation    Patient location during evaluation: PACU  Patient participation: complete - patient participated  Level of consciousness: awake and alert  Airway patency: patent  Nausea & Vomiting: no nausea  Cardiovascular status: hemodynamically stable  Respiratory status: acceptable  Hydration status: stable  Pain management: adequate    No notable events documented.

## 2025-03-04 NOTE — INTERVAL H&P NOTE
H&P Update    The patient's most recent H&P, office notes, breast imaging, and pathology were reviewed.    Patient examined and laterality marked.    There has been no changes.  We will plan to proceed with a left breast partial mastectomy.    Emilia Rodgers MD

## 2025-03-10 ENCOUNTER — RESULTS FOLLOW-UP (OUTPATIENT)
Dept: OPERATING ROOM | Age: 75
End: 2025-03-10

## 2025-03-10 NOTE — PROGRESS NOTES
PCP:  Medical Oncology: Justin  Radiation: Selvin  Other:      pTisNx  STAGE:  0 left breast cancer      Ms. Chilel is a 74 y.o.-year-old woman who is now s/p left partial mastectomy for left breast cancer.  She underwent this procedure on  3/4/2025  and tolerated it well.  She is doing quite well postoperatively and her pain is continuing to improve.      INTERVAL HISTORY:    On 3/4/2025 she underwent left breast partial mastectomy with an additional shave margins.  Pathology identified 5 mm of ER positive high-grade DCIS along with biopsy site changes.  The caudal and lateral margins were focally very close to DCIS however additional margins were taken at that time from these locations.  The caudal margin was negative.  The lateral margin identified atypical ductal proliferation favoring DCIS which is 2 mm to the new margin.  Therefore margins are considered negative at this time. [id number]     Pathology:    Department of Pathology   FINAL SURGICAL PATHOLOGY REPORT   Patient Name:  JESSICA CHILEL              Accession No:  [id number]    Age Sex:   1950    74 Y / F       Location:      [facility]   Account No:    [id number]                  Collected:     2025   Med Rec No:    [id number]                 Received:      2025   Attend Phys:   MARCO ANTONIO MARTIN             Completed:     2025   Perform Phys:  MARCO ANTONIO MARTIN             FINAL DIAGNOSIS:        A. Left breast, lumpectomy:        - Ductal carcinoma in situ, high nuclear grade- See Comment/ Case          Summary.        - Biopsy site change.        B. Left breast tissue medial margin, excision:        - Negative for carcinoma.        C. Left breast tissue caudal margin, excision:        - Negative for carcinoma.        D. Left breast tissue lateral margin, excision:        - Minute focus of atypical ductal proliferation; favor ductal          carcinoma in situ- See Comment.     COMMENT:     In main specimen part A,

## 2025-03-17 ENCOUNTER — OFFICE VISIT (OUTPATIENT)
Dept: SURGERY | Age: 75
End: 2025-03-17

## 2025-03-17 VITALS
OXYGEN SATURATION: 98 % | BODY MASS INDEX: 32.58 KG/M2 | HEART RATE: 68 BPM | DIASTOLIC BLOOD PRESSURE: 72 MMHG | HEIGHT: 68 IN | SYSTOLIC BLOOD PRESSURE: 122 MMHG | WEIGHT: 215 LBS

## 2025-03-17 DIAGNOSIS — Z09 POSTOP CHECK: ICD-10-CM

## 2025-03-17 DIAGNOSIS — C50.912 PRIMARY BREAST MALIGNANCY, LEFT: Primary | ICD-10-CM

## 2025-03-17 PROCEDURE — 99024 POSTOP FOLLOW-UP VISIT: CPT | Performed by: SURGERY

## 2025-03-18 ENCOUNTER — HOSPITAL ENCOUNTER (OUTPATIENT)
Dept: GENERAL RADIOLOGY | Age: 75
Discharge: HOME | End: 2025-03-18
Attending: INTERNAL MEDICINE
Payer: MEDICARE

## 2025-03-18 DIAGNOSIS — M81.0 POSTMENOPAUSAL OSTEOPOROSIS: ICD-10-CM

## 2025-03-18 PROCEDURE — 77080 DXA BONE DENSITY AXIAL: CPT

## 2025-03-24 DIAGNOSIS — I48.19 PERSISTENT ATRIAL FIBRILLATION (HCC): ICD-10-CM

## 2025-03-24 RX ORDER — DILTIAZEM HYDROCHLORIDE 180 MG/1
180 CAPSULE, COATED, EXTENDED RELEASE ORAL DAILY
Qty: 90 CAPSULE | Refills: 1 | Status: SHIPPED | OUTPATIENT
Start: 2025-03-24

## 2025-03-24 NOTE — TELEPHONE ENCOUNTER
Recent Visits  Date Type Provider Dept   02/13/25 Office Visit Adilene Almanza MD [facility]   01/28/25 Office Visit Adilene Almanza MD [facility]   10/24/24 Office Visit Adilene Almanza MD [facility]   05/31/24 Office Visit Adilene Almanza MD [facility]   01/23/24 Office Visit Adilene Almanza MD [facility]   12/19/23 Office Visit Adilene Almanza MD [facility]   12/05/23 Office Visit Adilene Almanza MD [facility]   Showing recent visits within past 540 days with a meds authorizing provider and meeting all other requirements  Future Appointments  Date Type Provider Dept   07/29/25 Appointment Adilene Almanza MD [facility]   Showing future appointments within next 150 days with a meds authorizing provider and meeting all other requirements     2/13/2025

## 2025-03-30 DIAGNOSIS — I10 ESSENTIAL HYPERTENSION: ICD-10-CM

## 2025-03-31 RX ORDER — SPIRONOLACTONE 25 MG/1
25 TABLET ORAL DAILY
Qty: 90 TABLET | Refills: 1 | Status: SHIPPED | OUTPATIENT
Start: 2025-03-31

## 2025-04-06 DIAGNOSIS — I10 ESSENTIAL HYPERTENSION: ICD-10-CM

## 2025-04-06 NOTE — TELEPHONE ENCOUNTER
Recent Visits  Date Type Provider Dept   02/13/25 Office Visit Adilene Almanza MD [facility]   01/28/25 Office Visit Adilene Almanza MD [facility]   10/24/24 Office Visit Adilene Almanza MD [facility]   05/31/24 Office Visit Adilene Almanza MD [facility]   01/23/24 Office Visit Adilene Almanza MD [facility]   12/19/23 Office Visit Adilene Almanza MD [facility]   12/05/23 Office Visit Adilene Almanza MD [facility]   Showing recent visits within past 540 days with a meds authorizing provider and meeting all other requirements  Future Appointments  Date Type Provider Dept   07/29/25 Appointment Adilene Almanza MD [facility]   Showing future appointments within next 150 days with a meds authorizing provider and meeting all other requirements     2/13/2025

## 2025-04-07 RX ORDER — HYDROCHLOROTHIAZIDE 25 MG/1
25 TABLET ORAL DAILY
Qty: 30 TABLET | Refills: 5 | Status: SHIPPED | OUTPATIENT
Start: 2025-04-07

## 2025-04-27 DIAGNOSIS — I48.19 PERSISTENT ATRIAL FIBRILLATION (HCC): ICD-10-CM

## 2025-04-28 RX ORDER — APIXABAN 5 MG/1
5 TABLET, FILM COATED ORAL 2 TIMES DAILY
Qty: 60 TABLET | Refills: 3 | Status: SHIPPED | OUTPATIENT
Start: 2025-04-28

## 2025-04-28 NOTE — TELEPHONE ENCOUNTER
Recent Visits  Date Type Provider Dept   02/13/25 Office Visit Adilene Almanza MD [facility]   01/28/25 Office Visit Adilene Almanza MD [facility]   10/24/24 Office Visit Adilene Almanza MD [facility]   05/31/24 Office Visit Adilene Almanza MD [facility]   01/23/24 Office Visit Adilene Almanza MD [facility]   12/19/23 Office Visit Adilene Almanza MD [facility]   12/05/23 Office Visit Adilene Almanza MD [facility]   Showing recent visits within past 540 days with a meds authorizing provider and meeting all other requirements  Future Appointments  Date Type Provider Dept   07/29/25 Appointment Adilene Almanza MD [facility]   Showing future appointments within next 150 days with a meds authorizing provider and meeting all other requirements     2/13/2025

## 2025-05-03 DIAGNOSIS — I48.19 PERSISTENT ATRIAL FIBRILLATION (HCC): ICD-10-CM

## 2025-05-03 DIAGNOSIS — I10 ESSENTIAL HYPERTENSION: ICD-10-CM

## 2025-05-05 NOTE — TELEPHONE ENCOUNTER
Recent Visits  Date Type Provider Dept   02/13/25 Office Visit Adilene Almanza MD [facility]   01/28/25 Office Visit Adilene Almanza MD [facility]   10/24/24 Office Visit Adilene Almanza MD [facility]   05/31/24 Office Visit Adilene Almanza MD [facility]   01/23/24 Office Visit Adilene Almanza MD [facility]   12/19/23 Office Visit Adilene Almanza MD [facility]   12/05/23 Office Visit Adilene Almanza MD [facility]   Showing recent visits within past 540 days with a meds authorizing provider and meeting all other requirements  Future Appointments  Date Type Provider Dept   07/29/25 Appointment Adilene Almanza MD [facility]   Showing future appointments within next 150 days with a meds authorizing provider and meeting all other requirements     2/13/2025

## 2025-05-06 RX ORDER — METOPROLOL SUCCINATE 50 MG/1
50 TABLET, EXTENDED RELEASE ORAL DAILY
Qty: 90 TABLET | Refills: 1 | Status: SHIPPED | OUTPATIENT
Start: 2025-05-06

## 2025-06-30 ENCOUNTER — TELEPHONE (OUTPATIENT)
Dept: SURGERY | Age: 75
End: 2025-06-30

## 2025-06-30 NOTE — TELEPHONE ENCOUNTER
LVM to see if patient could come in at 10:15 on Thursday the 3rd due to a change in Dr Rodgers's schedule

## 2025-06-30 NOTE — PROGRESS NOTES
Medical Oncology: Justin  Radiation: Selvin  Other:        pTisNx  STAGE:  0 left breast cancer      Ms. Acevedo is a 74 y.o.-year-old woman who initially presented to me with  left breast cancer.  Since her last encounter Ms. Acevedo has been doing quite well.  Her adjuvant treatment has included radiation and endocrine therapy.  She has no side effects from her endocrine therapy.  She has no new breast related concerns today.        INTERVAL HISTORY:    On 3/4/2025 she underwent left breast partial mastectomy with an additional shave margins.  Pathology identified 5 mm of ER positive high-grade DCIS along with biopsy site changes.  The caudal and lateral margins were focally very close to DCIS however additional margins were taken at that time from these locations.  The caudal margin was negative.  The lateral margin identified atypical ductal proliferation favoring DCIS which is 2 mm to the new margin.  Therefore margins are considered negative at this time. [id number]     On 4/1/2025 she initiated adjuvant anastrozole.    On 5/22/2025 she completed adjuvant radiation.          Exam:  Physical exam has been reviewed and updated  General: no acute distress  Breast:  The patient was examined in the upright and supine position. There is a well healed scar on the left breast.  There are expected  post surgical and radiation related changes.  This includes some skin peeling.  She has moderate range of motion with her arm.  Her contralateral breast shows no new masses or changes in breast contour.  There were no skin changes of the breast or nipple areolar complex.  There was no nipple inversion or discharge.   There is no axillary lymphadenopathy palpated bilaterally.  Respiratory: respirations are non-labored and there is no audible distress  Cardiovascular: regular rate, extremities appear well perfused  Neurologic: alert, oriented      Assessment/Plan:  pTisNx  STAGE:  0 left breast cancer  ER positive  S/p

## 2025-07-03 ENCOUNTER — HOSPITAL ENCOUNTER (OUTPATIENT)
Age: 75
Discharge: HOME | End: 2025-07-03
Payer: MEDICARE

## 2025-07-03 ENCOUNTER — OFFICE VISIT (OUTPATIENT)
Dept: SURGERY | Age: 75
End: 2025-07-03
Payer: MEDICARE

## 2025-07-03 VITALS
HEIGHT: 68 IN | OXYGEN SATURATION: 98 % | HEART RATE: 81 BPM | BODY MASS INDEX: 32.13 KG/M2 | WEIGHT: 212 LBS | RESPIRATION RATE: 16 BRPM

## 2025-07-03 DIAGNOSIS — Z85.3 PERSONAL HISTORY OF BREAST CANCER: ICD-10-CM

## 2025-07-03 DIAGNOSIS — Z08 ENCOUNTER FOR FOLLOW-UP SURVEILLANCE OF BREAST CANCER: ICD-10-CM

## 2025-07-03 DIAGNOSIS — Z85.3 ENCOUNTER FOR FOLLOW-UP SURVEILLANCE OF BREAST CANCER: ICD-10-CM

## 2025-07-03 DIAGNOSIS — Z09 SURGICAL FOLLOW-UP CARE: ICD-10-CM

## 2025-07-03 DIAGNOSIS — Z85.3 ENCOUNTER FOR FOLLOW-UP SURVEILLANCE OF BREAST CANCER: Primary | ICD-10-CM

## 2025-07-03 DIAGNOSIS — E05.00 GRAVES DISEASE: ICD-10-CM

## 2025-07-03 DIAGNOSIS — I10 ESSENTIAL HYPERTENSION: Chronic | ICD-10-CM

## 2025-07-03 DIAGNOSIS — Z12.39 SCREENING BREAST EXAMINATION: ICD-10-CM

## 2025-07-03 DIAGNOSIS — I48.0 PAROXYSMAL ATRIAL FIBRILLATION (HCC): Chronic | ICD-10-CM

## 2025-07-03 DIAGNOSIS — C50.912 PRIMARY BREAST MALIGNANCY, LEFT (HCC): Primary | ICD-10-CM

## 2025-07-03 DIAGNOSIS — Z08 ENCOUNTER FOR FOLLOW-UP SURVEILLANCE OF BREAST CANCER: Primary | ICD-10-CM

## 2025-07-03 LAB
ALBUMIN SERPL-MCNC: 4.4 G/DL (ref 3.4–5)
ALBUMIN/GLOB SERPL: 1.2 {RATIO} (ref 1.1–2.2)
ALP SERPL-CCNC: 91 U/L (ref 40–129)
ALT SERPL-CCNC: 17 U/L (ref 10–40)
ANION GAP SERPL CALCULATED.3IONS-SCNC: 12 MMOL/L (ref 3–16)
AST SERPL-CCNC: 27 U/L (ref 15–37)
BILIRUB SERPL-MCNC: 0.4 MG/DL (ref 0–1)
BUN SERPL-MCNC: 21 MG/DL (ref 7–20)
CALCIUM SERPL-MCNC: 10.3 MG/DL (ref 8.3–10.6)
CHLORIDE SERPL-SCNC: 99 MMOL/L (ref 99–110)
CO2 SERPL-SCNC: 24 MMOL/L (ref 21–32)
CREAT SERPL-MCNC: 1.2 MG/DL (ref 0.6–1.2)
GFR SERPLBLD CREATININE-BSD FMLA CKD-EPI: 48 ML/MIN/{1.73_M2}
GLUCOSE SERPL-MCNC: 91 MG/DL (ref 70–99)
POTASSIUM SERPL-SCNC: 4.2 MMOL/L (ref 3.5–5.1)
PROT SERPL-MCNC: 8 G/DL (ref 6.4–8.2)
SODIUM SERPL-SCNC: 135 MMOL/L (ref 136–145)
T4 FREE SERPL-MCNC: 0.9 NG/DL (ref 0.9–1.8)
TSH SERPL DL<=0.005 MIU/L-ACNC: 2.15 UIU/ML (ref 0.27–4.2)

## 2025-07-03 PROCEDURE — 84443 ASSAY THYROID STIM HORMONE: CPT

## 2025-07-03 PROCEDURE — 1126F AMNT PAIN NOTED NONE PRSNT: CPT | Performed by: SURGERY

## 2025-07-03 PROCEDURE — 1123F ACP DISCUSS/DSCN MKR DOCD: CPT | Performed by: SURGERY

## 2025-07-03 PROCEDURE — 99214 OFFICE O/P EST MOD 30 MIN: CPT | Performed by: SURGERY

## 2025-07-03 PROCEDURE — 1159F MED LIST DOCD IN RCRD: CPT | Performed by: SURGERY

## 2025-07-03 PROCEDURE — 36415 COLL VENOUS BLD VENIPUNCTURE: CPT

## 2025-07-03 PROCEDURE — 84439 ASSAY OF FREE THYROXINE: CPT

## 2025-07-03 PROCEDURE — 80053 COMPREHEN METABOLIC PANEL: CPT

## 2025-07-03 RX ORDER — ANASTROZOLE 1 MG/1
1 TABLET ORAL DAILY
COMMUNITY
Start: 2025-04-01

## 2025-07-08 ENCOUNTER — OFFICE VISIT (OUTPATIENT)
Dept: ENDOCRINOLOGY | Age: 75
End: 2025-07-08
Payer: MEDICARE

## 2025-07-08 VITALS
DIASTOLIC BLOOD PRESSURE: 80 MMHG | HEIGHT: 68 IN | BODY MASS INDEX: 31.98 KG/M2 | HEART RATE: 82 BPM | WEIGHT: 211 LBS | SYSTOLIC BLOOD PRESSURE: 140 MMHG

## 2025-07-08 DIAGNOSIS — E05.00 GRAVES DISEASE: Primary | ICD-10-CM

## 2025-07-08 DIAGNOSIS — G47.33 OBSTRUCTIVE SLEEP APNEA SYNDROME: Chronic | ICD-10-CM

## 2025-07-08 DIAGNOSIS — I48.0 PAROXYSMAL ATRIAL FIBRILLATION (HCC): Chronic | ICD-10-CM

## 2025-07-08 DIAGNOSIS — C54.1 ENDOMETRIAL CANCER (HCC): ICD-10-CM

## 2025-07-08 PROCEDURE — 1160F RVW MEDS BY RX/DR IN RCRD: CPT | Performed by: INTERNAL MEDICINE

## 2025-07-08 PROCEDURE — 3079F DIAST BP 80-89 MM HG: CPT | Performed by: INTERNAL MEDICINE

## 2025-07-08 PROCEDURE — G2211 COMPLEX E/M VISIT ADD ON: HCPCS | Performed by: INTERNAL MEDICINE

## 2025-07-08 PROCEDURE — 1123F ACP DISCUSS/DSCN MKR DOCD: CPT | Performed by: INTERNAL MEDICINE

## 2025-07-08 PROCEDURE — 1159F MED LIST DOCD IN RCRD: CPT | Performed by: INTERNAL MEDICINE

## 2025-07-08 PROCEDURE — 3077F SYST BP >= 140 MM HG: CPT | Performed by: INTERNAL MEDICINE

## 2025-07-08 PROCEDURE — 99214 OFFICE O/P EST MOD 30 MIN: CPT | Performed by: INTERNAL MEDICINE

## 2025-07-08 RX ORDER — BETAMETHASONE VALERATE 1.2 MG/G
CREAM TOPICAL
COMMUNITY
Start: 2025-06-05

## 2025-07-08 NOTE — PROGRESS NOTES
SUBJECTIVE:  Belén Acevedo is a 74 y.o. female  seen in my clinic for Graves' disease and multinodular goiter  Patient was initially referred for thyroid nodule  which was identified on a CT scan done in April 2018 as a workup for her ovarian cancer  .    Patient Current complaints: denies fatigue, weight changes, heat/cold intolerance, bowel/skin changes or CVS symptoms  History of obstructive symptoms: difficulty swallowing No, changes in voice/hoarseness No.    History of radiation to patient's neck: NO  Recent iodine exposure: No  Family history includes no thyroid abnormalities.  Family history of thyroid cancer: No    Ovarian cancer is treated with surgery , chemo and RT ---she was diagnosed in jan 2018   Patient also has hypertension and is on Toprol and hydrochlorothiazide along with Lotrel  Patient also has atrial fibrillation and has been cardioverted in the past.  Rate controlled    She had cardiac ablation pacemaker put in May 2022     INTERIM   Diagnosed with breast cancer in dec 2024 she underwent surgery and radiation. Follows with oncology   She has been taking 5 mg Tapazole daily she denies any significant palpitation, heat intolerance or insomnia.    Past Medical History:   Diagnosis Date    Anal bleeding 2019    CT scan clear     Anemia     Arthritis     bilateral knee    Atrial fibrillation (HCC)     cleared by cardiologist 2018, was related to cancer     Breast cancer (HCC)     Colon polyps     Diabetes mellitus (HCC)     pre diabetic diet controlled    Endometrial cancer (HCC)     History of blood transfusion     Hypertension     IFG (impaired fasting glucose)     SATISH (obstructive sleep apnea)     3/28/22-currently being fitted for CPAP    Small bowel obstruction (HCC)     resolved without surgery    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HCC)     Thyroid nodule      Patient Active Problem List    Diagnosis Date Noted    Obstructive sleep apnea syndrome 05/31/2022    Primary breast malignancy, left (HCC)

## 2025-07-29 ENCOUNTER — OFFICE VISIT (OUTPATIENT)
Dept: INTERNAL MEDICINE CLINIC | Age: 75
End: 2025-07-29
Payer: MEDICARE

## 2025-07-29 VITALS
BODY MASS INDEX: 32.36 KG/M2 | WEIGHT: 212.8 LBS | OXYGEN SATURATION: 90 % | HEART RATE: 87 BPM | SYSTOLIC BLOOD PRESSURE: 122 MMHG | TEMPERATURE: 97 F | DIASTOLIC BLOOD PRESSURE: 74 MMHG

## 2025-07-29 DIAGNOSIS — N17.9 AKI (ACUTE KIDNEY INJURY): ICD-10-CM

## 2025-07-29 DIAGNOSIS — I10 ESSENTIAL HYPERTENSION: ICD-10-CM

## 2025-07-29 DIAGNOSIS — M10.00 IDIOPATHIC GOUT, UNSPECIFIED CHRONICITY, UNSPECIFIED SITE: ICD-10-CM

## 2025-07-29 DIAGNOSIS — I48.19 PERSISTENT ATRIAL FIBRILLATION (HCC): ICD-10-CM

## 2025-07-29 DIAGNOSIS — C50.912 PRIMARY BREAST MALIGNANCY, LEFT (HCC): Primary | ICD-10-CM

## 2025-07-29 PROBLEM — C54.1 ENDOMETRIAL CANCER (HCC): Status: RESOLVED | Noted: 2018-03-27 | Resolved: 2025-07-29

## 2025-07-29 PROCEDURE — 1160F RVW MEDS BY RX/DR IN RCRD: CPT | Performed by: INTERNAL MEDICINE

## 2025-07-29 PROCEDURE — 1123F ACP DISCUSS/DSCN MKR DOCD: CPT | Performed by: INTERNAL MEDICINE

## 2025-07-29 PROCEDURE — 1159F MED LIST DOCD IN RCRD: CPT | Performed by: INTERNAL MEDICINE

## 2025-07-29 PROCEDURE — 3078F DIAST BP <80 MM HG: CPT | Performed by: INTERNAL MEDICINE

## 2025-07-29 PROCEDURE — 3074F SYST BP LT 130 MM HG: CPT | Performed by: INTERNAL MEDICINE

## 2025-07-29 PROCEDURE — 99214 OFFICE O/P EST MOD 30 MIN: CPT | Performed by: INTERNAL MEDICINE

## 2025-07-29 RX ORDER — DILTIAZEM HYDROCHLORIDE 180 MG/1
180 CAPSULE, COATED, EXTENDED RELEASE ORAL DAILY
Qty: 90 CAPSULE | Refills: 1 | Status: SHIPPED | OUTPATIENT
Start: 2025-07-29

## 2025-07-29 RX ORDER — SPIRONOLACTONE 25 MG/1
25 TABLET ORAL DAILY
Qty: 90 TABLET | Refills: 1 | Status: SHIPPED | OUTPATIENT
Start: 2025-07-29

## 2025-07-29 RX ORDER — METOPROLOL SUCCINATE 50 MG/1
50 TABLET, EXTENDED RELEASE ORAL DAILY
Qty: 90 TABLET | Refills: 1 | Status: SHIPPED | OUTPATIENT
Start: 2025-07-29

## 2025-07-29 NOTE — ASSESSMENT & PLAN NOTE
Chronic, at goal (stable), continue current treatment plan  -   Orders:    metoprolol succinate (TOPROL XL) 50 MG extended release tablet; Take 1 tablet by mouth daily    spironolactone (ALDACTONE) 25 MG tablet; Take 1 tablet by mouth daily

## 2025-07-29 NOTE — PROGRESS NOTES
Belén Acevedo (:  1950) is a 74 y.o. female,Established patient, here for evaluation of the following chief complaint(s):  Hypertension         Assessment & Plan  Primary breast malignancy, left (HCC)   Chronic, at goal (stable), on anastrazole         Essential hypertension   Chronic, at goal (stable), continue current treatment plan  -   Orders:    metoprolol succinate (TOPROL XL) 50 MG extended release tablet; Take 1 tablet by mouth daily    spironolactone (ALDACTONE) 25 MG tablet; Take 1 tablet by mouth daily    BMI 32.0-32.9,adult   Chronic, not at goal (unstable), continue current treatment plan         Persistent atrial fibrillation (HCC)   Chronic, at goal (stable), continue current treatment plan    Orders:    dilTIAZem (CARDIZEM CD) 180 MG extended release capsule; Take 1 capsule by mouth daily    apixaban (ELIQUIS) 5 MG TABS tablet; Take 1 tablet by mouth 2 times daily    metoprolol succinate (TOPROL XL) 50 MG extended release tablet; Take 1 tablet by mouth daily    Idiopathic gout, unspecified chronicity, unspecified site   Chronic, not at goal (unstable), continue current treatment plan    Orders:    Uric Acid; Future    Renal Function Panel; Future    Albumin/Creatinine Ratio, Urine    TEDDY (acute kidney injury)   Chronic, worsening (exacerbation), continue current treatment plan    Orders:    Uric Acid; Future    Renal Function Panel; Future    Albumin/Creatinine Ratio, Urine      No follow-ups on file.       Subjective   Hypertension          Belén Acevedo is a 74 y.o. female who presents with possible gout. Pain wass located in toe but hasresolved. She prefers not to take medications.  Associated symptoms: none.      Hypertension  Patient is here for follow-up of elevated blood pressure. She is not exercising and is adherent to a low-salt diet. Blood pressure is well controlled at home. Cardiac symptoms: none. Patient denies fatigue. Cardiovascular risk factors: advanced age (older than

## 2025-08-25 DIAGNOSIS — E05.00 GRAVES DISEASE: ICD-10-CM

## 2025-08-25 RX ORDER — METHIMAZOLE 5 MG/1
5 TABLET ORAL DAILY
Qty: 90 TABLET | Refills: 1 | Status: SHIPPED | OUTPATIENT
Start: 2025-08-25

## 2025-09-03 ENCOUNTER — HOSPITAL ENCOUNTER (OUTPATIENT)
Age: 75
Discharge: HOME | End: 2025-09-03
Payer: MEDICARE

## 2025-09-03 DIAGNOSIS — N17.9 AKI (ACUTE KIDNEY INJURY): ICD-10-CM

## 2025-09-03 DIAGNOSIS — M10.00 IDIOPATHIC GOUT, UNSPECIFIED CHRONICITY, UNSPECIFIED SITE: ICD-10-CM

## 2025-09-03 LAB
ALBUMIN SERPL-MCNC: 4.2 G/DL (ref 3.4–5)
ANION GAP SERPL CALCULATED.3IONS-SCNC: 12 MMOL/L (ref 3–16)
BUN SERPL-MCNC: 18 MG/DL (ref 7–20)
CALCIUM SERPL-MCNC: 10.1 MG/DL (ref 8.3–10.6)
CHLORIDE SERPL-SCNC: 101 MMOL/L (ref 99–110)
CO2 SERPL-SCNC: 24 MMOL/L (ref 21–32)
CREAT SERPL-MCNC: 1.1 MG/DL (ref 0.6–1.2)
GFR SERPLBLD CREATININE-BSD FMLA CKD-EPI: 53 ML/MIN/{1.73_M2}
GLUCOSE SERPL-MCNC: 88 MG/DL (ref 70–99)
PHOSPHATE SERPL-MCNC: 3.4 MG/DL (ref 2.5–4.9)
POTASSIUM SERPL-SCNC: ABNORMAL MMOL/L (ref 3.5–5.1)
REASON FOR REJECTION: NORMAL
REJECTED TEST: NORMAL
SODIUM SERPL-SCNC: 137 MMOL/L (ref 136–145)
URATE SERPL-MCNC: 8 MG/DL (ref 2.6–6)

## 2025-09-03 PROCEDURE — 84550 ASSAY OF BLOOD/URIC ACID: CPT

## 2025-09-03 PROCEDURE — 80069 RENAL FUNCTION PANEL: CPT

## 2025-09-06 ENCOUNTER — HOSPITAL ENCOUNTER (OUTPATIENT)
Age: 75
Setting detail: SPECIMEN
Discharge: HOME | End: 2025-09-06
Payer: MEDICARE

## 2025-09-06 LAB — POTASSIUM SERPL-SCNC: 3.9 MMOL/L (ref 3.5–5.1)

## 2025-09-06 PROCEDURE — 84132 ASSAY OF SERUM POTASSIUM: CPT

## (undated) DEVICE — BW-412T DISP COMBO CLEANING BRUSH: Brand: SINGLE USE COMBINATION CLEANING BRUSH

## (undated) DEVICE — ELECTRODE PT RET AD L9FT HI MOIST COND ADH HYDRGEL CORDED

## (undated) DEVICE — PROCEDURE KIT ENDOSCP CUST

## (undated) DEVICE — PROBE SET W/DRAPE

## (undated) DEVICE — SPECIMEN ORIENTATION CHARMS, SIX DISTINCTLY SHAPED STERILE 10MM CHARMS: Brand: MARGINMAP

## (undated) DEVICE — SET VLV 3 PC AWS DISPOSABLE GRDIAN SCOPEVALET

## (undated) DEVICE — CORD,CAUTERY,BIPOLAR,STERILE: Brand: MEDLINE

## (undated) DEVICE — GAUZE,SPONGE,4"X4",16PLY,STRL,LF,10/TRAY: Brand: MEDLINE

## (undated) DEVICE — GLOVE SURG SZ 7 L12IN THK7.5MIL DK GRN LTX FREE MSG6570] MEDLINE INDUSTRIES INC]

## (undated) DEVICE — APPLIER CLP L9.38IN M LIG TI DISP STR RNG HNDL LIGACLP

## (undated) DEVICE — LIQUIBAND RAPID ADHESIVE 36/CS 0.8ML: Brand: MEDLINE

## (undated) DEVICE — Device: Brand: DISPOSABLE ELECTROSURGICAL SNARE

## (undated) DEVICE — SOLUTION IV IRRIG WATER 500ML POUR BRL ST 2F7113

## (undated) DEVICE — 60 ML SYRINGE,REGULAR TIP: Brand: MONOJECT

## (undated) DEVICE — NEEDLE 25GAX5.0MM INJ CARR LOCKE

## (undated) DEVICE — SUTURE MONOCRYL + SZ 3-0 L27IN ABSRB UD L26MM SH 1/2 CIR MCP416H

## (undated) DEVICE — INTENDED USE FOR SURGICAL MARKING ON INTACT SKIN, ALSO PROVIDES A PERMANENT METHOD OF IDENTIFYING OBJECTS IN THE OPERATING ROOM: Brand: WRITESITE® PLUS MINI PREP RESISTANT MARKER

## (undated) DEVICE — TRAP SPEC RETRV CLR PLAS POLYP IN LN SUCT QUIK CTCH

## (undated) DEVICE — SNARES COLD OVAL 10MM THIN

## (undated) DEVICE — PROVE COVER: Brand: UNBRANDED

## (undated) DEVICE — STAPLER SKIN H3.9MM WIRE DIA0.58MM CRWN 6.9MM 35 STPL ROT

## (undated) DEVICE — GOWN SIRUS NONREIN XL W/TWL: Brand: MEDLINE INDUSTRIES, INC.

## (undated) DEVICE — PENCIL SMK EVAC TELSCP 3 M TBNG

## (undated) DEVICE — FIAPC® PROBE W/ FILTER 2200 A OD 2.3MM/6.9FR; L 2.2M/7.2FT: Brand: ERBE

## (undated) DEVICE — VALVE SUCTION AIR H2O SET ORCA POD + DISP

## (undated) DEVICE — HYPODERMIC SAFETY NEEDLE: Brand: MAGELLAN

## (undated) DEVICE — SUTURE MONOCRYL + SZ 4-0 L27IN ABSRB UD L19MM PS-2 3/8 CIR MCP426H

## (undated) DEVICE — WORKING LENGTH 155CM, WORKING CHANNEL 2.8MM: Brand: RESOLUTION 360 CLIP

## (undated) DEVICE — YANKAUER,BULB TIP,W/O VENT,RIGID,STERILE: Brand: MEDLINE

## (undated) DEVICE — SYRINGE,10ML,TR/FR,MLL,W/RES: Brand: NAMIC

## (undated) DEVICE — 3M™ IOBAN™ 2 ANTIMICROBIAL INCISE DRAPE 6650EZ: Brand: IOBAN™ 2

## (undated) DEVICE — SUTURE VICRYL + SZ 3-0 L18IN ABSRB UD SH 1/2 CIR TAPERCUT NDL VCP864D

## (undated) DEVICE — GLOVE SURG SZ 6.5 L11.2IN FNGR THK9.8MIL STRW LTX POLYMER

## (undated) DEVICE — BREAST: Brand: MEDLINE INDUSTRIES, INC.